# Patient Record
Sex: FEMALE | Race: WHITE | NOT HISPANIC OR LATINO | Employment: OTHER | URBAN - METROPOLITAN AREA
[De-identification: names, ages, dates, MRNs, and addresses within clinical notes are randomized per-mention and may not be internally consistent; named-entity substitution may affect disease eponyms.]

---

## 2017-01-06 ENCOUNTER — HOSPITAL ENCOUNTER (OUTPATIENT)
Dept: ONCOLOGY | Facility: CLINIC | Age: 72
Discharge: HOME | End: 2017-01-06
Attending: FAMILY MEDICINE | Admitting: FAMILY MEDICINE

## 2017-01-06 LAB — GLUCOSE BLD-MCNC: 99 MG/DL (ref 70–105)

## 2017-04-20 ENCOUNTER — HOSPITAL ENCOUNTER (OUTPATIENT)
Dept: CT IMAGING | Facility: HOSPITAL | Age: 72
Discharge: HOME | End: 2017-04-20
Attending: NURSE PRACTITIONER | Admitting: NURSE PRACTITIONER

## 2017-08-10 ENCOUNTER — HOSPITAL ENCOUNTER (OUTPATIENT)
Dept: MRI IMAGING | Facility: HOSPITAL | Age: 72
Discharge: HOME | End: 2017-08-10
Attending: FAMILY MEDICINE | Admitting: FAMILY MEDICINE

## 2017-08-24 ENCOUNTER — HOSPITAL ENCOUNTER (OUTPATIENT)
Dept: OTHER | Facility: HOSPITAL | Age: 72
Discharge: HOME | End: 2017-08-24
Attending: NURSE PRACTITIONER | Admitting: NURSE PRACTITIONER

## 2017-09-13 ENCOUNTER — HOSPITAL ENCOUNTER (OUTPATIENT)
Dept: CARDIOLOGY | Facility: HOSPITAL | Age: 72
Discharge: HOME | End: 2017-09-13
Attending: INTERNAL MEDICINE | Admitting: INTERNAL MEDICINE

## 2017-10-20 ENCOUNTER — HOSPITAL ENCOUNTER (OUTPATIENT)
Dept: CT IMAGING | Facility: HOSPITAL | Age: 72
Discharge: HOME | End: 2017-10-20
Attending: FAMILY MEDICINE | Admitting: FAMILY MEDICINE

## 2017-12-14 ENCOUNTER — HOSPITAL ENCOUNTER (OUTPATIENT)
Dept: CARDIOLOGY | Facility: HOSPITAL | Age: 72
Discharge: HOME | End: 2017-12-14
Attending: INTERNAL MEDICINE | Admitting: INTERNAL MEDICINE

## 2018-01-05 ENCOUNTER — HOSPITAL ENCOUNTER (OUTPATIENT)
Dept: OTHER | Facility: HOSPITAL | Age: 73
Discharge: HOME | End: 2018-01-05
Attending: SURGERY | Admitting: SURGERY

## 2018-01-05 LAB — CREAT BLDA-MCNC: 1 MG/DL (ref 0.6–1.3)

## 2018-03-16 ENCOUNTER — HOSPITAL ENCOUNTER (OUTPATIENT)
Dept: LAB | Facility: HOSPITAL | Age: 73
Discharge: HOME | End: 2018-03-16
Attending: FAMILY MEDICINE | Admitting: FAMILY MEDICINE

## 2018-06-28 ENCOUNTER — HOSPITAL ENCOUNTER (OUTPATIENT)
Dept: GENERAL RADIOLOGY | Facility: HOSPITAL | Age: 73
Discharge: HOME | End: 2018-06-28
Attending: NURSE PRACTITIONER | Admitting: NURSE PRACTITIONER

## 2019-08-01 ENCOUNTER — OFFICE VISIT (OUTPATIENT)
Dept: CARDIOLOGY | Facility: CLINIC | Age: 74
End: 2019-08-01

## 2019-08-01 VITALS
HEART RATE: 88 BPM | OXYGEN SATURATION: 91 % | WEIGHT: 146.4 LBS | SYSTOLIC BLOOD PRESSURE: 127 MMHG | DIASTOLIC BLOOD PRESSURE: 70 MMHG | RESPIRATION RATE: 18 BRPM

## 2019-08-01 DIAGNOSIS — E78.5 DYSLIPIDEMIA: ICD-10-CM

## 2019-08-01 DIAGNOSIS — I50.22 CHRONIC SYSTOLIC CONGESTIVE HEART FAILURE (HCC): ICD-10-CM

## 2019-08-01 DIAGNOSIS — J44.9 CHRONIC OBSTRUCTIVE PULMONARY DISEASE, UNSPECIFIED COPD TYPE (HCC): ICD-10-CM

## 2019-08-01 DIAGNOSIS — I25.10 CORONARY ARTERY DISEASE INVOLVING NATIVE CORONARY ARTERY OF NATIVE HEART WITHOUT ANGINA PECTORIS: Primary | ICD-10-CM

## 2019-08-01 DIAGNOSIS — I71.40 ABDOMINAL AORTIC ANEURYSM (AAA) WITHOUT RUPTURE (HCC): ICD-10-CM

## 2019-08-01 DIAGNOSIS — I10 ESSENTIAL HYPERTENSION: ICD-10-CM

## 2019-08-01 PROCEDURE — 99214 OFFICE O/P EST MOD 30 MIN: CPT | Performed by: INTERNAL MEDICINE

## 2019-08-01 PROCEDURE — 93000 ELECTROCARDIOGRAM COMPLETE: CPT | Performed by: INTERNAL MEDICINE

## 2019-08-01 RX ORDER — CLOPIDOGREL BISULFATE 75 MG/1
75 TABLET ORAL DAILY
COMMUNITY
Start: 2017-05-04

## 2019-08-01 RX ORDER — NITROGLYCERIN 0.4 MG/1
0.4 TABLET SUBLINGUAL
COMMUNITY
Start: 2017-12-12 | End: 2021-07-21 | Stop reason: SDUPTHER

## 2019-08-01 RX ORDER — ESTRADIOL 1 MG/1
3 TABLET ORAL NIGHTLY
COMMUNITY
Start: 2017-06-28

## 2019-08-01 RX ORDER — METHOCARBAMOL 500 MG/1
500 TABLET, FILM COATED ORAL 3 TIMES DAILY PRN
COMMUNITY

## 2019-08-01 RX ORDER — LORAZEPAM 1 MG/1
1 TABLET ORAL 4 TIMES DAILY PRN
COMMUNITY
Start: 2017-05-04

## 2019-08-01 RX ORDER — LEVOTHYROXINE SODIUM 0.05 MG/1
50 TABLET ORAL DAILY
COMMUNITY
End: 2019-11-18

## 2019-08-01 RX ORDER — BUDESONIDE AND FORMOTEROL FUMARATE DIHYDRATE 160; 4.5 UG/1; UG/1
2 AEROSOL RESPIRATORY (INHALATION) 2 TIMES DAILY
COMMUNITY
Start: 2018-07-30 | End: 2019-11-18

## 2019-08-01 RX ORDER — ISOSORBIDE MONONITRATE 30 MG/1
30 TABLET, EXTENDED RELEASE ORAL EVERY 24 HOURS
COMMUNITY
Start: 2018-06-27

## 2019-08-01 RX ORDER — FERROUS SULFATE TAB EC 324 MG (65 MG FE EQUIVALENT) 324 (65 FE) MG
324 TABLET DELAYED RESPONSE ORAL
COMMUNITY

## 2019-08-01 RX ORDER — HYDROCODONE BITARTRATE AND ACETAMINOPHEN 10; 325 MG/1; MG/1
1 TABLET ORAL EVERY 6 HOURS PRN
COMMUNITY
Start: 2017-05-04

## 2019-08-01 RX ORDER — DIPHENHYDRAMINE HYDROCHLORIDE 25 MG/1
1 TABLET ORAL DAILY
COMMUNITY
End: 2022-03-03

## 2019-08-01 RX ORDER — ROSUVASTATIN CALCIUM 10 MG/1
10 TABLET, COATED ORAL DAILY
COMMUNITY
Start: 2017-05-04 | End: 2022-03-18 | Stop reason: HOSPADM

## 2019-08-01 RX ORDER — GABAPENTIN 400 MG/1
400 CAPSULE ORAL 3 TIMES DAILY
COMMUNITY
Start: 2017-05-04

## 2019-08-01 RX ORDER — SERTRALINE HYDROCHLORIDE 100 MG/1
100 TABLET, FILM COATED ORAL DAILY
COMMUNITY
Start: 2017-05-04

## 2019-08-01 RX ORDER — RANOLAZINE 1000 MG/1
1000 TABLET, EXTENDED RELEASE ORAL EVERY 12 HOURS SCHEDULED
COMMUNITY
Start: 2018-07-30 | End: 2020-02-18

## 2019-08-01 NOTE — PROGRESS NOTES
"Cardiology Office Visit      Encounter Date:  08/01/2019    Patient ID:   Gayathri Reddy is a 74 y.o. female.    Reason For Followup:  CAD  Tako-Tsubo cardiomyopathy  HTN with hypertensive cardiovascular disease     Brief Clinical History:  Dear Dr. Hector, Deonte Patel MD    I had the pleasure of seeing Gayathri Reddy today. As you are well aware, this is a 74 y.o. female with an established history of CAD.  She has undergone 4-V CABG in 1995.  She has additional history of hypertension with hypertensive cardiovascular disease, HLD, COPD, and cardiomyopathy.  She presents today to follow-up on the above conditions.    Interval History:  She denies any chest pain, pressure, heaviness or tightness. She reports shortness of breath however it is not out of character.  She denies any PND orthopnea.  She denies any syncope or near-syncope. She has some palpitations from time to time as well.    She has experienced a couple of falls. The most recent was when she tripped over her oxygen tubing. She had another slip and fall in the shower. All of her falls have been mechanical. She also reports feeling shaky from time to time and these \"spells\" resolve when she lays down.    She underwent Cardiac catheterization in October 2018 with FFR and no significant treatable lesion was noted.  She did report a 75% improvement with Ranexa.  She is unable to afford this medication.  We will continue to work with patient assistance. She is getting samples however this may be difficult going forward due to the medication now being generic.      Assessment & Plan    Impressions:  CAD s/p CABG with attrition of 3/4 grafts. 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     Last echo with LVEF 45%  Oxygen dependant COPD  Chest pain with typical and atypical features.  HTN with HTCVD  AAA followed by Dr Lang  Angina pectoris significantly improved with Ranexa  Skin cancer on " "nose s/p surgery    Recommendations:  Consider melatonin to help with sleep  Try to check BP during \"spells\" and see if this is playing a role  Follow-up in 6 months, sooner should there be difficulties.    Objective:    Vitals:  Vitals:    08/01/19 1130   BP: 127/70   Pulse: 88   Resp: 18   SpO2: 91%   Weight: 66.4 kg (146 lb 6.4 oz)       Physical Exam:    General: Alert, cooperative, no distress, appears stated age  Head:  Normocephalic, atraumatic, mucous membranes moist  Eyes:  Conjunctiva/corneas clear, EOM's intact     Neck:  Supple,  no adenopathy;      Lungs: Clear to auscultation bilaterally, no wheezes rhonchi rales are noted  Chest wall: No tenderness  Heart::  Regular rate and rhythm, S1 and S2 normal, no murmur, rub or gallop  Abdomen: Soft, non-tender, nondistended bowel sounds active  Extremities: No cyanosis, clubbing, or edema  Pulses: 2+ and symmetric all extremities  Skin:  No rashes or lesions  Neuro/psych: A&O x3. CN II through XII are grossly intact with appropriate affect      Allergies:  Allergies   Allergen Reactions   • Naprosyn  [Naproxen] Rash   • Bactrim [Sulfamethoxazole-Trimethoprim] Rash       Medication Review:     Current Outpatient Medications:   •  Biotin (BIOTIN 5000) 5 MG capsule, Take 1 capsule by mouth Daily., Disp: , Rfl:   •  budesonide-formoterol (SYMBICORT) 160-4.5 MCG/ACT inhaler, Inhale 2 puffs 2 (Two) Times a Day., Disp: , Rfl:   •  clopidogrel (PLAVIX) 75 MG tablet, Take 1 tablet by mouth Daily., Disp: , Rfl:   •  estradiol (ESTRACE) 1 MG tablet, Take 1 tablet by mouth 3 (Three) Times a Day., Disp: , Rfl:   •  ferrous sulfate 324 (65 Fe) MG tablet delayed-release EC tablet, Take 324 mg by mouth Daily With Breakfast., Disp: , Rfl:   •  gabapentin (NEURONTIN) 400 MG capsule, Take 1 capsule by mouth 3 (Three) Times a Day., Disp: , Rfl:   •  HYDROcodone-acetaminophen (NORCO)  MG per tablet, Take 1 tablet by mouth Every 6 (Six) Hours As Needed., Disp: , Rfl:   •  " isosorbide mononitrate (IMDUR) 30 MG 24 hr tablet, Take 1 tablet by mouth Daily., Disp: , Rfl:   •  levothyroxine (SYNTHROID, LEVOTHROID) 50 MCG tablet, Take 50 mcg by mouth Daily., Disp: , Rfl:   •  LORazepam (ATIVAN) 1 MG tablet, Take 1 tablet by mouth 4 (Four) Times a Day As Needed., Disp: , Rfl:   •  methocarbamol (ROBAXIN) 500 MG tablet, Take 500 mg by mouth 3 (Three) Times a Day., Disp: , Rfl:   •  nitroglycerin (NITROSTAT) 0.4 MG SL tablet, Place 1 tablet under the tongue As Needed., Disp: , Rfl:   •  ranolazine (RANEXA) 1000 MG 12 hr tablet, Take 1 tablet by mouth Daily., Disp: , Rfl:   •  rosuvastatin (CRESTOR) 10 MG tablet, Take 1 tablet by mouth Daily., Disp: , Rfl:   •  sertraline (ZOLOFT) 100 MG tablet, Take 1 tablet by mouth Daily., Disp: , Rfl:   •  Tiotropium Bromide Monohydrate (SPIRIVA RESPIMAT) 1.25 MCG/ACT aerosol solution inhaler, Inhale 2 capsules 2 (Two) Times a Day., Disp: , Rfl:   •  Unable to find, Take 1 each by mouth 1 (One) Time. [employer] Sleep Aid Max strength, Disp: , Rfl:     Family History:  Family History   Problem Relation Age of Onset   • Heart disease Mother    • Aneurysm Mother    • Heart disease Father        Past Medical History:  Past Medical History:   Diagnosis Date   • Aneurysm (CMS/HCC)     AAA   • Arthritis    • COPD (chronic obstructive pulmonary disease) (CMS/HCC)    • Dyslipidemia    • GERD (gastroesophageal reflux disease)    • History of right heart catheterization     7/30/2017; 10/2018   • Hypertension    • Myocardial infarction (CMS/HCC)     x 3       Past surgical History:  Past Surgical History:   Procedure Laterality Date   • ABDOMINAL AORTIC ANEURYSM REPAIR N/A 2001   • CARDIAC CATHETERIZATION     • CATARACT EXTRACTION Bilateral    • CORONARY ANGIOPLASTY WITH STENT PLACEMENT N/A     x 5   • CORONARY ARTERY BYPASS GRAFT      x 4 1995   • HYSTERECTOMY N/A        Social History:  Social History     Socioeconomic History   • Marital status:      Spouse name:  Not on file   • Number of children: Not on file   • Years of education: Not on file   • Highest education level: Not on file   Tobacco Use   • Smoking status: Former Smoker   • Smokeless tobacco: Never Used   Substance and Sexual Activity   • Alcohol use: No     Frequency: Never   • Drug use: No       Review of Systems:  The following systems were reviewed as they relate to the cardiovascular system: Constitutional, Eyes, ENT, Cardiovascular, Respiratory, Gastrointestinal, Integumentary, Neurological, Psychiatric, Hematologic, Endocrine, Musculoskeletal, and Genitourinary. The pertinent cardiovascular findings are reported above with all other cardiovascular points within those systems being negative.    Diagnostic Study Review:     Current Electrocardiogram:    ECG 12 Lead  Date/Time: 8/1/2019 1:52 PM  Performed by: Roc Butler DO  Authorized by: Roc Butler DO   Comments: Sinus rhythm with a ventricular rate of 77 beats per minute. Incomplete LBBB. 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 Normal QT and QTc intervals. Left axis deviation.            Most recent Cardiac Catheterization:  Date:  Findings:    Most Recent Echocardiogram:  Date:  Findings:    Most Recent Stress Test:  Date:  Findings:    Most Recent Ambulatory ECG Monitor:  Date:  Findings:    NOTE: The following portions of the patient's history were reviewed and updated this visit as appropriate: allergies, current medications, past family history, past medical history, past social history, past surgical history and problem list.

## 2019-08-01 NOTE — PATIENT INSTRUCTIONS
"Consider Melatonin for slep aid  Check blood pressure and heart rate with \"spells\" and notify us of values  Follow-up in 6 months, sooner if there ar issues.  "

## 2019-08-09 PROBLEM — E78.5 DYSLIPIDEMIA: Status: ACTIVE | Noted: 2017-08-21

## 2019-08-09 PROBLEM — I50.9 CONGESTIVE HEART FAILURE (HCC): Status: ACTIVE | Noted: 2017-08-21

## 2019-08-09 PROBLEM — I25.10 CORONARY ARTERY DISEASE: Status: ACTIVE | Noted: 2017-08-21

## 2019-08-09 PROBLEM — I10 HYPERTENSION: Status: ACTIVE | Noted: 2017-08-21

## 2019-08-09 PROBLEM — J44.9 CHRONIC OBSTRUCTIVE PULMONARY DISEASE (HCC): Status: ACTIVE | Noted: 2017-08-21

## 2019-08-09 PROBLEM — I71.40 ABDOMINAL AORTIC ANEURYSM (AAA) (HCC): Status: ACTIVE | Noted: 2017-06-28

## 2019-10-31 ENCOUNTER — TRANSCRIBE ORDERS (OUTPATIENT)
Dept: ADMINISTRATIVE | Facility: HOSPITAL | Age: 74
End: 2019-10-31

## 2019-10-31 DIAGNOSIS — J44.9 CHRONIC OBSTRUCTIVE PULMONARY DISEASE, UNSPECIFIED COPD TYPE (HCC): Primary | ICD-10-CM

## 2019-11-14 ENCOUNTER — HOSPITAL ENCOUNTER (EMERGENCY)
Facility: HOSPITAL | Age: 74
Discharge: HOME | End: 2019-11-14
Admitting: EMERGENCY MEDICINE

## 2019-11-14 ENCOUNTER — APPOINTMENT (OUTPATIENT)
Dept: CT IMAGING | Facility: HOSPITAL | Age: 74
End: 2019-11-14

## 2019-11-14 ENCOUNTER — HOSPITAL ENCOUNTER (OUTPATIENT)
Dept: RESPIRATORY THERAPY | Facility: HOSPITAL | Age: 74
Discharge: HOME | End: 2019-11-14
Admitting: INTERNAL MEDICINE

## 2019-11-14 VITALS
RESPIRATION RATE: 16 BRPM | SYSTOLIC BLOOD PRESSURE: 171 MMHG | HEART RATE: 78 BPM | HEIGHT: 64 IN | TEMPERATURE: 98 F | BODY MASS INDEX: 24.92 KG/M2 | OXYGEN SATURATION: 96 % | WEIGHT: 146 LBS | DIASTOLIC BLOOD PRESSURE: 78 MMHG

## 2019-11-14 DIAGNOSIS — R51.9 NONINTRACTABLE HEADACHE, UNSPECIFIED CHRONICITY PATTERN, UNSPECIFIED HEADACHE TYPE: ICD-10-CM

## 2019-11-14 DIAGNOSIS — S16.1XXA STRAIN OF NECK MUSCLE, INITIAL ENCOUNTER: ICD-10-CM

## 2019-11-14 DIAGNOSIS — J44.9 CHRONIC OBSTRUCTIVE PULMONARY DISEASE, UNSPECIFIED COPD TYPE (HCC): ICD-10-CM
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
## 2019-11-14 DIAGNOSIS — S00.03XA CONTUSION OF SCALP, INITIAL ENCOUNTER: ICD-10-CM

## 2019-11-14 DIAGNOSIS — J06.9 UPPER RESPIRATORY TRACT INFECTION, UNSPECIFIED TYPE: ICD-10-CM

## 2019-11-14 PROCEDURE — 72125 CT NECK SPINE W/O DYE: CPT

## 2019-11-14 PROCEDURE — 99284 EMERGENCY DEPT VISIT MOD MDM: CPT

## 2019-11-14 PROCEDURE — 94727 GAS DIL/WSHOT DETER LNG VOL: CPT

## 2019-11-14 PROCEDURE — 94060 EVALUATION OF WHEEZING: CPT

## 2019-11-14 PROCEDURE — 94729 DIFFUSING CAPACITY: CPT

## 2019-11-14 PROCEDURE — 70450 CT HEAD/BRAIN W/O DYE: CPT

## 2019-11-14 RX ORDER — ALBUTEROL SULFATE 2.5 MG/3ML
2.5 SOLUTION RESPIRATORY (INHALATION) ONCE
Status: COMPLETED | OUTPATIENT
Start: 2019-11-14 | End: 2019-11-14

## 2019-11-14 RX ORDER — BENZONATATE 100 MG/1
100 CAPSULE ORAL 3 TIMES DAILY PRN
Qty: 21 CAPSULE | Refills: 0 | Status: SHIPPED | OUTPATIENT
Start: 2019-11-14 | End: 2022-02-18

## 2019-11-14 RX ORDER — FLUTICASONE PROPIONATE 50 MCG
2 SPRAY, SUSPENSION (ML) NASAL DAILY
Qty: 9.9 ML | Refills: 0 | Status: SHIPPED | OUTPATIENT
Start: 2019-11-14 | End: 2022-02-18 | Stop reason: ALTCHOICE

## 2019-11-14 RX ADMIN — ALBUTEROL SULFATE 2.5 MG: 2.5 SOLUTION RESPIRATORY (INHALATION) at 15:53

## 2019-11-15 NOTE — ED NOTES
Pt fell reports lost balance, c/o HA, neck pain and back pain      Delphine Samaniego RN  11/14/19 9120

## 2019-11-15 NOTE — ED PROVIDER NOTES
Subjective   Chief complaint: Fall      Context: Patient is a 74-year-old female who comes in amatory by private vehicle with complaints of headache and neck pain after she tripped and fell backwards.  She denies any loss of consciousness unilateral focal deficits weakness confusion ataxia lethargy visual changes saddle anesthesia bowel or bladder incontinence retention or weakness.    Duration:1930    Timing: waxes and wanes    Severity: moderate    Associated symptoms: worse with rom          PCP: chasidy              Review of Systems   Constitutional: Negative.    HENT: Positive for congestion.    Eyes: Negative.    Respiratory: Positive for cough.    Cardiovascular: Negative.    Gastrointestinal: Negative.    Genitourinary: Negative.    Musculoskeletal: Positive for neck pain.   Skin: Negative.    Neurological: Positive for headaches.   Hematological: Bruises/bleeds easily.       Past Medical History:   Diagnosis Date   • Aneurysm (CMS/[facility])     AAA   • Arthritis    • COPD (chronic obstructive pulmonary disease) (CMS/[facility])    • Dyslipidemia    • GERD (gastroesophageal reflux disease)    • History of right heart catheterization     7/30/2017; 10/2018   • Hypertension    • Myocardial infarction (CMS/[facility])     x 3       Allergies   Allergen Reactions   • Naprosyn  [Naproxen] Rash   • Bactrim [Sulfamethoxazole-Trimethoprim] Rash       Past Surgical History:   Procedure Laterality Date   • ABDOMINAL AORTIC ANEURYSM REPAIR N/A 2001   • CARDIAC CATHETERIZATION     • CATARACT EXTRACTION Bilateral    • CORONARY ANGIOPLASTY WITH STENT PLACEMENT N/A     x 5   • CORONARY ARTERY BYPASS GRAFT      x 4 1995   • HYSTERECTOMY N/A        Family History   Problem Relation Age of Onset   • Heart disease Mother    • Aneurysm Mother    • Heart disease Father        Social History     Socioeconomic History   • Marital status:      Spouse name: Not on file   • Number of children: Not on file   • Years of education: Not on file   •  Highest education level: Not on file   Tobacco Use   • Smoking status: Former Smoker   • Smokeless tobacco: Never Used   Substance and Sexual Activity   • Alcohol use: No     Frequency: Never   • Drug use: No           Objective   Physical Exam     Vital signs and traige nurse note reviewed.  Constitutional:  Awake, alert; well developed and well nourished.  No acute distress, the patient is examined in hospital gown.  HEENT:  Normocephalic, atraumatic; pupils are PERRL with intact EOM; oropharynx is pink and moist without edema or erythema.  TMs intact bilaterally without erythema bulging bleeding or discharge.  Minor amount of clear rhinorrhea.  There is some postnasal drip noted in the posterior oropharynx  Neck: Supple,   Cardiovascular: Regular rate and rhythm, normal S1-S2. . Murmur  Pulmonary: Respiratory effort regular, nonlabored; breath sounds CTA without wheezing or rhonchi.  Abdomen: Soft, nontender, nondistended with normoactive bowel sounds; no rebound or guarding.    Musculoskeletal: Independent range of motion of all extremities, no palpable tenderness or edema.   Back:  Spine is midline and without midline tenderness on palpation of , thoracic, and lumbar spine; some midline tenderness over the cervical spine without any bony step-off or crepitus; paraspinal musculature is tender bilateral trapezius muscles and without soft tissue swelling, overlying ecchymosis or erythema. ROM is without pain,  Distal muscle strength is 5/5.  Neuro: Alert oriented x3, speech is clear and appropriate. strong and equal dorsiflexion of bilateral great toes L4, L5, S1 motor sensory intact.    Patient oxygen saturation 94% on her baseline 2 L nasal cannula.  Triage saturation of 81% erroneous as triage nurse states he put the heart rate in the SPO2 box.    Procedures           ED Course        Labs Reviewed - No data to display  Medications - No data to display  Ct Head Without Contrast    Result Date: 11/14/2019  No  evidence of hemorrhage, mass effect or midline shift. No acute process identified.  Electronically Signed ByUsha Granger On:11/14/2019 9:42 PM This report was finalized on [id number] by  Andres Landry    Ct Cervical Spine Without Contrast    Result Date: 11/14/2019  No acute osseous abnormality.  Electronically Signed ByUsha Granger On:11/14/2019 9:44 PM This report was finalized on [id number] by  Andres Landry              MDM  Number of Diagnoses or Management Options  Contusion of scalp, initial encounter:   Fall, initial encounter:   Nonintractable headache, unspecified chronicity pattern, unspecified headache type:   Strain of neck muscle, initial encounter:   Upper respiratory tract infection, unspecified type:   Diagnosis management comments: Medical decision  Comorbidities:  has a past medical history of Aneurysm (CMS/[facility]), Arthritis, COPD (chronic obstructive pulmonary disease) (CMS/[facility]), Dyslipidemia, GERD (gastroesophageal reflux disease), History of right heart catheterization, Hypertension, and Myocardial infarction (CMS/[facility]).  Differentials: ICH fracture concussion  Radiology interpretation:  X-rays reviewed by me and interpreted by radiologist,   Ct Head Without Contrast    Result Date: 11/14/2019  No evidence of hemorrhage, mass effect or midline shift. No acute process identified.  Electronically Signed ByUsha Granger On:11/14/2019 9:42 PM This report was finalized on [id number] by  Daniella Granger, Andres    Ct Cervical Spine Without Contrast    Result Date: 11/14/2019  No acute osseous abnormality.  Electronically Signed ByUsha Granger On:11/14/2019 9:44 PM This report was finalized on [id number] by  Andres Landry    Lab interpretation: Agrees not warranted    On reexam patient is awake alert and oriented neurologically intact  We discussed signs and symptoms of when to return emergently to the ER and warning precautions for ICH    Charge patient states she has had some nasal congestion  and postnasal drip.  Physical exam and ROS updated a states she has had congestion for approximately 2 days.  S above.  Patient will also be discharged home with Tessalon and Flonase-we discussed antibiotic's were not warranted at this time.    i discussed findings with patient who voices understanding of discharge instructions, signs and symptoms requiring return to ED; discharged improved and in stable condition with follow up for re-evaluation.      Patient Progress  Patient progress: stable      Final diagnoses:   Fall, initial encounter   Nonintractable headache, unspecified chronicity pattern, unspecified headache type   Strain of neck muscle, initial encounter   Contusion of scalp, initial encounter   Upper respiratory tract infection, unspecified type              Susanne Aguila, APRN  11/14/19 2216       Susanne Aguila, APRN  11/14/19 6515

## 2019-11-18 ENCOUNTER — HOSPITAL ENCOUNTER (INPATIENT)
Facility: HOSPITAL | Age: 74
LOS: 6 days | Discharge: HOME | End: 2019-11-24
Attending: INTERNAL MEDICINE | Admitting: INTERNAL MEDICINE

## 2019-11-18 ENCOUNTER — APPOINTMENT (OUTPATIENT)
Dept: GENERAL RADIOLOGY | Facility: HOSPITAL | Age: 74
End: 2019-11-18

## 2019-11-18 ENCOUNTER — HOSPITAL ENCOUNTER (INPATIENT)
Dept: CARDIOLOGY | Facility: HOSPITAL | Age: 74
Discharge: HOME | End: 2019-11-18

## 2019-11-18 DIAGNOSIS — R05.9 COUGH: ICD-10-CM

## 2019-11-18 DIAGNOSIS — I50.9 ACUTE HEART FAILURE, UNSPECIFIED HEART FAILURE TYPE (HCC): ICD-10-CM

## 2019-11-18 DIAGNOSIS — R50.9 FEVER, UNSPECIFIED FEVER CAUSE: ICD-10-CM

## 2019-11-18 DIAGNOSIS — J18.9 COMMUNITY ACQUIRED PNEUMONIA, UNSPECIFIED LATERALITY: Primary | ICD-10-CM

## 2019-11-18 LAB
ABO GROUP BLD: NORMAL
ALBUMIN SERPL-MCNC: 3.2 G/DL (ref 3.5–5.2)
ALBUMIN SERPL-MCNC: 3.6 G/DL (ref 3.5–5.2)
ALBUMIN/GLOB SERPL: 1.1 G/DL
ALBUMIN/GLOB SERPL: 1.2 G/DL
ALP SERPL-CCNC: 67 U/L (ref 39–117)
ALP SERPL-CCNC: 72 U/L (ref 39–117)
ALT SERPL W P-5'-P-CCNC: 6 U/L (ref 1–33)
ALT SERPL W P-5'-P-CCNC: 6 U/L (ref 1–33)
ANION GAP SERPL CALCULATED.3IONS-SCNC: 10 MMOL/L (ref 5–15)
ANION GAP SERPL CALCULATED.3IONS-SCNC: 12 MMOL/L (ref 5–15)
ANION GAP SERPL CALCULATED.3IONS-SCNC: 9 MMOL/L (ref 5–15)
AST SERPL-CCNC: 11 U/L (ref 1–32)
AST SERPL-CCNC: 11 U/L (ref 1–32)
B PERT DNA SPEC QL NAA+PROBE: NOT DETECTED
BASOPHILS # BLD AUTO: 0 10*3/MM3 (ref 0–0.2)
BASOPHILS # BLD AUTO: 0.1 10*3/MM3 (ref 0–0.2)
BASOPHILS NFR BLD AUTO: 0.5 % (ref 0–1.5)
BASOPHILS NFR BLD AUTO: 0.6 % (ref 0–1.5)
BILIRUB SERPL-MCNC: 0.3 MG/DL (ref 0.2–1.2)
BILIRUB SERPL-MCNC: 0.6 MG/DL (ref 0.2–1.2)
BILIRUB UR QL STRIP: NEGATIVE
BLD GP AB SCN SERPL QL: NEGATIVE
BUN BLD-MCNC: 13 MG/DL (ref 8–23)
BUN BLD-MCNC: 16 MG/DL (ref 8–23)
BUN BLD-MCNC: 18 MG/DL (ref 8–23)
BUN/CREAT SERPL: 12.9 (ref 7–25)
BUN/CREAT SERPL: 13.2 (ref 7–25)
BUN/CREAT SERPL: 14.4 (ref 7–25)
C PNEUM DNA NPH QL NAA+NON-PROBE: NOT DETECTED
CALCIUM SPEC-SCNC: 8.2 MG/DL (ref 8.6–10.5)
CALCIUM SPEC-SCNC: 8.7 MG/DL (ref 8.6–10.5)
CALCIUM SPEC-SCNC: 8.9 MG/DL (ref 8.6–10.5)
CHLORIDE SERPL-SCNC: 98 MMOL/L (ref 98–107)
CHLORIDE SERPL-SCNC: 99 MMOL/L (ref 98–107)
CHLORIDE SERPL-SCNC: 99 MMOL/L (ref 98–107)
CLARITY UR: CLEAR
CO2 SERPL-SCNC: 28 MMOL/L (ref 22–29)
CO2 SERPL-SCNC: 29 MMOL/L (ref 22–29)
CO2 SERPL-SCNC: 30 MMOL/L (ref 22–29)
COLOR UR: YELLOW
CREAT BLD-MCNC: 1.01 MG/DL (ref 0.57–1)
CREAT BLD-MCNC: 1.11 MG/DL (ref 0.57–1)
CREAT BLD-MCNC: 1.36 MG/DL (ref 0.57–1)
D-LACTATE SERPL-SCNC: 0.9 MMOL/L (ref 0.5–2)
DEPRECATED RDW RBC AUTO: 47.3 FL (ref 37–54)
DEPRECATED RDW RBC AUTO: 47.7 FL (ref 37–54)
EOSINOPHIL # BLD AUTO: 0 10*3/MM3 (ref 0–0.4)
EOSINOPHIL # BLD AUTO: 0 10*3/MM3 (ref 0–0.4)
EOSINOPHIL NFR BLD AUTO: 0.2 % (ref 0.3–6.2)
EOSINOPHIL NFR BLD AUTO: 0.5 % (ref 0.3–6.2)
ERYTHROCYTE [DISTWIDTH] IN BLOOD BY AUTOMATED COUNT: 13.4 % (ref 12.3–15.4)
ERYTHROCYTE [DISTWIDTH] IN BLOOD BY AUTOMATED COUNT: 13.5 % (ref 12.3–15.4)
FLUAV H1 2009 PAND RNA NPH QL NAA+PROBE: NOT DETECTED
FLUAV H1 HA GENE NPH QL NAA+PROBE: NOT DETECTED
FLUAV H3 RNA NPH QL NAA+PROBE: NOT DETECTED
FLUAV SUBTYP SPEC NAA+PROBE: NOT DETECTED
FLUBV RNA ISLT QL NAA+PROBE: NOT DETECTED
GFR SERPL CREATININE-BSD FRML MDRD: 38 ML/MIN/1.73
GFR SERPL CREATININE-BSD FRML MDRD: 48 ML/MIN/1.73
GFR SERPL CREATININE-BSD FRML MDRD: 54 ML/MIN/1.73
GLOBULIN UR ELPH-MCNC: 3 GM/DL
GLOBULIN UR ELPH-MCNC: 3.1 GM/DL
GLUCOSE BLD-MCNC: 126 MG/DL (ref 65–99)
GLUCOSE BLD-MCNC: 127 MG/DL (ref 65–99)
GLUCOSE BLD-MCNC: 149 MG/DL (ref 65–99)
GLUCOSE UR STRIP-MCNC: NEGATIVE MG/DL
HADV DNA SPEC NAA+PROBE: NOT DETECTED
HCOV 229E RNA SPEC QL NAA+PROBE: NOT DETECTED
HCOV HKU1 RNA SPEC QL NAA+PROBE: NOT DETECTED
HCOV NL63 RNA SPEC QL NAA+PROBE: NOT DETECTED
HCOV OC43 RNA SPEC QL NAA+PROBE: NOT DETECTED
HCT VFR BLD AUTO: 22.4 % (ref 34–46.6)
HCT VFR BLD AUTO: 23.3 % (ref 34–46.6)
HCT VFR BLD AUTO: 25.8 % (ref 34–46.6)
HCT VFR BLD AUTO: 26.2 % (ref 34–46.6)
HGB BLD-MCNC: 7.7 G/DL (ref 12–15.9)
HGB BLD-MCNC: 7.9 G/DL (ref 12–15.9)
HGB BLD-MCNC: 8.5 G/DL (ref 12–15.9)
HGB BLD-MCNC: 9.1 G/DL (ref 12–15.9)
HGB UR QL STRIP.AUTO: NEGATIVE
HMPV RNA NPH QL NAA+NON-PROBE: NOT DETECTED
HOLD SPECIMEN: NORMAL
HOLD SPECIMEN: NORMAL
HPIV1 RNA SPEC QL NAA+PROBE: NOT DETECTED
HPIV2 RNA SPEC QL NAA+PROBE: NOT DETECTED
HPIV3 RNA NPH QL NAA+PROBE: NOT DETECTED
HPIV4 P GENE NPH QL NAA+PROBE: NOT DETECTED
IRON 24H UR-MRATE: 10 MCG/DL (ref 37–145)
IRON 24H UR-MRATE: 10 MCG/DL (ref 37–145)
IRON SATN MFR SERPL: 4 % (ref 20–50)
KETONES UR QL STRIP: NEGATIVE
L PNEUMO1 AG UR QL IA: NEGATIVE
LDH SERPL-CCNC: 134 U/L (ref 135–214)
LEUKOCYTE ESTERASE UR QL STRIP.AUTO: NEGATIVE
LYMPHOCYTES # BLD AUTO: 1.6 10*3/MM3 (ref 0.7–3.1)
LYMPHOCYTES # BLD AUTO: 1.9 10*3/MM3 (ref 0.7–3.1)
LYMPHOCYTES NFR BLD AUTO: 16.2 % (ref 19.6–45.3)
LYMPHOCYTES NFR BLD AUTO: 21.7 % (ref 19.6–45.3)
M PNEUMO IGG SER IA-ACNC: NOT DETECTED
MAGNESIUM SERPL-MCNC: 1.6 MG/DL (ref 1.6–2.4)
MCH RBC QN AUTO: 33.8 PG (ref 26.6–33)
MCH RBC QN AUTO: 34.8 PG (ref 26.6–33)
MCHC RBC AUTO-ENTMCNC: 33.7 G/DL (ref 31.5–35.7)
MCHC RBC AUTO-ENTMCNC: 34.8 G/DL (ref 31.5–35.7)
MCV RBC AUTO: 100 FL (ref 79–97)
MCV RBC AUTO: 100.1 FL (ref 79–97)
MONOCYTES # BLD AUTO: 1.1 10*3/MM3 (ref 0.1–0.9)
MONOCYTES # BLD AUTO: 1.1 10*3/MM3 (ref 0.1–0.9)
MONOCYTES NFR BLD AUTO: 10.8 % (ref 5–12)
MONOCYTES NFR BLD AUTO: 13.1 % (ref 5–12)
NEUTROPHILS # BLD AUTO: 5.7 10*3/MM3 (ref 1.7–7)
NEUTROPHILS # BLD AUTO: 7 10*3/MM3 (ref 1.7–7)
NEUTROPHILS NFR BLD AUTO: 64.4 % (ref 42.7–76)
NEUTROPHILS NFR BLD AUTO: 72 % (ref 42.7–76)
NITRITE UR QL STRIP: NEGATIVE
NRBC BLD AUTO-RTO: 0 /100 WBC (ref 0–0.2)
NRBC BLD AUTO-RTO: 0.1 /100 WBC (ref 0–0.2)
NT-PROBNP SERPL-MCNC: 4031 PG/ML (ref 5–900)
PH UR STRIP.AUTO: <=5 [PH] (ref 5–8)
PLAT MORPH BLD: NORMAL
PLATELET # BLD AUTO: 145 10*3/MM3 (ref 140–450)
PLATELET # BLD AUTO: 177 10*3/MM3 (ref 140–450)
PMV BLD AUTO: 7.8 FL (ref 6–12)
PMV BLD AUTO: 7.9 FL (ref 6–12)
POTASSIUM BLD-SCNC: 3.7 MMOL/L (ref 3.5–5.2)
POTASSIUM BLD-SCNC: 4.1 MMOL/L (ref 3.5–5.2)
POTASSIUM BLD-SCNC: 4.2 MMOL/L (ref 3.5–5.2)
PROT SERPL-MCNC: 6.2 G/DL (ref 6–8.5)
PROT SERPL-MCNC: 6.7 G/DL (ref 6–8.5)
PROT UR QL STRIP: NEGATIVE
RBC # BLD AUTO: 2.33 10*6/MM3 (ref 3.77–5.28)
RBC # BLD AUTO: 2.62 10*6/MM3 (ref 3.77–5.28)
RBC MORPH BLD: NORMAL
RH BLD: NEGATIVE
RHINOVIRUS RNA SPEC NAA+PROBE: NOT DETECTED
RSV RNA NPH QL NAA+NON-PROBE: NOT DETECTED
SODIUM BLD-SCNC: 137 MMOL/L (ref 136–145)
SODIUM BLD-SCNC: 137 MMOL/L (ref 136–145)
SODIUM BLD-SCNC: 140 MMOL/L (ref 136–145)
SP GR UR STRIP: 1.01 (ref 1–1.03)
T&S EXPIRATION DATE: NORMAL
TIBC SERPL-MCNC: 232 MCG/DL (ref 298–536)
TRANSFERRIN SERPL-MCNC: 156 MG/DL (ref 200–360)
TROPONIN T SERPL-MCNC: <0.01 NG/ML (ref 0–0.03)
UROBILINOGEN UR QL STRIP: NORMAL
WBC MORPH BLD: NORMAL
WBC NRBC COR # BLD: 8.8 10*3/MM3 (ref 3.4–10.8)
WBC NRBC COR # BLD: 9.8 10*3/MM3 (ref 3.4–10.8)
WHOLE BLOOD HOLD SPECIMEN: NORMAL
WHOLE BLOOD HOLD SPECIMEN: NORMAL

## 2019-11-18 PROCEDURE — 86850 RBC ANTIBODY SCREEN: CPT | Performed by: INTERNAL MEDICINE

## 2019-11-18 PROCEDURE — 83880 ASSAY OF NATRIURETIC PEPTIDE: CPT | Performed by: INTERNAL MEDICINE

## 2019-11-18 PROCEDURE — 83605 ASSAY OF LACTIC ACID: CPT

## 2019-11-18 PROCEDURE — 25010000002 CEFTRIAXONE PER 250 MG: Performed by: NURSE PRACTITIONER

## 2019-11-18 PROCEDURE — 99285 EMERGENCY DEPT VISIT HI MDM: CPT

## 2019-11-18 PROCEDURE — 85025 COMPLETE CBC W/AUTO DIFF WBC: CPT | Performed by: INTERNAL MEDICINE

## 2019-11-18 PROCEDURE — 71046 X-RAY EXAM CHEST 2 VIEWS: CPT

## 2019-11-18 PROCEDURE — 86901 BLOOD TYPING SEROLOGIC RH(D): CPT | Performed by: INTERNAL MEDICINE

## 2019-11-18 PROCEDURE — 87899 AGENT NOS ASSAY W/OPTIC: CPT | Performed by: INTERNAL MEDICINE

## 2019-11-18 PROCEDURE — 94799 UNLISTED PULMONARY SVC/PX: CPT

## 2019-11-18 PROCEDURE — 87040 BLOOD CULTURE FOR BACTERIA: CPT | Performed by: NURSE PRACTITIONER

## 2019-11-18 PROCEDURE — 82607 VITAMIN B-12: CPT | Performed by: INTERNAL MEDICINE

## 2019-11-18 PROCEDURE — 85025 COMPLETE CBC W/AUTO DIFF WBC: CPT | Performed by: EMERGENCY MEDICINE

## 2019-11-18 PROCEDURE — 25010000002 FUROSEMIDE PER 20 MG: Performed by: INTERNAL MEDICINE

## 2019-11-18 PROCEDURE — 86901 BLOOD TYPING SEROLOGIC RH(D): CPT

## 2019-11-18 PROCEDURE — 85014 HEMATOCRIT: CPT | Performed by: INTERNAL MEDICINE

## 2019-11-18 PROCEDURE — 83735 ASSAY OF MAGNESIUM: CPT | Performed by: INTERNAL MEDICINE

## 2019-11-18 PROCEDURE — 85046 RETICYTE/HGB CONCENTRATE: CPT | Performed by: INTERNAL MEDICINE

## 2019-11-18 PROCEDURE — 86900 BLOOD TYPING SEROLOGIC ABO: CPT

## 2019-11-18 PROCEDURE — 0099U HC BIOFIRE FILMARRAY RESP PANEL 1: CPT | Performed by: NURSE PRACTITIONER

## 2019-11-18 PROCEDURE — 93005 ELECTROCARDIOGRAM TRACING: CPT | Performed by: INTERNAL MEDICINE

## 2019-11-18 PROCEDURE — 85018 HEMOGLOBIN: CPT | Performed by: INTERNAL MEDICINE

## 2019-11-18 PROCEDURE — 94640 AIRWAY INHALATION TREATMENT: CPT

## 2019-11-18 PROCEDURE — 85007 BL SMEAR W/DIFF WBC COUNT: CPT | Performed by: EMERGENCY MEDICINE

## 2019-11-18 PROCEDURE — 25010000002 FUROSEMIDE PER 20 MG: Performed by: NURSE PRACTITIONER

## 2019-11-18 PROCEDURE — 83880 ASSAY OF NATRIURETIC PEPTIDE: CPT | Performed by: EMERGENCY MEDICINE

## 2019-11-18 PROCEDURE — 81003 URINALYSIS AUTO W/O SCOPE: CPT | Performed by: INTERNAL MEDICINE

## 2019-11-18 PROCEDURE — 83540 ASSAY OF IRON: CPT | Performed by: INTERNAL MEDICINE

## 2019-11-18 PROCEDURE — 84466 ASSAY OF TRANSFERRIN: CPT | Performed by: INTERNAL MEDICINE

## 2019-11-18 PROCEDURE — 99223 1ST HOSP IP/OBS HIGH 75: CPT | Performed by: INTERNAL MEDICINE

## 2019-11-18 PROCEDURE — 93005 ELECTROCARDIOGRAM TRACING: CPT | Performed by: EMERGENCY MEDICINE

## 2019-11-18 PROCEDURE — 83615 LACTATE (LD) (LDH) ENZYME: CPT | Performed by: INTERNAL MEDICINE

## 2019-11-18 PROCEDURE — 82746 ASSAY OF FOLIC ACID SERUM: CPT | Performed by: INTERNAL MEDICINE

## 2019-11-18 PROCEDURE — 84484 ASSAY OF TROPONIN QUANT: CPT | Performed by: INTERNAL MEDICINE

## 2019-11-18 PROCEDURE — 36415 COLL VENOUS BLD VENIPUNCTURE: CPT

## 2019-11-18 PROCEDURE — 80053 COMPREHEN METABOLIC PANEL: CPT | Performed by: EMERGENCY MEDICINE

## 2019-11-18 PROCEDURE — 84484 ASSAY OF TROPONIN QUANT: CPT | Performed by: EMERGENCY MEDICINE

## 2019-11-18 PROCEDURE — 86900 BLOOD TYPING SEROLOGIC ABO: CPT | Performed by: INTERNAL MEDICINE

## 2019-11-18 PROCEDURE — 93306 TTE W/DOPPLER COMPLETE: CPT

## 2019-11-18 PROCEDURE — 80053 COMPREHEN METABOLIC PANEL: CPT | Performed by: INTERNAL MEDICINE

## 2019-11-18 RX ORDER — CHOLECALCIFEROL (VITAMIN D3) 125 MCG
5 CAPSULE ORAL NIGHTLY PRN
Status: DISCONTINUED | OUTPATIENT
Start: 2019-11-18 | End: 2019-11-24 | Stop reason: HOSPADM

## 2019-11-18 RX ORDER — ONDANSETRON 4 MG/1
4 TABLET, FILM COATED ORAL EVERY 6 HOURS PRN
Status: DISCONTINUED | OUTPATIENT
Start: 2019-11-18 | End: 2019-11-24 | Stop reason: HOSPADM

## 2019-11-18 RX ORDER — HYDROCODONE BITARTRATE AND ACETAMINOPHEN 10; 325 MG/1; MG/1
1 TABLET ORAL EVERY 4 HOURS PRN
Status: DISCONTINUED | OUTPATIENT
Start: 2019-11-18 | End: 2019-11-24 | Stop reason: HOSPADM

## 2019-11-18 RX ORDER — METHOCARBAMOL 500 MG/1
500 TABLET, FILM COATED ORAL 3 TIMES DAILY PRN
Status: DISCONTINUED | OUTPATIENT
Start: 2019-11-18 | End: 2019-11-24 | Stop reason: HOSPADM

## 2019-11-18 RX ORDER — IPRATROPIUM BROMIDE AND ALBUTEROL SULFATE 2.5; .5 MG/3ML; MG/3ML
3 SOLUTION RESPIRATORY (INHALATION)
Status: DISCONTINUED | OUTPATIENT
Start: 2019-11-18 | End: 2019-11-24 | Stop reason: HOSPADM

## 2019-11-18 RX ORDER — LEVOTHYROXINE AND LIOTHYRONINE 38; 9 UG/1; UG/1
60 TABLET ORAL DAILY
COMMUNITY
End: 2022-03-03 | Stop reason: DRUGHIGH

## 2019-11-18 RX ORDER — FUROSEMIDE 10 MG/ML
40 INJECTION INTRAMUSCULAR; INTRAVENOUS DAILY
Status: DISCONTINUED | OUTPATIENT
Start: 2019-11-19 | End: 2019-11-24 | Stop reason: HOSPADM

## 2019-11-18 RX ORDER — ESTRADIOL 1 MG/1
1 TABLET ORAL 3 TIMES DAILY
Status: DISCONTINUED | OUTPATIENT
Start: 2019-11-18 | End: 2019-11-24 | Stop reason: HOSPADM

## 2019-11-18 RX ORDER — LEVOTHYROXINE AND LIOTHYRONINE 19; 4.5 UG/1; UG/1
60 TABLET ORAL DAILY
Status: DISCONTINUED | OUTPATIENT
Start: 2019-11-19 | End: 2019-11-24 | Stop reason: HOSPADM

## 2019-11-18 RX ORDER — ACETAMINOPHEN 160 MG/5ML
650 SOLUTION ORAL EVERY 4 HOURS PRN
Status: DISCONTINUED | OUTPATIENT
Start: 2019-11-18 | End: 2019-11-24 | Stop reason: HOSPADM

## 2019-11-18 RX ORDER — DOCUSATE SODIUM 100 MG/1
100 CAPSULE, LIQUID FILLED ORAL DAILY
Status: DISCONTINUED | OUTPATIENT
Start: 2019-11-19 | End: 2019-11-24 | Stop reason: HOSPADM

## 2019-11-18 RX ORDER — GUAIFENESIN 600 MG/1
1200 TABLET, EXTENDED RELEASE ORAL EVERY 12 HOURS SCHEDULED
Status: DISCONTINUED | OUTPATIENT
Start: 2019-11-18 | End: 2019-11-24 | Stop reason: HOSPADM

## 2019-11-18 RX ORDER — ASPIRIN 81 MG/1
81 TABLET ORAL DAILY
Status: DISCONTINUED | OUTPATIENT
Start: 2019-11-19 | End: 2019-11-24 | Stop reason: HOSPADM

## 2019-11-18 RX ORDER — SODIUM CHLORIDE 0.9 % (FLUSH) 0.9 %
10 SYRINGE (ML) INJECTION AS NEEDED
Status: DISCONTINUED | OUTPATIENT
Start: 2019-11-18 | End: 2019-11-24 | Stop reason: HOSPADM

## 2019-11-18 RX ORDER — NITROGLYCERIN 0.4 MG/1
0.4 TABLET SUBLINGUAL
Status: DISCONTINUED | OUTPATIENT
Start: 2019-11-18 | End: 2019-11-24 | Stop reason: HOSPADM

## 2019-11-18 RX ORDER — RANOLAZINE 500 MG/1
1000 TABLET, EXTENDED RELEASE ORAL EVERY 12 HOURS SCHEDULED
Status: DISCONTINUED | OUTPATIENT
Start: 2019-11-18 | End: 2019-11-24 | Stop reason: HOSPADM

## 2019-11-18 RX ORDER — DOCUSATE SODIUM 100 MG/1
100 CAPSULE, LIQUID FILLED ORAL DAILY
Status: DISCONTINUED | OUTPATIENT
Start: 2019-11-18 | End: 2019-11-18

## 2019-11-18 RX ORDER — FLUTICASONE PROPIONATE 50 MCG
2 SPRAY, SUSPENSION (ML) NASAL DAILY
Status: DISCONTINUED | OUTPATIENT
Start: 2019-11-19 | End: 2019-11-24 | Stop reason: HOSPADM

## 2019-11-18 RX ORDER — ROSUVASTATIN CALCIUM 10 MG/1
10 TABLET, COATED ORAL DAILY
Status: DISCONTINUED | OUTPATIENT
Start: 2019-11-19 | End: 2019-11-24 | Stop reason: HOSPADM

## 2019-11-18 RX ORDER — CLOPIDOGREL BISULFATE 75 MG/1
75 TABLET ORAL DAILY
Status: DISCONTINUED | OUTPATIENT
Start: 2019-11-19 | End: 2019-11-24 | Stop reason: HOSPADM

## 2019-11-18 RX ORDER — ISOSORBIDE MONONITRATE 30 MG/1
30 TABLET, EXTENDED RELEASE ORAL EVERY 24 HOURS
Status: DISCONTINUED | OUTPATIENT
Start: 2019-11-19 | End: 2019-11-24 | Stop reason: HOSPADM

## 2019-11-18 RX ORDER — GABAPENTIN 400 MG/1
400 CAPSULE ORAL 3 TIMES DAILY
Status: DISCONTINUED | OUTPATIENT
Start: 2019-11-18 | End: 2019-11-24 | Stop reason: HOSPADM

## 2019-11-18 RX ORDER — LORAZEPAM 1 MG/1
1 TABLET ORAL 4 TIMES DAILY PRN
Status: DISCONTINUED | OUTPATIENT
Start: 2019-11-18 | End: 2019-11-24 | Stop reason: HOSPADM

## 2019-11-18 RX ORDER — FUROSEMIDE 10 MG/ML
20 INJECTION INTRAMUSCULAR; INTRAVENOUS ONCE
Status: COMPLETED | OUTPATIENT
Start: 2019-11-18 | End: 2019-11-18

## 2019-11-18 RX ORDER — ACETAMINOPHEN 650 MG/1
650 SUPPOSITORY RECTAL EVERY 4 HOURS PRN
Status: DISCONTINUED | OUTPATIENT
Start: 2019-11-18 | End: 2019-11-24 | Stop reason: HOSPADM

## 2019-11-18 RX ORDER — SODIUM CHLORIDE 0.9 % (FLUSH) 0.9 %
10 SYRINGE (ML) INJECTION EVERY 12 HOURS SCHEDULED
Status: DISCONTINUED | OUTPATIENT
Start: 2019-11-18 | End: 2019-11-24 | Stop reason: HOSPADM

## 2019-11-18 RX ORDER — HYDROCODONE BITARTRATE AND ACETAMINOPHEN 10; 325 MG/1; MG/1
1 TABLET ORAL EVERY 6 HOURS PRN
Status: DISCONTINUED | OUTPATIENT
Start: 2019-11-18 | End: 2019-11-24 | Stop reason: HOSPADM

## 2019-11-18 RX ORDER — FERROUS SULFATE TAB EC 324 MG (65 MG FE EQUIVALENT) 324 (65 FE) MG
324 TABLET DELAYED RESPONSE ORAL
Status: DISCONTINUED | OUTPATIENT
Start: 2019-11-19 | End: 2019-11-24 | Stop reason: HOSPADM

## 2019-11-18 RX ORDER — FUROSEMIDE 10 MG/ML
40 INJECTION INTRAMUSCULAR; INTRAVENOUS ONCE
Status: COMPLETED | OUTPATIENT
Start: 2019-11-18 | End: 2019-11-18

## 2019-11-18 RX ORDER — BENZONATATE 100 MG/1
100 CAPSULE ORAL 3 TIMES DAILY
Status: DISCONTINUED | OUTPATIENT
Start: 2019-11-18 | End: 2019-11-19

## 2019-11-18 RX ORDER — SERTRALINE HYDROCHLORIDE 100 MG/1
100 TABLET, FILM COATED ORAL DAILY
Status: DISCONTINUED | OUTPATIENT
Start: 2019-11-19 | End: 2019-11-24 | Stop reason: HOSPADM

## 2019-11-18 RX ORDER — ACETAMINOPHEN 325 MG/1
650 TABLET ORAL EVERY 4 HOURS PRN
Status: DISCONTINUED | OUTPATIENT
Start: 2019-11-18 | End: 2019-11-24 | Stop reason: HOSPADM

## 2019-11-18 RX ORDER — BENZONATATE 100 MG/1
100 CAPSULE ORAL 3 TIMES DAILY PRN
Status: DISCONTINUED | OUTPATIENT
Start: 2019-11-18 | End: 2019-11-20

## 2019-11-18 RX ORDER — FAMOTIDINE 20 MG/1
40 TABLET, FILM COATED ORAL DAILY
Status: DISCONTINUED | OUTPATIENT
Start: 2019-11-19 | End: 2019-11-18

## 2019-11-18 RX ORDER — ONDANSETRON 2 MG/ML
4 INJECTION INTRAMUSCULAR; INTRAVENOUS EVERY 6 HOURS PRN
Status: DISCONTINUED | OUTPATIENT
Start: 2019-11-18 | End: 2019-11-24 | Stop reason: HOSPADM

## 2019-11-18 RX ORDER — ACETAMINOPHEN 500 MG
1000 TABLET ORAL ONCE
Status: COMPLETED | OUTPATIENT
Start: 2019-11-18 | End: 2019-11-18

## 2019-11-18 RX ORDER — ASPIRIN 81 MG/1
81 TABLET ORAL DAILY
COMMUNITY

## 2019-11-18 RX ORDER — TRAMADOL HYDROCHLORIDE 50 MG/1
50 TABLET ORAL EVERY 6 HOURS PRN
Status: DISCONTINUED | OUTPATIENT
Start: 2019-11-18 | End: 2019-11-24 | Stop reason: HOSPADM

## 2019-11-18 RX ORDER — PANTOPRAZOLE SODIUM 40 MG/10ML
40 INJECTION, POWDER, LYOPHILIZED, FOR SOLUTION INTRAVENOUS EVERY 12 HOURS SCHEDULED
Status: DISCONTINUED | OUTPATIENT
Start: 2019-11-18 | End: 2019-11-24 | Stop reason: HOSPADM

## 2019-11-18 RX ADMIN — GABAPENTIN 400 MG: 400 CAPSULE ORAL at 21:13

## 2019-11-18 RX ADMIN — Medication 10 ML: at 11:43

## 2019-11-18 RX ADMIN — LORAZEPAM 1 MG: 1 TABLET ORAL at 21:14

## 2019-11-18 RX ADMIN — DOXYCYCLINE 100 MG: 100 INJECTION, POWDER, LYOPHILIZED, FOR SOLUTION INTRAVENOUS at 22:00

## 2019-11-18 RX ADMIN — FUROSEMIDE 20 MG: 10 INJECTION, SOLUTION INTRAMUSCULAR; INTRAVENOUS at 08:27

## 2019-11-18 RX ADMIN — IPRATROPIUM BROMIDE AND ALBUTEROL SULFATE 3 ML: .5; 3 SOLUTION RESPIRATORY (INHALATION) at 18:47

## 2019-11-18 RX ADMIN — PANTOPRAZOLE SODIUM 40 MG: 40 INJECTION, POWDER, FOR SOLUTION INTRAVENOUS at 21:14

## 2019-11-18 RX ADMIN — IPRATROPIUM BROMIDE AND ALBUTEROL SULFATE 3 ML: .5; 3 SOLUTION RESPIRATORY (INHALATION) at 12:02

## 2019-11-18 RX ADMIN — GABAPENTIN 400 MG: 400 CAPSULE ORAL at 16:25

## 2019-11-18 RX ADMIN — FUROSEMIDE 40 MG: 10 INJECTION, SOLUTION INTRAMUSCULAR; INTRAVENOUS at 21:14

## 2019-11-18 RX ADMIN — CEFTRIAXONE SODIUM 2 G: 10 INJECTION, POWDER, FOR SOLUTION INTRAVENOUS at 07:15

## 2019-11-18 RX ADMIN — BENZONATATE 100 MG: 100 CAPSULE ORAL at 21:13

## 2019-11-18 RX ADMIN — ESTRADIOL 1 MG: 1 TABLET ORAL at 21:13

## 2019-11-18 RX ADMIN — DOXYCYCLINE 100 MG: 100 INJECTION, POWDER, LYOPHILIZED, FOR SOLUTION INTRAVENOUS at 07:15

## 2019-11-18 RX ADMIN — RANOLAZINE 1000 MG: 500 TABLET, FILM COATED, EXTENDED RELEASE ORAL at 21:14

## 2019-11-18 RX ADMIN — Medication 10 ML: at 21:13

## 2019-11-18 RX ADMIN — ACETAMINOPHEN 1000 MG: 500 TABLET, FILM COATED ORAL at 08:27

## 2019-11-18 RX ADMIN — GUAIFENESIN 1200 MG: 600 TABLET, EXTENDED RELEASE ORAL at 21:14

## 2019-11-18 NOTE — ED NOTES
Pt was moved rooms due to delay on getting a IP room and the need for privacy. Request was placed for hospital bed     Lina Leach RN  11/18/19 2200     1 pair

## 2019-11-18 NOTE — H&P
Hospitalist Team      Patient Care Team:  Deonte Hector MD as PCP - General        Chief Complaint: Shortness of breath    Subjective  The patient is a 74-year-old female with a long-standing history of coronary artery disease who presents with a chief complaint of increasing shortness of breath.  The patient apparently follows with Dr. Booker look for her cardiac problems and is status post 5 stents and coronary artery bypass grafting.  The bypass was done in 1995, the stents apparently have been done more recently.    The patient reports that she began having increasing problems with cough and shortness of breath and decreasing ability to even get around her home environment.  The patient had fever up to 100.6, but other than that she has been unable to lie down at all due to her shortness of breath.  The patient is chronically on home oxygen as well as breathing treatments which she has continued to utilize but has not had any overall improvement.  The patient reports that her cough is nonproductive and is continuing throughout the night.  The patient denies any chest pain, but she feels rough all over.  Interval History and ROS:     Review of Systems   Constitutional: Negative for activity change, appetite change, fatigue and fever.   HENT: Negative for congestion, nosebleeds, postnasal drip and voice change.    Eyes: Negative for photophobia and discharge.   Respiratory: Positive for cough and shortness of breath. Negative for choking, chest tightness and wheezing.    Cardiovascular: Positive for leg swelling. Negative for chest pain.   Gastrointestinal: Negative for abdominal distention, abdominal pain, constipation, diarrhea and nausea.   Genitourinary: Negative for decreased urine volume, difficulty urinating, dysuria, frequency and urgency.   Musculoskeletal: Negative for arthralgias and myalgias.   Skin: Negative for rash.   Neurological: Negative for dizziness, syncope, facial asymmetry,  "light-headedness and headaches.   Psychiatric/Behavioral: Negative for agitation, behavioral problems, confusion and decreased concentration. The patient is not nervous/anxious.    All other systems reviewed and are negative.      Objective    Vital Signs  Temp:  [98.2 °F (36.8 °C)-100.2 °F (37.9 °C)] 98.3 °F (36.8 °C)  Heart Rate:  [] 72  Resp:  [14-16] 14  BP: ()/(31-96) 147/69  Oxygen Therapy  SpO2: 100 %  Device (Oxygen Therapy): nasal cannula  Flow (L/min): 4  Flowsheet Rows      First Filed Value   Admission Height  160 cm (63\") Documented at 11/18/2019 0629   Admission Weight  66.2 kg (145 lb 15.1 oz) Documented at 11/18/2019 0629        Intake & Output (last 3 days)       11/15 0701 - 11/16 0700 11/16 0701 - 11/17 0700 11/17 0701 - 11/18 0700 11/18 0701 - 11/19 0700    IV Piggyback    100    Total Intake(mL/kg)    100 (1.5)    Net    +100                Lines, Drains & Airways    Active LDAs     Name:   Placement date:   Placement time:   Site:   Days:    Peripheral IV 11/18/19 1057 Left Arm   11/18/19    1057    Arm   less than 1                Physical Exam:    General Appearance:    Alert, cooperative, in no acute distress.  She is on her oxygen at the time of my examination.   Head:    Normocephalic, without obvious abnormality, atraumatic   Eyes:            Lids and lashes normal, conjunctivae and sclerae normal, no   icterus, no pallor, corneas clear, PERRLA   Ears:    Ears appear intact with no abnormalities noted   Throat:   No oral lesions, no thrush, oral mucosa moist   Neck:   No adenopathy, supple, trachea midline, no thyromegaly, no     carotid bruit, no JVD   Back:     No kyphosis present, no scoliosis present, no skin lesions,       erythema or scars, no tenderness to percussion or                   palpation,   range of motion normal   Lungs:    Rales are present bilaterally in the bases on auscultation,respirations regular, even and                   unlabored    Heart:    " Regular rhythm and normal rate, normal S1 and S2, no            murmur, there is a gallop present, no rub, no click   Breast Exam:    Deferred   Abdomen:     Normal bowel sounds, no masses, no organomegaly, soft        non-tender, non-distended, no guarding, no rebound                 tenderness   Genitalia:    Deferred   Extremities:   Moves all extremities well, 2-3+ edema, no cyanosis, no              redness   Pulses:   Pulses palpable and equal bilaterally   Skin:   No bleeding, bruising or rash   Lymph nodes:   No palpable adenopathy   Neurologic:   Cranial nerves 2 - 12 grossly intact, sensation intact, DTR        present and equal bilaterally       Results Review:     I reviewed the patient's new clinical results.    Lab Results (last 24 hours)     Procedure Component Value Units Date/Time    Urinalysis With Microscopic If Indicated (No Culture) - Urine, Clean Catch [[id number]]  (Normal) Collected:  11/18/19 1623    Specimen:  Urine, Clean Catch Updated:  11/18/19 1705     Color, UA Yellow     Appearance, UA Clear     pH, UA <=5.0     Specific Gravity, UA 1.013     Glucose, UA Negative     Ketones, UA Negative     Bilirubin, UA Negative     Blood, UA Negative     Protein, UA Negative     Leuk Esterase, UA Negative     Nitrite, UA Negative     Urobilinogen, UA 0.2 E.U./dL    Narrative:       Urine microscopic not indicated.    Retic With IRF & RET-He [[id number]] Collected:  11/18/19 1628    Specimen:  Blood Updated:  11/18/19 1653    Troponin [[id number]] Collected:  11/18/19 1627    Specimen:  Blood Updated:  11/18/19 1653    Comprehensive Metabolic Panel [[id number]] Collected:  11/18/19 1627    Specimen:  Blood Updated:  11/18/19 1653    Vitamin B12 [[id number]] Collected:  11/18/19 1627    Specimen:  Blood Updated:  11/18/19 1653    Folate [[id number]] Collected:  11/18/19 1627    Specimen:  Blood Updated:  11/18/19 1653    Legionella Antigen, Urine - Urine, Urine, Clean Catch [[id number]] Collected:  11/18/19  1623    Specimen:  Urine, Clean Catch Updated:  11/18/19 1652    Iron [[id number]]  (Abnormal) Collected:  11/18/19 1203    Specimen:  Blood Updated:  11/18/19 1620     Iron 10 mcg/dL     Iron Profile [[id number]]  (Abnormal) Collected:  11/18/19 1203    Specimen:  Blood Updated:  11/18/19 1620     Iron 10 mcg/dL      Iron Saturation 4 %      Transferrin 156 mg/dL      TIBC 232 mcg/dL     Lactate Dehydrogenase [[id number]]  (Abnormal) Collected:  11/18/19 1203    Specimen:  Blood Updated:  11/18/19 1620      U/L     Hemoglobin & Hematocrit, Blood [[id number]]  (Abnormal) Collected:  11/18/19 1505    Specimen:  Blood Updated:  11/18/19 1528     Hemoglobin 7.7 g/dL      Hematocrit 22.4 %     Basic Metabolic Panel [[id number]]  (Abnormal) Collected:  11/18/19 1203    Specimen:  Blood Updated:  11/18/19 1228     Glucose 126 mg/dL      BUN 16 mg/dL      Creatinine 1.11 mg/dL      Sodium 137 mmol/L      Potassium 4.1 mmol/L      Chloride 99 mmol/L      CO2 28.0 mmol/L      Calcium 8.2 mg/dL      eGFR Non African Amer 48 mL/min/1.73      BUN/Creatinine Ratio 14.4     Anion Gap 10.0 mmol/L     Narrative:       GFR Normal >60  Chronic Kidney Disease <60  Kidney Failure <15    Magnesium [[id number]]  (Normal) Collected:  11/18/19 1203    Specimen:  Blood Updated:  11/18/19 1228     Magnesium 1.6 mg/dL     Troponin [[id number]]  (Normal) Collected:  11/18/19 1203    Specimen:  Blood Updated:  11/18/19 1228     Troponin T <0.010 ng/mL     Narrative:       Troponin T Reference Range:  <= 0.03 ng/mL-   Negative for AMI  >0.03 ng/mL-     Abnormal for myocardial necrosis.  Clinicians would have to utilize clinical acumen, EKG, Troponin and serial changes to determine if it is an Acute Myocardial Infarction or myocardial injury due to an underlying chronic condition.     CBC & Differential [[id number]] Collected:  11/18/19 1203    Specimen:  Blood Updated:  11/18/19 1211    Narrative:       The following orders were created for panel  order CBC & Differential.  Procedure                               Abnormality         Status                     ---------                               -----------         ------                     CBC Auto Differential[[id number]]        Abnormal            Final result                 Please view results for these tests on the individual orders.    CBC Auto Differential [[id number]]  (Abnormal) Collected:  11/18/19 1203    Specimen:  Blood Updated:  11/18/19 1211     WBC 8.80 10*3/mm3      RBC 2.33 10*6/mm3      Hemoglobin 7.9 g/dL      Hematocrit 23.3 %      .1 fL      MCH 33.8 pg      MCHC 33.7 g/dL      RDW 13.4 %      RDW-SD 47.7 fl      MPV 7.8 fL      Platelets 145 10*3/mm3      Neutrophil % 64.4 %      Lymphocyte % 21.7 %      Monocyte % 13.1 %      Eosinophil % 0.2 %      Basophil % 0.6 %      Neutrophils, Absolute 5.70 10*3/mm3      Lymphocytes, Absolute 1.90 10*3/mm3      Monocytes, Absolute 1.10 10*3/mm3      Eosinophils, Absolute 0.00 10*3/mm3      Basophils, Absolute 0.10 10*3/mm3      nRBC 0.0 /100 WBC     Respiratory Panel, PCR - Swab, Nasopharynx [[id number]]  (Normal) Collected:  11/18/19 0704    Specimen:  Swab from Nasopharynx Updated:  11/18/19 0838     ADENOVIRUS, PCR Not Detected     Coronavirus 229E Not Detected     Coronavirus HKU1 Not Detected     Coronavirus NL63 Not Detected     Coronavirus OC43 Not Detected     Human Metapneumovirus Not Detected     Human Rhinovirus/Enterovirus Not Detected     Influenza B PCR Not Detected     Parainfluenza Virus 1 Not Detected     Parainfluenza Virus 2 Not Detected     Parainfluenza Virus 3 Not Detected     Parainfluenza Virus 4 Not Detected     Bordetella pertussis pcr Not Detected     Influenza A H1 2009 PCR Not Detected     Chlamydophila pneumoniae PCR Not Detected     Mycoplasma pneumo by PCR Not Detected     Influenza A PCR Not Detected     Influenza A H3 Not Detected     Influenza A H1 Not Detected     RSV, PCR Not Detected    CBC &  Differential [[id number]] Collected:  11/18/19 0649    Specimen:  Blood Updated:  11/18/19 0804    Narrative:       The following orders were created for panel order CBC & Differential.  Procedure                               Abnormality         Status                     ---------                               -----------         ------                     CBC Auto Differential[[id number]]        Abnormal            Final result                 Please view results for these tests on the individual orders.    CBC Auto Differential [[id number]]  (Abnormal) Collected:  11/18/19 0649    Specimen:  Blood Updated:  11/18/19 0804     WBC 9.80 10*3/mm3      RBC 2.62 10*6/mm3      Hemoglobin 9.1 g/dL      Hematocrit 26.2 %      .0 fL      MCH 34.8 pg      MCHC 34.8 g/dL      RDW 13.5 %      RDW-SD 47.3 fl      MPV 7.9 fL      Platelets 177 10*3/mm3      Neutrophil % 72.0 %      Lymphocyte % 16.2 %      Monocyte % 10.8 %      Eosinophil % 0.5 %      Basophil % 0.5 %      Neutrophils, Absolute 7.00 10*3/mm3      Lymphocytes, Absolute 1.60 10*3/mm3      Monocytes, Absolute 1.10 10*3/mm3      Eosinophils, Absolute 0.00 10*3/mm3      Basophils, Absolute 0.00 10*3/mm3      nRBC 0.1 /100 WBC     Narrative:       Appended report. These results have been appended to a previously verified report.    Scan Slide [[id number]]  (Normal) Collected:  11/18/19 0649    Specimen:  Blood Updated:  11/18/19 0804     RBC Morphology Normal     WBC Morphology Normal     Platelet Morphology Normal    Narrative:       Slide Reviewed    [place] Draw [[id number]] Collected:  11/18/19 0649    Specimen:  Blood Updated:  11/18/19 0801    Narrative:       The following orders were created for panel order [place] Draw.  Procedure                               Abnormality         Status                     ---------                               -----------         ------                     Light Blue Top[[id number]]                                    Final result               Green Top (Gel)[[id number]]                                  Final result               Lavender Top[[id number]]                                     Final result               Gold Top - SST[[id number]]                                   Final result                 Please view results for these tests on the individual orders.    Gold Top - SST [[id number]] Collected:  11/18/19 0649    Specimen:  Blood Updated:  11/18/19 0801     Extra Tube Hold for add-ons.     Comment: Auto resulted.       Light Blue Top [[id number]] Collected:  11/18/19 0649    Specimen:  Blood Updated:  11/18/19 0801     Extra Tube hold for add-on     Comment: Auto resulted       Green Top (Gel) [[id number]] Collected:  11/18/19 0649    Specimen:  Blood Updated:  11/18/19 0801     Extra Tube Hold for add-ons.     Comment: Auto resulted.       Lavender Top [[id number]] Collected:  11/18/19 0649    Specimen:  Blood Updated:  11/18/19 0801     Extra Tube hold for add-on     Comment: Auto resulted       Blood Culture - Blood, Wrist, Right [[id number]] Collected:  11/18/19 0714    Specimen:  Blood from Wrist, Right Updated:  11/18/19 0759    Comprehensive Metabolic Panel [[id number]]  (Abnormal) Collected:  11/18/19 0649    Specimen:  Blood Updated:  11/18/19 0729     Glucose 127 mg/dL      BUN 13 mg/dL      Creatinine 1.01 mg/dL      Sodium 137 mmol/L      Potassium 4.2 mmol/L      Chloride 99 mmol/L      CO2 29.0 mmol/L      Calcium 8.9 mg/dL      Total Protein 6.7 g/dL      Albumin 3.60 g/dL      ALT (SGPT) 6 U/L      AST (SGOT) 11 U/L      Alkaline Phosphatase 72 U/L      Total Bilirubin 0.6 mg/dL      eGFR Non African Amer 54 mL/min/1.73      Globulin 3.1 gm/dL      A/G Ratio 1.2 g/dL      BUN/Creatinine Ratio 12.9     Anion Gap 9.0 mmol/L     Narrative:       GFR Normal >60  Chronic Kidney Disease <60  Kidney Failure <15    Troponin [[id number]]  (Normal) Collected:  11/18/19 0649    Specimen:  Blood Updated:  11/18/19 0729      Troponin T <0.010 ng/mL     Narrative:       Troponin T Reference Range:  <= 0.03 ng/mL-   Negative for AMI  >0.03 ng/mL-     Abnormal for myocardial necrosis.  Clinicians would have to utilize clinical acumen, EKG, Troponin and serial changes to determine if it is an Acute Myocardial Infarction or myocardial injury due to an underlying chronic condition.     BNP [[id number]]  (Abnormal) Collected:  11/18/19 0649    Specimen:  Blood Updated:  11/18/19 0727     proBNP 4,031.0 pg/mL     Narrative:       Among patients with dyspnea, NT-proBNP is highly sensitive for the detection of acute congestive heart failure. In addition NT-proBNP of <300 pg/ml effectively rules out acute congestive heart failure with 99% negative predictive value.    Blood Culture - Blood, Arm, Left [[id number]] Collected:  11/18/19 0649    Specimen:  Blood from Arm, Left Updated:  11/18/19 0705    POC Lactate [[id number]]  (Normal) Collected:  11/18/19 0701    Specimen:  Blood Updated:  11/18/19 0702     Lactate 0.9 mmol/L      Comment: Serial Number: [id number]:  [id number]             Imaging Results (Last 24 Hours)     Procedure Component Value Units Date/Time    XR Chest 2 View [[id number]] Collected:  11/18/19 0648     Updated:  11/18/19 0652    Narrative:       DATE OF EXAM:  11/18/2019 6:42 AM     PROCEDURE:  XR CHEST 2 VW-     INDICATIONS:  CHF/COPD Protocol     COMPARISON:  10/28/2018.     TECHNIQUE:   Two radiologic views of the chest , PA and lateral were obtained.     FINDINGS:  Bibasilar pulmonary opacities are seen, which may represent atelectasis,  infiltrate, and/or fibrosis. A similar appearance was seen previously.  Pneumonia cannot be excluded. No cardiac enlargement is seen. The  thoracic aorta is atherosclerotic. There is chronic calcified  granulomatous disease of the chest. Biapical pleural-parenchymal  scarring is noted. No pneumothorax is seen. The patient has undergone  median sternotomy and suspected CABG surgery. No  pleural effusion is  suggested.        Impression:       Bibasilar pulmonary opacities are seen, which are similar or increased  in degree since the prior study. The findings may represent progression  of fibrosis. Atelectasis and/or pneumonia cannot be excluded.     Electronically Signed By-Dr. Quoc Parker MD On:11/18/2019 6:50 AM  This report was finalized on [id number] by Dr. Quoc Parker MD.          Xrays, labs reviewed personally by physician.    ECG/EMG Results (most recent)     Procedure Component Value Units Date/Time    ECG 12 Lead [[id number]] Collected:  11/18/19 0642     Updated:  11/18/19 0644    Narrative:       HEART RATE= 99  bpm  RR Interval= 608  ms  AZ Interval= 205  ms  P Horizontal Axis= 22  deg  P Front Axis= 35  deg  QRSD Interval= 85  ms  QT Interval= 348  ms  QRS Axis= -19  deg  T Wave Axis=   deg  - ABNORMAL ECG -  Sinus rhythm  Atrial premature complex  Anteroseptal infarct, old  Electronically Signed By:   Date and Time of Study: 2019-11-18 06:42:25          Medication Review:   I have reviewed the patient's current medication list    Current Facility-Administered Medications:   •  acetaminophen (TYLENOL) tablet 650 mg, 650 mg, Oral, Q4H PRN **OR** acetaminophen (TYLENOL) 160 MG/5ML solution 650 mg, 650 mg, Oral, Q4H PRN **OR** acetaminophen (TYLENOL) suppository 650 mg, 650 mg, Rectal, Q4H PRN, Ashtyn Salter MD  •  [START ON 11/19/2019] aspirin EC tablet 81 mg, 81 mg, Oral, Daily, Ashtyn Salter MD  •  benzonatate (TESSALON) capsule 100 mg, 100 mg, Oral, TID PRN, Ashtyn Salter MD  •  [START ON 11/19/2019] cefTRIAXone (ROCEPHIN) 1 g in sodium chloride 0.9 % 100 mL IVPB, 1 g, Intravenous, Q24H, Ashtyn Salter MD  •  [START ON 11/19/2019] clopidogrel (PLAVIX) tablet 75 mg, 75 mg, Oral, Daily, Ashtyn Salter MD  •  [START ON 11/19/2019] docusate sodium (COLACE) capsule 100 mg, 100 mg, Oral, Daily, Ashtyn Salter MD  •  doxycycline (VIBRAMYCIN) 100 mg  in sodium chloride 0.9 % 100 mL IVPB, 100 mg, Intravenous, Q12H, Ashtyn Salter MD  •  enoxaparin (LOVENOX) syringe 40 mg, 40 mg, Subcutaneous, Q24H, Ashtyn Salter MD  •  estradiol (ESTRACE) tablet 1 mg, 1 mg, Oral, TID, Ashtyn Salter MD  •  [START ON 11/19/2019] famotidine (PEPCID) tablet 40 mg, 40 mg, Oral, Daily, Ashtyn Salter MD  •  [START ON 11/19/2019] ferrous sulfate EC tablet 324 mg, 324 mg, Oral, Daily With Breakfast, Ashtyn Salter MD  •  [START ON 11/19/2019] fluticasone (FLONASE) 50 MCG/ACT nasal spray 2 spray, 2 spray, Nasal, Daily, Ashtyn Salter MD  •  furosemide (LASIX) injection 40 mg, 40 mg, Intravenous, Once, Ashtyn Salter MD  •  furosemide (LASIX) injection 40 mg, 40 mg, Intravenous, Daily, Ashtyn Salter MD  •  gabapentin (NEURONTIN) capsule 400 mg, 400 mg, Oral, TID, Ashtyn Salter MD, 400 mg at 11/18/19 1625  •  HYDROcodone-acetaminophen (NORCO)  MG per tablet 1 tablet, 1 tablet, Oral, Q6H PRN, Ashtyn Salter MD  •  HYDROcodone-acetaminophen (NORCO)  MG per tablet 1 tablet, 1 tablet, Oral, Q4H PRN, Ashtyn Salter MD  •  ipratropium-albuterol (DUO-NEB) nebulizer solution 3 mL, 3 mL, Nebulization, 4x Daily - RT, Ashtyn Salter MD, 3 mL at 11/18/19 1202  •  [START ON 11/19/2019] isosorbide mononitrate (IMDUR) 24 hr tablet 30 mg, 30 mg, Oral, Q24H, Ashtyn Salter MD  •  LORazepam (ATIVAN) tablet 1 mg, 1 mg, Oral, 4x Daily PRN, Ashtyn Salter MD  •  melatonin tablet 5 mg, 5 mg, Oral, Nightly PRN, Ashtyn Salter MD  •  methocarbamol (ROBAXIN) tablet 500 mg, 500 mg, Oral, TID PRN, Ashtyn Salter MD  •  nitroglycerin (NITROSTAT) SL tablet 0.4 mg, 0.4 mg, Sublingual, Q5 Min PRN, Ashtyn Salter MD  •  ondansetron (ZOFRAN) tablet 4 mg, 4 mg, Oral, Q6H PRN **OR** ondansetron (ZOFRAN) injection 4 mg, 4 mg, Intravenous, Q6H PRN, Ashtyn Salter MD  •  ranolazine (RANEXA) 12 hr tablet 1,000 mg, 1,000 mg,  Oral, Q12H, Ashtyn Salter MD  •  [START ON 11/19/2019] rosuvastatin (CRESTOR) tablet 10 mg, 10 mg, Oral, Daily, Ashtyn Salter MD  •  [START ON 11/19/2019] sertraline (ZOLOFT) tablet 100 mg, 100 mg, Oral, Daily, Ashtyn Salter MD  •  sodium chloride 0.9 % flush 10 mL, 10 mL, Intravenous, PRN, Ashtyn Salter MD  •  sodium chloride 0.9 % flush 10 mL, 10 mL, Intravenous, Q12H, Ashtyn Salter MD, 10 mL at 11/18/19 1143  •  sodium chloride 0.9 % flush 10 mL, 10 mL, Intravenous, PRN, Ashtyn Salter MD  •  [START ON 11/19/2019] Thyroid (ARMOUR) tablet 60 mg, 60 mg, Oral, Daily, Ashtyn Salter MD  •  traMADol (ULTRAM) tablet 50 mg, 50 mg, Oral, Q6H PRN, Ashtyn Salter MD      Assessment/Plan   1.  Dyspnea-the differential diagnosis would include coronary artery disease with congestive heart failure especially given the patient's BNP of greater than 4000.  The patient does have some renal insufficiency as well.  The patient denies chest pain.  Also in the differential would be COPD exacerbation as well as pneumonia.  While the patient does have some rales in her bases which would tend to support congestive heart failure, this remains in the differential.  The patient does not have a white count and is afebrile at this time.  This will tend to make this less likely.  We will continue with the Rocephin and doxycycline started in the emergency department and will let the cultures and the clinical course dictate whether or not these need to be continued beyond the next day or so.  We will also continue with DuoNeb breathing treatments with albuterol every 4 as needed.  We will also ask Dr. Blair who knows the patient well to follow her with us.  An echocardiogram and serial troponins have been ordered as well.  We will also give the patient 40 mg of IV Lasix now and monitor her output.  2.  Coronary artery disease-we will continue the patient's home medications.  As outlined above we  will also initiate diuresis and will follow her BNP as well as her serial troponins and electrolytes.  We will replace them as values dictate.  3.  Renal insufficiency-I cannot find any old records on the patient in the system despite the fact that she has been here for quite some time.  The patient apparently has records that have not yet been merged into the new epic system.  Will monitor her daily and will adjust as values dictate.  4.  Anemia-some of this may be delusional due to congestive heart failure.  The patient does report that she has been anemic for quite some time, however she denies any hemoptysis, hematemesis or hematochezia.  She does have hemorrhoids which she says do occasionally bleed.  She does have some bruising as well.  She has macrocytic indices therefore she has had a B12 level folate for to count drawn.  Would plan to transfusion the patient should she drop her hematocrit below 25% especially given her underlying coronary artery disease.  Check her hemoglobin and hematocrit every 6 hours for the next 24 hours.  Patient has also been typed and screened as well as having a route to count and iron studies drawn as well.  She also has stool for occult blood ordered.  She does have a history of colonic polyps and her last colonoscopy was approximately 6 years ago by her report.  She is also already on PPI support and we will continue this for now.  5.  Cough-we will start her on some Tessalon Perles so that her sensorium is not called and hopefully this will decrease the coughing and she can get some rest.    Plan for disposition: Be determined    Ashtyn Salter MD  11/18/19  5:11 PM

## 2019-11-18 NOTE — ED NOTES
Pt is resting in bed vitals are stable denies pain, family is aware of admission. Awaiting clean IP     Lina Leach RN  11/18/19 1118

## 2019-11-18 NOTE — ED NOTES
Pt was placed on bedpan, without complication. Denies pain no signs of distress     Lina Leach RN  11/18/19 6987

## 2019-11-18 NOTE — ED NOTES
Care transferred from David GUNN to Lina Leach RN.   Vitals are stable  2nd set of cultures were collected, and pt was started on antibiotics.  Pt is maintaining oxygen saturation well on 4L of oxygen with 98% on pleth         Lina Leach RN  11/18/19 0754

## 2019-11-18 NOTE — ED PROVIDER NOTES
Subjective   74-year-old female presents with complaint of dyspnea since this weekend.  She reports that she has had a productive cough this started on Thursday with a fever T-max of 100.5.  Patient denies recent antibiotics nor steroid use.  Reports that her sister did have a cold but lives with her.  She was seen here status post fall on 11/14/2019.  Patient denies smoking.    1. Location: Chest  2. Quality: Tightness  3. Severity: Moderate  4. Worsening factors: Cough, dyspnea  5. Alleviating factors: Denies  6. Onset: Cough-Thursday, dyspnea-Saturday  7. Radiation: Denies  8. Frequency: Constant periods of intensity  9. Co-morbidities: Aneurysm, arthritis, COPD, hyperlipidemia, GERD, hypertension, MI x3  10. Source: Patient and family at bedside              Review of Systems   Constitutional: Negative for chills, diaphoresis and fever.   HENT: Negative for ear discharge, postnasal drip, rhinorrhea and sore throat.    Respiratory: Positive for cough and chest tightness. Negative for apnea, choking, shortness of breath, wheezing and stridor.    Cardiovascular: Negative for chest pain, palpitations and leg swelling.   Gastrointestinal: Negative for abdominal pain, diarrhea, nausea and vomiting.   Genitourinary: Negative for decreased urine volume, difficulty urinating, flank pain and frequency.   Skin: Negative for color change, pallor and rash.   Neurological: Negative for headaches.   Psychiatric/Behavioral: Negative for confusion.   All other systems reviewed and are negative.      Past Medical History:   Diagnosis Date   • Aneurysm (CMS/[facility])     AAA   • Arthritis    • COPD (chronic obstructive pulmonary disease) (CMS/[facility])    • Dyslipidemia    • GERD (gastroesophageal reflux disease)    • History of right heart catheterization     7/30/2017; 10/2018   • Hypertension    • Myocardial infarction (CMS/HCC)     x 3       Allergies   Allergen Reactions   • Naprosyn  [Naproxen] Rash   • Bactrim  General Sunscreen Counseling: I recommended a broad spectrum sunscreen with at least SPF of 30, but higher is better.  I explained that SPF 30 sunscreens block approximately 97 percent of the sun's harmful rays in vitro, but in practice a higher SPF offers more protection from sun exposure as most people don't use the density of application to get the number on the body.  Sunscreens should be applied at least 15 minutes prior to expected sun exposure and then every 2 hours after that as long as sun exposure continues. If swimming or exercising sunscreen should be reapplied more frequently.  One ounce, or 30 fluid ounces (the equivalent of a shot glass full of sunscreen), is adequate to protect the skin not covered by a bathing suit. I also recommended a lip balm with a sunscreen as well. Sun protective clothing (UPF50+) can be used in lieu of sunscreen but must be worn the entire time you are exposed to the sun's rays. [Sulfamethoxazole-Trimethoprim] Rash       Past Surgical History:   Procedure Laterality Date   • ABDOMINAL AORTIC ANEURYSM REPAIR N/A 2001   • CARDIAC CATHETERIZATION     • CATARACT EXTRACTION Bilateral    • CORONARY ANGIOPLASTY WITH STENT PLACEMENT N/A     x 5   • CORONARY ARTERY BYPASS GRAFT      x 4 1995   • HYSTERECTOMY N/A        Family History   Problem Relation Age of Onset   • Heart disease Mother    • Aneurysm Mother    • Heart disease Father        Social History     Socioeconomic History   • Marital status:      Spouse name: Not on file   • Number of children: Not on file   • Years of education: Not on file   • Highest education level: Not on file   Tobacco Use   • Smoking status: Former Smoker   • Smokeless tobacco: Never Used   Substance and Sexual Activity   • Alcohol use: No     Frequency: Never   • Drug use: No           Objective   Physical Exam   Constitutional: She is oriented to person, place, and time. She appears well-developed and well-nourished. She does not appear ill. No distress.   HENT:   Head: Normocephalic and atraumatic.   Eyes: Conjunctivae and EOM are normal. Pupils are equal, round, and reactive to light.   Neck: Normal range of motion. Neck supple.   Cardiovascular: Normal rate, regular rhythm, S1 normal, S2 normal, normal heart sounds and intact distal pulses. Exam reveals no gallop and no friction rub.   No murmur heard.  Pulmonary/Chest: Effort normal. No respiratory distress. She has decreased breath sounds in the right lower field and the left lower field. She has no wheezes. She has rales in the right upper field, the right middle field and the left upper field. She exhibits no tenderness.   Abdominal: Soft. Bowel sounds are normal. She exhibits no distension and no mass. There is no tenderness. There is no rebound and no guarding. No hernia.   Neurological: She is alert and oriented to person, place, and time.   Skin: Skin is warm and dry. Capillary refill takes less  than 2 seconds. No rash noted. No erythema. No pallor.   Psychiatric: She has a normal mood and affect. Her behavior is normal. Judgment and thought content normal.   Nursing note and vitals reviewed.      Procedures           ED Course  ED Course as of Nov 18 0823   Mon Nov 18, 2019   0642 Sinus rhythm with no ectopy.  Rate of 99.  Interpreted by Dr. Kurtz and reviewed by me.  Similar to previous EKG 10/28/2018 ECG 12 Lead [AL]   0755 Noted to be 9.31-year ago. Hemoglobin: (!) 9.1 [AL]   0814 Patient's lactate was 0.8 and WBC noted to be within normal limits.  Patient was given Lasix.  At this time, sepsis has been ruled out.  No saline bolus given. Discussed this with Dr. Kurtz.  [AL]      ED Course User Index  [AL] Melissa Hernandes, NP      Xr Chest 2 View    Result Date: 11/18/2019  Bibasilar pulmonary opacities are seen, which are similar or increased in degree since the prior study. The findings may represent progression of fibrosis. Atelectasis and/or pneumonia cannot be excluded.  Electronically Signed By-Dr. Quoc Parker MD On:11/18/2019 6:50 AM This report was finalized on [id number] by Dr. Quoc Parker MD.    Medications   sodium chloride 0.9 % flush 10 mL (not administered)   furosemide (LASIX) injection 20 mg (not administered)   cefTRIAXone (ROCEPHIN) in SWFI 2 GRAMS/20ml IV PUSH syringe (2 g Intravenous Given 11/18/19 0715)   doxycycline (VIBRAMYCIN) 100 mg in sodium chloride 0.9 % 100 mL IVPB (0 mg Intravenous Stopped 11/18/19 0815)     Labs Reviewed   COMPREHENSIVE METABOLIC PANEL - Abnormal; Notable for the following components:       Result Value    Glucose 127 (*)     Creatinine 1.01 (*)     eGFR Non  Amer 54 (*)     All other components within normal limits    Narrative:     GFR Normal >60  Chronic Kidney Disease <60  Kidney Failure <15   BNP (IN-HOUSE) - Abnormal; Notable for the following components:    proBNP 4,031.0 (*)     All other components within normal limits     Detail Level: Generalized Narrative:     Among patients with dyspnea, NT-proBNP is highly sensitive for the detection of acute congestive heart failure. In addition NT-proBNP of <300 pg/ml effectively rules out acute congestive heart failure with 99% negative predictive value.   CBC WITH AUTO DIFFERENTIAL - Abnormal; Notable for the following components:    RBC 2.62 (*)     Hemoglobin 9.1 (*)     Hematocrit 26.2 (*)     .0 (*)     MCH 34.8 (*)     Lymphocyte % 16.2 (*)     Monocytes, Absolute 1.10 (*)     All other components within normal limits    Narrative:     Appended report. These results have been appended to a previously verified report.   TROPONIN (IN-HOUSE) - Normal    Narrative:     Troponin T Reference Range:  <= 0.03 ng/mL-   Negative for AMI  >0.03 ng/mL-     Abnormal for myocardial necrosis.  Clinicians would have to utilize clinical acumen, EKG, Troponin and serial changes to determine if it is an Acute Myocardial Infarction or myocardial injury due to an underlying chronic condition.    SCAN SLIDE - Normal    Narrative:     Slide Reviewed   POC LACTATE - Normal   BLOOD CULTURE   BLOOD CULTURE   RESPIRATORY PANEL, PCR   RAINBOW DRAW    Narrative:     The following orders were created for panel order [facility] Draw.  Procedure                               Abnormality         Status                     ---------                               -----------         ------                     Light Blue Top[[id number]]                                   Final result               Green Top (Gel)[[id number]]                                  Final result               Lavender Top[[id number]]                                     Final result               Gold Top - SST[[id number]]                                   Final result                 Please view results for these tests on the individual orders.   POC LACTATE   CBC AND DIFFERENTIAL    Narrative:     The following orders were created for panel order CBC &  Differential.  Procedure                               Abnormality         Status                     ---------                               -----------         ------                     CBC Auto Differential[[id number]]        Abnormal            Final result                 Please view results for these tests on the individual orders.   LIGHT BLUE TOP   GREEN TOP   LAVENDER TOP   GOLD TOP - SST                 MDM  Number of Diagnoses or Management Options  Acute heart failure, unspecified heart failure type (CMS/HCC):   Community acquired pneumonia, unspecified laterality:   Cough:   Fever, unspecified fever cause:   Diagnosis management comments: Chart Review: 11/14/2019 patient was seen at this facility status post fall and discharged home.  Comorbidity: Aneurysm, arthritis, COPD, hyperlipidemia, GERD, hypertension, MI x3  Imaging: Was interpreted by physician and reviewed by myself:  Disposition/Treatment: Discussed results with patient, verbalized understanding.  Agreeable with plan of care.    Patient undressed and placed in gown for exam. 74-year-old female presents with complaint of dyspnea since this weekend.  She reports that she has had a productive cough this started on Thursday with a fever T-max of 100.5.  Patient denies recent antibiotics nor steroid use.  Reports that her sister did have a cold but lives with her.  She was seen here status post fall on 11/14/2019.  Patient denies smoking. SOA protocol initiated by nursing staff.  Patient was noted to have bilateral rales in the upper lobes and diminished bibasilarly.  Simple sepsis initiated by provider. Patient's lactate was 0.8 and WBC noted to be within normal limits.  Patient was given Lasix.  At this time, sepsis has been ruled out.  No saline bolus given. Discussed this with Dr. Kurtz. Hospitalist paged for admission.  Spoke with Dr. Salter who accepted admission.           Amount and/or Complexity of Data Reviewed  Clinical lab tests:  Products Recommended: Neutrogena SPF stick, aquaphor SPF 30 lip reviewed  Tests in the radiology section of CPT®: reviewed  Tests in the medicine section of CPT®: reviewed        Final diagnoses:   Community acquired pneumonia, unspecified laterality   Acute heart failure, unspecified heart failure type (CMS/HCC)   Fever, unspecified fever cause   Cough              Melissa Hernandes, NP  11/18/19 [id number]

## 2019-11-18 NOTE — ED NOTES
Pt is resting in bed vitals are stable denies pain aware of delay on IP room      Lina Leach RN  11/18/19 5478

## 2019-11-19 LAB
ANION GAP SERPL CALCULATED.3IONS-SCNC: 11 MMOL/L (ref 5–15)
BASOPHILS # BLD AUTO: 0 10*3/MM3 (ref 0–0.2)
BASOPHILS NFR BLD AUTO: 0.4 % (ref 0–1.5)
BH CV ECHO MEAS - ACS: 1.6 CM
BH CV ECHO MEAS - AO MAX PG (FULL): 9.6 MMHG
BH CV ECHO MEAS - AO MAX PG: 15.1 MMHG
BH CV ECHO MEAS - AO MEAN PG (FULL): 4.7 MMHG
BH CV ECHO MEAS - AO MEAN PG: 7.6 MMHG
BH CV ECHO MEAS - AO ROOT AREA (BSA CORRECTED): 1.9
BH CV ECHO MEAS - AO ROOT AREA: 8 CM^2
BH CV ECHO MEAS - AO ROOT DIAM: 3.2 CM
BH CV ECHO MEAS - AO V2 MAX: 193.3 CM/SEC
BH CV ECHO MEAS - AO V2 MEAN: 126.2 CM/SEC
BH CV ECHO MEAS - AO V2 VTI: 37.9 CM
BH CV ECHO MEAS - AORTIC HR: 74.9 BPM
BH CV ECHO MEAS - AORTIC R-R: 0.8 SEC
BH CV ECHO MEAS - ASC AORTA: 2.8 CM
BH CV ECHO MEAS - AVA(I,A): 2.1 CM^2
BH CV ECHO MEAS - AVA(I,D): 2.1 CM^2
BH CV ECHO MEAS - AVA(V,A): 1.9 CM^2
BH CV ECHO MEAS - AVA(V,D): 1.9 CM^2
BH CV ECHO MEAS - BSA(HAYCOCK): 1.7 M^2
BH CV ECHO MEAS - BSA: 1.7 M^2
BH CV ECHO MEAS - BZI_BMI: 25.1 KILOGRAMS/M^2
BH CV ECHO MEAS - BZI_METRIC_HEIGHT: 162.6 CM
BH CV ECHO MEAS - BZI_METRIC_WEIGHT: 66.2 KG
BH CV ECHO MEAS - CI(AO): 13.2 L/MIN/M^2
BH CV ECHO MEAS - CI(LVOT): 3.6 L/MIN/M^2
BH CV ECHO MEAS - CO(AO): 22.6 L/MIN
BH CV ECHO MEAS - CO(LVOT): 6.1 L/MIN
BH CV ECHO MEAS - EDV(CUBED): 86 ML
BH CV ECHO MEAS - EDV(MOD-SP4): 76.2 ML
BH CV ECHO MEAS - EDV(TEICH): 88.3 ML
BH CV ECHO MEAS - EF(CUBED): 74.2 %
BH CV ECHO MEAS - EF(MOD-BP): 70 %
BH CV ECHO MEAS - EF(MOD-SP4): 69.8 %
BH CV ECHO MEAS - EF(TEICH): 66.3 %
BH CV ECHO MEAS - ESV(CUBED): 22.1 ML
BH CV ECHO MEAS - ESV(MOD-SP4): 23 ML
BH CV ECHO MEAS - ESV(TEICH): 29.8 ML
BH CV ECHO MEAS - FS: 36.4 %
BH CV ECHO MEAS - IVS/LVPW: 0.94
BH CV ECHO MEAS - IVSD: 1.2 CM
BH CV ECHO MEAS - LA DIMENSION(2D): 4.3 CM
BH CV ECHO MEAS - LV DIASTOLIC VOL/BSA (35-75): 44.5 ML/M^2
BH CV ECHO MEAS - LV MASS(C)D: 193.4 GRAMS
BH CV ECHO MEAS - LV MASS(C)DI: 113 GRAMS/M^2
BH CV ECHO MEAS - LV MAX PG: 5.5 MMHG
BH CV ECHO MEAS - LV MEAN PG: 2.9 MMHG
BH CV ECHO MEAS - LV SYSTOLIC VOL/BSA (12-30): 13.4 ML/M^2
BH CV ECHO MEAS - LV V1 MAX: 117.1 CM/SEC
BH CV ECHO MEAS - LV V1 MEAN: 76.4 CM/SEC
BH CV ECHO MEAS - LV V1 VTI: 26.4 CM
BH CV ECHO MEAS - LVIDD: 4.4 CM
BH CV ECHO MEAS - LVIDS: 2.8 CM
BH CV ECHO MEAS - LVOT AREA: 3.1 CM^2
BH CV ECHO MEAS - LVOT DIAM: 2 CM
BH CV ECHO MEAS - LVPWD: 1.2 CM
BH CV ECHO MEAS - MR MAX PG: 96.2 MMHG
BH CV ECHO MEAS - MR MAX VEL: 490.5 CM/SEC
BH CV ECHO MEAS - MV A MAX VEL: 103.4 CM/SEC
BH CV ECHO MEAS - MV DEC SLOPE: 914.1 CM/SEC^2
BH CV ECHO MEAS - MV DEC TIME: 0.15 SEC
BH CV ECHO MEAS - MV E MAX VEL: 136.6 CM/SEC
BH CV ECHO MEAS - MV E/A: 1.3
BH CV ECHO MEAS - MV MAX PG: 7.4 MMHG
BH CV ECHO MEAS - MV MEAN PG: 3.3 MMHG
BH CV ECHO MEAS - MV V2 MAX: 136 CM/SEC
BH CV ECHO MEAS - MV V2 MEAN: 86.4 CM/SEC
BH CV ECHO MEAS - MV V2 VTI: 32.6 CM
BH CV ECHO MEAS - MVA(VTI): 2.5 CM^2
BH CV ECHO MEAS - PA ACC TIME: 0.13 SEC
BH CV ECHO MEAS - PA MAX PG (FULL): 1.3 MMHG
BH CV ECHO MEAS - PA MAX PG: 2.9 MMHG
BH CV ECHO MEAS - PA MEAN PG (FULL): 0.73 MMHG
BH CV ECHO MEAS - PA MEAN PG: 1.5 MMHG
BH CV ECHO MEAS - PA PR(ACCEL): 20.8 MMHG
BH CV ECHO MEAS - PA V2 MAX: 85.8 CM/SEC
BH CV ECHO MEAS - PA V2 MEAN: 57.3 CM/SEC
BH CV ECHO MEAS - PA V2 VTI: 15.5 CM
BH CV ECHO MEAS - PI END-D VEL: 125.6 CM/SEC
BH CV ECHO MEAS - PI MAX PG: 20.2 MMHG
BH CV ECHO MEAS - PI MAX VEL: 225 CM/SEC
BH CV ECHO MEAS - PULM A REVS DUR: 0.13 SEC
BH CV ECHO MEAS - PULM A REVS VEL: 31.8 CM/SEC
BH CV ECHO MEAS - PULM DIAS VEL: 102.6 CM/SEC
BH CV ECHO MEAS - PULM S/D: 0.71
BH CV ECHO MEAS - PULM SYS VEL: 72.8 CM/SEC
BH CV ECHO MEAS - PVA(I,A): 6.6 CM^2
BH CV ECHO MEAS - PVA(I,D): 6.6 CM^2
BH CV ECHO MEAS - PVA(V,A): 5.6 CM^2
BH CV ECHO MEAS - PVA(V,D): 5.6 CM^2
BH CV ECHO MEAS - QP/QS: 1.3
BH CV ECHO MEAS - RAP SYSTOLE: 3 MMHG
BH CV ECHO MEAS - RV MAX PG: 1.6 MMHG
BH CV ECHO MEAS - RV MEAN PG: 0.79 MMHG
BH CV ECHO MEAS - RV V1 MAX: 64.1 CM/SEC
BH CV ECHO MEAS - RV V1 MEAN: 41 CM/SEC
BH CV ECHO MEAS - RV V1 VTI: 13.6 CM
BH CV ECHO MEAS - RVDD: 2.3 CM
BH CV ECHO MEAS - RVOT AREA: 7.5 CM^2
BH CV ECHO MEAS - RVOT DIAM: 3.1 CM
BH CV ECHO MEAS - RVSP: 24.4 MMHG
BH CV ECHO MEAS - SI(AO): 176.1 ML/M^2
BH CV ECHO MEAS - SI(CUBED): 37.3 ML/M^2
BH CV ECHO MEAS - SI(LVOT): 47.5 ML/M^2
BH CV ECHO MEAS - SI(MOD-SP4): 31.1 ML/M^2
BH CV ECHO MEAS - SI(TEICH): 34.2 ML/M^2
BH CV ECHO MEAS - SV(AO): 301.3 ML
BH CV ECHO MEAS - SV(CUBED): 63.8 ML
BH CV ECHO MEAS - SV(LVOT): 81.3 ML
BH CV ECHO MEAS - SV(MOD-SP4): 53.2 ML
BH CV ECHO MEAS - SV(RVOT): 101.6 ML
BH CV ECHO MEAS - SV(TEICH): 58.6 ML
BH CV ECHO MEAS - TR MAX VEL: 231.4 CM/SEC
BUN BLD-MCNC: 20 MG/DL (ref 8–23)
BUN/CREAT SERPL: 16.1 (ref 7–25)
CALCIUM SPEC-SCNC: 8.9 MG/DL (ref 8.6–10.5)
CHLORIDE SERPL-SCNC: 97 MMOL/L (ref 98–107)
CO2 SERPL-SCNC: 31 MMOL/L (ref 22–29)
CREAT BLD-MCNC: 1.24 MG/DL (ref 0.57–1)
DEPRECATED RDW RBC AUTO: 46.4 FL (ref 37–54)
EOSINOPHIL # BLD AUTO: 0.1 10*3/MM3 (ref 0–0.4)
EOSINOPHIL NFR BLD AUTO: 1.2 % (ref 0.3–6.2)
ERYTHROCYTE [DISTWIDTH] IN BLOOD BY AUTOMATED COUNT: 13.1 % (ref 12.3–15.4)
FOLATE SERPL-MCNC: 8.07 NG/ML (ref 4.78–24.2)
GFR SERPL CREATININE-BSD FRML MDRD: 42 ML/MIN/1.73
GLUCOSE BLD-MCNC: 104 MG/DL (ref 65–99)
HCT VFR BLD AUTO: 25.1 % (ref 34–46.6)
HGB BLD-MCNC: 8.3 G/DL (ref 12–15.9)
HGB RETIC QN AUTO: 26.4 PG (ref 29.8–36.1)
IMM RETICS NFR: 13.6 % (ref 3–15.8)
LYMPHOCYTES # BLD AUTO: 2.1 10*3/MM3 (ref 0.7–3.1)
LYMPHOCYTES NFR BLD AUTO: 25.5 % (ref 19.6–45.3)
MAGNESIUM SERPL-MCNC: 1.5 MG/DL (ref 1.6–2.4)
MCH RBC QN AUTO: 33.5 PG (ref 26.6–33)
MCHC RBC AUTO-ENTMCNC: 33.3 G/DL (ref 31.5–35.7)
MCV RBC AUTO: 100.7 FL (ref 79–97)
MONOCYTES # BLD AUTO: 0.9 10*3/MM3 (ref 0.1–0.9)
MONOCYTES NFR BLD AUTO: 10.7 % (ref 5–12)
NEUTROPHILS # BLD AUTO: 5.1 10*3/MM3 (ref 1.7–7)
NEUTROPHILS NFR BLD AUTO: 62.2 % (ref 42.7–76)
NRBC BLD AUTO-RTO: 0.1 /100 WBC (ref 0–0.2)
NT-PROBNP SERPL-MCNC: 2661 PG/ML (ref 5–900)
PLATELET # BLD AUTO: 168 10*3/MM3 (ref 140–450)
PMV BLD AUTO: 8 FL (ref 6–12)
POTASSIUM BLD-SCNC: 3.5 MMOL/L (ref 3.5–5.2)
RBC # BLD AUTO: 2.49 10*6/MM3 (ref 3.77–5.28)
RETICS/RBC NFR AUTO: 1.91 % (ref 0.7–1.9)
SODIUM BLD-SCNC: 139 MMOL/L (ref 136–145)
VIT B12 BLD-MCNC: 465 PG/ML (ref 211–946)
WBC NRBC COR # BLD: 8.2 10*3/MM3 (ref 3.4–10.8)

## 2019-11-19 PROCEDURE — 99233 SBSQ HOSP IP/OBS HIGH 50: CPT | Performed by: INTERNAL MEDICINE

## 2019-11-19 PROCEDURE — 85025 COMPLETE CBC W/AUTO DIFF WBC: CPT | Performed by: INTERNAL MEDICINE

## 2019-11-19 PROCEDURE — 80048 BASIC METABOLIC PNL TOTAL CA: CPT | Performed by: INTERNAL MEDICINE

## 2019-11-19 PROCEDURE — 94799 UNLISTED PULMONARY SVC/PX: CPT

## 2019-11-19 PROCEDURE — 25010000002 CEFTRIAXONE PER 250 MG: Performed by: INTERNAL MEDICINE

## 2019-11-19 PROCEDURE — 83735 ASSAY OF MAGNESIUM: CPT | Performed by: INTERNAL MEDICINE

## 2019-11-19 PROCEDURE — 99222 1ST HOSP IP/OBS MODERATE 55: CPT | Performed by: INTERNAL MEDICINE

## 2019-11-19 PROCEDURE — 25010000002 FUROSEMIDE PER 20 MG: Performed by: INTERNAL MEDICINE

## 2019-11-19 PROCEDURE — 93306 TTE W/DOPPLER COMPLETE: CPT | Performed by: INTERNAL MEDICINE

## 2019-11-19 PROCEDURE — 25010000002 ENOXAPARIN PER 10 MG: Performed by: INTERNAL MEDICINE

## 2019-11-19 RX ORDER — BENZONATATE 100 MG/1
200 CAPSULE ORAL 3 TIMES DAILY
Status: DISCONTINUED | OUTPATIENT
Start: 2019-11-19 | End: 2019-11-24 | Stop reason: HOSPADM

## 2019-11-19 RX ADMIN — ESTRADIOL 1 MG: 1 TABLET ORAL at 15:57

## 2019-11-19 RX ADMIN — PANTOPRAZOLE SODIUM 40 MG: 40 INJECTION, POWDER, FOR SOLUTION INTRAVENOUS at 20:38

## 2019-11-19 RX ADMIN — IPRATROPIUM BROMIDE AND ALBUTEROL SULFATE 3 ML: .5; 3 SOLUTION RESPIRATORY (INHALATION) at 15:39

## 2019-11-19 RX ADMIN — FERROUS SULFATE TAB EC 324 MG (65 MG FE EQUIVALENT) 324 MG: 324 (65 FE) TABLET DELAYED RESPONSE at 08:00

## 2019-11-19 RX ADMIN — HYDROCODONE BITARTRATE AND ACETAMINOPHEN 1 TABLET: 10; 325 TABLET ORAL at 08:56

## 2019-11-19 RX ADMIN — ACETAMINOPHEN 650 MG: 325 TABLET ORAL at 20:38

## 2019-11-19 RX ADMIN — ESTRADIOL 1 MG: 1 TABLET ORAL at 20:38

## 2019-11-19 RX ADMIN — LORAZEPAM 1 MG: 1 TABLET ORAL at 15:58

## 2019-11-19 RX ADMIN — THYROID, PORCINE 60 MG: 30 TABLET ORAL at 08:00

## 2019-11-19 RX ADMIN — BENZONATATE 200 MG: 100 CAPSULE ORAL at 20:38

## 2019-11-19 RX ADMIN — FUROSEMIDE 40 MG: 10 INJECTION, SOLUTION INTRAMUSCULAR; INTRAVENOUS at 08:01

## 2019-11-19 RX ADMIN — FLUTICASONE PROPIONATE 2 SPRAY: 50 SPRAY, METERED NASAL at 09:00

## 2019-11-19 RX ADMIN — CEFTRIAXONE SODIUM 1 G: 1 INJECTION, POWDER, FOR SOLUTION INTRAMUSCULAR; INTRAVENOUS at 08:01

## 2019-11-19 RX ADMIN — Medication 10 ML: at 20:39

## 2019-11-19 RX ADMIN — ASPIRIN 81 MG: 81 TABLET, COATED ORAL at 08:01

## 2019-11-19 RX ADMIN — ROSUVASTATIN CALCIUM 10 MG: 10 TABLET, FILM COATED ORAL at 08:01

## 2019-11-19 RX ADMIN — RANOLAZINE 1000 MG: 500 TABLET, FILM COATED, EXTENDED RELEASE ORAL at 20:38

## 2019-11-19 RX ADMIN — GABAPENTIN 400 MG: 400 CAPSULE ORAL at 20:38

## 2019-11-19 RX ADMIN — LORAZEPAM 1 MG: 1 TABLET ORAL at 08:10

## 2019-11-19 RX ADMIN — IPRATROPIUM BROMIDE AND ALBUTEROL SULFATE 3 ML: .5; 3 SOLUTION RESPIRATORY (INHALATION) at 19:43

## 2019-11-19 RX ADMIN — PANTOPRAZOLE SODIUM 40 MG: 40 INJECTION, POWDER, FOR SOLUTION INTRAVENOUS at 08:01

## 2019-11-19 RX ADMIN — CLOPIDOGREL BISULFATE 75 MG: 75 TABLET ORAL at 08:01

## 2019-11-19 RX ADMIN — DOXYCYCLINE 100 MG: 100 INJECTION, POWDER, LYOPHILIZED, FOR SOLUTION INTRAVENOUS at 20:39

## 2019-11-19 RX ADMIN — IPRATROPIUM BROMIDE AND ALBUTEROL SULFATE 3 ML: .5; 3 SOLUTION RESPIRATORY (INHALATION) at 11:58

## 2019-11-19 RX ADMIN — ISOSORBIDE MONONITRATE 30 MG: 30 TABLET, EXTENDED RELEASE ORAL at 08:02

## 2019-11-19 RX ADMIN — BENZONATATE 100 MG: 100 CAPSULE ORAL at 08:00

## 2019-11-19 RX ADMIN — IPRATROPIUM BROMIDE AND ALBUTEROL SULFATE 3 ML: .5; 3 SOLUTION RESPIRATORY (INHALATION) at 07:46

## 2019-11-19 RX ADMIN — RANOLAZINE 1000 MG: 500 TABLET, FILM COATED, EXTENDED RELEASE ORAL at 08:00

## 2019-11-19 RX ADMIN — GUAIFENESIN 1200 MG: 600 TABLET, EXTENDED RELEASE ORAL at 20:38

## 2019-11-19 RX ADMIN — BENZONATATE 200 MG: 100 CAPSULE ORAL at 15:58

## 2019-11-19 RX ADMIN — Medication 10 ML: at 08:01

## 2019-11-19 RX ADMIN — GUAIFENESIN 1200 MG: 600 TABLET, EXTENDED RELEASE ORAL at 08:00

## 2019-11-19 RX ADMIN — SERTRALINE 100 MG: 100 TABLET, FILM COATED ORAL at 08:01

## 2019-11-19 RX ADMIN — GABAPENTIN 400 MG: 400 CAPSULE ORAL at 15:58

## 2019-11-19 RX ADMIN — ESTRADIOL 1 MG: 1 TABLET ORAL at 08:00

## 2019-11-19 RX ADMIN — DOCUSATE SODIUM 100 MG: 100 CAPSULE, LIQUID FILLED ORAL at 08:00

## 2019-11-19 RX ADMIN — GABAPENTIN 400 MG: 400 CAPSULE ORAL at 08:01

## 2019-11-19 RX ADMIN — ENOXAPARIN SODIUM 40 MG: 40 INJECTION SUBCUTANEOUS at 15:57

## 2019-11-19 RX ADMIN — DOXYCYCLINE 100 MG: 100 INJECTION, POWDER, LYOPHILIZED, FOR SOLUTION INTRAVENOUS at 09:27

## 2019-11-19 NOTE — CONSULTS
Cardiology Consult Note      Requesting Physician    Ashtyn Salter MD    PATIENT IDENTIFICATION    Name: Gayathri Reddy Age: 74 y.o. Sex: female :  1945 MRN: [id number]    REASON FOR CONSULTATION:  74 y.o. female with an established history of CAD.  She has undergone 4-V CABG in  with attrition 3/4 grafts.  SVG-diagonal remains patent with negative FFR 10/2018. She has additional history of hypertension with hypertensive cardiovascular disease, HLD, COPD-oxygen dependent, chronic chest pain with typical and atypical features, AAA followed by vascular, angina pectoris significantly improved with Ranexa and Takotsubo cardiomyopathy.     2D echo 10/29/2018:   Ejection Fraction is 45-50%, apical hypokinesis is noted in the distal segment, apical akinesis, impaired LV relaxation.     Nuclear stress test 10/29/2018:  Moderate size severe intensity perfusion defect involving the inferolateral wall suggestive of prior myocardial infarction with small to moderate size lane-infarct ischemia.  Overall left ventricular systolic function was hypokinesis of the inferolateral wall with a calculated ejection fraction of 62%.    CC:  Shortness of breath    HISTORY OF PRESENT ILLNESS:   Patient presented to the emergency department with report of onset 5 days ago of progressively worsening shortness of breath.  She reports associated cough and exercise intolerance.  The patient had fever up to 100.6, positive orthopnea. The patient is chronically on home oxygen as well as breathing treatments which she has continued with no improvement in her symptoms.  The patient denies any chest pain, pressure or tightness, but she feels bad all over.  Pertinent labs include proBNP 2661, troponin negative x2, creatinine 1.24 (1.36), magnesium 1.5, hemoglobin 8.3 (8.5).  EKG shows sinus rhythm with PAC, old anterior/septal wall MI.  She denies any lower extremity edema, dizziness, lightheadedness,  "nausea.      REVIEW OF SYSTEMS:  Pertinent items are noted in HPI, all other systems reviewed and negative    OBJECTIVE   Patient sitting up in bed in mild respiratory distress on nasal O2    Admission problems    Chronic obstructive pulmonary disease (CMS/HCC)    Congestive heart failure (CMS/HCC)    Coronary artery disease    Dyslipidemia    Hypertension    Community acquired pneumonia            Vital Signs  Visit Vitals  /61   Pulse 85   Temp 98.2 °F (36.8 °C) (Oral)   Resp 18   Ht 162.6 cm (64\")   Wt 66 kg (145 lb 8.1 oz)   SpO2 98%   BMI 24.98 kg/m²     Oxygen Therapy  SpO2: 98 %  Pulse Oximetry Type: Intermittent  Device (Oxygen Therapy): nasal cannula  Flow (L/min): 4  Flowsheet Rows      First Filed Value   Admission Height  160 cm (63\") Documented at 11/18/2019 0629   Admission Weight  66.2 kg (145 lb 15.1 oz) Documented at 11/18/2019 0629        Intake & Output (last 3 days)       11/16 0701 - 11/17 0700 11/17 0701 - 11/18 0700 11/18 0701 - 11/19 0700 11/19 0701 - 11/20 0700    IV Piggyback   100     Total Intake(mL/kg)   100 (1.5)     Net   +100                 Lines, Drains & Airways    Active LDAs     Name:   Placement date:   Placement time:   Site:   Days:    Peripheral IV 11/18/19 1057 Left Arm   11/18/19    1057    Arm   less than 1                Medical History    Past Medical History:   Diagnosis Date   • Aneurysm (CMS/HCC)     AAA   • Arthritis    • CHF (congestive heart failure) (CMS/HCC)    • COPD (chronic obstructive pulmonary disease) (CMS/HCC)    • Dyslipidemia    • GERD (gastroesophageal reflux disease)    • History of right heart catheterization     7/30/2017; 10/2018   • Hypertension    • Myocardial infarct (CMS/HCC)    • Myocardial infarction (CMS/HCC)     x 3        Surgical History    Past Surgical History:   Procedure Laterality Date   • ABDOMINAL AORTIC ANEURYSM REPAIR N/A 2001   • CARDIAC CATHETERIZATION     • CATARACT EXTRACTION Bilateral    • CORONARY ANGIOPLASTY WITH " "STENT PLACEMENT N/A     x 5   • CORONARY ARTERY BYPASS GRAFT      x 4 1995   • HYSTERECTOMY N/A         Family History    Family History   Problem Relation Age of Onset   • Heart disease Mother    • Aneurysm Mother    • Heart disease Father        Social History    Social History     Tobacco Use   • Smoking status: Former Smoker   • Smokeless tobacco: Never Used   Substance Use Topics   • Alcohol use: No     Frequency: Never        Allergies    Allergies   Allergen Reactions   • Naprosyn  [Naproxen] Rash   • Bactrim [Sulfamethoxazole-Trimethoprim] Rash              /61   Pulse 85   Temp 98.2 °F (36.8 °C) (Oral)   Resp 18   Ht 162.6 cm (64\")   Wt 66 kg (145 lb 8.1 oz)   SpO2 98%   BMI 24.98 kg/m²   Intake/Output last 3 shifts:  I/O last 3 completed shifts:  In: 100 [IV Piggyback:100]  Out: -   Intake/Output this shift:  No intake/output data recorded.    PHYSICAL EXAM:        Scheduled Meds:        aspirin 81 mg Oral Daily   benzonatate 100 mg Oral TID   cefTRIAXone 1 g Intravenous Q24H   clopidogrel 75 mg Oral Daily   docusate sodium 100 mg Oral Daily   doxycycline 100 mg Intravenous Q12H   enoxaparin 40 mg Subcutaneous Q24H   estradiol 1 mg Oral TID   ferrous sulfate 324 mg Oral Daily With Breakfast   fluticasone 2 spray Nasal Daily   furosemide 40 mg Intravenous Daily   gabapentin 400 mg Oral TID   guaiFENesin 1,200 mg Oral Q12H   ipratropium-albuterol 3 mL Nebulization 4x Daily - RT   isosorbide mononitrate 30 mg Oral Q24H   pantoprazole 40 mg Intravenous Q12H   ranolazine 1,000 mg Oral Q12H   rosuvastatin 10 mg Oral Daily   sertraline 100 mg Oral Daily   sodium chloride 10 mL Intravenous Q12H   Thyroid 60 mg Oral Daily       Continuous Infusions:         PRN Meds:    •  acetaminophen **OR** acetaminophen **OR** acetaminophen  •  benzocaine-menthol  •  benzonatate  •  HYDROcodone-acetaminophen  •  HYDROcodone-acetaminophen  •  LORazepam  •  melatonin  •  methocarbamol  •  nitroglycerin  •  " ondansetron **OR** ondansetron  •  sodium chloride  •  sodium chloride  •  traMADol        Results Review:     I reviewed the patient's new clinical results.    CBC    Results from last 7 days   Lab Units 11/19/19  0429 11/18/19  2236 11/18/19  1505 11/18/19  1203 11/18/19  0649   WBC 10*3/mm3 8.20  --   --  8.80 9.80   HEMOGLOBIN g/dL 8.3* 8.5* 7.7* 7.9* 9.1*   PLATELETS 10*3/mm3 168  --   --  145 177     Cr Clearance Estimated Creatinine Clearance: 37.2 mL/min (A) (by C-G formula based on SCr of 1.24 mg/dL (H)).  Coag     HbA1C No results found for: HGBA1C  Blood Glucose No results found for: POCGLU  Infection   Results from last 7 days   Lab Units 11/18/19  0714 11/18/19  0649   BLOODCX  No growth at 24 hours No growth at 24 hours     CMP   Results from last 7 days   Lab Units 11/19/19  0429 11/18/19  1627 11/18/19  1203 11/18/19  0649   SODIUM mmol/L 139 140 137 137   POTASSIUM mmol/L 3.5 3.7 4.1 4.2   CHLORIDE mmol/L 97* 98 99 99   CO2 mmol/L 31.0* 30.0* 28.0 29.0   BUN mg/dL 20 18 16 13   CREATININE mg/dL 1.24* 1.36* 1.11* 1.01*   GLUCOSE mg/dL 104* 149* 126* 127*   ALBUMIN g/dL  --  3.20*  --  3.60   BILIRUBIN mg/dL  --  0.3  --  0.6   ALK PHOS U/L  --  67  --  72   AST (SGOT) U/L  --  11  --  11   ALT (SGPT) U/L  --  6  --  6     ABG      UA    Results from last 7 days   Lab Units 11/18/19  1623   NITRITE UA  Negative     ALFONZO  No results found for: POCMETH, POCAMPHET, POCBARBITUR, POCBENZO, POCCOCAINE, POCOPIATES, POCOXYCODO, POCPHENCYC, POCPROPOXY, POCTHC, POCTRICYC  Lysis Labs   Results from last 7 days   Lab Units 11/19/19  0429 11/18/19  2236 11/18/19  1627 11/18/19  1505 11/18/19  1203 11/18/19  0649   HEMOGLOBIN g/dL 8.3* 8.5*  --  7.7* 7.9* 9.1*   PLATELETS 10*3/mm3 168  --   --   --  145 177   CREATININE mg/dL 1.24*  --  1.36*  --  1.11* 1.01*     Radiology(recent) Xr Chest 2 View    Result Date: 11/18/2019  Bibasilar pulmonary opacities are seen, which are similar or increased in degree since the  prior study. The findings may represent progression of fibrosis. Atelectasis and/or pneumonia cannot be excluded.  Electronically Signed By-Dr. Quoc Parker MD On:11/18/2019 6:50 AM This report was finalized on [id number] by Dr. Quoc Parker MD.        Results from last 7 days   Lab Units 11/18/19  2236   TROPONIN T ng/mL <0.010       Xrays, labs reviewed personally by physician.    ECG/EMG Results (most recent)     Procedure Component Value Units Date/Time    Adult Transthoracic Echo Complete W/ Cont if Necessary Per Protocol [[id number]] Collected:  11/18/19 1738     Updated:  11/18/19 1806     BSA 1.7 m^2      BH CV ECHO DANNY - RVDD 2.3 cm      IVSd 1.2 cm      LVIDd 4.4 cm      LVIDs 2.8 cm      LVPWd 1.2 cm      IVS/LVPW 0.94     FS 36.4 %      EDV(Teich) 88.3 ml      ESV(Teich) 29.8 ml      EF(Teich) 66.3 %      EDV(cubed) 86.0 ml      ESV(cubed) 22.1 ml      EF(cubed) 74.2 %      LV mass(C)d 193.4 grams      LV mass(C)dI 113.0 grams/m^2      SV(Teich) 58.6 ml      SI(Teich) 34.2 ml/m^2      SV(cubed) 63.8 ml      SI(cubed) 37.3 ml/m^2      Ao root diam 3.2 cm      Ao root area 8.0 cm^2      ACS 1.6 cm      asc Aorta Diam 2.8 cm      LVOT diam 2.0 cm      LVOT area 3.1 cm^2      RVOT diam 3.1 cm      RVOT area 7.5 cm^2      EDV(MOD-sp4) 76.2 ml      ESV(MOD-sp4) 23.0 ml      EF(MOD-sp4) 69.8 %      SV(MOD-sp4) 53.2 ml      SI(MOD-sp4) 31.1 ml/m^2      Ao root area (BSA corrected) 1.9     LV Warren Vol (BSA corrected) 44.5 ml/m^2      LV Sys Vol (BSA corrected) 13.4 ml/m^2      Aortic R-R 0.8 sec      Aortic HR 74.9 BPM      MV E max truman 136.6 cm/sec      MV A max truman 103.4 cm/sec      MV E/A 1.3     MV V2 max 136.0 cm/sec      MV max PG 7.4 mmHg      MV V2 mean 86.4 cm/sec      MV mean PG 3.3 mmHg      MV V2 VTI 32.6 cm      MVA(VTI) 2.5 cm^2      MV dec slope 914.1 cm/sec^2      MV dec time 0.15 sec      Ao pk truman 193.3 cm/sec      Ao max PG 15.1 mmHg      Ao max PG (full) 9.6 mmHg      Ao V2 mean 126.2  cm/sec      Ao mean PG 7.6 mmHg      Ao mean PG (full) 4.7 mmHg      Ao V2 VTI 37.9 cm      OREN(I,A) 2.1 cm^2      OREN(I,D) 2.1 cm^2      OREN(V,A) 1.9 cm^2      OREN(V,D) 1.9 cm^2      LV V1 max PG 5.5 mmHg      LV V1 mean PG 2.9 mmHg      LV V1 max 117.1 cm/sec      LV V1 mean 76.4 cm/sec      LV V1 VTI 26.4 cm      MR max elmer 490.5 cm/sec      MR max PG 96.2 mmHg      CO(Ao) 22.6 l/min      CI(Ao) 13.2 l/min/m^2      SV(Ao) 301.3 ml      SI(Ao) 176.1 ml/m^2      CO(LVOT) 6.1 l/min      CI(LVOT) 3.6 l/min/m^2      SV(LVOT) 81.3 ml      SV(RVOT) 101.6 ml      SI(LVOT) 47.5 ml/m^2      PA V2 max 85.8 cm/sec      PA max PG 2.9 mmHg      PA max PG (full) 1.3 mmHg      PA V2 mean 57.3 cm/sec      PA mean PG 1.5 mmHg      PA mean PG (full) 0.73 mmHg      PA V2 VTI 15.5 cm      PVA(I,A) 6.6 cm^2      BH CV ECHO DANNY - PVA(I,D) 6.6 cm^2      BH CV ECHO DANNY - PVA(V,A) 5.6 cm^2      BH CV ECHO DANNY - PVA(V,D) 5.6 cm^2      PA acc time 0.13 sec      PI end-d elmer 125.6 cm/sec      PI max elmer 225.0 cm/sec      PI max PG 20.2 mmHg      RV V1 max PG 1.6 mmHg      RV V1 mean PG 0.79 mmHg      RV V1 max 64.1 cm/sec      RV V1 mean 41.0 cm/sec      RV V1 VTI 13.6 cm      TR max elmer 231.4 cm/sec      RVSP(TR) 24.4 mmHg      RAP systole 3.0 mmHg      PA pr(Accel) 20.8 mmHg      Pulm Sys Elmer 72.8 cm/sec      Pulm Warren Elmer 102.6 cm/sec      Pulm S/D 0.71     Qp/Qs 1.3     Pulm A Revs Dur 0.13 sec      Pulm A Revs Elmer 31.8 cm/sec      BH CV ECHO DANNY - BZI_BMI 25.1 kilograms/m^2      BH CV ECHO DANNY - BSA(HAYCOCK) 1.7 m^2       CV ECHO DANNY - BZI_METRIC_WEIGHT 66.2 kg      BH CV ECHO DANNY - BZI_METRIC_HEIGHT 162.6 cm      EF(MOD-bp) 70.0 %      LA dimension(2D) 4.3 cm     ECG 12 Lead [[id number]] Collected:  11/18/19 0642     Updated:  11/19/19 0658    Narrative:       HEART RATE= 99  bpm  RR Interval= 608  ms  WY Interval= 205  ms  P Horizontal Axis= 22  deg  P Front Axis= 35  deg  QRSD Interval= 85  ms  QT Interval= 348  ms  QRS Axis=  -19  deg  T Wave Axis=   deg  - ABNORMAL ECG -  Sinus rhythm  Atrial premature complex  Anteroseptal infarct, old  When compared with ECG of 28-Oct-2018 21:02:36,  Significant rate increase  Significant axis, voltage or hypertrophy change  Electronically Signed By: Av Kurtz (Ac) 19-Nov-2019 06:58:11  Date and Time of Study: 2019-11-18 06:42:25            Medication Review:   I have reviewed the patient's current medication list  Scheduled Meds:    aspirin 81 mg Oral Daily   benzonatate 100 mg Oral TID   cefTRIAXone 1 g Intravenous Q24H   clopidogrel 75 mg Oral Daily   docusate sodium 100 mg Oral Daily   doxycycline 100 mg Intravenous Q12H   enoxaparin 40 mg Subcutaneous Q24H   estradiol 1 mg Oral TID   ferrous sulfate 324 mg Oral Daily With Breakfast   fluticasone 2 spray Nasal Daily   furosemide 40 mg Intravenous Daily   gabapentin 400 mg Oral TID   guaiFENesin 1,200 mg Oral Q12H   ipratropium-albuterol 3 mL Nebulization 4x Daily - RT   isosorbide mononitrate 30 mg Oral Q24H   pantoprazole 40 mg Intravenous Q12H   ranolazine 1,000 mg Oral Q12H   rosuvastatin 10 mg Oral Daily   sertraline 100 mg Oral Daily   sodium chloride 10 mL Intravenous Q12H   Thyroid 60 mg Oral Daily     Continuous Infusions:   PRN Meds:.•  acetaminophen **OR** acetaminophen **OR** acetaminophen  •  benzocaine-menthol  •  benzonatate  •  HYDROcodone-acetaminophen  •  HYDROcodone-acetaminophen  •  LORazepam  •  melatonin  •  methocarbamol  •  nitroglycerin  •  ondansetron **OR** ondansetron  •  sodium chloride  •  sodium chloride  •  traMADol    Imaging:  Imaging Results (Last 72 Hours)     Procedure Component Value Units Date/Time    XR Chest 2 View [[id number]] Collected:  11/18/19 0648     Updated:  11/18/19 0652    Narrative:       DATE OF EXAM:  11/18/2019 6:42 AM     PROCEDURE:  XR CHEST 2 VW-     INDICATIONS:  CHF/COPD Protocol     COMPARISON:  10/28/2018.     TECHNIQUE:   Two radiologic views of the chest , PA and lateral were  obtained.     FINDINGS:  Bibasilar pulmonary opacities are seen, which may represent atelectasis,  infiltrate, and/or fibrosis. A similar appearance was seen previously.  Pneumonia cannot be excluded. No cardiac enlargement is seen. The  thoracic aorta is atherosclerotic. There is chronic calcified  granulomatous disease of the chest. Biapical pleural-parenchymal  scarring is noted. No pneumothorax is seen. The patient has undergone  median sternotomy and suspected CABG surgery. No pleural effusion is  suggested.        Impression:       Bibasilar pulmonary opacities are seen, which are similar or increased  in degree since the prior study. The findings may represent progression  of fibrosis. Atelectasis and/or pneumonia cannot be excluded.     Electronically Signed By-Dr. Quoc Parker MD On:11/18/2019 6:50 AM  This report was finalized on [id number] by Dr. Quoc Parker MD.            Alix Bernardo, RADHA  11/19/19  8:56 AM

## 2019-11-19 NOTE — PROGRESS NOTES
Discharge Planning Assessment   [facility]     Patient Name: aGyathri Reddy  MRN: [id number]  Today's Date: 11/19/2019    Admit Date: 11/18/2019    Discharge Needs Assessment     Row Name 11/19/19 1133       Living Environment    Lives With  sibling(s) Lives with sister    Current Living Arrangements  home/apartment/condo    Primary Care Provided by  self    Provides Primary Care For  no one Her sister Jackie has caregiver    Able to Return to Prior Arrangements  yes       Transition Planning    Patient/Family Anticipates Transition to  home with family       Discharge Needs Assessment    Readmission Within the Last 30 Days  no previous admission in last 30 days    Concerns to be Addressed  no discharge needs identified    Equipment Currently Used at Home  oxygen 02@ 3L with [employer], portable/concentrator    Equipment Needed After Discharge  none    Provided post acute provider list?  -- deneis needs        Discharge Plan     Row Name 11/19/19 1131       Plan    Plan  Return home with sister Jackie    Plan Comments  Plan to return home, Currently has home 02 via [employer], portable and concentrator  Discharge Barrier: IV Lasix,     Demographic Summary     Row Name 11/19/19 1131       General Information    Admission Type  inpatient    Arrived From  emergency department    Required Notices Provided  Important Message from Medicare    Referral Source  admission list;emergency department    Reason for Consult  discharge planning    Preferred Language  English        Functional Status     Row Name 11/19/19 1132       Functional Status    Usual Activity Tolerance  good    Current Activity Tolerance  good       Functional Status, IADL    Medications  independent    Meal Preparation  independent    Housekeeping  independent       Mental Status    General Appearance WDL  WDL     Alnea Villanueva RN   [phone]

## 2019-11-19 NOTE — PROGRESS NOTES
"Hospitalist Team      Patient Care Team:  Deonte Hector MD as PCP - General        Chief Complaint: Shortness of breath    Subjective  Today the patient reports that her shortness of breath is improved over yesterday but has not returned to baseline.  The patient overall says she is feeling somewhat better.  She has had her echocardiogram but the report remains pending.  She has no new complaints.  Interval History and ROS:     Review of Systems   Constitutional: Negative for activity change, appetite change, fatigue and fever.   HENT: Negative for congestion, nosebleeds, postnasal drip and voice change.    Eyes: Negative for photophobia and discharge.   Respiratory: Positive for cough and shortness of breath. Negative for choking, chest tightness and wheezing.    Cardiovascular: Negative for chest pain and leg swelling.   Gastrointestinal: Negative for abdominal distention, abdominal pain, constipation, diarrhea and nausea.   Genitourinary: Negative for decreased urine volume, difficulty urinating, dysuria, frequency and urgency.   Musculoskeletal: Negative for arthralgias and myalgias.   Skin: Negative for rash.   Neurological: Negative for dizziness, syncope, facial asymmetry, light-headedness and headaches.   Psychiatric/Behavioral: Negative for agitation, behavioral problems, confusion and decreased concentration. The patient is not nervous/anxious.    All other systems reviewed and are negative.      Objective    Vital Signs  Temp:  [98.1 °F (36.7 °C)-98.5 °F (36.9 °C)] 98.1 °F (36.7 °C)  Heart Rate:  [61-88] 88  Resp:  [15-19] 18  BP: (133-161)/(61-75) 153/74  Oxygen Therapy  SpO2: 99 %  Pulse Oximetry Type: Intermittent  Device (Oxygen Therapy): nasal cannula  Flow (L/min): 4  Oxygen Concentration (%): 4  Flowsheet Rows      First Filed Value   Admission Height  160 cm (63\") Documented at 11/18/2019 0629   Admission Weight  66.2 kg (145 lb 15.1 oz) Documented at 11/18/2019 0629        Intake & " Output (last 3 days)       11/16 0701 - 11/17 0700 11/17 0701 - 11/18 0700 11/18 0701 - 11/19 0700 11/19 0701 - 11/20 0700    P.O.    520    IV Piggyback   100     Total Intake(mL/kg)   100 (1.5) 520 (7.9)    Net   +100 +520                Lines, Drains & Airways    Active LDAs     Name:   Placement date:   Placement time:   Site:   Days:    Peripheral IV (Ped/Christopher) 11/19/19 Anterior;Right Forearm   11/19/19    0852     less than 1                Physical Exam:    General Appearance:    Alert, cooperative, in no acute distress   Head:    Normocephalic, without obvious abnormality, atraumatic   Eyes:            Lids and lashes normal, conjunctivae and sclerae normal, no   icterus, no pallor, corneas clear, PERRLA   Ears:    Ears appear intact with no abnormalities noted   Throat:   No oral lesions, no thrush, oral mucosa moist   Neck:   No adenopathy, supple, trachea midline, no thyromegaly, no     carotid bruit, no JVD   Back:     No kyphosis present, no scoliosis present, no skin lesions,       erythema or scars, no tenderness to percussion or                   palpation,   range of motion normal   Lungs:    She has a few rales in the bases to auscultation,respirations regular, even and unlabored    Heart:    Regular rhythm and normal rate, normal S1 and S2, no            murmur, no gallop, no rub, no click   Breast Exam:    Deferred   Abdomen:     Normal bowel sounds, no masses, no organomegaly, soft        non-tender, non-distended, no guarding, no rebound                 tenderness   Genitalia:    Deferred   Extremities:   Moves all extremities well, no edema, no cyanosis, no              redness   Pulses:   Pulses palpable and equal bilaterally   Skin:   No bleeding, bruising or rash   Lymph nodes:   No palpable adenopathy   Neurologic:   Cranial nerves 2 - 12 grossly intact, sensation intact, DTR        present and equal bilaterally       Results Review:     I reviewed the patient's new clinical results.    Lab  Results (last 24 hours)     Procedure Component Value Units Date/Time    Blood Culture - Blood, Wrist, Right [[id number]] Collected:  11/18/19 0714    Specimen:  Blood from Wrist, Right Updated:  11/19/19 0800     Blood Culture No growth at 24 hours    Blood Culture - Blood, Arm, Left [[id number]] Collected:  11/18/19 0649    Specimen:  Blood from Arm, Left Updated:  11/19/19 0715     Blood Culture No growth at 24 hours    Basic Metabolic Panel [[id number]]  (Abnormal) Collected:  11/19/19 0429    Specimen:  Blood Updated:  11/19/19 0525     Glucose 104 mg/dL      BUN 20 mg/dL      Creatinine 1.24 mg/dL      Sodium 139 mmol/L      Potassium 3.5 mmol/L      Chloride 97 mmol/L      CO2 31.0 mmol/L      Calcium 8.9 mg/dL      eGFR Non African Amer 42 mL/min/1.73      BUN/Creatinine Ratio 16.1     Anion Gap 11.0 mmol/L     Narrative:       GFR Normal >60  Chronic Kidney Disease <60  Kidney Failure <15    Magnesium [[id number]]  (Abnormal) Collected:  11/19/19 0429    Specimen:  Blood Updated:  11/19/19 0525     Magnesium 1.5 mg/dL     CBC & Differential [[id number]] Collected:  11/19/19 0429    Specimen:  Blood Updated:  11/19/19 0455    Narrative:       The following orders were created for panel order CBC & Differential.  Procedure                               Abnormality         Status                     ---------                               -----------         ------                     CBC Auto Differential[[id number]]        Abnormal            Final result                 Please view results for these tests on the individual orders.    CBC Auto Differential [[id number]]  (Abnormal) Collected:  11/19/19 0429    Specimen:  Blood Updated:  11/19/19 0455     WBC 8.20 10*3/mm3      RBC 2.49 10*6/mm3      Hemoglobin 8.3 g/dL      Hematocrit 25.1 %      .7 fL      MCH 33.5 pg      MCHC 33.3 g/dL      RDW 13.1 %      RDW-SD 46.4 fl      MPV 8.0 fL      Platelets 168 10*3/mm3      Neutrophil % 62.2 %      Lymphocyte %  25.5 %      Monocyte % 10.7 %      Eosinophil % 1.2 %      Basophil % 0.4 %      Neutrophils, Absolute 5.10 10*3/mm3      Lymphocytes, Absolute 2.10 10*3/mm3      Monocytes, Absolute 0.90 10*3/mm3      Eosinophils, Absolute 0.10 10*3/mm3      Basophils, Absolute 0.00 10*3/mm3      nRBC 0.1 /100 WBC     Vitamin B12 [[id number]]  (Normal) Collected:  11/18/19 1627    Specimen:  Blood Updated:  11/19/19 0039     Vitamin B-12 465 pg/mL     Folate [[id number]]  (Normal) Collected:  11/18/19 1627    Specimen:  Blood Updated:  11/19/19 0039     Folate 8.07 ng/mL     BNP [[id number]]  (Abnormal) Collected:  11/18/19 2236    Specimen:  Blood Updated:  11/19/19 0021     proBNP 2,661.0 pg/mL     Narrative:       Among patients with dyspnea, NT-proBNP is highly sensitive for the detection of acute congestive heart failure. In addition NT-proBNP of <300 pg/ml effectively rules out acute congestive heart failure with 99% negative predictive value.    Retic With IRF & RET-He [[id number]]  (Abnormal) Collected:  11/18/19 1628    Specimen:  Blood Updated:  11/19/19 0011     Immature Reticulocyte Fraction 13.6 %      Reticulocyte % 1.91 %      Reticulocyte Hgb 26.4 pg     Troponin [[id number]]  (Normal) Collected:  11/18/19 2236    Specimen:  Blood Updated:  11/18/19 2312     Troponin T <0.010 ng/mL     Narrative:       Troponin T Reference Range:  <= 0.03 ng/mL-   Negative for AMI  >0.03 ng/mL-     Abnormal for myocardial necrosis.  Clinicians would have to utilize clinical acumen, EKG, Troponin and serial changes to determine if it is an Acute Myocardial Infarction or myocardial injury due to an underlying chronic condition.     Hemoglobin & Hematocrit, Blood [[id number]]  (Abnormal) Collected:  11/18/19 2236    Specimen:  Blood Updated:  11/18/19 2248     Hemoglobin 8.5 g/dL      Hematocrit 25.8 %     Comprehensive Metabolic Panel [[id number]]  (Abnormal) Collected:  11/18/19 1627    Specimen:  Blood Updated:  11/18/19 1726     Glucose  149 mg/dL      BUN 18 mg/dL      Creatinine 1.36 mg/dL      Sodium 140 mmol/L      Potassium 3.7 mmol/L      Chloride 98 mmol/L      CO2 30.0 mmol/L      Calcium 8.7 mg/dL      Total Protein 6.2 g/dL      Albumin 3.20 g/dL      ALT (SGPT) 6 U/L      AST (SGOT) 11 U/L      Alkaline Phosphatase 67 U/L      Total Bilirubin 0.3 mg/dL      eGFR Non African Amer 38 mL/min/1.73      Globulin 3.0 gm/dL      A/G Ratio 1.1 g/dL      BUN/Creatinine Ratio 13.2     Anion Gap 12.0 mmol/L     Narrative:       GFR Normal >60  Chronic Kidney Disease <60  Kidney Failure <15    Troponin [[id number]]  (Normal) Collected:  11/18/19 1627    Specimen:  Blood Updated:  11/18/19 1715     Troponin T <0.010 ng/mL     Narrative:       Troponin T Reference Range:  <= 0.03 ng/mL-   Negative for AMI  >0.03 ng/mL-     Abnormal for myocardial necrosis.  Clinicians would have to utilize clinical acumen, EKG, Troponin and serial changes to determine if it is an Acute Myocardial Infarction or myocardial injury due to an underlying chronic condition.     Legionella Antigen, Urine - Urine, Urine, Clean Catch [[id number]]  (Normal) Collected:  11/18/19 1623    Specimen:  Urine, Clean Catch Updated:  11/18/19 1715     LEGIONELLA ANTIGEN, URINE Negative    Urinalysis With Microscopic If Indicated (No Culture) - Urine, Clean Catch [[id number]]  (Normal) Collected:  11/18/19 1623    Specimen:  Urine, Clean Catch Updated:  11/18/19 1705     Color, UA Yellow     Appearance, UA Clear     pH, UA <=5.0     Specific Gravity, UA 1.013     Glucose, UA Negative     Ketones, UA Negative     Bilirubin, UA Negative     Blood, UA Negative     Protein, UA Negative     Leuk Esterase, UA Negative     Nitrite, UA Negative     Urobilinogen, UA 0.2 E.U./dL    Narrative:       Urine microscopic not indicated.    Iron [[id number]]  (Abnormal) Collected:  11/18/19 1203    Specimen:  Blood Updated:  11/18/19 1620     Iron 10 mcg/dL     Iron Profile [[id number]]  (Abnormal) Collected:   11/18/19 1203    Specimen:  Blood Updated:  11/18/19 1620     Iron 10 mcg/dL      Iron Saturation 4 %      Transferrin 156 mg/dL      TIBC 232 mcg/dL     Lactate Dehydrogenase [[id number]]  (Abnormal) Collected:  11/18/19 1203    Specimen:  Blood Updated:  11/18/19 1620      U/L     Hemoglobin & Hematocrit, Blood [[id number]]  (Abnormal) Collected:  11/18/19 1505    Specimen:  Blood Updated:  11/18/19 1528     Hemoglobin 7.7 g/dL      Hematocrit 22.4 %           Imaging Results (Last 24 Hours)     ** No results found for the last 24 hours. **          Xrays, labs reviewed personally by physician.    ECG/EMG Results (most recent)     Procedure Component Value Units Date/Time    Adult Transthoracic Echo Complete W/ Cont if Necessary Per Protocol [[id number]] Collected:  11/18/19 1738     Updated:  11/18/19 1806     BSA 1.7 m^2      BH CV ECHO DANNY - RVDD 2.3 cm      IVSd 1.2 cm      LVIDd 4.4 cm      LVIDs 2.8 cm      LVPWd 1.2 cm      IVS/LVPW 0.94     FS 36.4 %      EDV(Teich) 88.3 ml      ESV(Teich) 29.8 ml      EF(Teich) 66.3 %      EDV(cubed) 86.0 ml      ESV(cubed) 22.1 ml      EF(cubed) 74.2 %      LV mass(C)d 193.4 grams      LV mass(C)dI 113.0 grams/m^2      SV(Teich) 58.6 ml      SI(Teich) 34.2 ml/m^2      SV(cubed) 63.8 ml      SI(cubed) 37.3 ml/m^2      Ao root diam 3.2 cm      Ao root area 8.0 cm^2      ACS 1.6 cm      asc Aorta Diam 2.8 cm      LVOT diam 2.0 cm      LVOT area 3.1 cm^2      RVOT diam 3.1 cm      RVOT area 7.5 cm^2      EDV(MOD-sp4) 76.2 ml      ESV(MOD-sp4) 23.0 ml      EF(MOD-sp4) 69.8 %      SV(MOD-sp4) 53.2 ml      SI(MOD-sp4) 31.1 ml/m^2      Ao root area (BSA corrected) 1.9     LV Warren Vol (BSA corrected) 44.5 ml/m^2      LV Sys Vol (BSA corrected) 13.4 ml/m^2      Aortic R-R 0.8 sec      Aortic HR 74.9 BPM      MV E max truman 136.6 cm/sec      MV A max truman 103.4 cm/sec      MV E/A 1.3     MV V2 max 136.0 cm/sec      MV max PG 7.4 mmHg      MV V2 mean 86.4 cm/sec      MV mean PG  3.3 mmHg      MV V2 VTI 32.6 cm      MVA(VTI) 2.5 cm^2      MV dec slope 914.1 cm/sec^2      MV dec time 0.15 sec      Ao pk elmer 193.3 cm/sec      Ao max PG 15.1 mmHg      Ao max PG (full) 9.6 mmHg      Ao V2 mean 126.2 cm/sec      Ao mean PG 7.6 mmHg      Ao mean PG (full) 4.7 mmHg      Ao V2 VTI 37.9 cm      OREN(I,A) 2.1 cm^2      OREN(I,D) 2.1 cm^2      OREN(V,A) 1.9 cm^2      OREN(V,D) 1.9 cm^2      LV V1 max PG 5.5 mmHg      LV V1 mean PG 2.9 mmHg      LV V1 max 117.1 cm/sec      LV V1 mean 76.4 cm/sec      LV V1 VTI 26.4 cm      MR max elmer 490.5 cm/sec      MR max PG 96.2 mmHg      CO(Ao) 22.6 l/min      CI(Ao) 13.2 l/min/m^2      SV(Ao) 301.3 ml      SI(Ao) 176.1 ml/m^2      CO(LVOT) 6.1 l/min      CI(LVOT) 3.6 l/min/m^2      SV(LVOT) 81.3 ml      SV(RVOT) 101.6 ml      SI(LVOT) 47.5 ml/m^2      PA V2 max 85.8 cm/sec      PA max PG 2.9 mmHg      PA max PG (full) 1.3 mmHg      PA V2 mean 57.3 cm/sec      PA mean PG 1.5 mmHg      PA mean PG (full) 0.73 mmHg      PA V2 VTI 15.5 cm      PVA(I,A) 6.6 cm^2      BH CV ECHO DANNY - PVA(I,D) 6.6 cm^2      BH CV ECHO DANNY - PVA(V,A) 5.6 cm^2      BH CV ECHO DANNY - PVA(V,D) 5.6 cm^2      PA acc time 0.13 sec      PI end-d elmer 125.6 cm/sec      PI max elmer 225.0 cm/sec      PI max PG 20.2 mmHg      RV V1 max PG 1.6 mmHg      RV V1 mean PG 0.79 mmHg      RV V1 max 64.1 cm/sec      RV V1 mean 41.0 cm/sec      RV V1 VTI 13.6 cm      TR max elmer 231.4 cm/sec      RVSP(TR) 24.4 mmHg      RAP systole 3.0 mmHg      PA pr(Accel) 20.8 mmHg      Pulm Sys Elmer 72.8 cm/sec      Pulm Warren Elmer 102.6 cm/sec      Pulm S/D 0.71     Qp/Qs 1.3     Pulm A Revs Dur 0.13 sec      Pulm A Revs Elmer 31.8 cm/sec       CV ECHO DANNY - BZI_BMI 25.1 kilograms/m^2       CV ECHO DANNY - BSA(HAYCOCK) 1.7 m^2       CV ECHO DANNY - BZI_METRIC_WEIGHT 66.2 kg       CV ECHO DANNY - BZI_METRIC_HEIGHT 162.6 cm      EF(MOD-bp) 70.0 %      LA dimension(2D) 4.3 cm     ECG 12 Lead [[id number]] Collected:  11/18/19  0642     Updated:  11/19/19 0658    Narrative:       HEART RATE= 99  bpm  RR Interval= 608  ms  OH Interval= 205  ms  P Horizontal Axis= 22  deg  P Front Axis= 35  deg  QRSD Interval= 85  ms  QT Interval= 348  ms  QRS Axis= -19  deg  T Wave Axis=   deg  - ABNORMAL ECG -  Sinus rhythm  Atrial premature complex  Anteroseptal infarct, old  When compared with ECG of 28-Oct-2018 21:02:36,  Significant rate increase  Significant axis, voltage or hypertrophy change  Electronically Signed By: Av Kurtz (Ac) 19-Nov-2019 06:58:11  Date and Time of Study: 2019-11-18 06:42:25          Medication Review:   I have reviewed the patient's current medication list    Current Facility-Administered Medications:   •  acetaminophen (TYLENOL) tablet 650 mg, 650 mg, Oral, Q4H PRN **OR** acetaminophen (TYLENOL) 160 MG/5ML solution 650 mg, 650 mg, Oral, Q4H PRN **OR** acetaminophen (TYLENOL) suppository 650 mg, 650 mg, Rectal, Q4H PRN, Ashtyn Salter MD  •  aspirin EC tablet 81 mg, 81 mg, Oral, Daily, Ashtyn Salter MD, 81 mg at 11/19/19 0801  •  benzocaine-menthol (CEPACOL) lozenge 1 lozenge, 1 lozenge, Mouth/Throat, Q2H PRN, Elisha Sherwood APRN  •  benzonatate (TESSALON) capsule 100 mg, 100 mg, Oral, TID PRN, Ashtyn Salter MD  •  benzonatate (TESSALON) capsule 100 mg, 100 mg, Oral, TID, Ashtyn Salter MD, 100 mg at 11/19/19 0800  •  cefTRIAXone (ROCEPHIN) 1 g in sodium chloride 0.9 % 100 mL IVPB, 1 g, Intravenous, Q24H, Ashtyn Salter MD, Last Rate: 200 mL/hr at 11/19/19 0801, 1 g at 11/19/19 0801  •  clopidogrel (PLAVIX) tablet 75 mg, 75 mg, Oral, Daily, Ashtyn Saltre MD, 75 mg at 11/19/19 0801  •  docusate sodium (COLACE) capsule 100 mg, 100 mg, Oral, Daily, Ashtyn Salter MD, 100 mg at 11/19/19 0800  •  doxycycline (VIBRAMYCIN) 100 mg in sodium chloride 0.9 % 100 mL IVPB, 100 mg, Intravenous, Q12H, Ashtyn Salter MD, 100 mg at 11/19/19 0927  •  enoxaparin (LOVENOX) syringe 40 mg, 40  mg, Subcutaneous, Q24H, Ahstyn Salter MD  •  estradiol (ESTRACE) tablet 1 mg, 1 mg, Oral, TID, Ashtyn Salter MD, 1 mg at 11/19/19 0800  •  ferrous sulfate EC tablet 324 mg, 324 mg, Oral, Daily With Breakfast, Ashtyn Salter MD, 324 mg at 11/19/19 0800  •  fluticasone (FLONASE) 50 MCG/ACT nasal spray 2 spray, 2 spray, Nasal, Daily, Ashtyn Salter MD, 2 spray at 11/19/19 0900  •  furosemide (LASIX) injection 40 mg, 40 mg, Intravenous, Daily, Ashtyn Salter MD, 40 mg at 11/19/19 0801  •  gabapentin (NEURONTIN) capsule 400 mg, 400 mg, Oral, TID, Ashtyn Salter MD, 400 mg at 11/19/19 0801  •  guaiFENesin (MUCINEX) 12 hr tablet 1,200 mg, 1,200 mg, Oral, Q12H, Elisha Sherwood APRN, 1,200 mg at 11/19/19 0800  •  HYDROcodone-acetaminophen (NORCO)  MG per tablet 1 tablet, 1 tablet, Oral, Q6H PRN, Ashtyn Salter MD, 1 tablet at 11/19/19 0856  •  HYDROcodone-acetaminophen (NORCO)  MG per tablet 1 tablet, 1 tablet, Oral, Q4H PRN, Ashtyn Salter MD  •  ipratropium-albuterol (DUO-NEB) nebulizer solution 3 mL, 3 mL, Nebulization, 4x Daily - RT, Ashtyn Salter MD, 3 mL at 11/19/19 1158  •  isosorbide mononitrate (IMDUR) 24 hr tablet 30 mg, 30 mg, Oral, Q24H, Ashtyn Salter MD, 30 mg at 11/19/19 0802  •  LORazepam (ATIVAN) tablet 1 mg, 1 mg, Oral, 4x Daily PRN, Ashtyn Salter MD, 1 mg at 11/19/19 0810  •  melatonin tablet 5 mg, 5 mg, Oral, Nightly PRN, Ashtyn Salter MD  •  methocarbamol (ROBAXIN) tablet 500 mg, 500 mg, Oral, TID PRN, Ashtyn Salter MD  •  nitroglycerin (NITROSTAT) SL tablet 0.4 mg, 0.4 mg, Sublingual, Q5 Min PRN, Ashtyn Salter MD  •  ondansetron (ZOFRAN) tablet 4 mg, 4 mg, Oral, Q6H PRN **OR** ondansetron (ZOFRAN) injection 4 mg, 4 mg, Intravenous, Q6H PRN, Ashtyn Salter MD  •  pantoprazole (PROTONIX) injection 40 mg, 40 mg, Intravenous, Q12H, Ashtyn Salter MD, 40 mg at 11/19/19 0801  •  ranolazine (RANEXA) 12 hr  tablet 1,000 mg, 1,000 mg, Oral, Q12H, Ashtyn Salter MD, 1,000 mg at 11/19/19 0800  •  rosuvastatin (CRESTOR) tablet 10 mg, 10 mg, Oral, Daily, Ashtyn Salter MD, 10 mg at 11/19/19 0801  •  sertraline (ZOLOFT) tablet 100 mg, 100 mg, Oral, Daily, Ashtyn Salter MD, 100 mg at 11/19/19 0801  •  sodium chloride 0.9 % flush 10 mL, 10 mL, Intravenous, PRN, Ashtyn Salter MD  •  sodium chloride 0.9 % flush 10 mL, 10 mL, Intravenous, Q12H, Ashtyn Salter MD, 10 mL at 11/19/19 0801  •  sodium chloride 0.9 % flush 10 mL, 10 mL, Intravenous, PRN, Ashtyn Salter MD  •  Thyroid (ARMOUR) tablet 60 mg, 60 mg, Oral, Daily, Ashtyn Salter MD, 60 mg at 11/19/19 0800  •  traMADol (ULTRAM) tablet 50 mg, 50 mg, Oral, Q6H PRN, Ashtyn Salter MD      Assessment/Plan   1.  Dyspnea-this is likely related to cardiac disease.  The patient is much improved with diuretic therapy.  Her cough is somewhat improved as well.  Continue the Lasix and the empiric Rocephin.  We will repeat a chest x-ray in the a.m.  2.  Coronary artery disease-appreciate cardiology's input.  Will await the report of the recent echocardiogram.  Appreciate their note which included information that previously had not been available.  3.  Renal insufficiency-the patient's creatinine is actually improved today over yesterday.  We will continue to follow daily.  4.  Anemia-the patient appears to have the anemia of chronic disease.  Clearly she has multiple medical issues which could account for that.  We will continue supportive care.  We will also guaiac her stools in order to ensure that she does not have a GI issue that needs to be addressed.  5.  Cough-the patient is improved on the Tessalon Perles but remains with cough.  We will increase them to 200 mg 3 times daily routinely.    Plan for disposition: Home possibly with home health    Ashtyn Salter MD  11/19/19  2:40 PM

## 2019-11-19 NOTE — PLAN OF CARE
Problem: Patient Care Overview  Goal: Plan of Care Review  Outcome: Ongoing (interventions implemented as appropriate)   11/19/19 0305   OTHER   Outcome Summary pt has bad cough and complained that she just wanted to get some rest. Pt was finally able to go to sleep once cough medication took effect. no other complaints through the night, will continue to monitor pt      Goal: Individualization and Mutuality  Outcome: Ongoing (interventions implemented as appropriate)    Goal: Discharge Needs Assessment  Outcome: Ongoing (interventions implemented as appropriate)    Goal: Interprofessional Rounds/Family Conf  Outcome: Ongoing (interventions implemented as appropriate)      Problem: Fall Risk (Adult)  Goal: Identify Related Risk Factors and Signs and Symptoms  Outcome: Outcome(s) achieved Date Met: 11/19/19 11/19/19 0305   Fall Risk (Adult)   Related Risk Factors (Fall Risk) age-related changes;gait/mobility problems;history of falls;polypharmacy;environment unfamiliar   Signs and Symptoms (Fall Risk) presence of risk factors     Goal: Absence of Fall  Outcome: Ongoing (interventions implemented as appropriate)   11/19/19 0305   Fall Risk (Adult)   Absence of Fall making progress toward outcome       Problem: Pneumonia (Adult)  Goal: Signs and Symptoms of Listed Potential Problems Will be Absent, Minimized or Managed (Pneumonia)  Outcome: Ongoing (interventions implemented as appropriate)   11/19/19 0305   Goal/Outcome Evaluation   Problems Assessed (Pneumonia) all   Problems Present (Pneumonia) infection progression;respiratory compromise

## 2019-11-19 NOTE — CONSULTS
CARDIOLOGY CONSULT      Requesting Provider    Ashtyn Salter MD      PATIENT IDENTIFICATION    Name: Gayathri Reddy Age: 74 y.o. Sex: female :  1945 MRN: [id number]    REASON FOR CONSULTATION:  74 y.o. female with an established history of CAD.  She has undergone 4-V CABG in  with attrition 3/4 grafts.  SVG-diagonal remains patent with negative FFR 10/2018. She has additional history of hypertension with hypertensive cardiovascular disease, HLD, COPD-oxygen dependent, chronic chest pain with typical and atypical features, AAA followed by vascular, angina pectoris significantly improved with Ranexa and Takotsubo cardiomyopathy.      Her most recent noninvasive findings are as follows:    2D echo 10/29/2018:   Ejection Fraction is 45-50%, apical hypokinesis is noted in the distal segment, apical akinesis, impaired LV relaxation.      Nuclear stress test 10/29/2018:  Moderate size severe intensity perfusion defect involving the inferolateral wall suggestive of prior myocardial infarction with small to moderate size lane-infarct ischemia.  Overall left ventricular systolic function was hypokinesis of the inferolateral wall with a calculated ejection fraction of 62%.    CHIEF COMPLAINT:  Dyspnea    HISTORY OF PRESENT ILLNESS:   This is a 74-year-old female who presented to the emergency department with a complaint of shortness of breath that began approximately 5 days prior to presentation.  She reports progressive shortness of breath.  She had associated cough and exercise intolerance.  She reports a fever of 100.6 and orthopnea.  She has chronic respiratory failure and is on home oxygen.  She receives breathing treatments periodically as well.  The breathing treatments however did not provide her any relief from her symptoms.  She denies any chest pain pressure heaviness or tightness.  She reports feeling just overall bad.    Serial cardiac biomarkers are negative for acute injury however her  proBNP is elevated at 2661.    IMPRESSIONS  Acute respiratory failure  Pneumonia  Elevated pro BNP of uncertain clinical significance  COPD oxygen dependent  History of coronary artery disease status post remote four-vessel CABG     Most recent catheterization demonstrating attrition of 3 of 4 grafts     Negative FFR of remaining vein graft in October 2018  Elevated proBNP  Hypertension  Hypertensive cardiovascular disease  Hyperlipidemia  Chronic stable angina    RECOMMENDATIONS:  Antimicrobials as per admitting service  Pulmonary toilet  2D echocardiogram reviewed and demonstrates normal LV systolic function.  Monitor volume status  Further recommendations as patient's course progresses.    Medical History    Past Medical History:   Diagnosis Date   • Aneurysm (CMS/[facility])     AAA   • Arthritis    • CHF (congestive heart failure) (CMS/[facility])    • COPD (chronic obstructive pulmonary disease) (CMS/[facility])    • Dyslipidemia    • GERD (gastroesophageal reflux disease)    • History of right heart catheterization     7/30/2017; 10/2018   • Hypertension    • Myocardial infarct (CMS/[facility])    • Myocardial infarction (CMS/[facility])     x 3        Surgical History    Past Surgical History:   Procedure Laterality Date   • ABDOMINAL AORTIC ANEURYSM REPAIR N/A 2001   • CARDIAC CATHETERIZATION     • CATARACT EXTRACTION Bilateral    • CORONARY ANGIOPLASTY WITH STENT PLACEMENT N/A     x 5   • CORONARY ARTERY BYPASS GRAFT      x 4 1995   • HYSTERECTOMY N/A         Family History    Family History   Problem Relation Age of Onset   • Heart disease Mother    • Aneurysm Mother    • Heart disease Father        Social History    Social History     Tobacco Use   • Smoking status: Former Smoker   • Smokeless tobacco: Never Used   Substance Use Topics   • Alcohol use: No     Frequency: Never        Allergies    Allergies   Allergen Reactions   • Naprosyn  [Naproxen] Rash   • Bactrim [Sulfamethoxazole-Trimethoprim] Rash       REVIEW OF SYSTEMS:  Pertinent  "items are noted in HPI, all other systems reviewed and negative    OBJECTIVE     VITAL SIGNS:  Visit Vitals  /74   Pulse 84   Temp 98.1 °F (36.7 °C) (Oral)   Resp 18   Ht 162.6 cm (64\")   Wt 66 kg (145 lb 8.1 oz)   SpO2 98%   BMI 24.98 kg/m²     Oxygen Therapy  SpO2: 98 %  Pulse Oximetry Type: Intermittent  Device (Oxygen Therapy): nasal cannula  Flow (L/min): 3(decreased to 3 liters from 4, home 02 @ 3 liters.)  Flowsheet Rows      First Filed Value   Admission Height  160 cm (63\") Documented at 11/18/2019 0629   Admission Weight  66.2 kg (145 lb 15.1 oz) Documented at 11/18/2019 0629        Intake & Output (last 3 days)       11/16 0701 - 11/17 0700 11/17 0701 - 11/18 0700 11/18 0701 - 11/19 0700 11/19 0701 - 11/20 0700    P.O.    520    IV Piggyback   100     Total Intake(mL/kg)   100 (1.5) 520 (7.9)    Net   +100 +520                Lines, Drains & Airways    Active LDAs     Name:   Placement date:   Placement time:   Site:   Days:    Peripheral IV (Ped/Christopher) 11/19/19 Anterior;Right Forearm   11/19/19    0852     less than 1              /74   Pulse 84   Temp 98.1 °F (36.7 °C) (Oral)   Resp 18   Ht 162.6 cm (64\")   Wt 66 kg (145 lb 8.1 oz)   SpO2 98%   BMI 24.98 kg/m²     INTAKE/OUTPUT:    Intake/Output this shift:  I/O this shift:  In: 520 [P.O.:520]  Out: -   Intake/Output last 3 shifts:  I/O last 3 completed shifts:  In: 100 [IV Piggyback:100]  Out: -       PHYSICAL EXAM:    General: Alert, cooperative, no distress, appears stated age  Head:  Normocephalic, atraumatic, mucous membranes moist  Eyes:  Conjunctiva/corneas clear, EOM's intact     Neck:  Supple,  no adenopathy;      Lungs: Coarse with occasional crackles  Chest wall: No tenderness  Heart::  Regular rate and rhythm, S1 and S2 normal, no murmur, rub or gallop  Abdomen: Soft, non-tender, nondistended bowel sounds active  Extremities: No cyanosis, clubbing, or edema  Pulses: 2+ and symmetric all extremities  Skin:  No rashes or " lesions  Neuro/psych: A&O x3. CN II through XII are grossly intact with appropriate affect    Scheduled Meds:        aspirin 81 mg Oral Daily   benzonatate 200 mg Oral TID   cefTRIAXone 1 g Intravenous Q24H   clopidogrel 75 mg Oral Daily   docusate sodium 100 mg Oral Daily   doxycycline 100 mg Intravenous Q12H   enoxaparin 40 mg Subcutaneous Q24H   estradiol 1 mg Oral TID   ferrous sulfate 324 mg Oral Daily With Breakfast   fluticasone 2 spray Nasal Daily   furosemide 40 mg Intravenous Daily   gabapentin 400 mg Oral TID   guaiFENesin 1,200 mg Oral Q12H   ipratropium-albuterol 3 mL Nebulization 4x Daily - RT   isosorbide mononitrate 30 mg Oral Q24H   pantoprazole 40 mg Intravenous Q12H   ranolazine 1,000 mg Oral Q12H   rosuvastatin 10 mg Oral Daily   sertraline 100 mg Oral Daily   sodium chloride 10 mL Intravenous Q12H   Thyroid 60 mg Oral Daily       Continuous Infusions:         PRN Meds:    •  acetaminophen **OR** acetaminophen **OR** acetaminophen  •  benzocaine-menthol  •  benzonatate  •  HYDROcodone-acetaminophen  •  HYDROcodone-acetaminophen  •  LORazepam  •  melatonin  •  methocarbamol  •  nitroglycerin  •  ondansetron **OR** ondansetron  •  sodium chloride  •  sodium chloride  •  traMADol     Data Review:     Xrays, labs reviewed personally by provider.    CBC    Results from last 7 days   Lab Units 11/19/19  0429 11/18/19  2236 11/18/19  1505 11/18/19  1203 11/18/19  0649   WBC 10*3/mm3 8.20  --   --  8.80 9.80   HEMOGLOBIN g/dL 8.3* 8.5* 7.7* 7.9* 9.1*   PLATELETS 10*3/mm3 168  --   --  145 177     Cr Clearance Estimated Creatinine Clearance: 37.2 mL/min (A) (by C-G formula based on SCr of 1.24 mg/dL (H)).  Coag     HbA1C No results found for: HGBA1C  Blood Glucose No results found for: POCGLU  Infection Results from last 7 days   Lab Units 11/18/19  0714 11/18/19  0649   BLOODCX  No growth at 24 hours No growth at 24 hours     CMP Results from last 7 days   Lab Units 11/19/19  0429 11/18/19  6729  11/18/19  1203 11/18/19  0649   SODIUM mmol/L 139 140 137 137   POTASSIUM mmol/L 3.5 3.7 4.1 4.2   CHLORIDE mmol/L 97* 98 99 99   CO2 mmol/L 31.0* 30.0* 28.0 29.0   BUN mg/dL 20 18 16 13   CREATININE mg/dL 1.24* 1.36* 1.11* 1.01*   GLUCOSE mg/dL 104* 149* 126* 127*   ALBUMIN g/dL  --  3.20*  --  3.60   BILIRUBIN mg/dL  --  0.3  --  0.6   ALK PHOS U/L  --  67  --  72   AST (SGOT) U/L  --  11  --  11   ALT (SGPT) U/L  --  6  --  6     ABG      UA  Results from last 7 days   Lab Units 11/18/19  1623   NITRITE UA  Negative     ALFONZO  No results found for: POCMETH, POCAMPHET, POCBARBITUR, POCBENZO, POCCOCAINE, POCOPIATES, POCOXYCODO, POCPHENCYC, POCPROPOXY, POCTHC, POCTRICYC  Lysis Labs Results from last 7 days   Lab Units 11/19/19  0429 11/18/19  2236 11/18/19  1627 11/18/19  1505 11/18/19  1203 11/18/19  0649   HEMOGLOBIN g/dL 8.3* 8.5*  --  7.7* 7.9* 9.1*   PLATELETS 10*3/mm3 168  --   --   --  145 177   CREATININE mg/dL 1.24*  --  1.36*  --  1.11* 1.01*     Radiology(recent) Xr Chest 2 View    Result Date: 11/18/2019  Bibasilar pulmonary opacities are seen, which are similar or increased in degree since the prior study. The findings may represent progression of fibrosis. Atelectasis and/or pneumonia cannot be excluded.  Electronically Signed By-Dr. Quoc Parker MD On:11/18/2019 6:50 AM This report was finalized on [id number] by Dr. Quoc Parker MD.        Results from last 7 days   Lab Units 11/18/19  2236   TROPONIN T ng/mL <0.010       ECG/EMG Results (most recent)     Procedure Component Value Units Date/Time    ECG 12 Lead [[id number]] Collected:  11/18/19 0642     Updated:  11/19/19 0658    Narrative:       HEART RATE= 99  bpm  RR Interval= 608  ms  CA Interval= 205  ms  P Horizontal Axis= 22  deg  P Front Axis= 35  deg  QRSD Interval= 85  ms  QT Interval= 348  ms  QRS Axis= -19  deg  T Wave Axis=   deg  - ABNORMAL ECG -  Sinus rhythm  Atrial premature complex  Anteroseptal infarct, old  When compared with  ECG of 28-Oct-2018 21:02:36,  Significant rate increase  Significant axis, voltage or hypertrophy change  Electronically Signed By: Av Kurtz (Ac) 19-Nov-2019 06:58:11  Date and Time of Study: 2019-11-18 06:42:25    Adult Transthoracic Echo Complete W/ Cont if Necessary Per Protocol [[id number]] Collected:  11/18/19 1738     Updated:  11/19/19 1624     BSA 1.7 m^2       CV ECHO DANNY - RVDD 2.3 cm      IVSd 1.2 cm      LVIDd 4.4 cm      LVIDs 2.8 cm      LVPWd 1.2 cm      IVS/LVPW 0.94     FS 36.4 %      EDV(Teich) 88.3 ml      ESV(Teich) 29.8 ml      EF(Teich) 66.3 %      EDV(cubed) 86.0 ml      ESV(cubed) 22.1 ml      EF(cubed) 74.2 %      LV mass(C)d 193.4 grams      LV mass(C)dI 113.0 grams/m^2      SV(Teich) 58.6 ml      SI(Teich) 34.2 ml/m^2      SV(cubed) 63.8 ml      SI(cubed) 37.3 ml/m^2      Ao root diam 3.2 cm      Ao root area 8.0 cm^2      ACS 1.6 cm      asc Aorta Diam 2.8 cm      LVOT diam 2.0 cm      LVOT area 3.1 cm^2      RVOT diam 3.1 cm      RVOT area 7.5 cm^2      EDV(MOD-sp4) 76.2 ml      ESV(MOD-sp4) 23.0 ml      EF(MOD-sp4) 69.8 %      SV(MOD-sp4) 53.2 ml      SI(MOD-sp4) 31.1 ml/m^2      Ao root area (BSA corrected) 1.9     LV Warren Vol (BSA corrected) 44.5 ml/m^2      LV Sys Vol (BSA corrected) 13.4 ml/m^2      Aortic R-R 0.8 sec      Aortic HR 74.9 BPM      MV E max truman 136.6 cm/sec      MV A max truman 103.4 cm/sec      MV E/A 1.3     MV V2 max 136.0 cm/sec      MV max PG 7.4 mmHg      MV V2 mean 86.4 cm/sec      MV mean PG 3.3 mmHg      MV V2 VTI 32.6 cm      MVA(VTI) 2.5 cm^2      MV dec slope 914.1 cm/sec^2      MV dec time 0.15 sec      Ao pk truman 193.3 cm/sec      Ao max PG 15.1 mmHg      Ao max PG (full) 9.6 mmHg      Ao V2 mean 126.2 cm/sec      Ao mean PG 7.6 mmHg      Ao mean PG (full) 4.7 mmHg      Ao V2 VTI 37.9 cm      OREN(I,A) 2.1 cm^2      OREN(I,D) 2.1 cm^2      OREN(V,A) 1.9 cm^2      OREN(V,D) 1.9 cm^2      LV V1 max PG 5.5 mmHg      LV V1 mean PG 2.9 mmHg      LV V1 max  117.1 cm/sec      LV V1 mean 76.4 cm/sec      LV V1 VTI 26.4 cm      MR max elmer 490.5 cm/sec      MR max PG 96.2 mmHg      CO(Ao) 22.6 l/min      CI(Ao) 13.2 l/min/m^2      SV(Ao) 301.3 ml      SI(Ao) 176.1 ml/m^2      CO(LVOT) 6.1 l/min      CI(LVOT) 3.6 l/min/m^2      SV(LVOT) 81.3 ml      SV(RVOT) 101.6 ml      SI(LVOT) 47.5 ml/m^2      PA V2 max 85.8 cm/sec      PA max PG 2.9 mmHg      PA max PG (full) 1.3 mmHg      PA V2 mean 57.3 cm/sec      PA mean PG 1.5 mmHg      PA mean PG (full) 0.73 mmHg      PA V2 VTI 15.5 cm      PVA(I,A) 6.6 cm^2       CV ECHO DANNY - PVA(I,D) 6.6 cm^2       CV ECHO DANNY - PVA(V,A) 5.6 cm^2       CV ECHO DANNY - PVA(V,D) 5.6 cm^2      PA acc time 0.13 sec      PI end-d elmer 125.6 cm/sec      PI max elmer 225.0 cm/sec      PI max PG 20.2 mmHg      RV V1 max PG 1.6 mmHg      RV V1 mean PG 0.79 mmHg      RV V1 max 64.1 cm/sec      RV V1 mean 41.0 cm/sec      RV V1 VTI 13.6 cm      TR max elmer 231.4 cm/sec      RVSP(TR) 24.4 mmHg      RAP systole 3.0 mmHg      PA pr(Accel) 20.8 mmHg      Pulm Sys Elmer 72.8 cm/sec      Pulm Warren Elmer 102.6 cm/sec      Pulm S/D 0.71     Qp/Qs 1.3     Pulm A Revs Dur 0.13 sec      Pulm A Revs Elmer 31.8 cm/sec       CV ECHO DANNY - BZI_BMI 25.1 kilograms/m^2       CV ECHO DANNY - BSA(HAYCOCK) 1.7 m^2       CV ECHO DANNY - BZI_METRIC_WEIGHT 66.2 kg       CV ECHO DANNY - BZI_METRIC_HEIGHT 162.6 cm      EF(MOD-bp) 70.0 %      LA dimension(2D) 4.3 cm     Narrative:       · Left ventricular systolic function is normal.  · Estimated EF was in agreement with the calculated EF. Estimated EF   appears to be in the range of 66 - 70%.  · Mild-to-moderate mitral valve regurgitation is present  · Left ventricular wall thickness is consistent with borderline concentric   hypertrophy.  · Mild-to-moderate pulmonic valve regurgitation is present.  · Left atrial cavity size is mild-to-moderately dilated.             Imaging:  Imaging Results (Last 72 Hours)     Procedure  Component Value Units Date/Time    XR Chest 2 View [[id number]] Collected:  11/18/19 0648     Updated:  11/18/19 0652    Narrative:       DATE OF EXAM:  11/18/2019 6:42 AM     PROCEDURE:  XR CHEST 2 VW-     INDICATIONS:  CHF/COPD Protocol     COMPARISON:  10/28/2018.     TECHNIQUE:   Two radiologic views of the chest , PA and lateral were obtained.     FINDINGS:  Bibasilar pulmonary opacities are seen, which may represent atelectasis,  infiltrate, and/or fibrosis. A similar appearance was seen previously.  Pneumonia cannot be excluded. No cardiac enlargement is seen. The  thoracic aorta is atherosclerotic. There is chronic calcified  granulomatous disease of the chest. Biapical pleural-parenchymal  scarring is noted. No pneumothorax is seen. The patient has undergone  median sternotomy and suspected CABG surgery. No pleural effusion is  suggested.        Impression:       Bibasilar pulmonary opacities are seen, which are similar or increased  in degree since the prior study. The findings may represent progression  of fibrosis. Atelectasis and/or pneumonia cannot be excluded.     Electronically Signed By-Dr. Quoc Parker MD On:11/18/2019 6:50 AM  This report was finalized on [id number] by Dr. Quoc Parker MD.            Roc Butler DO  11/19/19  5:56 PM

## 2019-11-20 ENCOUNTER — APPOINTMENT (OUTPATIENT)
Dept: GENERAL RADIOLOGY | Facility: HOSPITAL | Age: 74
End: 2019-11-20

## 2019-11-20 LAB
ANION GAP SERPL CALCULATED.3IONS-SCNC: 9 MMOL/L (ref 5–15)
BASOPHILS # BLD AUTO: 0 10*3/MM3 (ref 0–0.2)
BASOPHILS NFR BLD AUTO: 0.3 % (ref 0–1.5)
BUN BLD-MCNC: 15 MG/DL (ref 8–23)
BUN/CREAT SERPL: 17.9 (ref 7–25)
CALCIUM SPEC-SCNC: 9.4 MG/DL (ref 8.6–10.5)
CHLORIDE SERPL-SCNC: 99 MMOL/L (ref 98–107)
CO2 SERPL-SCNC: 34 MMOL/L (ref 22–29)
CREAT BLD-MCNC: 0.84 MG/DL (ref 0.57–1)
DEPRECATED RDW RBC AUTO: 45.5 FL (ref 37–54)
EOSINOPHIL # BLD AUTO: 0.1 10*3/MM3 (ref 0–0.4)
EOSINOPHIL NFR BLD AUTO: 1.8 % (ref 0.3–6.2)
ERYTHROCYTE [DISTWIDTH] IN BLOOD BY AUTOMATED COUNT: 13.2 % (ref 12.3–15.4)
GFR SERPL CREATININE-BSD FRML MDRD: 66 ML/MIN/1.73
GLUCOSE BLD-MCNC: 125 MG/DL (ref 65–99)
HCT VFR BLD AUTO: 27 % (ref 34–46.6)
HGB BLD-MCNC: 9.4 G/DL (ref 12–15.9)
LYMPHOCYTES # BLD AUTO: 2.4 10*3/MM3 (ref 0.7–3.1)
LYMPHOCYTES NFR BLD AUTO: 28.6 % (ref 19.6–45.3)
MAGNESIUM SERPL-MCNC: 1.5 MG/DL (ref 1.6–2.4)
MCH RBC QN AUTO: 34.2 PG (ref 26.6–33)
MCHC RBC AUTO-ENTMCNC: 34.7 G/DL (ref 31.5–35.7)
MCV RBC AUTO: 98.6 FL (ref 79–97)
MONOCYTES # BLD AUTO: 0.7 10*3/MM3 (ref 0.1–0.9)
MONOCYTES NFR BLD AUTO: 8.8 % (ref 5–12)
NEUTROPHILS # BLD AUTO: 5 10*3/MM3 (ref 1.7–7)
NEUTROPHILS NFR BLD AUTO: 60.5 % (ref 42.7–76)
NRBC BLD AUTO-RTO: 0 /100 WBC (ref 0–0.2)
NT-PROBNP SERPL-MCNC: 1067 PG/ML (ref 5–900)
PLATELET # BLD AUTO: 204 10*3/MM3 (ref 140–450)
PMV BLD AUTO: 7.7 FL (ref 6–12)
POTASSIUM BLD-SCNC: 3.4 MMOL/L (ref 3.5–5.2)
POTASSIUM BLD-SCNC: 3.9 MMOL/L (ref 3.5–5.2)
RBC # BLD AUTO: 2.74 10*6/MM3 (ref 3.77–5.28)
SODIUM BLD-SCNC: 142 MMOL/L (ref 136–145)
WBC NRBC COR # BLD: 8.2 10*3/MM3 (ref 3.4–10.8)

## 2019-11-20 PROCEDURE — 85025 COMPLETE CBC W/AUTO DIFF WBC: CPT | Performed by: INTERNAL MEDICINE

## 2019-11-20 PROCEDURE — 80048 BASIC METABOLIC PNL TOTAL CA: CPT | Performed by: INTERNAL MEDICINE

## 2019-11-20 PROCEDURE — 83735 ASSAY OF MAGNESIUM: CPT | Performed by: INTERNAL MEDICINE

## 2019-11-20 PROCEDURE — 25010000002 MAGNESIUM SULFATE 2 GM/50ML SOLUTION: Performed by: INTERNAL MEDICINE

## 2019-11-20 PROCEDURE — 71045 X-RAY EXAM CHEST 1 VIEW: CPT

## 2019-11-20 PROCEDURE — 25010000002 ONDANSETRON PER 1 MG: Performed by: INTERNAL MEDICINE

## 2019-11-20 PROCEDURE — 99233 SBSQ HOSP IP/OBS HIGH 50: CPT | Performed by: INTERNAL MEDICINE

## 2019-11-20 PROCEDURE — 83880 ASSAY OF NATRIURETIC PEPTIDE: CPT | Performed by: INTERNAL MEDICINE

## 2019-11-20 PROCEDURE — 99232 SBSQ HOSP IP/OBS MODERATE 35: CPT | Performed by: INTERNAL MEDICINE

## 2019-11-20 PROCEDURE — 94799 UNLISTED PULMONARY SVC/PX: CPT

## 2019-11-20 PROCEDURE — 25010000002 CEFTRIAXONE PER 250 MG: Performed by: INTERNAL MEDICINE

## 2019-11-20 PROCEDURE — 25010000002 ENOXAPARIN PER 10 MG: Performed by: INTERNAL MEDICINE

## 2019-11-20 PROCEDURE — 25010000002 FUROSEMIDE PER 20 MG: Performed by: INTERNAL MEDICINE

## 2019-11-20 PROCEDURE — 84132 ASSAY OF SERUM POTASSIUM: CPT | Performed by: INTERNAL MEDICINE

## 2019-11-20 RX ORDER — POTASSIUM CHLORIDE 1.5 G/1.77G
40 POWDER, FOR SOLUTION ORAL AS NEEDED
Status: DISCONTINUED | OUTPATIENT
Start: 2019-11-20 | End: 2019-11-24 | Stop reason: HOSPADM

## 2019-11-20 RX ORDER — LOSARTAN POTASSIUM 25 MG/1
25 TABLET ORAL
Status: DISCONTINUED | OUTPATIENT
Start: 2019-11-20 | End: 2019-11-24 | Stop reason: HOSPADM

## 2019-11-20 RX ORDER — POTASSIUM CHLORIDE 20 MEQ/1
40 TABLET, EXTENDED RELEASE ORAL AS NEEDED
Status: DISCONTINUED | OUTPATIENT
Start: 2019-11-20 | End: 2019-11-24 | Stop reason: HOSPADM

## 2019-11-20 RX ORDER — MAGNESIUM SULFATE HEPTAHYDRATE 40 MG/ML
2 INJECTION, SOLUTION INTRAVENOUS AS NEEDED
Status: DISCONTINUED | OUTPATIENT
Start: 2019-11-20 | End: 2019-11-24 | Stop reason: HOSPADM

## 2019-11-20 RX ORDER — MAGNESIUM SULFATE HEPTAHYDRATE 40 MG/ML
4 INJECTION, SOLUTION INTRAVENOUS AS NEEDED
Status: DISCONTINUED | OUTPATIENT
Start: 2019-11-20 | End: 2019-11-24 | Stop reason: HOSPADM

## 2019-11-20 RX ORDER — BENZONATATE 100 MG/1
200 CAPSULE ORAL 3 TIMES DAILY PRN
Status: DISCONTINUED | OUTPATIENT
Start: 2019-11-20 | End: 2019-11-24 | Stop reason: HOSPADM

## 2019-11-20 RX ADMIN — ONDANSETRON 4 MG: 2 INJECTION INTRAMUSCULAR; INTRAVENOUS at 15:31

## 2019-11-20 RX ADMIN — IPRATROPIUM BROMIDE AND ALBUTEROL SULFATE 3 ML: .5; 3 SOLUTION RESPIRATORY (INHALATION) at 07:35

## 2019-11-20 RX ADMIN — DOCUSATE SODIUM 100 MG: 100 CAPSULE, LIQUID FILLED ORAL at 08:33

## 2019-11-20 RX ADMIN — IPRATROPIUM BROMIDE AND ALBUTEROL SULFATE 3 ML: .5; 3 SOLUTION RESPIRATORY (INHALATION) at 19:55

## 2019-11-20 RX ADMIN — FLUTICASONE PROPIONATE 2 SPRAY: 50 SPRAY, METERED NASAL at 08:37

## 2019-11-20 RX ADMIN — BENZONATATE 200 MG: 100 CAPSULE ORAL at 20:48

## 2019-11-20 RX ADMIN — ESTRADIOL 1 MG: 1 TABLET ORAL at 08:32

## 2019-11-20 RX ADMIN — GABAPENTIN 400 MG: 400 CAPSULE ORAL at 20:47

## 2019-11-20 RX ADMIN — RANOLAZINE 1000 MG: 500 TABLET, FILM COATED, EXTENDED RELEASE ORAL at 20:48

## 2019-11-20 RX ADMIN — POTASSIUM CHLORIDE 40 MEQ: 1500 TABLET, EXTENDED RELEASE ORAL at 10:52

## 2019-11-20 RX ADMIN — HYDROCODONE BITARTRATE AND ACETAMINOPHEN 1 TABLET: 10; 325 TABLET ORAL at 08:36

## 2019-11-20 RX ADMIN — PANTOPRAZOLE SODIUM 40 MG: 40 INJECTION, POWDER, FOR SOLUTION INTRAVENOUS at 20:47

## 2019-11-20 RX ADMIN — ACETAMINOPHEN 650 MG: 325 TABLET ORAL at 15:30

## 2019-11-20 RX ADMIN — ISOSORBIDE MONONITRATE 30 MG: 30 TABLET, EXTENDED RELEASE ORAL at 08:32

## 2019-11-20 RX ADMIN — GUAIFENESIN 1200 MG: 600 TABLET, EXTENDED RELEASE ORAL at 08:31

## 2019-11-20 RX ADMIN — THYROID, PORCINE 60 MG: 30 TABLET ORAL at 09:51

## 2019-11-20 RX ADMIN — IPRATROPIUM BROMIDE AND ALBUTEROL SULFATE 3 ML: .5; 3 SOLUTION RESPIRATORY (INHALATION) at 15:56

## 2019-11-20 RX ADMIN — HYDROCODONE BITARTRATE AND HOMATROPINE METHYLBROMIDE 5 ML: 5; 1.5 SOLUTION ORAL at 16:31

## 2019-11-20 RX ADMIN — LORAZEPAM 1 MG: 1 TABLET ORAL at 15:31

## 2019-11-20 RX ADMIN — MAGNESIUM SULFATE HEPTAHYDRATE 2 G: 40 INJECTION, SOLUTION INTRAVENOUS at 15:44

## 2019-11-20 RX ADMIN — GABAPENTIN 400 MG: 400 CAPSULE ORAL at 08:32

## 2019-11-20 RX ADMIN — ASPIRIN 81 MG: 81 TABLET, COATED ORAL at 08:32

## 2019-11-20 RX ADMIN — LOSARTAN POTASSIUM 25 MG: 25 TABLET, FILM COATED ORAL at 18:03

## 2019-11-20 RX ADMIN — LORAZEPAM 1 MG: 1 TABLET ORAL at 05:25

## 2019-11-20 RX ADMIN — GUAIFENESIN 1200 MG: 600 TABLET, EXTENDED RELEASE ORAL at 20:48

## 2019-11-20 RX ADMIN — CLOPIDOGREL BISULFATE 75 MG: 75 TABLET ORAL at 08:33

## 2019-11-20 RX ADMIN — Medication 10 ML: at 08:36

## 2019-11-20 RX ADMIN — ESTRADIOL 1 MG: 1 TABLET ORAL at 20:48

## 2019-11-20 RX ADMIN — CEFTRIAXONE SODIUM 1 G: 1 INJECTION, POWDER, FOR SOLUTION INTRAMUSCULAR; INTRAVENOUS at 08:34

## 2019-11-20 RX ADMIN — DOXYCYCLINE 100 MG: 100 INJECTION, POWDER, LYOPHILIZED, FOR SOLUTION INTRAVENOUS at 09:26

## 2019-11-20 RX ADMIN — BENZONATATE 200 MG: 100 CAPSULE ORAL at 15:30

## 2019-11-20 RX ADMIN — IPRATROPIUM BROMIDE AND ALBUTEROL SULFATE 3 ML: .5; 3 SOLUTION RESPIRATORY (INHALATION) at 11:20

## 2019-11-20 RX ADMIN — MAGNESIUM SULFATE HEPTAHYDRATE 2 G: 40 INJECTION, SOLUTION INTRAVENOUS at 10:53

## 2019-11-20 RX ADMIN — BENZONATATE 200 MG: 100 CAPSULE ORAL at 08:31

## 2019-11-20 RX ADMIN — LORAZEPAM 1 MG: 1 TABLET ORAL at 20:59

## 2019-11-20 RX ADMIN — MAGNESIUM SULFATE HEPTAHYDRATE 2 G: 40 INJECTION, SOLUTION INTRAVENOUS at 13:03

## 2019-11-20 RX ADMIN — FUROSEMIDE 40 MG: 10 INJECTION, SOLUTION INTRAMUSCULAR; INTRAVENOUS at 08:33

## 2019-11-20 RX ADMIN — GABAPENTIN 400 MG: 400 CAPSULE ORAL at 15:31

## 2019-11-20 RX ADMIN — Medication 10 ML: at 20:48

## 2019-11-20 RX ADMIN — ESTRADIOL 1 MG: 1 TABLET ORAL at 15:31

## 2019-11-20 RX ADMIN — POTASSIUM CHLORIDE 40 MEQ: 1500 TABLET, EXTENDED RELEASE ORAL at 16:12

## 2019-11-20 RX ADMIN — RANOLAZINE 1000 MG: 500 TABLET, FILM COATED, EXTENDED RELEASE ORAL at 08:31

## 2019-11-20 RX ADMIN — SERTRALINE 100 MG: 100 TABLET, FILM COATED ORAL at 08:31

## 2019-11-20 RX ADMIN — ROSUVASTATIN CALCIUM 10 MG: 10 TABLET, FILM COATED ORAL at 08:31

## 2019-11-20 RX ADMIN — PANTOPRAZOLE SODIUM 40 MG: 40 INJECTION, POWDER, FOR SOLUTION INTRAVENOUS at 08:33

## 2019-11-20 RX ADMIN — DOXYCYCLINE 100 MG: 100 INJECTION, POWDER, LYOPHILIZED, FOR SOLUTION INTRAVENOUS at 20:47

## 2019-11-20 RX ADMIN — FERROUS SULFATE TAB EC 324 MG (65 MG FE EQUIVALENT) 324 MG: 324 (65 FE) TABLET DELAYED RESPONSE at 08:32

## 2019-11-20 RX ADMIN — ENOXAPARIN SODIUM 40 MG: 40 INJECTION SUBCUTANEOUS at 15:30

## 2019-11-20 RX ADMIN — METHOCARBAMOL TABLETS 500 MG: 500 TABLET, COATED ORAL at 05:25

## 2019-11-20 RX ADMIN — ACETAMINOPHEN 650 MG: 325 TABLET ORAL at 05:25

## 2019-11-20 NOTE — PROGRESS NOTES
"Hospitalist Team      Patient Care Team:  Deonte Hector MD as PCP - General        Chief Complaint: Patient is complaining to nursing of a cough which has been recalcitrant to Tessalon Perles.    Subjective  Today the patient reports that her shortness of breath is improved over mission.  Nursing reports that the patient is complaining of a lot of coughing.  Her cough is nonproductive.  She is still waiting seeing the cardiologist.  Interval History and ROS:     Review of Systems   Constitutional: Negative for activity change, appetite change, fatigue and fever.   HENT: Negative for congestion, nosebleeds, postnasal drip and voice change.    Eyes: Negative for photophobia and discharge.   Respiratory: Positive for cough and shortness of breath. Negative for choking, chest tightness and wheezing.    Cardiovascular: Negative for chest pain and leg swelling.   Gastrointestinal: Negative for abdominal distention, abdominal pain, constipation, diarrhea and nausea.   Genitourinary: Negative for decreased urine volume, difficulty urinating, dysuria, frequency and urgency.   Musculoskeletal: Negative for arthralgias and myalgias.   Skin: Negative for rash.   Neurological: Negative for dizziness, syncope, facial asymmetry, light-headedness and headaches.   Psychiatric/Behavioral: Negative for agitation, behavioral problems, confusion and decreased concentration. The patient is not nervous/anxious.    All other systems reviewed and are negative.      Objective    Vital Signs  Temp:  [98.7 °F (37.1 °C)-99.9 °F (37.7 °C)] 98.7 °F (37.1 °C)  Heart Rate:  [75-98] 88  Resp:  [16-19] 19  BP: (156-185)/(75-79) 185/77  Oxygen Therapy  SpO2: 98 %  Pulse Oximetry Type: Intermittent  Device (Oxygen Therapy): nasal cannula  Flow (L/min): 3  Flowsheet Rows      First Filed Value   Admission Height  160 cm (63\") Documented at 11/18/2019 0629   Admission Weight  66.2 kg (145 lb 15.1 oz) Documented at 11/18/2019 0629        Intake " & Output (last 3 days)       11/17 0701 - 11/18 0700 11/18 0701 - 11/19 0700 11/19 0701 - 11/20 0700 11/20 0701 - 11/21 0700    P.O.   520     IV Piggyback  100      Total Intake(mL/kg)  100 (1.5) 520 (7.9)     Net  +100 +520                 Lines, Drains & Airways    Active LDAs     Name:   Placement date:   Placement time:   Site:   Days:    Peripheral IV (Ped/Christopher) 11/19/19 Anterior;Right Forearm   11/19/19    0852     less than 1                Physical Exam:    General Appearance:    Alert, cooperative, in no acute distress   Head:    Normocephalic, without obvious abnormality, atraumatic   Eyes:            Lids and lashes normal, conjunctivae and sclerae normal, no   icterus, no pallor, corneas clear, PERRLA   Ears:    Ears appear intact with no abnormalities noted   Throat:   No oral lesions, no thrush, oral mucosa moist   Neck:   No adenopathy, supple, trachea midline, no thyromegaly, no     carotid bruit, no JVD   Back:     No kyphosis present, no scoliosis present, no skin lesions,       erythema or scars, no tenderness to percussion or                   palpation,   range of motion normal   Lungs:    She has a few rales in the bases to auscultation,respirations regular, even and unlabored.  Equal breath sounds bilaterally.    Heart:    Regular rhythm and normal rate, normal S1 and S2, no            murmur, no gallop, no rub, no click   Breast Exam:    Deferred   Abdomen:     Normal bowel sounds, no masses, no organomegaly, soft        non-tender, non-distended, no guarding, no rebound                 tenderness   Genitalia:    Deferred   Extremities:   Moves all extremities well, no edema, no cyanosis, no              redness   Pulses:   Pulses palpable and equal bilaterally   Skin:   No bleeding, bruising or rash   Lymph nodes:   No palpable adenopathy   Neurologic:   Cranial nerves 2 - 12 grossly intact, sensation intact, DTR        present and equal bilaterally       Results Review:     I reviewed the  patient's new clinical results.    Lab Results (last 24 hours)     Procedure Component Value Units Date/Time    Blood Culture - Blood, Wrist, Right [[id number]] Collected:  11/18/19 0714    Specimen:  Blood from Wrist, Right Updated:  11/20/19 0800     Blood Culture No growth at 2 days    Blood Culture - Blood, Arm, Left [[id number]] Collected:  11/18/19 0649    Specimen:  Blood from Arm, Left Updated:  11/20/19 0715     Blood Culture No growth at 2 days    Basic Metabolic Panel [[id number]]  (Abnormal) Collected:  11/20/19 0449    Specimen:  Blood Updated:  11/20/19 0541     Glucose 125 mg/dL      BUN 15 mg/dL      Creatinine 0.84 mg/dL      Sodium 142 mmol/L      Potassium 3.4 mmol/L      Chloride 99 mmol/L      CO2 34.0 mmol/L      Calcium 9.4 mg/dL      eGFR Non African Amer 66 mL/min/1.73      BUN/Creatinine Ratio 17.9     Anion Gap 9.0 mmol/L     Narrative:       GFR Normal >60  Chronic Kidney Disease <60  Kidney Failure <15    Magnesium [[id number]]  (Abnormal) Collected:  11/20/19 0449    Specimen:  Blood Updated:  11/20/19 0541     Magnesium 1.5 mg/dL     BNP [[id number]]  (Abnormal) Collected:  11/20/19 0449    Specimen:  Blood Updated:  11/20/19 0533     proBNP 1,067.0 pg/mL     Narrative:       Among patients with dyspnea, NT-proBNP is highly sensitive for the detection of acute congestive heart failure. In addition NT-proBNP of <300 pg/ml effectively rules out acute congestive heart failure with 99% negative predictive value.    CBC & Differential [[id number]] Collected:  11/20/19 0449    Specimen:  Blood Updated:  11/20/19 0529    Narrative:       The following orders were created for panel order CBC & Differential.  Procedure                               Abnormality         Status                     ---------                               -----------         ------                     CBC Auto Differential[[id number]]        Abnormal            Final result                 Please view results for these  tests on the individual orders.    CBC Auto Differential [[id number]]  (Abnormal) Collected:  11/20/19 0449    Specimen:  Blood Updated:  11/20/19 0529     WBC 8.20 10*3/mm3      RBC 2.74 10*6/mm3      Hemoglobin 9.4 g/dL      Hematocrit 27.0 %      MCV 98.6 fL      MCH 34.2 pg      MCHC 34.7 g/dL      RDW 13.2 %      RDW-SD 45.5 fl      MPV 7.7 fL      Platelets 204 10*3/mm3      Neutrophil % 60.5 %      Lymphocyte % 28.6 %      Monocyte % 8.8 %      Eosinophil % 1.8 %      Basophil % 0.3 %      Neutrophils, Absolute 5.00 10*3/mm3      Lymphocytes, Absolute 2.40 10*3/mm3      Monocytes, Absolute 0.70 10*3/mm3      Eosinophils, Absolute 0.10 10*3/mm3      Basophils, Absolute 0.00 10*3/mm3      nRBC 0.0 /100 WBC           Imaging Results (Last 24 Hours)     Procedure Component Value Units Date/Time    XR Chest 1 View [[id number]] Collected:  11/20/19 0727     Updated:  11/20/19 0731    Narrative:          DATE OF EXAM:   11/20/2019 6:35 AM     PROCEDURE:   XR CHEST 1 VW-     INDICATIONS:   f/u pulmonary edema; J18.9-Pneumonia, unspecified organism; I50.9-Heart  failure, unspecified; R50.9-Fever, unspecified; R05-Cough     COMPARISON:  11/18/2019     TECHNIQUE:   [Portable chest radiograph]     FINDINGS:    Postoperative changes of sternotomy and CABG. Heart size normal. Mild  bibasilar airspace disease unchanged. No pneumothorax. No large  effusion. Stable appearance of biapical scarring. Granuloma the right  upper lung.       Impression:       No change in bibasilar airspace disease which again could relate to  pneumonia, atelectasis, or progression of chronic fibrosis.     Electronically Signed By-Nahid Cooley On:11/20/2019 7:29 AM  This report was finalized on [id number] by  Nahid Cooley, .          Xrays, labs reviewed personally by physician.    ECG/EMG Results (most recent)     Procedure Component Value Units Date/Time    ECG 12 Lead [[id number]] Collected:  11/18/19 0642     Updated:  11/19/19 0658     Narrative:       HEART RATE= 99  bpm  RR Interval= 608  ms  NH Interval= 205  ms  P Horizontal Axis= 22  deg  P Front Axis= 35  deg  QRSD Interval= 85  ms  QT Interval= 348  ms  QRS Axis= -19  deg  T Wave Axis=   deg  - ABNORMAL ECG -  Sinus rhythm  Atrial premature complex  Anteroseptal infarct, old  When compared with ECG of 28-Oct-2018 21:02:36,  Significant rate increase  Significant axis, voltage or hypertrophy change  Electronically Signed By: Av Kurtz (Ac) 19-Nov-2019 06:58:11  Date and Time of Study: 2019-11-18 06:42:25    Adult Transthoracic Echo Complete W/ Cont if Necessary Per Protocol [[id number]] Collected:  11/18/19 1738     Updated:  11/19/19 1624     BSA 1.7 m^2       CV ECHO DANNY - RVDD 2.3 cm      IVSd 1.2 cm      LVIDd 4.4 cm      LVIDs 2.8 cm      LVPWd 1.2 cm      IVS/LVPW 0.94     FS 36.4 %      EDV(Teich) 88.3 ml      ESV(Teich) 29.8 ml      EF(Teich) 66.3 %      EDV(cubed) 86.0 ml      ESV(cubed) 22.1 ml      EF(cubed) 74.2 %      LV mass(C)d 193.4 grams      LV mass(C)dI 113.0 grams/m^2      SV(Teich) 58.6 ml      SI(Teich) 34.2 ml/m^2      SV(cubed) 63.8 ml      SI(cubed) 37.3 ml/m^2      Ao root diam 3.2 cm      Ao root area 8.0 cm^2      ACS 1.6 cm      asc Aorta Diam 2.8 cm      LVOT diam 2.0 cm      LVOT area 3.1 cm^2      RVOT diam 3.1 cm      RVOT area 7.5 cm^2      EDV(MOD-sp4) 76.2 ml      ESV(MOD-sp4) 23.0 ml      EF(MOD-sp4) 69.8 %      SV(MOD-sp4) 53.2 ml      SI(MOD-sp4) 31.1 ml/m^2      Ao root area (BSA corrected) 1.9     LV Warren Vol (BSA corrected) 44.5 ml/m^2      LV Sys Vol (BSA corrected) 13.4 ml/m^2      Aortic R-R 0.8 sec      Aortic HR 74.9 BPM      MV E max truman 136.6 cm/sec      MV A max truman 103.4 cm/sec      MV E/A 1.3     MV V2 max 136.0 cm/sec      MV max PG 7.4 mmHg      MV V2 mean 86.4 cm/sec      MV mean PG 3.3 mmHg      MV V2 VTI 32.6 cm      MVA(VTI) 2.5 cm^2      MV dec slope 914.1 cm/sec^2      MV dec time 0.15 sec      Ao pk truman 193.3 cm/sec      Ao  max PG 15.1 mmHg      Ao max PG (full) 9.6 mmHg      Ao V2 mean 126.2 cm/sec      Ao mean PG 7.6 mmHg      Ao mean PG (full) 4.7 mmHg      Ao V2 VTI 37.9 cm      OREN(I,A) 2.1 cm^2      OREN(I,D) 2.1 cm^2      OREN(V,A) 1.9 cm^2      OREN(V,D) 1.9 cm^2      LV V1 max PG 5.5 mmHg      LV V1 mean PG 2.9 mmHg      LV V1 max 117.1 cm/sec      LV V1 mean 76.4 cm/sec      LV V1 VTI 26.4 cm      MR max elmer 490.5 cm/sec      MR max PG 96.2 mmHg      CO(Ao) 22.6 l/min      CI(Ao) 13.2 l/min/m^2      SV(Ao) 301.3 ml      SI(Ao) 176.1 ml/m^2      CO(LVOT) 6.1 l/min      CI(LVOT) 3.6 l/min/m^2      SV(LVOT) 81.3 ml      SV(RVOT) 101.6 ml      SI(LVOT) 47.5 ml/m^2      PA V2 max 85.8 cm/sec      PA max PG 2.9 mmHg      PA max PG (full) 1.3 mmHg      PA V2 mean 57.3 cm/sec      PA mean PG 1.5 mmHg      PA mean PG (full) 0.73 mmHg      PA V2 VTI 15.5 cm      PVA(I,A) 6.6 cm^2      BH CV ECHO DANNY - PVA(I,D) 6.6 cm^2      BH CV ECHO DANNY - PVA(V,A) 5.6 cm^2      BH CV ECHO DANNY - PVA(V,D) 5.6 cm^2      PA acc time 0.13 sec      PI end-d elmer 125.6 cm/sec      PI max elmer 225.0 cm/sec      PI max PG 20.2 mmHg      RV V1 max PG 1.6 mmHg      RV V1 mean PG 0.79 mmHg      RV V1 max 64.1 cm/sec      RV V1 mean 41.0 cm/sec      RV V1 VTI 13.6 cm      TR max elmer 231.4 cm/sec      RVSP(TR) 24.4 mmHg      RAP systole 3.0 mmHg      PA pr(Accel) 20.8 mmHg      Pulm Sys Elmer 72.8 cm/sec      Pulm Warren Elmer 102.6 cm/sec      Pulm S/D 0.71     Qp/Qs 1.3     Pulm A Revs Dur 0.13 sec      Pulm A Revs Elmer 31.8 cm/sec      BH CV ECHO DANNY - BZI_BMI 25.1 kilograms/m^2      BH CV ECHO DANNY - BSA(HAYCOCK) 1.7 m^2      BH CV ECHO DANNY - BZI_METRIC_WEIGHT 66.2 kg      BH CV ECHO DANNY - BZI_METRIC_HEIGHT 162.6 cm      EF(MOD-bp) 70.0 %      LA dimension(2D) 4.3 cm     Narrative:       · Left ventricular systolic function is normal.  · Estimated EF was in agreement with the calculated EF. Estimated EF   appears to be in the range of 66 - 70%.  · Mild-to-moderate  mitral valve regurgitation is present  · Left ventricular wall thickness is consistent with borderline concentric   hypertrophy.  · Mild-to-moderate pulmonic valve regurgitation is present.  · Left atrial cavity size is mild-to-moderately dilated.             Medication Review:   I have reviewed the patient's current medication list    Current Facility-Administered Medications:   •  acetaminophen (TYLENOL) tablet 650 mg, 650 mg, Oral, Q4H PRN, 650 mg at 11/20/19 1530 **OR** acetaminophen (TYLENOL) 160 MG/5ML solution 650 mg, 650 mg, Oral, Q4H PRN **OR** acetaminophen (TYLENOL) suppository 650 mg, 650 mg, Rectal, Q4H PRN, Ashtyn Salter MD  •  aspirin EC tablet 81 mg, 81 mg, Oral, Daily, Ashtyn Salter MD, 81 mg at 11/20/19 0832  •  benzocaine-menthol (CEPACOL) lozenge 1 lozenge, 1 lozenge, Mouth/Throat, Q2H PRN, Elisha Sherwood APRN  •  benzonatate (TESSALON) capsule 200 mg, 200 mg, Oral, TID, Ashtyn Salter MD, 200 mg at 11/20/19 1530  •  benzonatate (TESSALON) capsule 200 mg, 200 mg, Oral, TID PRN, Ashtyn Salter MD  •  cefTRIAXone (ROCEPHIN) 1 g in sodium chloride 0.9 % 100 mL IVPB, 1 g, Intravenous, Q24H, Ashtyn Salter MD, Last Rate: 200 mL/hr at 11/20/19 0834, 1 g at 11/20/19 0834  •  clopidogrel (PLAVIX) tablet 75 mg, 75 mg, Oral, Daily, Ashtyn Salter MD, 75 mg at 11/20/19 0833  •  docusate sodium (COLACE) capsule 100 mg, 100 mg, Oral, Daily, Ashtyn Salter MD, 100 mg at 11/20/19 0833  •  doxycycline (VIBRAMYCIN) 100 mg in sodium chloride 0.9 % 100 mL IVPB, 100 mg, Intravenous, Q12H, Ashtyn Salter MD, 100 mg at 11/20/19 0926  •  enoxaparin (LOVENOX) syringe 40 mg, 40 mg, Subcutaneous, Q24H, Ashtyn Salter MD, 40 mg at 11/20/19 1530  •  estradiol (ESTRACE) tablet 1 mg, 1 mg, Oral, TID, Ashtyn Salter MD, 1 mg at 11/20/19 1531  •  ferrous sulfate EC tablet 324 mg, 324 mg, Oral, Daily With Breakfast, Ashtyn Salter MD, 324 mg at 11/20/19 0832  •   fluticasone (FLONASE) 50 MCG/ACT nasal spray 2 spray, 2 spray, Nasal, Daily, Ashtyn Salter MD, 2 spray at 11/20/19 0837  •  furosemide (LASIX) injection 40 mg, 40 mg, Intravenous, Daily, Ashtyn Salter MD, 40 mg at 11/20/19 0833  •  gabapentin (NEURONTIN) capsule 400 mg, 400 mg, Oral, TID, Ashtyn Salter MD, 400 mg at 11/20/19 1531  •  guaiFENesin (MUCINEX) 12 hr tablet 1,200 mg, 1,200 mg, Oral, Q12H, Elisha Sherwood APRN, 1,200 mg at 11/20/19 0831  •  HYDROcodone-acetaminophen (NORCO)  MG per tablet 1 tablet, 1 tablet, Oral, Q6H PRN, Ashtyn Salter MD, 1 tablet at 11/19/19 0856  •  HYDROcodone-acetaminophen (NORCO)  MG per tablet 1 tablet, 1 tablet, Oral, Q4H PRN, Ashtyn Salter MD, 1 tablet at 11/20/19 0836  •  HYDROcodone-homatropine (HYCODAN) 5-1.5 MG/5ML syrup 5 mL, 5 mL, Oral, Q6H PRN, Ashtyn Salter MD  •  ipratropium-albuterol (DUO-NEB) nebulizer solution 3 mL, 3 mL, Nebulization, 4x Daily - RT, Ashtyn Salter MD, 3 mL at 11/20/19 1556  •  isosorbide mononitrate (IMDUR) 24 hr tablet 30 mg, 30 mg, Oral, Q24H, Ashtyn Salter MD, 30 mg at 11/20/19 0832  •  LORazepam (ATIVAN) tablet 1 mg, 1 mg, Oral, 4x Daily PRN, Ashtyn Salter MD, 1 mg at 11/20/19 1531  •  Magnesium Sulfate 2 gram Bolus, followed by 8 gram infusion (total Mg dose 10 grams)- Mg less than or equal to 1mg/dL, 2 g, Intravenous, PRN **OR** Magnesium Sulfate 2 gram / 50mL Infusion (GIVE X 3 BAGS TO EQUAL 6GM TOTAL DOSE) - Mg 1.1 - 1.5 mg/dl, 2 g, Intravenous, PRN, Last Rate: 25 mL/hr at 11/20/19 1544, 2 g at 11/20/19 1544 **OR** Magnesium Sulfate 4 gram infusion- Mg 1.6-1.9 mg/dL, 4 g, Intravenous, PRN, Ashtyn Salter MD  •  melatonin tablet 5 mg, 5 mg, Oral, Nightly PRN, Ashtyn Salter MD  •  methocarbamol (ROBAXIN) tablet 500 mg, 500 mg, Oral, TID PRN, Ashtyn Salter MD, 500 mg at 11/20/19 0525  •  nitroglycerin (NITROSTAT) SL tablet 0.4 mg, 0.4 mg, Sublingual, Q5 Min  PRN, Ashtyn Salter MD  •  ondansetron (ZOFRAN) tablet 4 mg, 4 mg, Oral, Q6H PRN **OR** ondansetron (ZOFRAN) injection 4 mg, 4 mg, Intravenous, Q6H PRN, Ashtyn Salter MD, 4 mg at 11/20/19 1531  •  pantoprazole (PROTONIX) injection 40 mg, 40 mg, Intravenous, Q12H, Ashtyn Salter MD, 40 mg at 11/20/19 0833  •  potassium chloride (K-DUR,KLOR-CON) CR tablet 40 mEq, 40 mEq, Oral, PRN, Ashtyn Salter MD, 40 mEq at 11/20/19 1612  •  potassium chloride (KLOR-CON) packet 40 mEq, 40 mEq, Oral, PRN, Ashtyn Salter MD  •  ranolazine (RANEXA) 12 hr tablet 1,000 mg, 1,000 mg, Oral, Q12H, Ashtyn Salter MD, 1,000 mg at 11/20/19 0831  •  rosuvastatin (CRESTOR) tablet 10 mg, 10 mg, Oral, Daily, Ashtyn Salter MD, 10 mg at 11/20/19 0831  •  sertraline (ZOLOFT) tablet 100 mg, 100 mg, Oral, Daily, Ashtyn Salter MD, 100 mg at 11/20/19 0831  •  sodium chloride 0.9 % flush 10 mL, 10 mL, Intravenous, PRN, Ashtyn Salter MD  •  sodium chloride 0.9 % flush 10 mL, 10 mL, Intravenous, Q12H, Ashtyn Salter MD, 10 mL at 11/20/19 0836  •  sodium chloride 0.9 % flush 10 mL, 10 mL, Intravenous, PRN, Ashtyn Salter MD  •  Thyroid (ARMOUR) tablet 60 mg, 60 mg, Oral, Daily, Ashtyn Salter MD, 60 mg at 11/20/19 0951  •  traMADol (ULTRAM) tablet 50 mg, 50 mg, Oral, Q6H PRN, Ashtyn Salter MD      Assessment/Plan   1.  Dyspnea-this is likely related to cardiac disease.  The patient is much improved with diuretic therapy.  Her cough is somewhat improved as well.  Continue the Lasix and the empiric Rocephin.  Today's chest x-ray shows essentially no change.  We will continue the Rocephin for now and follow closely.  2.  Coronary artery disease-appreciate cardiology's input.  Will await the report of the recent echocardiogram.  Appreciate their note which included information that previously had not been available.  3.  Renal insufficiency-the patient's renal function appears to be improving  on the Lasix.  Her BNP was down to thousand 67.  We will continue the Lasix twice daily for now but consider decreasing it to daily tomorrow.  4.  Anemia-the patient appears to have the anemia of chronic disease.  Clearly she has multiple medical issues which could account for that.  We will continue supportive care.  We will also guaiac her stools in order to ensure that she does not have a GI issue that needs to be addressed.  Patient reports that she is given them at least one sample, however I cannot find the results of the stool for occult blood.  We will continue as 3 have been ordered.  5.  Cough-the patient is improved on the Tessalon Perles but remains with cough.  We will increase them to 200 mg 3 times daily routinely.  We will also give her Hycodan for breakthrough cough.  6.  Hypokalemia-we will replace per electrolyte protocol  7.  Hypomagnesemia-replace per protocol and recheck.  Plan for disposition: Home possibly with home health    Ashtyn Salter MD  11/20/19  4:15 PM

## 2019-11-20 NOTE — PLAN OF CARE
Problem: Patient Care Overview  Goal: Plan of Care Review  Outcome: Ongoing (interventions implemented as appropriate)   11/20/19 0135   OTHER   Outcome Summary pt has had no complaints through the night, she just wanted to get some rest, will continue to montior pt     Goal: Individualization and Mutuality  Outcome: Ongoing (interventions implemented as appropriate)    Goal: Discharge Needs Assessment  Outcome: Ongoing (interventions implemented as appropriate)    Goal: Interprofessional Rounds/Family Conf  Outcome: Ongoing (interventions implemented as appropriate)      Problem: Pneumonia (Adult)  Goal: Signs and Symptoms of Listed Potential Problems Will be Absent, Minimized or Managed (Pneumonia)  Outcome: Ongoing (interventions implemented as appropriate)   11/20/19 0135   Goal/Outcome Evaluation   Problems Assessed (Pneumonia) all   Problems Present (Pneumonia) infection progression;respiratory compromise

## 2019-11-20 NOTE — PROGRESS NOTES
Patient reports that she was told by an EMT that she has CHF but that her cardiologist has never told her that but that she had an ECHO yesterday and is awaiting to hear the results from the cardiologist.   Will follow up again tomorrow

## 2019-11-20 NOTE — PLAN OF CARE
Problem: Patient Care Overview  Goal: Plan of Care Review   11/20/19 1638   OTHER   Outcome Summary Frequent cough today. Mag runs x3 today with Potassium replacement. Expiratory wheezes noted with auscultation.

## 2019-11-20 NOTE — PROGRESS NOTES
Cardiology Progress Note      Admiting Physician:  Ashtyn Salter MD   LOS: 2 days       Reason For Followup:  Respiratory failure  Coronary artery disease  Elevated proBNP      Subjective:    Interval History:  Seen and examined.  Chart and labs reviewed.  Patient reports breathing better today.  Denies chest pain pressure heaviness or tightness.  Coughing up sputum.  No other new issues identified.  Discussed echocardiogram findings with patient.  Blood pressure suboptimal today.  We will add losartan    Review of Systems:  A complete review of systems was undertaken with the pertinent cardiovascular findings listed in history of present illness and all other systems being negative.     Assessment & Plan    Impressions:  Acute respiratory failure  Pneumonia  Elevated pro BNP of uncertain clinical significance  COPD oxygen dependent  History of coronary artery disease status post remote four-vessel CABG     Most recent catheterization demonstrating attrition of 3 of 4 grafts     Negative FFR of remaining vein graft in October 2018  Elevated proBNP  Hypertension  Hypertensive cardiovascular disease  Hyperlipidemia  Chronic stable angina    Recommendations:  Antimicrobials as per admitting service  Pulmonary toilet  2D echocardiogram reviewed and demonstrates normal LV systolic function.  Monitor volume status  Add losartan  No further cardiac work-up planned at the present time.        Objective:    Medication Review:   Scheduled Meds:  aspirin 81 mg Oral Daily   benzonatate 200 mg Oral TID   cefTRIAXone 1 g Intravenous Q24H   clopidogrel 75 mg Oral Daily   docusate sodium 100 mg Oral Daily   doxycycline 100 mg Intravenous Q12H   enoxaparin 40 mg Subcutaneous Q24H   estradiol 1 mg Oral TID   ferrous sulfate 324 mg Oral Daily With Breakfast   fluticasone 2 spray Nasal Daily   furosemide 40 mg Intravenous Daily   gabapentin 400 mg Oral TID   guaiFENesin 1,200 mg Oral Q12H   ipratropium-albuterol 3 mL Nebulization  4x Daily - RT   isosorbide mononitrate 30 mg Oral Q24H   pantoprazole 40 mg Intravenous Q12H   ranolazine 1,000 mg Oral Q12H   rosuvastatin 10 mg Oral Daily   sertraline 100 mg Oral Daily   sodium chloride 10 mL Intravenous Q12H   Thyroid 60 mg Oral Daily     Continuous Infusions:   PRN Meds:.•  acetaminophen **OR** acetaminophen **OR** acetaminophen  •  benzocaine-menthol  •  benzonatate  •  HYDROcodone-acetaminophen  •  HYDROcodone-acetaminophen  •  HYDROcodone-homatropine  •  LORazepam  •  magnesium sulfate **OR** magnesium sulfate **OR** magnesium sulfate  •  melatonin  •  methocarbamol  •  nitroglycerin  •  ondansetron **OR** ondansetron  •  potassium chloride  •  potassium chloride  •  sodium chloride  •  sodium chloride  •  traMADol    Patient Active Problem List   Diagnosis   • Abdominal aortic aneurysm (AAA) (CMS/HCC)   • Chronic obstructive pulmonary disease (CMS/HCC)   • Congestive heart failure (CMS/HCC)   • Coronary artery disease   • Dyslipidemia   • Hypertension   • Community acquired pneumonia         Physical Exam:    General: Alert, cooperative, no distress, appears stated age  Head:  Normocephalic, atraumatic, mucous membranes moist  Eyes:  Conjunctiva/corneas clear, EOM's intact     Neck:  Supple,  no adenopathy;      Lungs: Coarse with occasional crackles  Chest wall: No tenderness  Heart::  Regular rate and rhythm, S1 and S2 normal, no murmur, rub or gallop  Abdomen: Soft, non-tender, nondistended bowel sounds active  Extremities: No cyanosis, clubbing, or edema  Pulses: 2+ and symmetric all extremities  Skin:  No rashes or lesions  Neuro/psych: A&O x3. CN II through XII are grossly intact with appropriate affect    Vital Signs:  Vitals:    11/20/19 1123 11/20/19 1556 11/20/19 1602 11/20/19 1614   BP:    (!) 185/77   BP Location:       Patient Position:       Pulse: 86 78 78 88   Resp: 18 16 18 19   Temp:    98.7 °F (37.1 °C)   TempSrc:    Oral   SpO2:  98%  98%   Weight:       Height:          Wt Readings from Last 1 Encounters:   11/20/19 66.1 kg (145 lb 11.6 oz)     No intake or output data in the 24 hours ending 11/20/19 1718      Results Review:     CBC    Results from last 7 days   Lab Units 11/20/19  0449 11/19/19  0429 11/18/19  2236 11/18/19  1505 11/18/19  1203 11/18/19  0649   WBC 10*3/mm3 8.20 8.20  --   --  8.80 9.80   HEMOGLOBIN g/dL 9.4* 8.3* 8.5* 7.7* 7.9* 9.1*   PLATELETS 10*3/mm3 204 168  --   --  145 177     Cr Clearance Estimated Creatinine Clearance: 55 mL/min (by C-G formula based on SCr of 0.84 mg/dL).  Coag     HbA1C No results found for: HGBA1C  Blood Glucose No results found for: POCGLU  Infection Results from last 7 days   Lab Units 11/18/19  0714 11/18/19  0649   BLOODCX  No growth at 2 days No growth at 2 days     CMP Results from last 7 days   Lab Units 11/20/19  0449 11/19/19  0429 11/18/19  1627 11/18/19  1203 11/18/19  0649   SODIUM mmol/L 142 139 140 137 137   POTASSIUM mmol/L 3.4* 3.5 3.7 4.1 4.2   CHLORIDE mmol/L 99 97* 98 99 99   CO2 mmol/L 34.0* 31.0* 30.0* 28.0 29.0   BUN mg/dL 15 20 18 16 13   CREATININE mg/dL 0.84 1.24* 1.36* 1.11* 1.01*   GLUCOSE mg/dL 125* 104* 149* 126* 127*   ALBUMIN g/dL  --   --  3.20*  --  3.60   BILIRUBIN mg/dL  --   --  0.3  --  0.6   ALK PHOS U/L  --   --  67  --  72   AST (SGOT) U/L  --   --  11  --  11   ALT (SGPT) U/L  --   --  6  --  6     ABG      UA  Results from last 7 days   Lab Units 11/18/19  1623   NITRITE UA  Negative     ALFONZO  No results found for: POCMETH, POCAMPHET, POCBARBITUR, POCBENZO, POCCOCAINE, POCOPIATES, POCOXYCODO, POCPHENCYC, POCPROPOXY, POCTHC, POCTRICYC  Lysis Labs Results from last 7 days   Lab Units 11/20/19  0449 11/19/19  0429 11/18/19  2236 11/18/19  1627 11/18/19  1505 11/18/19  1203 11/18/19  0649   HEMOGLOBIN g/dL 9.4* 8.3* 8.5*  --  7.7* 7.9* 9.1*   PLATELETS 10*3/mm3 204 168  --   --   --  145 177   CREATININE mg/dL 0.84 1.24*  --  1.36*  --  1.11* 1.01*     Radiology(recent) Xr Chest 1  View    Result Date: 11/20/2019  No change in bibasilar airspace disease which again could relate to pneumonia, atelectasis, or progression of chronic fibrosis.  Electronically Signed By-Nahid Cooley On:11/20/2019 7:29 AM This report was finalized on [id number] by  Nahid Cooley, .        Results from last 7 days   Lab Units 11/18/19  2236   TROPONIN T ng/mL <0.010       Imaging Results (Last 24 Hours)     Procedure Component Value Units Date/Time    XR Chest 1 View [[id number]] Collected:  11/20/19 0727     Updated:  11/20/19 0731    Narrative:          DATE OF EXAM:   11/20/2019 6:35 AM     PROCEDURE:   XR CHEST 1 VW-     INDICATIONS:   f/u pulmonary edema; J18.9-Pneumonia, unspecified organism; I50.9-Heart  failure, unspecified; R50.9-Fever, unspecified; R05-Cough     COMPARISON:  11/18/2019     TECHNIQUE:   [Portable chest radiograph]     FINDINGS:    Postoperative changes of sternotomy and CABG. Heart size normal. Mild  bibasilar airspace disease unchanged. No pneumothorax. No large  effusion. Stable appearance of biapical scarring. Granuloma the right  upper lung.       Impression:       No change in bibasilar airspace disease which again could relate to  pneumonia, atelectasis, or progression of chronic fibrosis.     Electronically Signed By-Nahid Cooley On:11/20/2019 7:29 AM  This report was finalized on [id number] by  Nahid Cooley, .          Cardiac Studies:  Echo-   Results for orders placed during the hospital encounter of 11/18/19   Adult Transthoracic Echo Complete W/ Cont if Necessary Per Protocol    Narrative · Left ventricular systolic function is normal.  · Estimated EF was in agreement with the calculated EF. Estimated EF   appears to be in the range of 66 - 70%.  · Mild-to-moderate mitral valve regurgitation is present  · Left ventricular wall thickness is consistent with borderline concentric   hypertrophy.  · Mild-to-moderate pulmonic valve regurgitation is present.  · Left atrial cavity size  is mild-to-moderately dilated.        Stress Myoview-  Cath-        Roc Butler DO  11/20/19  5:18 PM

## 2019-11-21 LAB
ANION GAP SERPL CALCULATED.3IONS-SCNC: 13 MMOL/L (ref 5–15)
BASOPHILS # BLD AUTO: 0 10*3/MM3 (ref 0–0.2)
BASOPHILS NFR BLD AUTO: 0.4 % (ref 0–1.5)
BUN BLD-MCNC: 13 MG/DL (ref 8–23)
BUN/CREAT SERPL: 14.6 (ref 7–25)
CALCIUM SPEC-SCNC: 8.8 MG/DL (ref 8.6–10.5)
CHLORIDE SERPL-SCNC: 99 MMOL/L (ref 98–107)
CO2 SERPL-SCNC: 27 MMOL/L (ref 22–29)
CREAT BLD-MCNC: 0.89 MG/DL (ref 0.57–1)
DEPRECATED RDW RBC AUTO: 45.9 FL (ref 37–54)
EOSINOPHIL # BLD AUTO: 0.2 10*3/MM3 (ref 0–0.4)
EOSINOPHIL NFR BLD AUTO: 2.3 % (ref 0.3–6.2)
ERYTHROCYTE [DISTWIDTH] IN BLOOD BY AUTOMATED COUNT: 13.3 % (ref 12.3–15.4)
GFR SERPL CREATININE-BSD FRML MDRD: 62 ML/MIN/1.73
GLUCOSE BLD-MCNC: 145 MG/DL (ref 65–99)
HCT VFR BLD AUTO: 25.6 % (ref 34–46.6)
HEMOCCULT STL QL IA: NEGATIVE
HEMOCCULT STL QL IA: NEGATIVE
HGB BLD-MCNC: 8.8 G/DL (ref 12–15.9)
LYMPHOCYTES # BLD AUTO: 1.6 10*3/MM3 (ref 0.7–3.1)
LYMPHOCYTES NFR BLD AUTO: 18.9 % (ref 19.6–45.3)
MAGNESIUM SERPL-MCNC: 2.5 MG/DL (ref 1.6–2.4)
MCH RBC QN AUTO: 33.9 PG (ref 26.6–33)
MCHC RBC AUTO-ENTMCNC: 34.5 G/DL (ref 31.5–35.7)
MCV RBC AUTO: 98.2 FL (ref 79–97)
MONOCYTES # BLD AUTO: 0.7 10*3/MM3 (ref 0.1–0.9)
MONOCYTES NFR BLD AUTO: 8.5 % (ref 5–12)
NEUTROPHILS # BLD AUTO: 5.9 10*3/MM3 (ref 1.7–7)
NEUTROPHILS NFR BLD AUTO: 69.9 % (ref 42.7–76)
NRBC BLD AUTO-RTO: 0.1 /100 WBC (ref 0–0.2)
NT-PROBNP SERPL-MCNC: 1067 PG/ML (ref 5–900)
PLATELET # BLD AUTO: 219 10*3/MM3 (ref 140–450)
PMV BLD AUTO: 7.5 FL (ref 6–12)
POTASSIUM BLD-SCNC: 3.9 MMOL/L (ref 3.5–5.2)
RBC # BLD AUTO: 2.6 10*6/MM3 (ref 3.77–5.28)
SODIUM BLD-SCNC: 139 MMOL/L (ref 136–145)
WBC NRBC COR # BLD: 8.4 10*3/MM3 (ref 3.4–10.8)

## 2019-11-21 PROCEDURE — 99232 SBSQ HOSP IP/OBS MODERATE 35: CPT | Performed by: NURSE PRACTITIONER

## 2019-11-21 PROCEDURE — 25010000002 ENOXAPARIN PER 10 MG: Performed by: INTERNAL MEDICINE

## 2019-11-21 PROCEDURE — 25010000002 ONDANSETRON PER 1 MG: Performed by: INTERNAL MEDICINE

## 2019-11-21 PROCEDURE — 83880 ASSAY OF NATRIURETIC PEPTIDE: CPT | Performed by: INTERNAL MEDICINE

## 2019-11-21 PROCEDURE — 80048 BASIC METABOLIC PNL TOTAL CA: CPT | Performed by: INTERNAL MEDICINE

## 2019-11-21 PROCEDURE — 99232 SBSQ HOSP IP/OBS MODERATE 35: CPT | Performed by: INTERNAL MEDICINE

## 2019-11-21 PROCEDURE — 25010000002 FUROSEMIDE PER 20 MG: Performed by: INTERNAL MEDICINE

## 2019-11-21 PROCEDURE — 25010000002 CEFTRIAXONE PER 250 MG: Performed by: INTERNAL MEDICINE

## 2019-11-21 PROCEDURE — 94799 UNLISTED PULMONARY SVC/PX: CPT

## 2019-11-21 PROCEDURE — 82274 ASSAY TEST FOR BLOOD FECAL: CPT | Performed by: INTERNAL MEDICINE

## 2019-11-21 PROCEDURE — 85025 COMPLETE CBC W/AUTO DIFF WBC: CPT | Performed by: INTERNAL MEDICINE

## 2019-11-21 PROCEDURE — 83735 ASSAY OF MAGNESIUM: CPT | Performed by: INTERNAL MEDICINE

## 2019-11-21 RX ADMIN — PANTOPRAZOLE SODIUM 40 MG: 40 INJECTION, POWDER, FOR SOLUTION INTRAVENOUS at 20:00

## 2019-11-21 RX ADMIN — FLUTICASONE PROPIONATE 2 SPRAY: 50 SPRAY, METERED NASAL at 09:23

## 2019-11-21 RX ADMIN — ACETAMINOPHEN 650 MG: 325 TABLET ORAL at 01:40

## 2019-11-21 RX ADMIN — LORAZEPAM 1 MG: 1 TABLET ORAL at 09:26

## 2019-11-21 RX ADMIN — THYROID, PORCINE 60 MG: 30 TABLET ORAL at 09:18

## 2019-11-21 RX ADMIN — METHOCARBAMOL TABLETS 500 MG: 500 TABLET, COATED ORAL at 20:00

## 2019-11-21 RX ADMIN — HYDROCODONE BITARTRATE AND ACETAMINOPHEN 1 TABLET: 10; 325 TABLET ORAL at 09:21

## 2019-11-21 RX ADMIN — GABAPENTIN 400 MG: 400 CAPSULE ORAL at 20:00

## 2019-11-21 RX ADMIN — DOXYCYCLINE 100 MG: 100 INJECTION, POWDER, LYOPHILIZED, FOR SOLUTION INTRAVENOUS at 10:37

## 2019-11-21 RX ADMIN — METHOCARBAMOL TABLETS 500 MG: 500 TABLET, COATED ORAL at 01:42

## 2019-11-21 RX ADMIN — FERROUS SULFATE TAB EC 324 MG (65 MG FE EQUIVALENT) 324 MG: 324 (65 FE) TABLET DELAYED RESPONSE at 09:19

## 2019-11-21 RX ADMIN — BENZONATATE 200 MG: 100 CAPSULE ORAL at 20:00

## 2019-11-21 RX ADMIN — BENZONATATE 200 MG: 100 CAPSULE ORAL at 15:45

## 2019-11-21 RX ADMIN — PANTOPRAZOLE SODIUM 40 MG: 40 INJECTION, POWDER, FOR SOLUTION INTRAVENOUS at 09:21

## 2019-11-21 RX ADMIN — METHOCARBAMOL TABLETS 500 MG: 500 TABLET, COATED ORAL at 09:26

## 2019-11-21 RX ADMIN — BENZONATATE 200 MG: 100 CAPSULE ORAL at 09:20

## 2019-11-21 RX ADMIN — GUAIFENESIN 1200 MG: 600 TABLET, EXTENDED RELEASE ORAL at 20:00

## 2019-11-21 RX ADMIN — IPRATROPIUM BROMIDE AND ALBUTEROL SULFATE 3 ML: .5; 3 SOLUTION RESPIRATORY (INHALATION) at 15:48

## 2019-11-21 RX ADMIN — GUAIFENESIN 1200 MG: 600 TABLET, EXTENDED RELEASE ORAL at 09:19

## 2019-11-21 RX ADMIN — IPRATROPIUM BROMIDE AND ALBUTEROL SULFATE 3 ML: .5; 3 SOLUTION RESPIRATORY (INHALATION) at 20:11

## 2019-11-21 RX ADMIN — ONDANSETRON 4 MG: 2 INJECTION INTRAMUSCULAR; INTRAVENOUS at 18:17

## 2019-11-21 RX ADMIN — ROSUVASTATIN CALCIUM 10 MG: 10 TABLET, FILM COATED ORAL at 09:19

## 2019-11-21 RX ADMIN — ESTRADIOL 1 MG: 1 TABLET ORAL at 15:45

## 2019-11-21 RX ADMIN — ESTRADIOL 1 MG: 1 TABLET ORAL at 20:00

## 2019-11-21 RX ADMIN — HYDROCODONE BITARTRATE AND HOMATROPINE METHYLBROMIDE 5 ML: 5; 1.5 SOLUTION ORAL at 09:25

## 2019-11-21 RX ADMIN — RANOLAZINE 1000 MG: 500 TABLET, FILM COATED, EXTENDED RELEASE ORAL at 09:20

## 2019-11-21 RX ADMIN — LORAZEPAM 1 MG: 1 TABLET ORAL at 03:21

## 2019-11-21 RX ADMIN — Medication 10 ML: at 10:36

## 2019-11-21 RX ADMIN — GABAPENTIN 400 MG: 400 CAPSULE ORAL at 15:45

## 2019-11-21 RX ADMIN — CEFTRIAXONE SODIUM 1 G: 1 INJECTION, POWDER, FOR SOLUTION INTRAMUSCULAR; INTRAVENOUS at 09:17

## 2019-11-21 RX ADMIN — LORAZEPAM 1 MG: 1 TABLET ORAL at 20:00

## 2019-11-21 RX ADMIN — ESTRADIOL 1 MG: 1 TABLET ORAL at 09:18

## 2019-11-21 RX ADMIN — SERTRALINE 100 MG: 100 TABLET, FILM COATED ORAL at 09:20

## 2019-11-21 RX ADMIN — RANOLAZINE 1000 MG: 500 TABLET, FILM COATED, EXTENDED RELEASE ORAL at 20:00

## 2019-11-21 RX ADMIN — CLOPIDOGREL BISULFATE 75 MG: 75 TABLET ORAL at 09:20

## 2019-11-21 RX ADMIN — ONDANSETRON 4 MG: 2 INJECTION INTRAMUSCULAR; INTRAVENOUS at 10:07

## 2019-11-21 RX ADMIN — LOSARTAN POTASSIUM 25 MG: 25 TABLET, FILM COATED ORAL at 09:18

## 2019-11-21 RX ADMIN — HYDROCODONE BITARTRATE AND HOMATROPINE METHYLBROMIDE 5 ML: 5; 1.5 SOLUTION ORAL at 20:00

## 2019-11-21 RX ADMIN — HYDROCODONE BITARTRATE AND HOMATROPINE METHYLBROMIDE 5 ML: 5; 1.5 SOLUTION ORAL at 03:24

## 2019-11-21 RX ADMIN — IPRATROPIUM BROMIDE AND ALBUTEROL SULFATE 3 ML: .5; 3 SOLUTION RESPIRATORY (INHALATION) at 11:14

## 2019-11-21 RX ADMIN — FUROSEMIDE 40 MG: 10 INJECTION, SOLUTION INTRAMUSCULAR; INTRAVENOUS at 09:20

## 2019-11-21 RX ADMIN — ENOXAPARIN SODIUM 40 MG: 40 INJECTION SUBCUTANEOUS at 15:45

## 2019-11-21 RX ADMIN — GABAPENTIN 400 MG: 400 CAPSULE ORAL at 09:20

## 2019-11-21 RX ADMIN — DOXYCYCLINE 100 MG: 100 INJECTION, POWDER, LYOPHILIZED, FOR SOLUTION INTRAVENOUS at 20:01

## 2019-11-21 RX ADMIN — Medication 10 ML: at 20:00

## 2019-11-21 RX ADMIN — ASPIRIN 81 MG: 81 TABLET, COATED ORAL at 09:21

## 2019-11-21 RX ADMIN — ISOSORBIDE MONONITRATE 30 MG: 30 TABLET, EXTENDED RELEASE ORAL at 09:21

## 2019-11-21 RX ADMIN — IPRATROPIUM BROMIDE AND ALBUTEROL SULFATE 3 ML: .5; 3 SOLUTION RESPIRATORY (INHALATION) at 07:22

## 2019-11-21 RX ADMIN — DOCUSATE SODIUM 100 MG: 100 CAPSULE, LIQUID FILLED ORAL at 09:21

## 2019-11-21 NOTE — PROGRESS NOTES
Cardiology Progress Note      Admiting Physician:  Ashtyn Salter MD   LOS: 3 days       Reason For Followup:  Respiratory failure  Coronary artery disease  Elevated proBNP      Subjective:    Interval History:  Seen and examined.  Chart and labs reviewed.  Patient reports breathing is okay, but has had cough through the night and some nausea.  Denies chest pain, pressure, heaviness or tightness.  Coughing up sputum.  No other new issues identified.  Losartan added for better blood pressure control 11/20/2019 with some improvement.    Review of Systems:  A complete review of systems was undertaken with the pertinent cardiovascular findings listed in history of present illness and all other systems being negative.     Assessment & Plan    Impressions:  Acute respiratory failure  Pneumonia  Elevated pro BNP of uncertain clinical significance  COPD oxygen dependent  History of coronary artery disease status post remote four-vessel CABG     Most recent catheterization demonstrating attrition of 3 of 4 grafts     Negative FFR of remaining vein graft in October 2018  Elevated proBNP  Hypertension  Hypertensive cardiovascular disease  Hyperlipidemia  Chronic stable angina    Recommendations:  Antimicrobials as per admitting service  Pulmonary toilet  2D echocardiogram reviewed and demonstrates normal LV systolic function.  Monitor volume status  Losartan added with some improvement in blood pressure  No further cardiac work-up planned at the present time.        Objective:    Medication Review:   Scheduled Meds:    aspirin 81 mg Oral Daily   benzonatate 200 mg Oral TID   cefTRIAXone 1 g Intravenous Q24H   clopidogrel 75 mg Oral Daily   docusate sodium 100 mg Oral Daily   doxycycline 100 mg Intravenous Q12H   enoxaparin 40 mg Subcutaneous Q24H   estradiol 1 mg Oral TID   ferrous sulfate 324 mg Oral Daily With Breakfast   fluticasone 2 spray Nasal Daily   furosemide 40 mg Intravenous Daily   gabapentin 400 mg Oral TID    guaiFENesin 1,200 mg Oral Q12H   ipratropium-albuterol 3 mL Nebulization 4x Daily - RT   isosorbide mononitrate 30 mg Oral Q24H   losartan 25 mg Oral Q24H   pantoprazole 40 mg Intravenous Q12H   ranolazine 1,000 mg Oral Q12H   rosuvastatin 10 mg Oral Daily   sertraline 100 mg Oral Daily   sodium chloride 10 mL Intravenous Q12H   Thyroid 60 mg Oral Daily     Continuous Infusions:   PRN Meds:.•  acetaminophen **OR** acetaminophen **OR** acetaminophen  •  benzocaine-menthol  •  benzonatate  •  HYDROcodone-acetaminophen  •  HYDROcodone-acetaminophen  •  HYDROcodone-homatropine  •  LORazepam  •  magnesium sulfate **OR** magnesium sulfate **OR** magnesium sulfate  •  melatonin  •  methocarbamol  •  nitroglycerin  •  ondansetron **OR** ondansetron  •  potassium chloride  •  potassium chloride  •  sodium chloride  •  sodium chloride  •  traMADol    Patient Active Problem List   Diagnosis   • Abdominal aortic aneurysm (AAA) (CMS/HCC)   • Chronic obstructive pulmonary disease (CMS/HCC)   • Congestive heart failure (CMS/HCC)   • Coronary artery disease   • Dyslipidemia   • Hypertension   • Community acquired pneumonia         Physical Exam:    General: Well-developed, well-nourished 74-year-old  female who is alert, cooperative, appears stated age  Head:  Normocephalic, atraumatic, mucous membranes moist  Eyes:  Conjunctiva/corneas clear, EOM's intact     Neck:  Supple,  no adenopathy;      Lungs: Diminished and coarse with occasional crackles, no wheezes  Chest wall: No tenderness  Heart::  Regular rate and rhythm, S1 and S2 normal, no murmur, rub or gallop  Abdomen: Soft, non-tender, nondistended bowel sounds active  Extremities: No cyanosis, clubbing, or edema  Pulses: 2+ and symmetric all extremities  Skin:  No rashes or lesions  Neuro/psych: A&O x3. CN II through XII are grossly intact with appropriate affect    Vital Signs:  Vitals:    11/21/19 0315 11/21/19 0715 11/21/19 0722 11/21/19 0729   BP: 166/72 159/69      BP Location:  Right arm     Patient Position:  Lying     Pulse: 80 85 83 84   Resp: 20 20 20 20   Temp: 99.1 °F (37.3 °C)      TempSrc:       SpO2: 98% 97% 97% 98%   Weight: 63.3 kg (139 lb 8.8 oz)      Height:         Wt Readings from Last 1 Encounters:   11/21/19 63.3 kg (139 lb 8.8 oz)       Intake/Output Summary (Last 24 hours) at 11/21/2019 0809  Last data filed at 11/21/2019 0315  Gross per 24 hour   Intake --   Output 100 ml   Net -100 ml         Results Review:     CBC    Results from last 7 days   Lab Units 11/21/19  0402 11/20/19  0449 11/19/19  0429 11/18/19  2236 11/18/19  1505 11/18/19  1203 11/18/19  0649   WBC 10*3/mm3 8.40 8.20 8.20  --   --  8.80 9.80   HEMOGLOBIN g/dL 8.8* 9.4* 8.3* 8.5* 7.7* 7.9* 9.1*   PLATELETS 10*3/mm3 219 204 168  --   --  145 177     Cr Clearance Estimated Creatinine Clearance: 55.4 mL/min (by C-G formula based on SCr of 0.89 mg/dL).  Coag     HbA1C No results found for: HGBA1C  Blood Glucose No results found for: POCGLU  Infection   Results from last 7 days   Lab Units 11/18/19  0714 11/18/19  0649   BLOODCX  No growth at 3 days No growth at 3 days     CMP   Results from last 7 days   Lab Units 11/21/19  0403 11/20/19 2006 11/20/19 0449 11/19/19  0429 11/18/19  1627 11/18/19  1203 11/18/19  0649   SODIUM mmol/L 139  --  142 139 140 137 137   POTASSIUM mmol/L 3.9 3.9 3.4* 3.5 3.7 4.1 4.2   CHLORIDE mmol/L 99  --  99 97* 98 99 99   CO2 mmol/L 27.0  --  34.0* 31.0* 30.0* 28.0 29.0   BUN mg/dL 13  --  15 20 18 16 13   CREATININE mg/dL 0.89  --  0.84 1.24* 1.36* 1.11* 1.01*   GLUCOSE mg/dL 145*  --  125* 104* 149* 126* 127*   ALBUMIN g/dL  --   --   --   --  3.20*  --  3.60   BILIRUBIN mg/dL  --   --   --   --  0.3  --  0.6   ALK PHOS U/L  --   --   --   --  67  --  72   AST (SGOT) U/L  --   --   --   --  11  --  11   ALT (SGPT) U/L  --   --   --   --  6  --  6     ABG      UA    Results from last 7 days   Lab Units 11/18/19  1623   NITRITE UA  Negative     ALFONZO  No results  found for: POCMETH, POCAMPHET, POCBARBITUR, POCBENZO, POCCOCAINE, POCOPIATES, POCOXYCODO, POCPHENCYC, POCPROPOXY, POCTHC, POCTRICYC  Lysis Labs   Results from last 7 days   Lab Units 11/21/19  0403 11/21/19  0402 11/20/19  0449 11/19/19  0429 11/18/19  2236 11/18/19  1627 11/18/19  1505 11/18/19  1203 11/18/19  0649   HEMOGLOBIN g/dL  --  8.8* 9.4* 8.3* 8.5*  --  7.7* 7.9* 9.1*   PLATELETS 10*3/mm3  --  219 204 168  --   --   --  145 177   CREATININE mg/dL 0.89  --  0.84 1.24*  --  1.36*  --  1.11* 1.01*     Radiology(recent) Xr Chest 1 View    Result Date: 11/20/2019  No change in bibasilar airspace disease which again could relate to pneumonia, atelectasis, or progression of chronic fibrosis.  Electronically Signed By-Nahid Cooley On:11/20/2019 7:29 AM This report was finalized on [id number] by  Nahid Cooley, .        Results from last 7 days   Lab Units 11/18/19 2236   TROPONIN T ng/mL <0.010       Imaging Results (Last 24 Hours)     ** No results found for the last 24 hours. **          Cardiac Studies:  Echo-   Results for orders placed during the hospital encounter of 11/18/19   Adult Transthoracic Echo Complete W/ Cont if Necessary Per Protocol    Narrative · Left ventricular systolic function is normal.  · Estimated EF was in agreement with the calculated EF. Estimated EF   appears to be in the range of 66 - 70%.  · Mild-to-moderate mitral valve regurgitation is present  · Left ventricular wall thickness is consistent with borderline concentric   hypertrophy.  · Mild-to-moderate pulmonic valve regurgitation is present.  · Left atrial cavity size is mild-to-moderately dilated.        Stress Myoview-  Cath-        RADHA Rudd  11/21/19  8:09 AM

## 2019-11-21 NOTE — PLAN OF CARE
Problem: Patient Care Overview  Goal: Plan of Care Review  Outcome: Ongoing (interventions implemented as appropriate)   11/21/19 6502   OTHER   Outcome Summary Pt continues with frequent cough and pain. Meds given as ordered. Will continue to monitor.   Coping/Psychosocial   Plan of Care Reviewed With patient     Goal: Interprofessional Rounds/Family Conf  Outcome: Ongoing (interventions implemented as appropriate)

## 2019-11-21 NOTE — PROGRESS NOTES
Hospitalist Team      Patient Care Team:  Deonte Hector MD as PCP - General        Chief Complaint: Patient is complaining to nursing of a cough which has been recalcitrant to Tessalon Perles.    Subjective  The patient today is complaining of nausea and has not been eating well.  The patient also continues to complain of cough which she says may be better than on admission but is not resolved.  Also tells me today that she had been told that she had a recurrence of her abdominal aortic aneurysm 5 to 7 years ago but she has neglected to have this followed up.  Is not having any pain in her abdomen at this time.  She is having some nausea which is chronic in nature from her description today.  Interval History and ROS:     Review of Systems   Constitutional: Negative for activity change, appetite change, fatigue and fever.   HENT: Negative for congestion, nosebleeds, postnasal drip and voice change.    Eyes: Negative for photophobia and discharge.   Respiratory: Positive for cough and shortness of breath. Negative for choking, chest tightness and wheezing.    Cardiovascular: Negative for chest pain and leg swelling.   Gastrointestinal: Negative for abdominal distention, abdominal pain, constipation, diarrhea and nausea.   Genitourinary: Negative for decreased urine volume, difficulty urinating, dysuria, frequency and urgency.   Musculoskeletal: Negative for arthralgias and myalgias.   Skin: Negative for rash.   Neurological: Negative for dizziness, syncope, facial asymmetry, light-headedness and headaches.   Psychiatric/Behavioral: Negative for agitation, behavioral problems, confusion and decreased concentration. The patient is not nervous/anxious.    All other systems reviewed and are negative.      Objective    Vital Signs  Temp:  [98.4 °F (36.9 °C)-99.1 °F (37.3 °C)] 98.7 °F (37.1 °C)  Heart Rate:  [75-95] 82  Resp:  [18-22] 18  BP: (112-166)/(61-72) 112/61  Oxygen Therapy  SpO2: 98 %  Pulse Oximetry  "Type: Intermittent  Device (Oxygen Therapy): nasal cannula  Flow (L/min): 3  Flowsheet Rows      First Filed Value   Admission Height  160 cm (63\") Documented at 11/18/2019 0629   Admission Weight  66.2 kg (145 lb 15.1 oz) Documented at 11/18/2019 0629        Intake & Output (last 3 days)       11/18 0701 - 11/19 0700 11/19 0701 - 11/20 0700 11/20 0701 - 11/21 0700 11/21 0701 - 11/22 0700    P.O.  520      IV Piggyback 100       Total Intake(mL/kg) 100 (1.5) 520 (7.9)      Urine (mL/kg/hr)   100 (0.1)     Stool   0     Total Output   100     Net +100 +520 -100             Stool Unmeasured Occurrence   0 x         Lines, Drains & Airways    Active LDAs     Name:   Placement date:   Placement time:   Site:   Days:    Peripheral IV (Ped/Christopher) 11/19/19 Anterior;Right Forearm   11/19/19    0852     less than 1                Physical Exam:    General Appearance:    Alert, cooperative, in no acute distress   Head:    Normocephalic, without obvious abnormality, atraumatic   Eyes:            Lids and lashes normal, conjunctivae and sclerae normal, no   icterus, no pallor, corneas clear, PERRLA   Ears:    Ears appear intact with no abnormalities noted   Throat:   No oral lesions, no thrush, oral mucosa moist   Neck:   No adenopathy, supple, trachea midline, no thyromegaly, no     carotid bruit, no JVD   Back:     No kyphosis present, no scoliosis present, no skin lesions,       erythema or scars, no tenderness to percussion or                   palpation,   range of motion normal   Lungs:    She has a few rales in the bases to auscultation,respirations regular, even and unlabored.  Equal breath sounds bilaterally.    Heart:    Regular rhythm and normal rate, normal S1 and S2, no            murmur, no gallop, no rub, no click   Breast Exam:    Deferred   Abdomen:     Normal bowel sounds, no masses, no organomegaly, soft        non-tender, non-distended, no guarding, no rebound                 tenderness   Genitalia:    " Deferred   Extremities:   Moves all extremities well, no edema, no cyanosis, no              redness   Pulses:   Pulses palpable and equal bilaterally   Skin:   No bleeding, bruising or rash   Lymph nodes:   No palpable adenopathy   Neurologic:   Cranial nerves 2 - 12 grossly intact, sensation intact, DTR        present and equal bilaterally       Results Review:     I reviewed the patient's new clinical results.    Lab Results (last 24 hours)     Procedure Component Value Units Date/Time    Occult Blood, Fecal By Immunoassay - Stool, Per Rectum [[id number]]  (Normal) Collected:  11/21/19 1454    Specimen:  Stool from Per Rectum Updated:  11/21/19 1600     Occult Blood, Fecal by Immunoassay Negative    Occult Blood, Fecal By Immunoassay - Stool, Per Rectum [[id number]]  (Normal) Collected:  11/21/19 0740    Specimen:  Stool from Per Rectum Updated:  11/21/19 1058     Occult Blood, Fecal by Immunoassay Negative    Blood Culture - Blood, Wrist, Right [[id number]] Collected:  11/18/19 0714    Specimen:  Blood from Wrist, Right Updated:  11/21/19 0801     Blood Culture No growth at 3 days    Blood Culture - Blood, Arm, Left [[id number]] Collected:  11/18/19 0649    Specimen:  Blood from Arm, Left Updated:  11/21/19 0716     Blood Culture No growth at 3 days    Magnesium [[id number]]  (Abnormal) Collected:  11/21/19 0403    Specimen:  Blood Updated:  11/21/19 0646     Magnesium 2.5 mg/dL     Basic Metabolic Panel [[id number]]  (Abnormal) Collected:  11/21/19 0403    Specimen:  Blood Updated:  11/21/19 0646     Glucose 145 mg/dL      BUN 13 mg/dL      Creatinine 0.89 mg/dL      Sodium 139 mmol/L      Potassium 3.9 mmol/L      Chloride 99 mmol/L      CO2 27.0 mmol/L      Calcium 8.8 mg/dL      eGFR Non African Amer 62 mL/min/1.73      BUN/Creatinine Ratio 14.6     Anion Gap 13.0 mmol/L     Narrative:       GFR Normal >60  Chronic Kidney Disease <60  Kidney Failure <15    BNP [[id number]]  (Abnormal) Collected:  11/21/19 0403     Specimen:  Blood Updated:  11/21/19 0629     proBNP 1,067.0 pg/mL     Narrative:       Among patients with dyspnea, NT-proBNP is highly sensitive for the detection of acute congestive heart failure. In addition NT-proBNP of <300 pg/ml effectively rules out acute congestive heart failure with 99% negative predictive value.    CBC & Differential [[id number]] Collected:  11/21/19 0402    Specimen:  Blood Updated:  11/21/19 0435    Narrative:       The following orders were created for panel order CBC & Differential.  Procedure                               Abnormality         Status                     ---------                               -----------         ------                     CBC Auto Differential[[id number]]        Abnormal            Final result                 Please view results for these tests on the individual orders.    CBC Auto Differential [[id number]]  (Abnormal) Collected:  11/21/19 0402    Specimen:  Blood Updated:  11/21/19 0435     WBC 8.40 10*3/mm3      RBC 2.60 10*6/mm3      Hemoglobin 8.8 g/dL      Hematocrit 25.6 %      MCV 98.2 fL      MCH 33.9 pg      MCHC 34.5 g/dL      RDW 13.3 %      RDW-SD 45.9 fl      MPV 7.5 fL      Platelets 219 10*3/mm3      Neutrophil % 69.9 %      Lymphocyte % 18.9 %      Monocyte % 8.5 %      Eosinophil % 2.3 %      Basophil % 0.4 %      Neutrophils, Absolute 5.90 10*3/mm3      Lymphocytes, Absolute 1.60 10*3/mm3      Monocytes, Absolute 0.70 10*3/mm3      Eosinophils, Absolute 0.20 10*3/mm3      Basophils, Absolute 0.00 10*3/mm3      nRBC 0.1 /100 WBC     Potassium [[id number]]  (Normal) Collected:  11/20/19 2006    Specimen:  Blood Updated:  11/20/19 2043     Potassium 3.9 mmol/L           Imaging Results (Last 24 Hours)     ** No results found for the last 24 hours. **          Xrays, labs reviewed personally by physician.    ECG/EMG Results (most recent)     Procedure Component Value Units Date/Time    ECG 12 Lead [[id number]] Collected:  11/18/19 0642      Updated:  11/19/19 0658    Narrative:       HEART RATE= 99  bpm  RR Interval= 608  ms  WI Interval= 205  ms  P Horizontal Axis= 22  deg  P Front Axis= 35  deg  QRSD Interval= 85  ms  QT Interval= 348  ms  QRS Axis= -19  deg  T Wave Axis=   deg  - ABNORMAL ECG -  Sinus rhythm  Atrial premature complex  Anteroseptal infarct, old  When compared with ECG of 28-Oct-2018 21:02:36,  Significant rate increase  Significant axis, voltage or hypertrophy change  Electronically Signed By: Av Kurtz (Ac) 19-Nov-2019 06:58:11  Date and Time of Study: 2019-11-18 06:42:25    Adult Transthoracic Echo Complete W/ Cont if Necessary Per Protocol [[id number]] Collected:  11/18/19 1738     Updated:  11/19/19 1624     BSA 1.7 m^2       CV ECHO DANNY - RVDD 2.3 cm      IVSd 1.2 cm      LVIDd 4.4 cm      LVIDs 2.8 cm      LVPWd 1.2 cm      IVS/LVPW 0.94     FS 36.4 %      EDV(Teich) 88.3 ml      ESV(Teich) 29.8 ml      EF(Teich) 66.3 %      EDV(cubed) 86.0 ml      ESV(cubed) 22.1 ml      EF(cubed) 74.2 %      LV mass(C)d 193.4 grams      LV mass(C)dI 113.0 grams/m^2      SV(Teich) 58.6 ml      SI(Teich) 34.2 ml/m^2      SV(cubed) 63.8 ml      SI(cubed) 37.3 ml/m^2      Ao root diam 3.2 cm      Ao root area 8.0 cm^2      ACS 1.6 cm      asc Aorta Diam 2.8 cm      LVOT diam 2.0 cm      LVOT area 3.1 cm^2      RVOT diam 3.1 cm      RVOT area 7.5 cm^2      EDV(MOD-sp4) 76.2 ml      ESV(MOD-sp4) 23.0 ml      EF(MOD-sp4) 69.8 %      SV(MOD-sp4) 53.2 ml      SI(MOD-sp4) 31.1 ml/m^2      Ao root area (BSA corrected) 1.9     LV Warren Vol (BSA corrected) 44.5 ml/m^2      LV Sys Vol (BSA corrected) 13.4 ml/m^2      Aortic R-R 0.8 sec      Aortic HR 74.9 BPM      MV E max truman 136.6 cm/sec      MV A max truman 103.4 cm/sec      MV E/A 1.3     MV V2 max 136.0 cm/sec      MV max PG 7.4 mmHg      MV V2 mean 86.4 cm/sec      MV mean PG 3.3 mmHg      MV V2 VTI 32.6 cm      MVA(VTI) 2.5 cm^2      MV dec slope 914.1 cm/sec^2      MV dec time 0.15 sec      Ao pk  elmer 193.3 cm/sec      Ao max PG 15.1 mmHg      Ao max PG (full) 9.6 mmHg      Ao V2 mean 126.2 cm/sec      Ao mean PG 7.6 mmHg      Ao mean PG (full) 4.7 mmHg      Ao V2 VTI 37.9 cm      OREN(I,A) 2.1 cm^2      OREN(I,D) 2.1 cm^2      OREN(V,A) 1.9 cm^2      OREN(V,D) 1.9 cm^2      LV V1 max PG 5.5 mmHg      LV V1 mean PG 2.9 mmHg      LV V1 max 117.1 cm/sec      LV V1 mean 76.4 cm/sec      LV V1 VTI 26.4 cm      MR max elmer 490.5 cm/sec      MR max PG 96.2 mmHg      CO(Ao) 22.6 l/min      CI(Ao) 13.2 l/min/m^2      SV(Ao) 301.3 ml      SI(Ao) 176.1 ml/m^2      CO(LVOT) 6.1 l/min      CI(LVOT) 3.6 l/min/m^2      SV(LVOT) 81.3 ml      SV(RVOT) 101.6 ml      SI(LVOT) 47.5 ml/m^2      PA V2 max 85.8 cm/sec      PA max PG 2.9 mmHg      PA max PG (full) 1.3 mmHg      PA V2 mean 57.3 cm/sec      PA mean PG 1.5 mmHg      PA mean PG (full) 0.73 mmHg      PA V2 VTI 15.5 cm      PVA(I,A) 6.6 cm^2      BH CV ECHO DANNY - PVA(I,D) 6.6 cm^2      BH CV ECHO DANNY - PVA(V,A) 5.6 cm^2      BH CV ECHO DANNY - PVA(V,D) 5.6 cm^2      PA acc time 0.13 sec      PI end-d elmer 125.6 cm/sec      PI max elmer 225.0 cm/sec      PI max PG 20.2 mmHg      RV V1 max PG 1.6 mmHg      RV V1 mean PG 0.79 mmHg      RV V1 max 64.1 cm/sec      RV V1 mean 41.0 cm/sec      RV V1 VTI 13.6 cm      TR max elmer 231.4 cm/sec      RVSP(TR) 24.4 mmHg      RAP systole 3.0 mmHg      PA pr(Accel) 20.8 mmHg      Pulm Sys Elmer 72.8 cm/sec      Pulm Warren Elmer 102.6 cm/sec      Pulm S/D 0.71     Qp/Qs 1.3     Pulm A Revs Dur 0.13 sec      Pulm A Revs Elmer 31.8 cm/sec      BH CV ECHO DANNY - BZI_BMI 25.1 kilograms/m^2      BH CV ECHO DANNY - BSA(HAYCOCK) 1.7 m^2      BH CV ECHO DANNY - BZI_METRIC_WEIGHT 66.2 kg      BH CV ECHO DANNY - BZI_METRIC_HEIGHT 162.6 cm      EF(MOD-bp) 70.0 %      LA dimension(2D) 4.3 cm     Narrative:       · Left ventricular systolic function is normal.  · Estimated EF was in agreement with the calculated EF. Estimated EF   appears to be in the range of 66 -  70%.  · Mild-to-moderate mitral valve regurgitation is present  · Left ventricular wall thickness is consistent with borderline concentric   hypertrophy.  · Mild-to-moderate pulmonic valve regurgitation is present.  · Left atrial cavity size is mild-to-moderately dilated.             Medication Review:   I have reviewed the patient's current medication list    Current Facility-Administered Medications:   •  acetaminophen (TYLENOL) tablet 650 mg, 650 mg, Oral, Q4H PRN, 650 mg at 11/21/19 0140 **OR** acetaminophen (TYLENOL) 160 MG/5ML solution 650 mg, 650 mg, Oral, Q4H PRN **OR** acetaminophen (TYLENOL) suppository 650 mg, 650 mg, Rectal, Q4H PRN, Ashtyn Salter MD  •  aspirin EC tablet 81 mg, 81 mg, Oral, Daily, Ashtyn Salter MD, 81 mg at 11/21/19 0921  •  benzocaine-menthol (CEPACOL) lozenge 1 lozenge, 1 lozenge, Mouth/Throat, Q2H PRN, Elisha Sherwood APRN  •  benzonatate (TESSALON) capsule 200 mg, 200 mg, Oral, TID, Ashtyn Salter MD, 200 mg at 11/21/19 1545  •  benzonatate (TESSALON) capsule 200 mg, 200 mg, Oral, TID PRN, Ashtyn Salter MD  •  cefTRIAXone (ROCEPHIN) 1 g in sodium chloride 0.9 % 100 mL IVPB, 1 g, Intravenous, Q24H, Ashtyn Salter MD, Last Rate: 200 mL/hr at 11/21/19 0917, 1 g at 11/21/19 0917  •  clopidogrel (PLAVIX) tablet 75 mg, 75 mg, Oral, Daily, Ashtyn Salter MD, 75 mg at 11/21/19 0920  •  docusate sodium (COLACE) capsule 100 mg, 100 mg, Oral, Daily, Ashtyn Salter MD, 100 mg at 11/21/19 0921  •  doxycycline (VIBRAMYCIN) 100 mg in sodium chloride 0.9 % 100 mL IVPB, 100 mg, Intravenous, Q12H, Ashtyn Salter MD, 100 mg at 11/21/19 1037  •  enoxaparin (LOVENOX) syringe 40 mg, 40 mg, Subcutaneous, Q24H, Ashtyn Salter MD, 40 mg at 11/21/19 1545  •  estradiol (ESTRACE) tablet 1 mg, 1 mg, Oral, TID, Ashtyn Salter MD, 1 mg at 11/21/19 1545  •  ferrous sulfate EC tablet 324 mg, 324 mg, Oral, Daily With Breakfast, Ashtyn Salter MD, 324  mg at 11/21/19 0919  •  fluticasone (FLONASE) 50 MCG/ACT nasal spray 2 spray, 2 spray, Nasal, Daily, Ashtyn Salter MD, 2 spray at 11/21/19 0923  •  furosemide (LASIX) injection 40 mg, 40 mg, Intravenous, Daily, Ashtyn Salter MD, 40 mg at 11/21/19 0920  •  gabapentin (NEURONTIN) capsule 400 mg, 400 mg, Oral, TID, Ashtyn Salter MD, 400 mg at 11/21/19 1545  •  guaiFENesin (MUCINEX) 12 hr tablet 1,200 mg, 1,200 mg, Oral, Q12H, Elisha Sherwood APRN, 1,200 mg at 11/21/19 0919  •  HYDROcodone-acetaminophen (NORCO)  MG per tablet 1 tablet, 1 tablet, Oral, Q6H PRN, Ashtyn Salter MD, 1 tablet at 11/21/19 0921  •  HYDROcodone-acetaminophen (NORCO)  MG per tablet 1 tablet, 1 tablet, Oral, Q4H PRN, Ashtyn Salter MD, 1 tablet at 11/20/19 0836  •  HYDROcodone-homatropine (HYCODAN) 5-1.5 MG/5ML syrup 5 mL, 5 mL, Oral, Q6H PRN, Ashtyn Salter MD, 5 mL at 11/21/19 0925  •  ipratropium-albuterol (DUO-NEB) nebulizer solution 3 mL, 3 mL, Nebulization, 4x Daily - RT, Ashtyn Salter MD, 3 mL at 11/21/19 1548  •  isosorbide mononitrate (IMDUR) 24 hr tablet 30 mg, 30 mg, Oral, Q24H, Ashtyn Salter MD, 30 mg at 11/21/19 0921  •  LORazepam (ATIVAN) tablet 1 mg, 1 mg, Oral, 4x Daily PRN, Ashtyn Salter MD, 1 mg at 11/21/19 0926  •  losartan (COZAAR) tablet 25 mg, 25 mg, Oral, Q24H, Roc Butlerwn, , 25 mg at 11/21/19 0918  •  Magnesium Sulfate 2 gram Bolus, followed by 8 gram infusion (total Mg dose 10 grams)- Mg less than or equal to 1mg/dL, 2 g, Intravenous, PRN **OR** Magnesium Sulfate 2 gram / 50mL Infusion (GIVE X 3 BAGS TO EQUAL 6GM TOTAL DOSE) - Mg 1.1 - 1.5 mg/dl, 2 g, Intravenous, PRN, Last Rate: 25 mL/hr at 11/20/19 1544, 2 g at 11/20/19 1544 **OR** Magnesium Sulfate 4 gram infusion- Mg 1.6-1.9 mg/dL, 4 g, Intravenous, PRN, Ashtyn Salter MD  •  melatonin tablet 5 mg, 5 mg, Oral, Nightly PRN, Ashtyn Salter MD  •  methocarbamol (ROBAXIN)  tablet 500 mg, 500 mg, Oral, TID PRN, Ashtyn Salter MD, 500 mg at 11/21/19 0926  •  nitroglycerin (NITROSTAT) SL tablet 0.4 mg, 0.4 mg, Sublingual, Q5 Min PRN, Ashtyn Salter MD  •  ondansetron (ZOFRAN) tablet 4 mg, 4 mg, Oral, Q6H PRN **OR** ondansetron (ZOFRAN) injection 4 mg, 4 mg, Intravenous, Q6H PRN, Ashtyn Salter MD, 4 mg at 11/21/19 1817  •  pantoprazole (PROTONIX) injection 40 mg, 40 mg, Intravenous, Q12H, Ashtyn Salter MD, 40 mg at 11/21/19 0921  •  potassium chloride (K-DUR,KLOR-CON) CR tablet 40 mEq, 40 mEq, Oral, PRN, Ashtyn Salter MD, 40 mEq at 11/20/19 1612  •  potassium chloride (KLOR-CON) packet 40 mEq, 40 mEq, Oral, PRN, Ashtyn Salter MD  •  ranolazine (RANEXA) 12 hr tablet 1,000 mg, 1,000 mg, Oral, Q12H, Ashtyn Salter MD, 1,000 mg at 11/21/19 0920  •  rosuvastatin (CRESTOR) tablet 10 mg, 10 mg, Oral, Daily, Ashtyn Salter MD, 10 mg at 11/21/19 0919  •  sertraline (ZOLOFT) tablet 100 mg, 100 mg, Oral, Daily, Ashtyn Salter MD, 100 mg at 11/21/19 0920  •  sodium chloride 0.9 % flush 10 mL, 10 mL, Intravenous, PRN, Ashtyn Salter MD  •  sodium chloride 0.9 % flush 10 mL, 10 mL, Intravenous, Q12H, Ashtyn Salter MD, 10 mL at 11/21/19 1036  •  sodium chloride 0.9 % flush 10 mL, 10 mL, Intravenous, PRN, Ashtyn Salter MD  •  Thyroid (ARMOUR) tablet 60 mg, 60 mg, Oral, Daily, Ashtyn Salter MD, 60 mg at 11/21/19 0918  •  traMADol (ULTRAM) tablet 50 mg, 50 mg, Oral, Q6H PRN, Ashtyn Salter MD      Assessment/Plan   1.  Dyspnea-this is likely related to cardiac disease.  The patient is much improved with diuretic therapy.  Her cough is somewhat improved as well.  Continue the Lasix and the empiric Rocephin.  Today's chest x-ray shows essentially no change.  We will continue the Rocephin for now and follow closely.  2.  Coronary artery disease-appreciate cardiology's input.  Will await the report of the recent echocardiogram.   Appreciate their note which included information that previously had not been available.  3.  Renal insufficiency-the patient's renal function appears to be improving on the Lasix.  Her BNP was down to thousand 67.  We will continue the Lasix twice daily for now but consider decreasing it to daily tomorrow.  4.  Anemia-the patient appears to have the anemia of chronic disease.  Clearly she has multiple medical issues which could account for that.  We will continue supportive care.  We will also guaiac her stools in order to ensure that she does not have a GI issue that needs to be addressed.  Patient reports that she is given them at least one sample, however I cannot find the results of the stool for occult blood.  We will continue as 3 have been ordered.  5.  Cough-the patient is improved on the Tessalon Perles but remains with cough.  We will increase them to 200 mg 3 times daily routinely.  We will also give her Hycodan for breakthrough cough.  Spite all of this the patient still continues to have significant cough.  Repeat chest x-ray in the a.m.  6.  Hypokalemia-we will replace per electrolyte protocol  7.  Hypomagnesemia-replace per protocol and recheck.  8.  History of abdominal aortic aneurysm-the patient now reports that she had a recurrence of the abdominal aortic aneurysm 5 to 7 years ago.  She was supposed to have this followed up, however she had neglected to do this.  We will order a nonemergent abdominal ultrasound to verify this and to measure it and see if intervention would be necessary.  Plan for disposition: Home possibly with home health    Ashtyn Salter MD  11/21/19  6:18 PM

## 2019-11-21 NOTE — PLAN OF CARE
Problem: Patient Care Overview  Goal: Plan of Care Review  Outcome: Ongoing (interventions implemented as appropriate)   11/21/19 0518   OTHER   Outcome Summary Pt has complained of pain, and prn meds given. Pt's cough has coughing frequently and prn cough med was given. Vitals have been stable. Will continue to monitor    Coping/Psychosocial   Plan of Care Reviewed With patient   Plan of Care Review   Progress no change       Problem: Pneumonia (Adult)  Goal: Signs and Symptoms of Listed Potential Problems Will be Absent, Minimized or Managed (Pneumonia)  Outcome: Ongoing (interventions implemented as appropriate)   11/21/19 0518   Goal/Outcome Evaluation   Problems Assessed (Pneumonia) all   Problems Present (Pneumonia) respiratory compromise

## 2019-11-22 ENCOUNTER — APPOINTMENT (OUTPATIENT)
Dept: ULTRASOUND IMAGING | Facility: HOSPITAL | Age: 74
End: 2019-11-22

## 2019-11-22 ENCOUNTER — APPOINTMENT (OUTPATIENT)
Dept: GENERAL RADIOLOGY | Facility: HOSPITAL | Age: 74
End: 2019-11-22

## 2019-11-22 PROBLEM — R19.7 DIARRHEA: Status: ACTIVE | Noted: 2019-11-22

## 2019-11-22 PROBLEM — D63.8 ANEMIA OF CHRONIC DISEASE: Status: ACTIVE | Noted: 2019-11-22

## 2019-11-22 LAB
ANION GAP SERPL CALCULATED.3IONS-SCNC: 9 MMOL/L (ref 5–15)
BASOPHILS # BLD AUTO: 0 10*3/MM3 (ref 0–0.2)
BASOPHILS NFR BLD AUTO: 0.2 % (ref 0–1.5)
BUN BLD-MCNC: 13 MG/DL (ref 8–23)
BUN/CREAT SERPL: 17.6 (ref 7–25)
CALCIUM SPEC-SCNC: 9.1 MG/DL (ref 8.6–10.5)
CHLORIDE SERPL-SCNC: 102 MMOL/L (ref 98–107)
CO2 SERPL-SCNC: 28 MMOL/L (ref 22–29)
CREAT BLD-MCNC: 0.74 MG/DL (ref 0.57–1)
DEPRECATED RDW RBC AUTO: 47.7 FL (ref 37–54)
EOSINOPHIL # BLD AUTO: 0.2 10*3/MM3 (ref 0–0.4)
EOSINOPHIL NFR BLD AUTO: 2.7 % (ref 0.3–6.2)
ERYTHROCYTE [DISTWIDTH] IN BLOOD BY AUTOMATED COUNT: 13.5 % (ref 12.3–15.4)
ERYTHROCYTE [SEDIMENTATION RATE] IN BLOOD: 72 MM/HR (ref 0–30)
GFR SERPL CREATININE-BSD FRML MDRD: 77 ML/MIN/1.73
GLUCOSE BLD-MCNC: 121 MG/DL (ref 65–99)
HAPTOGLOB SERPL-MCNC: 357 MG/DL (ref 30–200)
HCT VFR BLD AUTO: 25.9 % (ref 34–46.6)
HGB BLD-MCNC: 8.6 G/DL (ref 12–15.9)
LYMPHOCYTES # BLD AUTO: 2.2 10*3/MM3 (ref 0.7–3.1)
LYMPHOCYTES NFR BLD AUTO: 25.4 % (ref 19.6–45.3)
MAGNESIUM SERPL-MCNC: 1.9 MG/DL (ref 1.6–2.4)
MCH RBC QN AUTO: 32.8 PG (ref 26.6–33)
MCHC RBC AUTO-ENTMCNC: 33.2 G/DL (ref 31.5–35.7)
MCV RBC AUTO: 99 FL (ref 79–97)
MONOCYTES # BLD AUTO: 0.7 10*3/MM3 (ref 0.1–0.9)
MONOCYTES NFR BLD AUTO: 8.2 % (ref 5–12)
NEUTROPHILS # BLD AUTO: 5.5 10*3/MM3 (ref 1.7–7)
NEUTROPHILS NFR BLD AUTO: 63.5 % (ref 42.7–76)
NRBC BLD AUTO-RTO: 0 /100 WBC (ref 0–0.2)
NT-PROBNP SERPL-MCNC: 996.1 PG/ML (ref 5–900)
PLATELET # BLD AUTO: 234 10*3/MM3 (ref 140–450)
PMV BLD AUTO: 7.6 FL (ref 6–12)
POTASSIUM BLD-SCNC: 3.9 MMOL/L (ref 3.5–5.2)
RBC # BLD AUTO: 2.62 10*6/MM3 (ref 3.77–5.28)
SODIUM BLD-SCNC: 139 MMOL/L (ref 136–145)
WBC NRBC COR # BLD: 8.7 10*3/MM3 (ref 3.4–10.8)

## 2019-11-22 PROCEDURE — 25010000002 HYDRALAZINE PER 20 MG: Performed by: PHYSICIAN ASSISTANT

## 2019-11-22 PROCEDURE — 25010000002 CEFTRIAXONE PER 250 MG: Performed by: INTERNAL MEDICINE

## 2019-11-22 PROCEDURE — 25010000002 ONDANSETRON PER 1 MG: Performed by: INTERNAL MEDICINE

## 2019-11-22 PROCEDURE — 25010000002 FUROSEMIDE PER 20 MG: Performed by: INTERNAL MEDICINE

## 2019-11-22 PROCEDURE — 85025 COMPLETE CBC W/AUTO DIFF WBC: CPT | Performed by: INTERNAL MEDICINE

## 2019-11-22 PROCEDURE — 94760 N-INVAS EAR/PLS OXIMETRY 1: CPT

## 2019-11-22 PROCEDURE — 83735 ASSAY OF MAGNESIUM: CPT | Performed by: INTERNAL MEDICINE

## 2019-11-22 PROCEDURE — 94799 UNLISTED PULMONARY SVC/PX: CPT

## 2019-11-22 PROCEDURE — 84155 ASSAY OF PROTEIN SERUM: CPT | Performed by: NURSE PRACTITIONER

## 2019-11-22 PROCEDURE — 84165 PROTEIN E-PHORESIS SERUM: CPT | Performed by: NURSE PRACTITIONER

## 2019-11-22 PROCEDURE — 83880 ASSAY OF NATRIURETIC PEPTIDE: CPT | Performed by: INTERNAL MEDICINE

## 2019-11-22 PROCEDURE — 80048 BASIC METABOLIC PNL TOTAL CA: CPT | Performed by: INTERNAL MEDICINE

## 2019-11-22 PROCEDURE — 25010000002 IRON SUCROSE PER 1 MG: Performed by: NURSE PRACTITIONER

## 2019-11-22 PROCEDURE — 76775 US EXAM ABDO BACK WALL LIM: CPT

## 2019-11-22 PROCEDURE — 71045 X-RAY EXAM CHEST 1 VIEW: CPT

## 2019-11-22 PROCEDURE — 99232 SBSQ HOSP IP/OBS MODERATE 35: CPT | Performed by: NURSE PRACTITIONER

## 2019-11-22 PROCEDURE — 85652 RBC SED RATE AUTOMATED: CPT | Performed by: NURSE PRACTITIONER

## 2019-11-22 PROCEDURE — 83010 ASSAY OF HAPTOGLOBIN QUANT: CPT | Performed by: NURSE PRACTITIONER

## 2019-11-22 PROCEDURE — 99233 SBSQ HOSP IP/OBS HIGH 50: CPT | Performed by: INTERNAL MEDICINE

## 2019-11-22 PROCEDURE — 97162 PT EVAL MOD COMPLEX 30 MIN: CPT

## 2019-11-22 PROCEDURE — 99223 1ST HOSP IP/OBS HIGH 75: CPT | Performed by: INTERNAL MEDICINE

## 2019-11-22 RX ORDER — HYDRALAZINE HYDROCHLORIDE 20 MG/ML
10 INJECTION INTRAMUSCULAR; INTRAVENOUS EVERY 6 HOURS PRN
Status: DISCONTINUED | OUTPATIENT
Start: 2019-11-22 | End: 2019-11-24 | Stop reason: HOSPADM

## 2019-11-22 RX ADMIN — FUROSEMIDE 40 MG: 10 INJECTION, SOLUTION INTRAMUSCULAR; INTRAVENOUS at 08:44

## 2019-11-22 RX ADMIN — ESTRADIOL 1 MG: 1 TABLET ORAL at 21:07

## 2019-11-22 RX ADMIN — GABAPENTIN 400 MG: 400 CAPSULE ORAL at 16:30

## 2019-11-22 RX ADMIN — BENZONATATE 200 MG: 100 CAPSULE ORAL at 21:07

## 2019-11-22 RX ADMIN — GUAIFENESIN 1200 MG: 600 TABLET, EXTENDED RELEASE ORAL at 08:44

## 2019-11-22 RX ADMIN — THYROID, PORCINE 60 MG: 30 TABLET ORAL at 08:43

## 2019-11-22 RX ADMIN — LORAZEPAM 1 MG: 1 TABLET ORAL at 17:43

## 2019-11-22 RX ADMIN — HYDROCODONE BITARTRATE AND ACETAMINOPHEN 1 TABLET: 10; 325 TABLET ORAL at 19:13

## 2019-11-22 RX ADMIN — BENZONATATE 200 MG: 100 CAPSULE ORAL at 08:44

## 2019-11-22 RX ADMIN — ONDANSETRON 4 MG: 2 INJECTION INTRAMUSCULAR; INTRAVENOUS at 20:36

## 2019-11-22 RX ADMIN — RANOLAZINE 1000 MG: 500 TABLET, FILM COATED, EXTENDED RELEASE ORAL at 08:44

## 2019-11-22 RX ADMIN — RANOLAZINE 1000 MG: 500 TABLET, FILM COATED, EXTENDED RELEASE ORAL at 21:07

## 2019-11-22 RX ADMIN — PANTOPRAZOLE SODIUM 40 MG: 40 INJECTION, POWDER, FOR SOLUTION INTRAVENOUS at 08:44

## 2019-11-22 RX ADMIN — SERTRALINE 100 MG: 100 TABLET, FILM COATED ORAL at 08:44

## 2019-11-22 RX ADMIN — NYSTATIN 10 ML: 100000 SUSPENSION ORAL at 21:13

## 2019-11-22 RX ADMIN — PANTOPRAZOLE SODIUM 40 MG: 40 INJECTION, POWDER, FOR SOLUTION INTRAVENOUS at 21:10

## 2019-11-22 RX ADMIN — NYSTATIN 10 ML: 100000 SUSPENSION ORAL at 16:31

## 2019-11-22 RX ADMIN — CEFTRIAXONE SODIUM 1 G: 1 INJECTION, POWDER, FOR SOLUTION INTRAMUSCULAR; INTRAVENOUS at 08:45

## 2019-11-22 RX ADMIN — METHOCARBAMOL TABLETS 500 MG: 500 TABLET, COATED ORAL at 21:07

## 2019-11-22 RX ADMIN — LOSARTAN POTASSIUM 25 MG: 25 TABLET, FILM COATED ORAL at 08:43

## 2019-11-22 RX ADMIN — LORAZEPAM 1 MG: 1 TABLET ORAL at 10:29

## 2019-11-22 RX ADMIN — IPRATROPIUM BROMIDE AND ALBUTEROL SULFATE 3 ML: .5; 3 SOLUTION RESPIRATORY (INHALATION) at 19:17

## 2019-11-22 RX ADMIN — HYDRALAZINE HYDROCHLORIDE 10 MG: 20 INJECTION INTRAMUSCULAR; INTRAVENOUS at 06:17

## 2019-11-22 RX ADMIN — IPRATROPIUM BROMIDE AND ALBUTEROL SULFATE 3 ML: .5; 3 SOLUTION RESPIRATORY (INHALATION) at 08:57

## 2019-11-22 RX ADMIN — IRON SUCROSE 400 MG: 20 INJECTION, SOLUTION INTRAVENOUS at 16:30

## 2019-11-22 RX ADMIN — ESTRADIOL 1 MG: 1 TABLET ORAL at 16:30

## 2019-11-22 RX ADMIN — IPRATROPIUM BROMIDE AND ALBUTEROL SULFATE 3 ML: .5; 3 SOLUTION RESPIRATORY (INHALATION) at 16:39

## 2019-11-22 RX ADMIN — DOXYCYCLINE 100 MG: 100 INJECTION, POWDER, LYOPHILIZED, FOR SOLUTION INTRAVENOUS at 21:10

## 2019-11-22 RX ADMIN — ESTRADIOL 1 MG: 1 TABLET ORAL at 08:44

## 2019-11-22 RX ADMIN — CLOPIDOGREL BISULFATE 75 MG: 75 TABLET ORAL at 08:44

## 2019-11-22 RX ADMIN — ASPIRIN 81 MG: 81 TABLET, COATED ORAL at 08:44

## 2019-11-22 RX ADMIN — GABAPENTIN 400 MG: 400 CAPSULE ORAL at 08:44

## 2019-11-22 RX ADMIN — METHOCARBAMOL TABLETS 500 MG: 500 TABLET, COATED ORAL at 03:50

## 2019-11-22 RX ADMIN — GUAIFENESIN 1200 MG: 600 TABLET, EXTENDED RELEASE ORAL at 21:07

## 2019-11-22 RX ADMIN — ACETAMINOPHEN 650 MG: 325 TABLET ORAL at 03:40

## 2019-11-22 RX ADMIN — Medication 10 ML: at 08:45

## 2019-11-22 RX ADMIN — ONDANSETRON HYDROCHLORIDE 4 MG: 4 TABLET, FILM COATED ORAL at 03:40

## 2019-11-22 RX ADMIN — Medication 10 ML: at 21:19

## 2019-11-22 RX ADMIN — ROSUVASTATIN CALCIUM 10 MG: 10 TABLET, FILM COATED ORAL at 08:43

## 2019-11-22 RX ADMIN — DOXYCYCLINE 100 MG: 100 INJECTION, POWDER, LYOPHILIZED, FOR SOLUTION INTRAVENOUS at 10:25

## 2019-11-22 RX ADMIN — DOCUSATE SODIUM 100 MG: 100 CAPSULE, LIQUID FILLED ORAL at 08:44

## 2019-11-22 RX ADMIN — IPRATROPIUM BROMIDE AND ALBUTEROL SULFATE 3 ML: .5; 3 SOLUTION RESPIRATORY (INHALATION) at 11:00

## 2019-11-22 RX ADMIN — FERROUS SULFATE TAB EC 324 MG (65 MG FE EQUIVALENT) 324 MG: 324 (65 FE) TABLET DELAYED RESPONSE at 08:44

## 2019-11-22 RX ADMIN — BENZONATATE 200 MG: 100 CAPSULE ORAL at 16:30

## 2019-11-22 RX ADMIN — GABAPENTIN 400 MG: 400 CAPSULE ORAL at 21:07

## 2019-11-22 RX ADMIN — LORAZEPAM 1 MG: 1 TABLET ORAL at 03:40

## 2019-11-22 RX ADMIN — ISOSORBIDE MONONITRATE 30 MG: 30 TABLET, EXTENDED RELEASE ORAL at 08:44

## 2019-11-22 NOTE — CONSULTS
Hematology/Oncology Inpatient Consultation    Patient name: Gayathri Reddy  : 1945  MRN: [id number]  Referring Provider: Dr. Salter  Reason for Consultation: Anemia    Chief complaint: Shortness of breath and anemia    History of present illness:    74 y.o. female direct admitted 2019 secondary to increasing cough and shortness of breath with orthopnea.  She reported temperature up to 100.6 at home.  Admission labs showed WBC 9.8, hemoglobin 9.1, .0, and platelets 177,000.  Creatinine was 1.01.  There was no elevation of LFTs.  BNP was high (4031).  Troponin was normal.  Hemoglobin dropped to 7.7 post admission.  Anemia labs showed low iron, iron saturation (4%), and TIBC.  LDH was low.  Reticulocyte count was mildly elevated.  Vitamin B12 and folate were normal.  Hemoccult stool was negative. She reported chronic anemia on admission on oral iron therapy at home along with occasional bleeding from hemorrhoids.  Her last colonoscopy was over 10 years ago and done at [facility] where she had 4-6 polyps removed per her report.  She reported normal EGD at that time.  Chest x-ray showed stable bibasilar pulmonary opacities from prior exam done 10/28/2019 felt by the radiologist to possibly represent pneumonia, atelectasis, or progression of chronic fibrosis. Respiratory viral panel was negative.  An echocardiogram showed normal LVEF and mild to moderate mitral and pulmonic valve regurgitation.  There was borderline concentric hypertrophy and mild to moderate dilation of left atrial cavity. PMH significant for CAD status post multiple stent procedures and history of CABG in , and AAA.  Cardiology was consulted and she was started on Rocephin and diuresis.    19  Hematology/Oncology was consulted for anemia.    PCP: Deonte Hector MD    History:  Past Medical History:   Diagnosis Date   • Aneurysm (CMS/HCC)     AAA   • Arthritis    • CHF (congestive heart failure) (CMS/[facility])     • COPD (chronic obstructive pulmonary disease) (CMS/HCC)    • Dyslipidemia    • GERD (gastroesophageal reflux disease)    • History of right heart catheterization     2017; 10/2018   • Hypertension    • Hypothyroidism    • Myocardial infarct (CMS/HCC)    • Myocardial infarction (CMS/HCC)     x 3   ,   Past Surgical History:   Procedure Laterality Date   • ABDOMINAL AORTIC ANEURYSM REPAIR N/A    • CARDIAC CATHETERIZATION     • CATARACT EXTRACTION Bilateral    • CORONARY ANGIOPLASTY WITH STENT PLACEMENT N/A     x 5   • CORONARY ARTERY BYPASS GRAFT      x 4    • HYSTERECTOMY N/A    ,   Family History   Problem Relation Age of Onset   • Heart disease Mother    • Aneurysm Mother    • Heart disease Father    • Colon cancer Sister         Date of onset unknown to patient.  States her sister  from colon cancer at age 59.   • Cancer Paternal Grandfather         Type unknown to patient   ,   Social History     Tobacco Use   • Smoking status: Former Smoker     Last attempt to quit:      Years since quittin.9   • Smokeless tobacco: Never Used   Substance Use Topics   • Alcohol use: No     Frequency: Never   • Drug use: No   ,   Medications Prior to Admission   Medication Sig Dispense Refill Last Dose   • aspirin 81 MG EC tablet Take 81 mg by mouth Daily.      • benzonatate (TESSALON) 100 MG capsule Take 1 capsule by mouth 3 (Three) Times a Day As Needed for Cough. 21 capsule 0    • Biotin (BIOTIN 5000) 5 MG capsule Take 1 capsule by mouth Daily.   Taking   • clopidogrel (PLAVIX) 75 MG tablet Take 75 mg by mouth Daily.   Taking   • estradiol (ESTRACE) 1 MG tablet Take 1 mg by mouth 3 (Three) Times a Day.   Taking   • ferrous sulfate 324 (65 Fe) MG tablet delayed-release EC tablet Take 324 mg by mouth Daily With Breakfast.   Taking   • fluticasone (FLONASE) 50 MCG/ACT nasal spray 2 sprays into the nostril(s) as directed by provider Daily. 9.9 mL 0    • gabapentin (NEURONTIN) 400 MG capsule Take 400  mg by mouth 3 (Three) Times a Day.   Taking   • HYDROcodone-acetaminophen (NORCO)  MG per tablet Take 1 tablet by mouth Every 6 (Six) Hours As Needed.   Taking   • isosorbide mononitrate (IMDUR) 30 MG 24 hr tablet Take 30 mg by mouth Daily.   Taking   • LORazepam (ATIVAN) 1 MG tablet Take 1 mg by mouth 4 (Four) Times a Day As Needed.   Taking   • methocarbamol (ROBAXIN) 500 MG tablet Take 500 mg by mouth 3 (Three) Times a Day As Needed for Muscle Spasms.   Taking   • nitroglycerin (NITROSTAT) 0.4 MG SL tablet Place 0.4 mg under the tongue Every 5 (Five) Minutes As Needed for Chest Pain.   Taking   • ranolazine (RANEXA) 1000 MG 12 hr tablet Take 1,000 mg by mouth Every 12 (Twelve) Hours.   Taking   • rosuvastatin (CRESTOR) 10 MG tablet Take 10 mg by mouth Daily.   Taking   • sertraline (ZOLOFT) 100 MG tablet Take 100 mg by mouth Daily.   Taking   • Thyroid 60 MG PO tablet Take 60 mg by mouth Daily.      • Tiotropium Bromide Monohydrate (SPIRIVA RESPIMAT) 1.25 MCG/ACT aerosol solution inhaler Inhale 2 puffs 2 (Two) Times a Day.   Taking   • Unable to find Take 2 each by mouth every night at bedtime. [employer] Sleep Aid Max strength    Taking   , Scheduled Meds:      aspirin 81 mg Oral Daily   benzonatate 200 mg Oral TID   cefTRIAXone 1 g Intravenous Q24H   clopidogrel 75 mg Oral Daily   docusate sodium 100 mg Oral Daily   doxycycline 100 mg Intravenous Q12H   enoxaparin 40 mg Subcutaneous Q24H   estradiol 1 mg Oral TID   ferrous sulfate 324 mg Oral Daily With Breakfast   fluticasone 2 spray Nasal Daily   furosemide 40 mg Intravenous Daily   gabapentin 400 mg Oral TID   guaiFENesin 1,200 mg Oral Q12H   ipratropium-albuterol 3 mL Nebulization 4x Daily - RT   isosorbide mononitrate 30 mg Oral Q24H   losartan 25 mg Oral Q24H   pantoprazole 40 mg Intravenous Q12H   ranolazine 1,000 mg Oral Q12H   rosuvastatin 10 mg Oral Daily   sertraline 100 mg Oral Daily   sodium chloride 10 mL Intravenous Q12H   Thyroid 60 mg Oral  Daily   , Continuous Infusions:   , PRN Meds:  •  acetaminophen **OR** acetaminophen **OR** acetaminophen  •  benzocaine-menthol  •  benzonatate  •  hydrALAZINE  •  HYDROcodone-acetaminophen  •  HYDROcodone-acetaminophen  •  HYDROcodone-homatropine  •  LORazepam  •  magnesium sulfate **OR** magnesium sulfate **OR** magnesium sulfate  •  melatonin  •  methocarbamol  •  nitroglycerin  •  ondansetron **OR** ondansetron  •  potassium chloride  •  potassium chloride  •  sodium chloride  •  sodium chloride  •  traMADol     Allergies:  Naprosyn  [naproxen] and Bactrim [sulfamethoxazole-trimethoprim]    ROS:  Review of Systems   Constitutional: Positive for appetite change (poor since admit) and fatigue. Negative for activity change, chills and fever.   HENT: Negative for dental problem, ear pain, mouth sores ( upper gum is sore), nosebleeds and sore throat.    Eyes: Negative for photophobia and visual disturbance.   Respiratory: Positive for cough and shortness of breath. Negative for apnea, chest tightness, wheezing and stridor.    Cardiovascular: Negative for chest pain, palpitations and leg swelling.   Gastrointestinal: Negative for abdominal pain, blood in stool, constipation ( improved post admit with antibiotic without diarrhea), diarrhea, nausea and vomiting.   Endocrine: Negative for cold intolerance and heat intolerance.   Genitourinary: Negative for difficulty urinating, dysuria, frequency and hematuria.   Musculoskeletal: Negative for joint swelling and neck stiffness.   Skin: Negative for color change and rash.   Neurological: Positive for headaches. Negative for dizziness, seizures and syncope.   Hematological: Negative for adenopathy. Bruises/bleeds easily.        No obvious bleeding   Psychiatric/Behavioral: Negative for agitation and confusion. The patient is not nervous/anxious.    All other systems reviewed and are negative.       Objective     Vital Signs:   /66   Pulse 86   Temp 98.3 °F (36.8  "°C) (Oral)   Resp 15   Ht 162.6 cm (64\")   Wt 66.1 kg (145 lb 11.6 oz)   SpO2 98%   BMI 25.01 kg/m²     Physical Exam:  Physical Exam   Constitutional: She is oriented to person, place, and time. She appears well-developed and well-nourished. No distress.   HENT:   Head: Normocephalic and atraumatic.   Nose: Nose normal.   Mouth/Throat: Oropharynx is clear and moist. No oropharyngeal exudate.   dentures   Eyes: Conjunctivae and EOM are normal. Pupils are equal, round, and reactive to light. Right eye exhibits no discharge. Left eye exhibits no discharge. No scleral icterus.   Neck: Normal range of motion. Neck supple. No thyromegaly present.   Cardiovascular: Normal rate, regular rhythm, normal heart sounds and intact distal pulses. Exam reveals no gallop and no friction rub.   No murmur heard.  Monitor leads   Pulmonary/Chest: Effort normal. No stridor. No respiratory distress. She has no wheezes. She has rales ( few rhonchi in bases, right >left).   Abdominal: Soft. Bowel sounds are normal. She exhibits no mass. There is no tenderness. There is no rebound and no guarding.   Genitourinary:   Genitourinary Comments: deferred   Musculoskeletal: Normal range of motion. She exhibits no edema, tenderness or deformity.   Lymphadenopathy:     She has no cervical adenopathy.   Neurological: She is alert and oriented to person, place, and time. She exhibits normal muscle tone. Coordination normal.   Skin: Skin is warm and dry. Capillary refill takes less than 2 seconds. No rash noted. She is not diaphoretic. No erythema. No pallor.   Psychiatric: She has a normal mood and affect. Her behavior is normal.   Nursing note and vitals reviewed.     Results Review:  Lab Results (last 48 hours)     Procedure Component Value Units Date/Time    Blood Culture - Blood, Wrist, Right [[id number]] Collected:  11/18/19 0714    Specimen:  Blood from Wrist, Right Updated:  11/22/19 0801     Blood Culture No growth at 4 days    Blood " Culture - Blood, Arm, Left [[id number]] Collected:  11/18/19 0649    Specimen:  Blood from Arm, Left Updated:  11/22/19 0715     Blood Culture No growth at 4 days    Basic Metabolic Panel [[id number]]  (Abnormal) Collected:  11/22/19 0411    Specimen:  Blood Updated:  11/22/19 0522     Glucose 121 mg/dL      BUN 13 mg/dL      Creatinine 0.74 mg/dL      Sodium 139 mmol/L      Potassium 3.9 mmol/L      Chloride 102 mmol/L      CO2 28.0 mmol/L      Calcium 9.1 mg/dL      eGFR Non African Amer 77 mL/min/1.73      BUN/Creatinine Ratio 17.6     Anion Gap 9.0 mmol/L     Narrative:       GFR Normal >60  Chronic Kidney Disease <60  Kidney Failure <15    Magnesium [[id number]]  (Normal) Collected:  11/22/19 0411    Specimen:  Blood Updated:  11/22/19 0522     Magnesium 1.9 mg/dL     BNP [[id number]]  (Abnormal) Collected:  11/22/19 0411    Specimen:  Blood Updated:  11/22/19 0513     proBNP 996.1 pg/mL     Narrative:       Among patients with dyspnea, NT-proBNP is highly sensitive for the detection of acute congestive heart failure. In addition NT-proBNP of <300 pg/ml effectively rules out acute congestive heart failure with 99% negative predictive value.    CBC & Differential [[id number]] Collected:  11/22/19 0411    Specimen:  Blood Updated:  11/22/19 0453    Narrative:       The following orders were created for panel order CBC & Differential.  Procedure                               Abnormality         Status                     ---------                               -----------         ------                     CBC Auto Differential[[id number]]        Abnormal            Final result                 Please view results for these tests on the individual orders.    CBC Auto Differential [[id number]]  (Abnormal) Collected:  11/22/19 0411    Specimen:  Blood Updated:  11/22/19 0453     WBC 8.70 10*3/mm3      RBC 2.62 10*6/mm3      Hemoglobin 8.6 g/dL      Hematocrit 25.9 %      MCV 99.0 fL      MCH 32.8 pg      MCHC 33.2 g/dL       RDW 13.5 %      RDW-SD 47.7 fl      MPV 7.6 fL      Platelets 234 10*3/mm3      Neutrophil % 63.5 %      Lymphocyte % 25.4 %      Monocyte % 8.2 %      Eosinophil % 2.7 %      Basophil % 0.2 %      Neutrophils, Absolute 5.50 10*3/mm3      Lymphocytes, Absolute 2.20 10*3/mm3      Monocytes, Absolute 0.70 10*3/mm3      Eosinophils, Absolute 0.20 10*3/mm3      Basophils, Absolute 0.00 10*3/mm3      nRBC 0.0 /100 WBC     Occult Blood, Fecal By Immunoassay - Stool, Per Rectum [[id number]]  (Normal) Collected:  11/21/19 1454    Specimen:  Stool from Per Rectum Updated:  11/21/19 1600     Occult Blood, Fecal by Immunoassay Negative    Occult Blood, Fecal By Immunoassay - Stool, Per Rectum [[id number]]  (Normal) Collected:  11/21/19 0740    Specimen:  Stool from Per Rectum Updated:  11/21/19 1058     Occult Blood, Fecal by Immunoassay Negative    Magnesium [[id number]]  (Abnormal) Collected:  11/21/19 0403    Specimen:  Blood Updated:  11/21/19 0646     Magnesium 2.5 mg/dL     Basic Metabolic Panel [[id number]]  (Abnormal) Collected:  11/21/19 0403    Specimen:  Blood Updated:  11/21/19 0646     Glucose 145 mg/dL      BUN 13 mg/dL      Creatinine 0.89 mg/dL      Sodium 139 mmol/L      Potassium 3.9 mmol/L      Chloride 99 mmol/L      CO2 27.0 mmol/L      Calcium 8.8 mg/dL      eGFR Non African Amer 62 mL/min/1.73      BUN/Creatinine Ratio 14.6     Anion Gap 13.0 mmol/L     Narrative:       GFR Normal >60  Chronic Kidney Disease <60  Kidney Failure <15    BNP [[id number]]  (Abnormal) Collected:  11/21/19 0403    Specimen:  Blood Updated:  11/21/19 0629     proBNP 1,067.0 pg/mL     Narrative:       Among patients with dyspnea, NT-proBNP is highly sensitive for the detection of acute congestive heart failure. In addition NT-proBNP of <300 pg/ml effectively rules out acute congestive heart failure with 99% negative predictive value.    CBC & Differential [[id number]] Collected:  11/21/19 0402    Specimen:  Blood Updated:  11/21/19  0435    Narrative:       The following orders were created for panel order CBC & Differential.  Procedure                               Abnormality         Status                     ---------                               -----------         ------                     CBC Auto Differential[[id number]]        Abnormal            Final result                 Please view results for these tests on the individual orders.    CBC Auto Differential [[id number]]  (Abnormal) Collected:  11/21/19 0402    Specimen:  Blood Updated:  11/21/19 0435     WBC 8.40 10*3/mm3      RBC 2.60 10*6/mm3      Hemoglobin 8.8 g/dL      Hematocrit 25.6 %      MCV 98.2 fL      MCH 33.9 pg      MCHC 34.5 g/dL      RDW 13.3 %      RDW-SD 45.9 fl      MPV 7.5 fL      Platelets 219 10*3/mm3      Neutrophil % 69.9 %      Lymphocyte % 18.9 %      Monocyte % 8.5 %      Eosinophil % 2.3 %      Basophil % 0.4 %      Neutrophils, Absolute 5.90 10*3/mm3      Lymphocytes, Absolute 1.60 10*3/mm3      Monocytes, Absolute 0.70 10*3/mm3      Eosinophils, Absolute 0.20 10*3/mm3      Basophils, Absolute 0.00 10*3/mm3      nRBC 0.1 /100 WBC     Potassium [[id number]]  (Normal) Collected:  11/20/19 2006    Specimen:  Blood Updated:  11/20/19 2043     Potassium 3.9 mmol/L            Pending Results: SPEP, hapto, sed rate    Imaging Reviewed:   Xr Chest 2 View    Result Date: 11/18/2019  Bibasilar pulmonary opacities are seen, which are similar or increased in degree since the prior study. The findings may represent progression of fibrosis. Atelectasis and/or pneumonia cannot be excluded.  Electronically Signed By-Dr. Quoc Parker MD On:11/18/2019 6:50 AM This report was finalized on [id number] by Dr. Quoc Parker MD.    Ct Head Without Contrast    Result Date: 11/14/2019  No evidence of hemorrhage, mass effect or midline shift. No acute process identified.  Electronically Signed By-Daniella Granger On:11/14/2019 9:42 PM This report was finalized on [id number]  by  Daniella Granger, .    Ct Cervical Spine Without Contrast    Result Date: 11/14/2019  No acute osseous abnormality.  Electronically Signed By-Daniella Granger On:11/14/2019 9:44 PM This report was finalized on [id number] by  Daniella Granger, .    Xr Chest 1 View    Result Date: 11/22/2019   1. No significant interval change. 2. Status post CABG. 3. Basilar infiltrates, possibly representing fibrosis.  Electronically Signed By-Av Rosas On:11/22/2019 7:34 AM This report was finalized on [id number] by  Av Rosas, .    Xr Chest 1 View    Result Date: 11/20/2019  No change in bibasilar airspace disease which again could relate to pneumonia, atelectasis, or progression of chronic fibrosis.  Electronically Signed By-Nahid Cooley On:11/20/2019 7:29 AM This report was finalized on [id number] by  Nahid Cooley, .    Us Aorta Limited    Result Date: 11/22/2019  IMPRESSION : Probably little interval change is seen in the caliber of the fusiform infrarenal abdominal aortic aneurysm, measuring 3.5 cm in maximum diameter, since the prior abdominal/pelvic CT study from 1/5/2018.  Electronically Signed By-Dr. Quoc Parker MD On:11/22/2019 8:31 AM This report was finalized on [id number] by Dr. Quoc Parker MD.      I have reviewed the patient's labs, imaging, reports, and other clinician documentation.         Assessment/Plan     ASSESSMENT  1. Macrocytic anemia -  Iron studies with low iron, iron sat, and TIBC.  Chronic and home oral ferrous sulfate has been continued during admission.  Stool heme negative.  Last colonoscopy and EGD with polypectomy per patient report and normal EGD at least 10 years ago per patient. Not likely hemolysis with decreased LDH and mild elevation of retic however haptoglobin has not been checked.  No vitamin B12 or folate deficiencies.  Stable AAA. On PPI. Prior creatinine levels do not indicate CKD. Paraproteinemia or bone marrow etiologies possible.   2. Dyspnea/low grade fever - CXR findings  due to PNA vs pulmonary fibrosis or cardiac etiologies.  RVP neg and blood cx neg to date.  Improved post admit with diuretics and Rocephin.   3. CAD/h/o stents and CABG - per cardiology.  4. Infrarenal AAA - ultrasound showed 3.5 cm and felt stable since 1/2018.  5. Renal insufficiency - improved post hydration.  6. Hypothyroidism -  Per primary team.    PLAN  1. SPEP, haptoglobin and sed rate  2. Venofer 400mg IV x1  3. Daily CBC    Electronically signed by BOB Pool, 11/22/19, 3:17 PM.    I have personally performed a face-to-face diagnostic evaluation on this patient.  I have reviewed and agreed with the care plan.  Physical exam reveals decreased air entry in the lungs with some wheezing.  Hematology consulted for anemia.  Work-up shows anemia of chronic inflammation along with some evidence of iron deficiency.  No evidence of any occult GI bleeding.  Will order inflammatory markers to confirm if patient has inflammation.  Will give patient iron empirically to evaluate for response.  Patient does not have enough response to IV iron and to treatment of infection, I will consider doing bone marrow biopsy as outpatient.  Reviewed creatinine patient has a normal creatinine.    I discussed the patients findings and my recommendations with patient.    Thank you for this consult.  We will be happy to follow along in the care of this patient.     BOB Pool  11/22/19  11:58 AM

## 2019-11-22 NOTE — ASSESSMENT & PLAN NOTE
Today the patient reports that her cough is much improved.  She reports that she is overall feeling much better.  We will continue the Tessalon Perles and Hycodan as needed.

## 2019-11-22 NOTE — THERAPY EVALUATION
Patient Name: Gayathri Reddy  : 1945    MRN: [id number]                              Today's Date: 2019       Admit Date: 2019    Visit Dx:     ICD-10-CM ICD-9-CM   1. Community acquired pneumonia, unspecified laterality J18.9 486   2. Acute heart failure, unspecified heart failure type (CMS/HCC) I50.9 428.9   3. Fever, unspecified fever cause R50.9 780.60   4. Cough R05 786.2     Patient Active Problem List   Diagnosis   • Abdominal aortic aneurysm (AAA) (CMS/[facility])   • Chronic obstructive pulmonary disease (CMS/[facility])   • Congestive heart failure (CMS/[facility])   • Coronary artery disease   • Dyslipidemia   • Hypertension   • Community acquired pneumonia     Past Medical History:   Diagnosis Date   • Aneurysm (CMS/[facility])     AAA   • Arthritis    • CHF (congestive heart failure) (CMS/[facility])    • COPD (chronic obstructive pulmonary disease) (CMS/[facility])    • Dyslipidemia    • GERD (gastroesophageal reflux disease)    • History of right heart catheterization     2017; 10/2018   • Hypertension    • Hypothyroidism    • Myocardial infarct (CMS/[facility])    • Myocardial infarction (CMS/[facility])     x 3     Past Surgical History:   Procedure Laterality Date   • ABDOMINAL AORTIC ANEURYSM REPAIR N/A    • CARDIAC CATHETERIZATION     • CATARACT EXTRACTION Bilateral    • CORONARY ANGIOPLASTY WITH STENT PLACEMENT N/A     x 5   • CORONARY ARTERY BYPASS GRAFT      x 4    • HYSTERECTOMY N/A      General Information     Row Name 19 1457          PT Evaluation Time/Intention    Document Type  evaluation  -CR     Mode of Treatment  physical therapy  -CR     Row Name 19 3022          General Information    Patient Profile Reviewed?  yes  -CR     Prior Level of Function  independent:;all household mobility;ADL's  -CR     Existing Precautions/Restrictions  no known precautions/restrictions  -CR     Barriers to Rehab  none identified  -CR     Row Name 19 0665          Relationship/Environment    Lives With   sibling(s)  -CR     Row Name 11/22/19 1457          Home Main Entrance    Number of Stairs, Main Entrance  one  -CR     Row Name 11/22/19 1457          Stairs Within Home, Primary    Number of Stairs, Within Home, Primary  none  -CR     Row Name 11/22/19 1457          Cognitive Assessment/Intervention- PT/OT    Orientation Status (Cognition)  oriented x 4  -CR       User Key  (r) = Recorded By, (t) = Taken By, (c) = Cosigned By    Initials Name Provider Type    CR Reyes, Carmela, PT Physical Therapist        Mobility     Row Name 11/22/19 1459          Bed Mobility Assessment/Treatment    Bed Mobility Assessment/Treatment  bed mobility (all) activities  -CR     [place] Level (Bed Mobility)  independent  -CR     Row Name 11/22/19 1459          Sit-Stand Transfer    Sit-Stand [place] (Transfers)  independent  -CR     Row Name 11/22/19 1459          Gait/Stairs Assessment/Training    Gait/Stairs Assessment/Training  gait/ambulation independence  -CR     [place] Level (Gait)  independent  -CR     Pattern (Gait)  swing-through  -CR       User Key  (r) = Recorded By, (t) = Taken By, (c) = Cosigned By    Initials Name Provider Type    CR Reyes, Carmela, PT Physical Therapist        Obj/Interventions     Row Name 11/22/19 1459          General ROM    GENERAL ROM COMMENTS  active BUe/LE wfl  -CR     Row Name 11/22/19 1459          MMT (Manual Muscle Testing)    General MMT Comments  grossly wfl  -CR     Row Name 11/22/19 1459          Static Sitting Balance    Level of [place] (Unsupported Sitting, Static Balance)  independent  -CR     Sitting Position (Unsupported Sitting, Static Balance)  sitting on edge of bed  -CR     Time Able to Maintain Position (Unsupported Sitting, Static Balance)  2 to 3 minutes  -CR     Row Name 11/22/19 1459          Dynamic Sitting Balance    Level of [place], Reaches Outside Midline (Sitting, Dynamic Balance)  independent  -CR     Row Name 11/22/19 1459          Static  Standing Balance    Level of [place] (Supported Standing, Static Balance)  independent  -CR     Time Able to Maintain Position (Supported Standing, Static Balance)  2 to 3 minutes  -CR     Row Name 11/22/19 1459          Dynamic Standing Balance    Level of [place], Reaches Outside Midline (Standing, Dynamic Balance)  independent  -CR     Time Able to Maintain Position, Reaches Outside Midline (Standing, Dynamic Balance)  1 to 2 minutes  -CR       User Key  (r) = Recorded By, (t) = Taken By, (c) = Cosigned By    Initials Name Provider Type    CR Reyes, Carmela, PT Physical Therapist        Goals/Plan    No documentation.       Clinical Impression     Row Name 11/22/19 1500          Pain Assessment    Additional Documentation  Pain Scale: Numbers Pre/Post-Treatment (Group)  -CR     Row Name 11/22/19 1500          Pain Scale: Numbers Pre/Post-Treatment    Pain Scale: Numbers, Pretreatment  0/10 - no pain  -CR     Pain Scale: Numbers, Post-Treatment  0/10 - no pain  -CR     Row Name 11/22/19 1500          Plan of Care Review    Plan of Care Reviewed With  patient  -CR     Row Name 11/22/19 1500          Physical Therapy Clinical Impression    Patient/Family Goals Statement (PT Clinical Impression)  75 y/o female admitted on 11/18 due to worsening SOA, fever, weakness and found to have pna and anemia. Pt with COPD on home O2. At time of eval, pt is independent with all transfers and able to ambulate to/from bathroom without a.d. ; no inc work of breathing nor SOA noted . Pt has caregiver that assists with laundry, housecleaning and lives with sister. No further skilled rehab needs indicated; pt near her baseline. Educated on importance of mobility and pt verbalized understanding.   -CR     Criteria for Skilled Interventions Met (PT Clinical Impression)  no;treatment indicated  -CR       User Key  (r) = Recorded By, (t) = Taken By, (c) = Cosigned By    Initials Name Provider Type    CR Reyes, Carmela, PT  Physical Therapist        Outcome Measures     Row Name 11/22/19 1503          How much help from another person do you currently need...    Turning from your back to your side while in flat bed without using bedrails?  4  -CR     Moving from lying on back to sitting on the side of a flat bed without bedrails?  4  -CR     Moving to and from a bed to a chair (including a wheelchair)?  4  -CR     Standing up from a chair using your arms (e.g., wheelchair, bedside chair)?  4  -CR     Climbing 3-5 steps with a railing?  3  -CR     To walk in hospital room?  3  -CR     AM-PAC 6 Clicks Score (PT)  22  -CR     Row Name 11/22/19 1503          Functional Assessment    Outcome Measure Options  AM-PAC 6 Clicks Basic Mobility (PT)  -CR       User Key  (r) = Recorded By, (t) = Taken By, (c) = Cosigned By    Initials Name Provider Type    CR Reyes, Carmela, PT Physical Therapist        Physical Therapy Education     Title: PT OT SLP Therapies (Resolved)     Topic: Physical Therapy (Resolved)     Point: Mobility training (Resolved)     Learning Progress Summary           Patient Acceptance, E, VU by CR at 11/22/2019  3:04 PM                   Point: Home exercise program (Resolved)     Learning Progress Summary           Patient Acceptance, E, VU by CR at 11/22/2019  3:04 PM                   Point: Precautions (Resolved)     Learning Progress Summary           Patient Acceptance, E, VU by CR at 11/22/2019  3:04 PM                               User Key     Initials Effective Dates Name Provider Type Discipline    CR 03/01/19 -  Reyes, Carmela, PT Physical Therapist PT              PT Recommendation and Plan     Outcome Summary/Treatment Plan (PT)  Anticipated Discharge Disposition (PT): home  Plan of Care Reviewed With: patient     Time Calculation:   PT Charges     Row Name 11/22/19 1504             Time Calculation    Start Time  1345  -CR      Stop Time  1355  -CR      Time Calculation (min)  10 min  -CR      PT Received On   11/22/19  -CR         Time Calculation- PT    Total Timed Code Minutes- PT  0 minute(s)  -CR        User Key  (r) = Recorded By, (t) = Taken By, (c) = Cosigned By    Initials Name Provider Type    CR Reyes, Carmela, PT Physical Therapist        Therapy Charges for Today     Code Description Service Date Service Provider Modifiers Qty    39783409252 HC PT EVAL MOD COMPLEXITY 3 11/22/2019 Reyes, Carmela, NIKKIE GP 1          PT G-Codes  Outcome Measure Options: AM-PAC 6 Clicks Basic Mobility (PT)  AM-PAC 6 Clicks Score (PT): 22    Carmela Reyes, PT  11/22/2019

## 2019-11-22 NOTE — ASSESSMENT & PLAN NOTE
Currently the patient has no issues with chest pain.  We will continue her on her home medications.

## 2019-11-22 NOTE — PROGRESS NOTES
Cardiology Progress Note      Admiting Physician:  Ashtyn Salter MD   LOS: 4 days       Reason For Followup:  Respiratory failure  Coronary artery disease  Elevated proBNP      Subjective:    Interval History:  Seen and examined.  Chart and labs reviewed.  Patient reports breathing is improved.  Still with cough and clear phlegm production.  Nausea had subsided.  Denies chest pain, pressure, heaviness or tightness.  No other new issues identified.  Losartan added for better blood pressure control 11/20/2019 with good results.    Review of Systems:  A complete review of systems was undertaken with the pertinent cardiovascular findings listed in history of present illness and all other systems being negative.     Assessment & Plan    Impressions:  Acute respiratory failure  Pneumonia  Elevated pro BNP of uncertain clinical significance  COPD oxygen dependent  History of coronary artery disease status post remote four-vessel CABG     Most recent catheterization demonstrating attrition of 3 of 4 grafts     Negative FFR of remaining vein graft in October 2018  Elevated proBNP  Hypertension  Hypertensive cardiovascular disease  Hyperlipidemia  Chronic stable angina    Recommendations:  Antimicrobials as per admitting service  Pulmonary toilet  2D echocardiogram reviewed and demonstrates normal LV systolic function.  Monitor volume status  Losartan added with improvement in blood pressure  No further cardiac work-up planned at the present time.        Objective:    Medication Review:   Scheduled Meds:    aspirin 81 mg Oral Daily   benzonatate 200 mg Oral TID   cefTRIAXone 1 g Intravenous Q24H   clopidogrel 75 mg Oral Daily   docusate sodium 100 mg Oral Daily   doxycycline 100 mg Intravenous Q12H   enoxaparin 40 mg Subcutaneous Q24H   estradiol 1 mg Oral TID   ferrous sulfate 324 mg Oral Daily With Breakfast   fluticasone 2 spray Nasal Daily   furosemide 40 mg Intravenous Daily   gabapentin 400 mg Oral TID    guaiFENesin 1,200 mg Oral Q12H   ipratropium-albuterol 3 mL Nebulization 4x Daily - RT   isosorbide mononitrate 30 mg Oral Q24H   losartan 25 mg Oral Q24H   pantoprazole 40 mg Intravenous Q12H   ranolazine 1,000 mg Oral Q12H   rosuvastatin 10 mg Oral Daily   sertraline 100 mg Oral Daily   sodium chloride 10 mL Intravenous Q12H   Thyroid 60 mg Oral Daily     Continuous Infusions:   PRN Meds:.•  acetaminophen **OR** acetaminophen **OR** acetaminophen  •  benzocaine-menthol  •  benzonatate  •  hydrALAZINE  •  HYDROcodone-acetaminophen  •  HYDROcodone-acetaminophen  •  HYDROcodone-homatropine  •  LORazepam  •  magnesium sulfate **OR** magnesium sulfate **OR** magnesium sulfate  •  melatonin  •  methocarbamol  •  nitroglycerin  •  ondansetron **OR** ondansetron  •  potassium chloride  •  potassium chloride  •  sodium chloride  •  sodium chloride  •  traMADol    Patient Active Problem List   Diagnosis   • Abdominal aortic aneurysm (AAA) (CMS/HCC)   • Chronic obstructive pulmonary disease (CMS/HCC)   • Congestive heart failure (CMS/HCC)   • Coronary artery disease   • Dyslipidemia   • Hypertension   • Community acquired pneumonia         Physical Exam:    General: Well-developed, well-nourished 74-year-old  female who is alert, cooperative, appears stated age  Head:  Normocephalic, atraumatic, mucous membranes moist  Eyes:  Conjunctiva/corneas clear, EOM's intact     Neck:  Supple,  no adenopathy;      Lungs: Diminished but clear bilaterally, no wheezes  Chest wall: No tenderness  Heart::  Regular rate and rhythm, S1 and S2 normal, no murmur, rub or gallop  Abdomen: Soft, non-tender, nondistended bowel sounds active  Extremities: No cyanosis, clubbing, or edema  Pulses: 2+ and symmetric all extremities  Skin:  No rashes or lesions  Neuro/psych: A&O x3. CN II through XII are grossly intact with appropriate affect    Vital Signs:  Vitals:    11/21/19 2011 11/21/19 2014 11/21/19 2016 11/22/19 0306   BP:   170/74  171/74   BP Location:   Left arm Right arm   Patient Position:   Sitting Lying   Pulse: 73 88 78 84   Resp: 18 18 17 18   Temp:   98.6 °F (37 °C) 98.3 °F (36.8 °C)   TempSrc:   Axillary Oral   SpO2: 97% 97% 97% 100%   Weight:    66.1 kg (145 lb 11.6 oz)   Height:         Wt Readings from Last 1 Encounters:   11/22/19 66.1 kg (145 lb 11.6 oz)     No intake or output data in the 24 hours ending 11/22/19 0819      Results Review:     CBC    Results from last 7 days   Lab Units 11/22/19  0411 11/21/19  0402 11/20/19  0449 11/19/19  0429 11/18/19  2236 11/18/19  1505 11/18/19  1203 11/18/19  0649   WBC 10*3/mm3 8.70 8.40 8.20 8.20  --   --  8.80 9.80   HEMOGLOBIN g/dL 8.6* 8.8* 9.4* 8.3* 8.5* 7.7* 7.9* 9.1*   PLATELETS 10*3/mm3 234 219 204 168  --   --  145 177     Cr Clearance Estimated Creatinine Clearance: 57.8 mL/min (by C-G formula based on SCr of 0.74 mg/dL).  Coag     HbA1C No results found for: HGBA1C  Blood Glucose No results found for: POCGLU  Infection   Results from last 7 days   Lab Units 11/18/19  0714 11/18/19  0649   BLOODCX  No growth at 4 days No growth at 4 days     CMP   Results from last 7 days   Lab Units 11/22/19  0411 11/21/19  0403 11/20/19 2006 11/20/19  0449 11/19/19  0429 11/18/19  1627 11/18/19  1203 11/18/19  0649   SODIUM mmol/L 139 139  --  142 139 140 137 137   POTASSIUM mmol/L 3.9 3.9 3.9 3.4* 3.5 3.7 4.1 4.2   CHLORIDE mmol/L 102 99  --  99 97* 98 99 99   CO2 mmol/L 28.0 27.0  --  34.0* 31.0* 30.0* 28.0 29.0   BUN mg/dL 13 13  --  15 20 18 16 13   CREATININE mg/dL 0.74 0.89  --  0.84 1.24* 1.36* 1.11* 1.01*   GLUCOSE mg/dL 121* 145*  --  125* 104* 149* 126* 127*   ALBUMIN g/dL  --   --   --   --   --  3.20*  --  3.60   BILIRUBIN mg/dL  --   --   --   --   --  0.3  --  0.6   ALK PHOS U/L  --   --   --   --   --  67  --  72   AST (SGOT) U/L  --   --   --   --   --  11  --  11   ALT (SGPT) U/L  --   --   --   --   --  6  --  6     ABG      UA    Results from last 7 days   Lab Units  11/18/19  1623   NITRITE UA  Negative     ALFONZO  No results found for: POCMETH, POCAMPHET, POCBARBITUR, POCBENZO, POCCOCAINE, POCOPIATES, POCOXYCODO, POCPHENCYC, POCPROPOXY, POCTHC, POCTRICYC  Lysis Labs   Results from last 7 days   Lab Units 11/22/19  0411 11/21/19  0403 11/21/19  0402 11/20/19  0449 11/19/19  0429 11/18/19  2236 11/18/19  1627 11/18/19  1505 11/18/19  1203 11/18/19  0649   HEMOGLOBIN g/dL 8.6*  --  8.8* 9.4* 8.3* 8.5*  --  7.7* 7.9* 9.1*   PLATELETS 10*3/mm3 234  --  219 204 168  --   --   --  145 177   CREATININE mg/dL 0.74 0.89  --  0.84 1.24*  --  1.36*  --  1.11* 1.01*     Radiology(recent) Xr Chest 1 View    Result Date: 11/22/2019   1. No significant interval change. 2. Status post CABG. 3. Basilar infiltrates, possibly representing fibrosis.  Electronically Signed By-Av Rosas On:11/22/2019 7:34 AM This report was finalized on [id number] by  Av Rosas, .        Results from last 7 days   Lab Units 11/18/19  2236   TROPONIN T ng/mL <0.010       Imaging Results (Last 24 Hours)     Procedure Component Value Units Date/Time    US Aorta Limited [[id number]] Updated:  11/22/19 0805    XR Chest 1 View [[id number]] Collected:  11/22/19 0733     Updated:  11/22/19 0736    Narrative:       DATE OF EXAM:  11/22/2019 6:36 AM     PROCEDURE:  XR CHEST 1 VW-     INDICATIONS:  Follow-up pneumonia; J18.9-Pneumonia, unspecified organism; I50.9-Heart  failure, unspecified; R50.9-Fever, unspecified; R05-Cough     COMPARISON:  11/20/2019     TECHNIQUE:   Single radiographic AP view of the chest was obtained.     FINDINGS:  Cardiac size is stable. There are postoperative changes of prior CABG.  Predominantly interstitial infiltrates persist in the bases, unchanged.        Impression:          1. No significant interval change.  2. Status post CABG.  3. Basilar infiltrates, possibly representing fibrosis.     Electronically Signed By-Av Rosas On:11/22/2019 7:34 AM  This report was finalized on [id number]  by  Av Rosas, .          Cardiac Studies:  Echo-   Results for orders placed during the hospital encounter of 11/18/19   Adult Transthoracic Echo Complete W/ Cont if Necessary Per Protocol    Narrative · Left ventricular systolic function is normal.  · Estimated EF was in agreement with the calculated EF. Estimated EF   appears to be in the range of 66 - 70%.  · Mild-to-moderate mitral valve regurgitation is present  · Left ventricular wall thickness is consistent with borderline concentric   hypertrophy.  · Mild-to-moderate pulmonic valve regurgitation is present.  · Left atrial cavity size is mild-to-moderately dilated.        Stress Myoview-  Cath-        Alix Bernardo, APRN  11/22/19  8:19 AM

## 2019-11-22 NOTE — ASSESSMENT & PLAN NOTE
The patient's repeat chest x-ray essentially was unchanged.  This may be difficult to see much improvement given that the patient has underlying pulmonary disease.  We will continue to monitor.

## 2019-11-22 NOTE — ASSESSMENT & PLAN NOTE
The patient's echocardiogram showed good left ventricular function without evidence of congestive heart failure.  We will wean her off of the diuretics.  She seems to be much improved.

## 2019-11-22 NOTE — PLAN OF CARE
Problem: Patient Care Overview  Goal: Plan of Care Review  Outcome: Ongoing (interventions implemented as appropriate)   11/22/19 0442   OTHER   Outcome Summary pt has had an ongoing frequent nonproductive cough. Pt has requested prn meds for pain and anxiety as well. B/p was high, and medication ordered and given. Will continue to monitor    Coping/Psychosocial   Plan of Care Reviewed With patient   Plan of Care Review   Progress no change       Problem: Pneumonia (Adult)  Goal: Signs and Symptoms of Listed Potential Problems Will be Absent, Minimized or Managed (Pneumonia)  Outcome: Ongoing (interventions implemented as appropriate)   11/22/19 0442   Goal/Outcome Evaluation   Problems Assessed (Pneumonia) all   Problems Present (Pneumonia) none

## 2019-11-22 NOTE — PROGRESS NOTES
Hospitalist Team      Patient Care Team:  Deonte Hector MD as PCP - General        Chief Complaint: Patient is complaining to nursing of a cough which has been recalcitrant to Tessalon Perles.    Subjective  The patient is having problems with diarrhea today.  I saw her this morning and by noon she is Irving had 3 bowel movements.  She describes them as loose.  She is not complaining of any orthostasis nor she complaining of any nausea or vomiting.    The patient's breathing is somewhat improved.  She remains with a cough, while this is irritating this may not completely resolved.  The patient is already on Tessalon Perles 200 mg 3 times daily as well as Hycodan.  Her chest x-ray is essentially unchanged.  Lots of this may be related to her underlying COPD.  Interval History and ROS:     Review of Systems   Constitutional: Negative for activity change, appetite change, fatigue and fever.   HENT: Negative for congestion, nosebleeds, postnasal drip and voice change.    Eyes: Negative for photophobia and discharge.   Respiratory: Positive for cough and shortness of breath. Negative for choking, chest tightness and wheezing.    Cardiovascular: Negative for chest pain and leg swelling.   Gastrointestinal: Negative for abdominal distention, abdominal pain, constipation, diarrhea and nausea.   Genitourinary: Negative for decreased urine volume, difficulty urinating, dysuria, frequency and urgency.   Musculoskeletal: Negative for arthralgias and myalgias.   Skin: Negative for rash.   Neurological: Negative for dizziness, syncope, facial asymmetry, light-headedness and headaches.   Psychiatric/Behavioral: Negative for agitation, behavioral problems, confusion and decreased concentration. The patient is not nervous/anxious.    All other systems reviewed and are negative.      Objective    Vital Signs  Temp:  [98.3 °F (36.8 °C)-98.6 °F (37 °C)] 98.3 °F (36.8 °C)  Heart Rate:  [73-92] 86  Resp:  [15-18] 15  BP:  "(272-171)/(66-74) 132/66  Oxygen Therapy  SpO2: 98 %  Pulse Oximetry Type: Intermittent  Device (Oxygen Therapy): nasal cannula  Flow (L/min): 3  Flowsheet Rows      First Filed Value   Admission Height  160 cm (63\") Documented at 11/18/2019 0629   Admission Weight  66.2 kg (145 lb 15.1 oz) Documented at 11/18/2019 0629        Intake & Output (last 3 days)       11/19 0701 - 11/20 0700 11/20 0701 - 11/21 0700 11/21 0701 - 11/22 0700 11/22 0701 - 11/23 0700    P.O. 520   240    IV Piggyback    100    Total Intake(mL/kg) 520 (7.9)   340 (5.1)    Urine (mL/kg/hr)  100 (0.1)      Stool  0      Total Output  100      Net +520 -100  +340            Stool Unmeasured Occurrence  0 x          Lines, Drains & Airways    Active LDAs     Name:   Placement date:   Placement time:   Site:   Days:    Peripheral IV (Ped/Christopher) 11/19/19 Anterior;Right Forearm   11/19/19    0852     less than 1                Physical Exam:    General Appearance:    Alert, cooperative, in no acute distress   Head:    Normocephalic, without obvious abnormality, atraumatic   Eyes:            Lids and lashes normal, conjunctivae and sclerae normal, no   icterus, no pallor, corneas clear, PERRLA   Ears:    Ears appear intact with no abnormalities noted   Throat:   No oral lesions, no thrush, oral mucosa moist   Neck:   No adenopathy, supple, trachea midline, no thyromegaly, no     carotid bruit, no JVD   Back:     No kyphosis present, no scoliosis present, no skin lesions,       erythema or scars, no tenderness to percussion or                   palpation,   range of motion normal   Lungs:    She has a few rales in the bases to auscultation,respirations regular, even and unlabored.  Equal breath sounds bilaterally.    Heart:    Regular rhythm and normal rate, normal S1 and S2, no            murmur, no gallop, no rub, no click   Breast Exam:    Deferred   Abdomen:     Normal bowel sounds, no masses, no organomegaly, soft        non-tender, non-distended, no " guarding, no rebound                 tenderness   Genitalia:    Deferred   Extremities:   Moves all extremities well, no edema, no cyanosis, no              redness   Pulses:   Pulses palpable and equal bilaterally   Skin:   No bleeding, bruising or rash   Lymph nodes:   No palpable adenopathy   Neurologic:   Cranial nerves 2 - 12 grossly intact, sensation intact, DTR        present and equal bilaterally       Results Review:     I reviewed the patient's new clinical results.    Lab Results (last 24 hours)     Procedure Component Value Units Date/Time    Sedimentation Rate [[id number]] Collected:  11/22/19 0411    Specimen:  Blood Updated:  11/22/19 1536    Blood Culture - Blood, Wrist, Right [[id number]] Collected:  11/18/19 0714    Specimen:  Blood from Wrist, Right Updated:  11/22/19 0801     Blood Culture No growth at 4 days    Blood Culture - Blood, Arm, Left [[id number]] Collected:  11/18/19 0649    Specimen:  Blood from Arm, Left Updated:  11/22/19 0715     Blood Culture No growth at 4 days    Basic Metabolic Panel [[id number]]  (Abnormal) Collected:  11/22/19 0411    Specimen:  Blood Updated:  11/22/19 0522     Glucose 121 mg/dL      BUN 13 mg/dL      Creatinine 0.74 mg/dL      Sodium 139 mmol/L      Potassium 3.9 mmol/L      Chloride 102 mmol/L      CO2 28.0 mmol/L      Calcium 9.1 mg/dL      eGFR Non African Amer 77 mL/min/1.73      BUN/Creatinine Ratio 17.6     Anion Gap 9.0 mmol/L     Narrative:       GFR Normal >60  Chronic Kidney Disease <60  Kidney Failure <15    Magnesium [[id number]]  (Normal) Collected:  11/22/19 0411    Specimen:  Blood Updated:  11/22/19 0522     Magnesium 1.9 mg/dL     BNP [[id number]]  (Abnormal) Collected:  11/22/19 0411    Specimen:  Blood Updated:  11/22/19 0513     proBNP 996.1 pg/mL     Narrative:       Among patients with dyspnea, NT-proBNP is highly sensitive for the detection of acute congestive heart failure. In addition NT-proBNP of <300 pg/ml effectively rules out acute  congestive heart failure with 99% negative predictive value.    CBC & Differential [[id number]] Collected:  11/22/19 0411    Specimen:  Blood Updated:  11/22/19 0453    Narrative:       The following orders were created for panel order CBC & Differential.  Procedure                               Abnormality         Status                     ---------                               -----------         ------                     CBC Auto Differential[[id number]]        Abnormal            Final result                 Please view results for these tests on the individual orders.    CBC Auto Differential [[id number]]  (Abnormal) Collected:  11/22/19 0411    Specimen:  Blood Updated:  11/22/19 0453     WBC 8.70 10*3/mm3      RBC 2.62 10*6/mm3      Hemoglobin 8.6 g/dL      Hematocrit 25.9 %      MCV 99.0 fL      MCH 32.8 pg      MCHC 33.2 g/dL      RDW 13.5 %      RDW-SD 47.7 fl      MPV 7.6 fL      Platelets 234 10*3/mm3      Neutrophil % 63.5 %      Lymphocyte % 25.4 %      Monocyte % 8.2 %      Eosinophil % 2.7 %      Basophil % 0.2 %      Neutrophils, Absolute 5.50 10*3/mm3      Lymphocytes, Absolute 2.20 10*3/mm3      Monocytes, Absolute 0.70 10*3/mm3      Eosinophils, Absolute 0.20 10*3/mm3      Basophils, Absolute 0.00 10*3/mm3      nRBC 0.0 /100 WBC     Occult Blood, Fecal By Immunoassay - Stool, Per Rectum [[id number]]  (Normal) Collected:  11/21/19 1454    Specimen:  Stool from Per Rectum Updated:  11/21/19 1600     Occult Blood, Fecal by Immunoassay Negative          Imaging Results (Last 24 Hours)     Procedure Component Value Units Date/Time    US Aorta Limited [[id number]] Collected:  11/22/19 0826     Updated:  11/22/19 0833    Narrative:          DATE OF EXAM:   11/22/2019 7:52 AM     PROCEDURE:   US AORTA LIMITED-     INDICATIONS:   History of abdominal aortic aneurysm with reported recurrence, which is  not been followed up on in several years; J18.9-Pneumonia, unspecified  organism; I50.9-Heart failure,  unspecified; R50.9-Fever, unspecified;  R05-Cough.     COMPARISON:  CT examination of the abdomen and pelvis, 1/5/2018.     TECHNIQUE:   2-dimensional grayscale images as well as color and spectral Doppler  analysis are provided for review. The study was tailored in order to  evaluate the abdominal aorta and common iliac arteries.     FINDINGS:   There is a fusiform aneurysm of the infrarenal abdominal aorta, which  measures approximately 3.5 cm in greatest diameter by ultrasound  examination. It is probably similar in size to the prior CT study.  Atherosclerotic change is present in the imaged aortoiliac arterial  system. The aortic bifurcation is patent.  The maximum diameter of the  right common iliac artery is 0.9 cm.  The maximum diameter of the left  common iliac artery is 1.1 cm. Abdominal/pelvic CT examination follow-up  may be helpful in further imaging assessment if clinically warranted and  if not contraindicated.        Impression:       IMPRESSION :   Probably little interval change is seen in the caliber of the fusiform  infrarenal abdominal aortic aneurysm, measuring 3.5 cm in maximum  diameter, since the prior abdominal/pelvic CT study from 1/5/2018.     Electronically Signed By-Dr. Quoc Parker MD On:11/22/2019 8:31 AM  This report was finalized on [id number] by Dr. Quoc Parker MD.    XR Chest 1 View [[id number]] Collected:  11/22/19 0733     Updated:  11/22/19 0736    Narrative:       DATE OF EXAM:  11/22/2019 6:36 AM     PROCEDURE:  XR CHEST 1 VW-     INDICATIONS:  Follow-up pneumonia; J18.9-Pneumonia, unspecified organism; I50.9-Heart  failure, unspecified; R50.9-Fever, unspecified; R05-Cough     COMPARISON:  11/20/2019     TECHNIQUE:   Single radiographic AP view of the chest was obtained.     FINDINGS:  Cardiac size is stable. There are postoperative changes of prior CABG.  Predominantly interstitial infiltrates persist in the bases, unchanged.        Impression:          1. No  significant interval change.  2. Status post CABG.  3. Basilar infiltrates, possibly representing fibrosis.     Electronically Signed By-Av Rosas On:11/22/2019 7:34 AM  This report was finalized on [id number] by  Av Rosas, .          Xrays, labs reviewed personally by physician.    ECG/EMG Results (most recent)     Procedure Component Value Units Date/Time    ECG 12 Lead [[id number]] Collected:  11/18/19 0642     Updated:  11/19/19 0658    Narrative:       HEART RATE= 99  bpm  RR Interval= 608  ms  WA Interval= 205  ms  P Horizontal Axis= 22  deg  P Front Axis= 35  deg  QRSD Interval= 85  ms  QT Interval= 348  ms  QRS Axis= -19  deg  T Wave Axis=   deg  - ABNORMAL ECG -  Sinus rhythm  Atrial premature complex  Anteroseptal infarct, old  When compared with ECG of 28-Oct-2018 21:02:36,  Significant rate increase  Significant axis, voltage or hypertrophy change  Electronically Signed By: Av Kurtz (Ac) 19-Nov-2019 06:58:11  Date and Time of Study: 2019-11-18 06:42:25    Adult Transthoracic Echo Complete W/ Cont if Necessary Per Protocol [[id number]] Collected:  11/18/19 1738     Updated:  11/19/19 1624     BSA 1.7 m^2      BH CV ECHO DANNY - RVDD 2.3 cm      IVSd 1.2 cm      LVIDd 4.4 cm      LVIDs 2.8 cm      LVPWd 1.2 cm      IVS/LVPW 0.94     FS 36.4 %      EDV(Teich) 88.3 ml      ESV(Teich) 29.8 ml      EF(Teich) 66.3 %      EDV(cubed) 86.0 ml      ESV(cubed) 22.1 ml      EF(cubed) 74.2 %      LV mass(C)d 193.4 grams      LV mass(C)dI 113.0 grams/m^2      SV(Teich) 58.6 ml      SI(Teich) 34.2 ml/m^2      SV(cubed) 63.8 ml      SI(cubed) 37.3 ml/m^2      Ao root diam 3.2 cm      Ao root area 8.0 cm^2      ACS 1.6 cm      asc Aorta Diam 2.8 cm      LVOT diam 2.0 cm      LVOT area 3.1 cm^2      RVOT diam 3.1 cm      RVOT area 7.5 cm^2      EDV(MOD-sp4) 76.2 ml      ESV(MOD-sp4) 23.0 ml      EF(MOD-sp4) 69.8 %      SV(MOD-sp4) 53.2 ml      SI(MOD-sp4) 31.1 ml/m^2      Ao root area (BSA corrected) 1.9     LV  Warren Vol (BSA corrected) 44.5 ml/m^2      LV Sys Vol (BSA corrected) 13.4 ml/m^2      Aortic R-R 0.8 sec      Aortic HR 74.9 BPM      MV E max elmer 136.6 cm/sec      MV A max elmer 103.4 cm/sec      MV E/A 1.3     MV V2 max 136.0 cm/sec      MV max PG 7.4 mmHg      MV V2 mean 86.4 cm/sec      MV mean PG 3.3 mmHg      MV V2 VTI 32.6 cm      MVA(VTI) 2.5 cm^2      MV dec slope 914.1 cm/sec^2      MV dec time 0.15 sec      Ao pk elmer 193.3 cm/sec      Ao max PG 15.1 mmHg      Ao max PG (full) 9.6 mmHg      Ao V2 mean 126.2 cm/sec      Ao mean PG 7.6 mmHg      Ao mean PG (full) 4.7 mmHg      Ao V2 VTI 37.9 cm      OREN(I,A) 2.1 cm^2      OREN(I,D) 2.1 cm^2      OREN(V,A) 1.9 cm^2      OREN(V,D) 1.9 cm^2      LV V1 max PG 5.5 mmHg      LV V1 mean PG 2.9 mmHg      LV V1 max 117.1 cm/sec      LV V1 mean 76.4 cm/sec      LV V1 VTI 26.4 cm      MR max elmer 490.5 cm/sec      MR max PG 96.2 mmHg      CO(Ao) 22.6 l/min      CI(Ao) 13.2 l/min/m^2      SV(Ao) 301.3 ml      SI(Ao) 176.1 ml/m^2      CO(LVOT) 6.1 l/min      CI(LVOT) 3.6 l/min/m^2      SV(LVOT) 81.3 ml      SV(RVOT) 101.6 ml      SI(LVOT) 47.5 ml/m^2      PA V2 max 85.8 cm/sec      PA max PG 2.9 mmHg      PA max PG (full) 1.3 mmHg      PA V2 mean 57.3 cm/sec      PA mean PG 1.5 mmHg      PA mean PG (full) 0.73 mmHg      PA V2 VTI 15.5 cm      PVA(I,A) 6.6 cm^2      BH CV ECHO DANNY - PVA(I,D) 6.6 cm^2      BH CV ECHO DANNY - PVA(V,A) 5.6 cm^2      BH CV ECHO DANNY - PVA(V,D) 5.6 cm^2      PA acc time 0.13 sec      PI end-d elmer 125.6 cm/sec      PI max elmer 225.0 cm/sec      PI max PG 20.2 mmHg      RV V1 max PG 1.6 mmHg      RV V1 mean PG 0.79 mmHg      RV V1 max 64.1 cm/sec      RV V1 mean 41.0 cm/sec      RV V1 VTI 13.6 cm      TR max elmer 231.4 cm/sec      RVSP(TR) 24.4 mmHg      RAP systole 3.0 mmHg      PA pr(Accel) 20.8 mmHg      Pulm Sys Elmer 72.8 cm/sec      Pulm Warren Elmer 102.6 cm/sec      Pulm S/D 0.71     Qp/Qs 1.3     Pulm A Revs Dur 0.13 sec      Pulm A Revs Elmer 31.8  cm/sec       CV ECHO DANNY - BZI_BMI 25.1 kilograms/m^2       CV ECHO DANNY - BSA(HAYCOCK) 1.7 m^2       CV ECHO DANNY - BZI_METRIC_WEIGHT 66.2 kg       CV ECHO DANNY - BZI_METRIC_HEIGHT 162.6 cm      EF(MOD-bp) 70.0 %      LA dimension(2D) 4.3 cm     Narrative:       · Left ventricular systolic function is normal.  · Estimated EF was in agreement with the calculated EF. Estimated EF   appears to be in the range of 66 - 70%.  · Mild-to-moderate mitral valve regurgitation is present  · Left ventricular wall thickness is consistent with borderline concentric   hypertrophy.  · Mild-to-moderate pulmonic valve regurgitation is present.  · Left atrial cavity size is mild-to-moderately dilated.             Medication Review:   I have reviewed the patient's current medication list    Current Facility-Administered Medications:   •  acetaminophen (TYLENOL) tablet 650 mg, 650 mg, Oral, Q4H PRN, 650 mg at 11/22/19 0340 **OR** acetaminophen (TYLENOL) 160 MG/5ML solution 650 mg, 650 mg, Oral, Q4H PRN **OR** acetaminophen (TYLENOL) suppository 650 mg, 650 mg, Rectal, Q4H PRN, Ashtyn Salter MD  •  aspirin EC tablet 81 mg, 81 mg, Oral, Daily, Ashtyn Salter MD, 81 mg at 11/22/19 0844  •  benzocaine-menthol (CEPACOL) lozenge 1 lozenge, 1 lozenge, Mouth/Throat, Q2H PRN, Elisha Sherwood APRN  •  benzonatate (TESSALON) capsule 200 mg, 200 mg, Oral, TID, Ashtyn Salter MD, 200 mg at 11/22/19 0844  •  benzonatate (TESSALON) capsule 200 mg, 200 mg, Oral, TID PRN, Ashtyn Salter MD  •  cefTRIAXone (ROCEPHIN) 1 g in sodium chloride 0.9 % 100 mL IVPB, 1 g, Intravenous, Q24H, Ashtyn Salter MD, Last Rate: 200 mL/hr at 11/22/19 0845, 1 g at 11/22/19 0845  •  clopidogrel (PLAVIX) tablet 75 mg, 75 mg, Oral, Daily, Ashtyn Salter MD, 75 mg at 11/22/19 0844  •  docusate sodium (COLACE) capsule 100 mg, 100 mg, Oral, Daily, Ashtyn Salter MD, 100 mg at 11/22/19 0844  •  doxycycline (VIBRAMYCIN) 100 mg in  sodium chloride 0.9 % 100 mL IVPB, 100 mg, Intravenous, Q12H, Ashtyn Salter MD, 100 mg at 11/22/19 1025  •  enoxaparin (LOVENOX) syringe 40 mg, 40 mg, Subcutaneous, Q24H, Ashtyn Salter MD, 40 mg at 11/21/19 1545  •  estradiol (ESTRACE) tablet 1 mg, 1 mg, Oral, TID, Ashtyn Salter MD, 1 mg at 11/22/19 0844  •  ferrous sulfate EC tablet 324 mg, 324 mg, Oral, Daily With Breakfast, Ashtyn Salter MD, 324 mg at 11/22/19 0844  •  fluticasone (FLONASE) 50 MCG/ACT nasal spray 2 spray, 2 spray, Nasal, Daily, Ashtyn Salter MD, 2 spray at 11/21/19 0923  •  furosemide (LASIX) injection 40 mg, 40 mg, Intravenous, Daily, Ashtyn Salter MD, 40 mg at 11/22/19 0844  •  gabapentin (NEURONTIN) capsule 400 mg, 400 mg, Oral, TID, Ashtyn Salter MD, 400 mg at 11/22/19 0844  •  guaiFENesin (MUCINEX) 12 hr tablet 1,200 mg, 1,200 mg, Oral, Q12H, Elisha Sherwood APRN, 1,200 mg at 11/22/19 0844  •  hydrALAZINE (APRESOLINE) injection 10 mg, 10 mg, Intravenous, Q6H PRN, Yumiko Lucia PA-C, 10 mg at 11/22/19 0617  •  HYDROcodone-acetaminophen (NORCO)  MG per tablet 1 tablet, 1 tablet, Oral, Q6H PRN, Ashtyn Salter MD, 1 tablet at 11/21/19 0921  •  HYDROcodone-acetaminophen (NORCO)  MG per tablet 1 tablet, 1 tablet, Oral, Q4H PRN, Ashtyn Salter MD, 1 tablet at 11/20/19 0836  •  HYDROcodone-homatropine (HYCODAN) 5-1.5 MG/5ML syrup 5 mL, 5 mL, Oral, Q6H PRN, Ashtyn Salter MD, 5 mL at 11/21/19 2000  •  hydrocortisone-diphenhydramine-nystatin (MAGIC MOUTHWASH) suspension 10 mL, 10 mL, Swish & Spit, 4x Daily, Jay Srinivasan FNP  •  ipratropium-albuterol (DUO-NEB) nebulizer solution 3 mL, 3 mL, Nebulization, 4x Daily - RT, Ashtyn Salter MD, 3 mL at 11/22/19 1100  •  iron sucrose (VENOFER) 400 mg in sodium chloride 0.9 % 250 mL IVPB, 400 mg, Intravenous, Once, Jay Srinivasan FNP  •  isosorbide mononitrate (IMDUR) 24 hr tablet 30 mg, 30 mg, Oral, Q24H, Ashtyn Salter  MD Luiza, 30 mg at 11/22/19 0844  •  LORazepam (ATIVAN) tablet 1 mg, 1 mg, Oral, 4x Daily PRN, Ashtyn Salter MD, 1 mg at 11/22/19 1029  •  losartan (COZAAR) tablet 25 mg, 25 mg, Oral, Q24H, Roc Butler, , 25 mg at 11/22/19 0843  •  Magnesium Sulfate 2 gram Bolus, followed by 8 gram infusion (total Mg dose 10 grams)- Mg less than or equal to 1mg/dL, 2 g, Intravenous, PRN **OR** Magnesium Sulfate 2 gram / 50mL Infusion (GIVE X 3 BAGS TO EQUAL 6GM TOTAL DOSE) - Mg 1.1 - 1.5 mg/dl, 2 g, Intravenous, PRN, Last Rate: 25 mL/hr at 11/20/19 1544, 2 g at 11/20/19 1544 **OR** Magnesium Sulfate 4 gram infusion- Mg 1.6-1.9 mg/dL, 4 g, Intravenous, PRN, Ashtyn Salter MD  •  melatonin tablet 5 mg, 5 mg, Oral, Nightly PRN, Ashtyn Salter MD  •  methocarbamol (ROBAXIN) tablet 500 mg, 500 mg, Oral, TID PRN, Ashtyn Salter MD, 500 mg at 11/22/19 0350  •  nitroglycerin (NITROSTAT) SL tablet 0.4 mg, 0.4 mg, Sublingual, Q5 Min PRN, Ashtyn Salter MD  •  ondansetron (ZOFRAN) tablet 4 mg, 4 mg, Oral, Q6H PRN, 4 mg at 11/22/19 0340 **OR** ondansetron (ZOFRAN) injection 4 mg, 4 mg, Intravenous, Q6H PRN, Ashtyn Salter MD, 4 mg at 11/21/19 1817  •  pantoprazole (PROTONIX) injection 40 mg, 40 mg, Intravenous, Q12H, Ashtyn Salter MD, 40 mg at 11/22/19 0844  •  potassium chloride (K-DUR,KLOR-CON) CR tablet 40 mEq, 40 mEq, Oral, PRN, Ashtyn Salter MD, 40 mEq at 11/20/19 1612  •  potassium chloride (KLOR-CON) packet 40 mEq, 40 mEq, Oral, PRN, Ashtyn Salter MD  •  ranolazine (RANEXA) 12 hr tablet 1,000 mg, 1,000 mg, Oral, Q12H, Ashtyn Salter MD, 1,000 mg at 11/22/19 0844  •  rosuvastatin (CRESTOR) tablet 10 mg, 10 mg, Oral, Daily, Ashtyn Salter MD, 10 mg at 11/22/19 0843  •  sertraline (ZOLOFT) tablet 100 mg, 100 mg, Oral, Daily, Ashtyn Salter MD, 100 mg at 11/22/19 0844  •  sodium chloride 0.9 % flush 10 mL, 10 mL, Intravenous, PRN, Ashtyn Salter MD  •   sodium chloride 0.9 % flush 10 mL, 10 mL, Intravenous, Q12H, Ashtyn Salter MD, 10 mL at 11/22/19 0845  •  sodium chloride 0.9 % flush 10 mL, 10 mL, Intravenous, PRN, Ashtyn Salter MD  •  Thyroid (ARMOUR) tablet 60 mg, 60 mg, Oral, Daily, Ashtyn Salter MD, 60 mg at 11/22/19 0843  •  traMADol (ULTRAM) tablet 50 mg, 50 mg, Oral, Q6H PRN, Ashtyn Salter MD      Assessment/Plan   1.  Dyspnea-this is likely related to cardiac disease.  The patient is much improved with diuretic therapy.  Her cough is somewhat improved as well.  Continue the Lasix and the empiric Rocephin.  Today's chest x-ray shows essentially no change.  We will continue the Rocephin for now and follow closely.  2.  Coronary artery disease-appreciate cardiology's input.  Will await the report of the recent echocardiogram.  Appreciate their note which included information that previously had not been available.  3.  Renal insufficiency-the patient's renal function appears to be improving on the Lasix.  Her BNP was down to thousand 67.  We will continue the Lasix twice daily for now but consider decreasing it to daily tomorrow.  4.  Anemia-the patient appears to have the anemia of chronic disease.  Clearly she has multiple medical issues which could account for that.  We will continue supportive care.  We will also guaiac her stools in order to ensure that she does not have a GI issue that needs to be addressed.  Patient has had 3 guaiac negative stools.  Will ask hematology to see if any additional work-up is warranted at this time.  5.  Cough-the patient is improved on the Tessalon Perles but remains with cough.  We will increase them to 200 mg 3 times daily routinely.  We will also give her Hycodan for breakthrough cough.  Spite all of this the patient still continues to have significant cough.  Repeat chest x-ray in the a.m.  6.  Hypokalemia-we will replace per electrolyte protocol  7.  Hypomagnesemia-replace per protocol and  recheck.  8.  History of abdominal aortic aneurysm-the patient now reports that she had a recurrence of the abdominal aortic aneurysm 5 to 7 years ago.  She was supposed to have this followed up, however she had neglected to do this.  We will order a nonemergent abdominal ultrasound to verify this and to measure it and see if intervention would be necessary.  9.  Diarrhea-we will check the patient for C. difficile.  Will withhold Lomotil pending that result.  If the patient is negative Lomotil could be implemented.  Patient is not on any medications outside of the antibiotics that could be causing this i.e. no laxatives.  Plan for disposition: Home possibly with home health    Ashtyn Salter MD  11/22/19  3:56 PM

## 2019-11-23 LAB
ANION GAP SERPL CALCULATED.3IONS-SCNC: 8 MMOL/L (ref 5–15)
BACTERIA SPEC AEROBE CULT: NORMAL
BACTERIA SPEC AEROBE CULT: NORMAL
BASOPHILS # BLD AUTO: 0 10*3/MM3 (ref 0–0.2)
BASOPHILS NFR BLD AUTO: 0.2 % (ref 0–1.5)
BUN BLD-MCNC: 14 MG/DL (ref 8–23)
BUN/CREAT SERPL: 17.3 (ref 7–25)
CALCIUM SPEC-SCNC: 9 MG/DL (ref 8.6–10.5)
CHLORIDE SERPL-SCNC: 103 MMOL/L (ref 98–107)
CO2 SERPL-SCNC: 30 MMOL/L (ref 22–29)
CREAT BLD-MCNC: 0.81 MG/DL (ref 0.57–1)
DEPRECATED RDW RBC AUTO: 45.9 FL (ref 37–54)
EOSINOPHIL # BLD AUTO: 0.3 10*3/MM3 (ref 0–0.4)
EOSINOPHIL NFR BLD AUTO: 3.5 % (ref 0.3–6.2)
ERYTHROCYTE [DISTWIDTH] IN BLOOD BY AUTOMATED COUNT: 13.3 % (ref 12.3–15.4)
GFR SERPL CREATININE-BSD FRML MDRD: 69 ML/MIN/1.73
GLUCOSE BLD-MCNC: 116 MG/DL (ref 65–99)
HCT VFR BLD AUTO: 24.5 % (ref 34–46.6)
HGB BLD-MCNC: 8.6 G/DL (ref 12–15.9)
LYMPHOCYTES # BLD AUTO: 1.6 10*3/MM3 (ref 0.7–3.1)
LYMPHOCYTES NFR BLD AUTO: 22.1 % (ref 19.6–45.3)
MAGNESIUM SERPL-MCNC: 1.6 MG/DL (ref 1.6–2.4)
MCH RBC QN AUTO: 34.6 PG (ref 26.6–33)
MCHC RBC AUTO-ENTMCNC: 35.2 G/DL (ref 31.5–35.7)
MCV RBC AUTO: 98.2 FL (ref 79–97)
MONOCYTES # BLD AUTO: 0.6 10*3/MM3 (ref 0.1–0.9)
MONOCYTES NFR BLD AUTO: 7.7 % (ref 5–12)
NEUTROPHILS # BLD AUTO: 5 10*3/MM3 (ref 1.7–7)
NEUTROPHILS NFR BLD AUTO: 66.5 % (ref 42.7–76)
NRBC BLD AUTO-RTO: 0.1 /100 WBC (ref 0–0.2)
NT-PROBNP SERPL-MCNC: 1005 PG/ML (ref 5–900)
PLAT MORPH BLD: NORMAL
PLATELET # BLD AUTO: 222 10*3/MM3 (ref 140–450)
PMV BLD AUTO: 7.2 FL (ref 6–12)
POTASSIUM BLD-SCNC: 3.7 MMOL/L (ref 3.5–5.2)
RBC # BLD AUTO: 2.5 10*6/MM3 (ref 3.77–5.28)
RBC MORPH BLD: NORMAL
SODIUM BLD-SCNC: 141 MMOL/L (ref 136–145)
WBC MORPH BLD: NORMAL
WBC NRBC COR # BLD: 7.5 10*3/MM3 (ref 3.4–10.8)

## 2019-11-23 PROCEDURE — 99233 SBSQ HOSP IP/OBS HIGH 50: CPT | Performed by: INTERNAL MEDICINE

## 2019-11-23 PROCEDURE — 25010000002 ENOXAPARIN PER 10 MG: Performed by: INTERNAL MEDICINE

## 2019-11-23 PROCEDURE — 99232 SBSQ HOSP IP/OBS MODERATE 35: CPT | Performed by: INTERNAL MEDICINE

## 2019-11-23 PROCEDURE — 25010000002 CEFTRIAXONE PER 250 MG: Performed by: INTERNAL MEDICINE

## 2019-11-23 PROCEDURE — 97535 SELF CARE MNGMENT TRAINING: CPT

## 2019-11-23 PROCEDURE — 85025 COMPLETE CBC W/AUTO DIFF WBC: CPT | Performed by: INTERNAL MEDICINE

## 2019-11-23 PROCEDURE — 85007 BL SMEAR W/DIFF WBC COUNT: CPT | Performed by: INTERNAL MEDICINE

## 2019-11-23 PROCEDURE — 80048 BASIC METABOLIC PNL TOTAL CA: CPT | Performed by: INTERNAL MEDICINE

## 2019-11-23 PROCEDURE — 25010000002 ONDANSETRON PER 1 MG: Performed by: INTERNAL MEDICINE

## 2019-11-23 PROCEDURE — 97165 OT EVAL LOW COMPLEX 30 MIN: CPT

## 2019-11-23 PROCEDURE — 83880 ASSAY OF NATRIURETIC PEPTIDE: CPT | Performed by: INTERNAL MEDICINE

## 2019-11-23 PROCEDURE — 94799 UNLISTED PULMONARY SVC/PX: CPT

## 2019-11-23 PROCEDURE — 83735 ASSAY OF MAGNESIUM: CPT | Performed by: INTERNAL MEDICINE

## 2019-11-23 PROCEDURE — 25010000002 FUROSEMIDE PER 20 MG: Performed by: INTERNAL MEDICINE

## 2019-11-23 RX ADMIN — LORAZEPAM 1 MG: 1 TABLET ORAL at 09:47

## 2019-11-23 RX ADMIN — PANTOPRAZOLE SODIUM 40 MG: 40 INJECTION, POWDER, FOR SOLUTION INTRAVENOUS at 09:25

## 2019-11-23 RX ADMIN — Medication 10 ML: at 09:27

## 2019-11-23 RX ADMIN — GABAPENTIN 400 MG: 400 CAPSULE ORAL at 16:31

## 2019-11-23 RX ADMIN — GUAIFENESIN 1200 MG: 600 TABLET, EXTENDED RELEASE ORAL at 09:25

## 2019-11-23 RX ADMIN — DOXYCYCLINE 100 MG: 100 INJECTION, POWDER, LYOPHILIZED, FOR SOLUTION INTRAVENOUS at 22:17

## 2019-11-23 RX ADMIN — ASPIRIN 81 MG: 81 TABLET, COATED ORAL at 09:24

## 2019-11-23 RX ADMIN — FERROUS SULFATE TAB EC 324 MG (65 MG FE EQUIVALENT) 324 MG: 324 (65 FE) TABLET DELAYED RESPONSE at 09:24

## 2019-11-23 RX ADMIN — THYROID, PORCINE 60 MG: 30 TABLET ORAL at 09:24

## 2019-11-23 RX ADMIN — ESTRADIOL 1 MG: 1 TABLET ORAL at 16:32

## 2019-11-23 RX ADMIN — NYSTATIN 10 ML: 100000 SUSPENSION ORAL at 09:28

## 2019-11-23 RX ADMIN — METHOCARBAMOL TABLETS 500 MG: 500 TABLET, COATED ORAL at 09:48

## 2019-11-23 RX ADMIN — BENZONATATE 200 MG: 100 CAPSULE ORAL at 22:17

## 2019-11-23 RX ADMIN — GABAPENTIN 400 MG: 400 CAPSULE ORAL at 22:16

## 2019-11-23 RX ADMIN — ESTRADIOL 1 MG: 1 TABLET ORAL at 09:25

## 2019-11-23 RX ADMIN — LORAZEPAM 1 MG: 1 TABLET ORAL at 16:31

## 2019-11-23 RX ADMIN — SERTRALINE 100 MG: 100 TABLET, FILM COATED ORAL at 09:24

## 2019-11-23 RX ADMIN — NYSTATIN 10 ML: 100000 SUSPENSION ORAL at 22:16

## 2019-11-23 RX ADMIN — HYDROCODONE BITARTRATE AND ACETAMINOPHEN 1 TABLET: 10; 325 TABLET ORAL at 16:30

## 2019-11-23 RX ADMIN — NYSTATIN 10 ML: 100000 SUSPENSION ORAL at 14:07

## 2019-11-23 RX ADMIN — LOSARTAN POTASSIUM 25 MG: 25 TABLET, FILM COATED ORAL at 09:25

## 2019-11-23 RX ADMIN — Medication 10 ML: at 22:20

## 2019-11-23 RX ADMIN — IPRATROPIUM BROMIDE AND ALBUTEROL SULFATE 3 ML: .5; 3 SOLUTION RESPIRATORY (INHALATION) at 10:46

## 2019-11-23 RX ADMIN — GUAIFENESIN 1200 MG: 600 TABLET, EXTENDED RELEASE ORAL at 22:16

## 2019-11-23 RX ADMIN — ESTRADIOL 1 MG: 1 TABLET ORAL at 22:19

## 2019-11-23 RX ADMIN — IPRATROPIUM BROMIDE AND ALBUTEROL SULFATE 3 ML: .5; 3 SOLUTION RESPIRATORY (INHALATION) at 06:40

## 2019-11-23 RX ADMIN — NYSTATIN 10 ML: 100000 SUSPENSION ORAL at 18:46

## 2019-11-23 RX ADMIN — PANTOPRAZOLE SODIUM 40 MG: 40 INJECTION, POWDER, FOR SOLUTION INTRAVENOUS at 22:17

## 2019-11-23 RX ADMIN — RANOLAZINE 1000 MG: 500 TABLET, FILM COATED, EXTENDED RELEASE ORAL at 09:24

## 2019-11-23 RX ADMIN — ONDANSETRON 4 MG: 2 INJECTION INTRAMUSCULAR; INTRAVENOUS at 04:04

## 2019-11-23 RX ADMIN — BENZONATATE 200 MG: 100 CAPSULE ORAL at 09:25

## 2019-11-23 RX ADMIN — METHOCARBAMOL TABLETS 500 MG: 500 TABLET, COATED ORAL at 22:05

## 2019-11-23 RX ADMIN — HYDROCODONE BITARTRATE AND ACETAMINOPHEN 1 TABLET: 10; 325 TABLET ORAL at 22:05

## 2019-11-23 RX ADMIN — FUROSEMIDE 40 MG: 10 INJECTION, SOLUTION INTRAMUSCULAR; INTRAVENOUS at 09:25

## 2019-11-23 RX ADMIN — ROSUVASTATIN CALCIUM 10 MG: 10 TABLET, FILM COATED ORAL at 09:25

## 2019-11-23 RX ADMIN — IPRATROPIUM BROMIDE AND ALBUTEROL SULFATE 3 ML: .5; 3 SOLUTION RESPIRATORY (INHALATION) at 14:50

## 2019-11-23 RX ADMIN — CLOPIDOGREL BISULFATE 75 MG: 75 TABLET ORAL at 09:24

## 2019-11-23 RX ADMIN — LORAZEPAM 1 MG: 1 TABLET ORAL at 02:17

## 2019-11-23 RX ADMIN — DOXYCYCLINE 100 MG: 100 INJECTION, POWDER, LYOPHILIZED, FOR SOLUTION INTRAVENOUS at 11:13

## 2019-11-23 RX ADMIN — GABAPENTIN 400 MG: 400 CAPSULE ORAL at 09:25

## 2019-11-23 RX ADMIN — RANOLAZINE 1000 MG: 500 TABLET, FILM COATED, EXTENDED RELEASE ORAL at 22:12

## 2019-11-23 RX ADMIN — ENOXAPARIN SODIUM 40 MG: 40 INJECTION SUBCUTANEOUS at 16:31

## 2019-11-23 RX ADMIN — BENZONATATE 200 MG: 100 CAPSULE ORAL at 16:31

## 2019-11-23 RX ADMIN — ISOSORBIDE MONONITRATE 30 MG: 30 TABLET, EXTENDED RELEASE ORAL at 09:25

## 2019-11-23 RX ADMIN — CEFTRIAXONE SODIUM 1 G: 1 INJECTION, POWDER, FOR SOLUTION INTRAMUSCULAR; INTRAVENOUS at 09:29

## 2019-11-23 RX ADMIN — HYDROCODONE BITARTRATE AND ACETAMINOPHEN 1 TABLET: 10; 325 TABLET ORAL at 04:05

## 2019-11-23 RX ADMIN — FLUTICASONE PROPIONATE 2 SPRAY: 50 SPRAY, METERED NASAL at 12:04

## 2019-11-23 RX ADMIN — DOCUSATE SODIUM 100 MG: 100 CAPSULE, LIQUID FILLED ORAL at 09:24

## 2019-11-23 RX ADMIN — IPRATROPIUM BROMIDE AND ALBUTEROL SULFATE 3 ML: .5; 3 SOLUTION RESPIRATORY (INHALATION) at 20:00

## 2019-11-23 NOTE — ASSESSMENT & PLAN NOTE
The patient had an abdominal ultrasound which showed that her abdominal aortic aneurysm is unchanged from 18 months ago when it was last visualized.

## 2019-11-23 NOTE — PLAN OF CARE
Problem: Patient Care Overview  Goal: Plan of Care Review  Outcome: Ongoing (interventions implemented as appropriate)   11/23/19 8191   OTHER   Outcome Summary pt was unable to sleep well last night complained of nausea and neck pain see MAR. Was unable to collecct stool sample on pt   Coping/Psychosocial   Plan of Care Reviewed With patient   Plan of Care Review   Progress no change     Goal: Individualization and Mutuality  Outcome: Ongoing (interventions implemented as appropriate)    Goal: Discharge Needs Assessment  Outcome: Ongoing (interventions implemented as appropriate)    Goal: Interprofessional Rounds/Family Conf  Outcome: Ongoing (interventions implemented as appropriate)      Problem: Pneumonia (Adult)  Goal: Signs and Symptoms of Listed Potential Problems Will be Absent, Minimized or Managed (Pneumonia)  Outcome: Ongoing (interventions implemented as appropriate)

## 2019-11-23 NOTE — ASSESSMENT & PLAN NOTE
The patient reports that her diarrhea is markedly improved.  She does not see any blood in her stool.  She has had 3 negative stools.  Hold off on C. difficile.  Patient does not need Lomotil at this time either.

## 2019-11-23 NOTE — PLAN OF CARE
Problem: Patient Care Overview  Goal: Plan of Care Review   11/23/19 1008   OTHER   Outcome Summary 73 yo female adm with PNA. Pt normally independent with BADL at home, shares IADL with sister and son and resides with sister. Pt wears 3L O2 at baseline, and uses sisters pulse ox to check sats. Pt has a handicap accessible bathroom for safe toileting needs. Pt has a h/o 1x recent fall, but demo's good balance to complete self care and transfers at home this session. Rec return home with as needed assist of dtr and 3 local sons.   Coping/Psychosocial   Plan of Care Reviewed With patient

## 2019-11-23 NOTE — PROGRESS NOTES
Hematology/Oncology Inpatient Progress Note    PATIENT NAME: Gayathri Reddy  : 1945  MRN: [id number]    CHIEF COMPLAINT: Shortness of breath and anemia    HISTORY OF PRESENT ILLNESS:    74 y.o. female direct admitted 2019 secondary to increasing cough and shortness of breath with orthopnea.  She reported temperature up to 100.6 at home.  Admission labs showed WBC 9.8, hemoglobin 9.1, .0, and platelets 177,000.  Creatinine was 1.01.  There was no elevation of LFTs.  BNP was high (4031).  Troponin was normal.  Hemoglobin dropped to 7.7 post admission.  Anemia labs showed low iron, iron saturation (4%), and TIBC.  LDH was low.  Reticulocyte count was mildly elevated.  Vitamin B12 and folate were normal.  Hemoccult stool was negative. She reported chronic anemia on admission on oral iron therapy at home along with occasional bleeding from hemorrhoids.  Her last colonoscopy was over 10 years ago and done at [facility] where she had 4-6 polyps removed per her report.  She reported normal EGD at that time.  Chest x-ray showed stable bibasilar pulmonary opacities from prior exam done 10/28/2019 felt by the radiologist to possibly represent pneumonia, atelectasis, or progression of chronic fibrosis. Respiratory viral panel was negative.  An echocardiogram showed normal LVEF and mild to moderate mitral and pulmonic valve regurgitation.  There was borderline concentric hypertrophy and mild to moderate dilation of left atrial cavity. PMH significant for CAD status post multiple stent procedures and history of CABG in , and AAA.  Cardiology was consulted and she was started on Rocephin and diuresis.     19  Hematology/Oncology was consulted for anemia.  · S/p Venofer 400 mg IV x1 on 19     PCP: Deonte Hector MD     Subjective Patient is receiving breathing treatments today.  She still has some wheezing.  Otherwise doing well.  Received IV iron infusion.    ROS:  Review of  Systems   Constitutional: Positive for appetite change ( ) and fatigue. Negative for chills and fever.   HENT: Negative for ear pain, mouth sores, nosebleeds and sore throat.    Eyes: Negative for photophobia and visual disturbance.   Respiratory: Positive for cough and shortness of breath. Negative for wheezing and stridor.    Cardiovascular: Negative for chest pain and palpitations.   Gastrointestinal: Negative for abdominal pain, diarrhea, nausea and vomiting.   Endocrine: Negative for cold intolerance and heat intolerance.   Genitourinary: Negative for dysuria and hematuria.   Musculoskeletal: Negative for joint swelling and neck stiffness.   Skin: Negative for color change and rash.   Neurological: Positive for headaches. Negative for seizures and syncope.   Hematological: Negative for adenopathy. Bruises/bleeds easily.        No obvious bleeding   Psychiatric/Behavioral: Negative for agitation, confusion and hallucinations.   All other systems reviewed and are negative.    MEDICATIONS:    Scheduled Meds:    aspirin 81 mg Oral Daily   benzonatate 200 mg Oral TID   cefTRIAXone 1 g Intravenous Q24H   clopidogrel 75 mg Oral Daily   docusate sodium 100 mg Oral Daily   doxycycline 100 mg Intravenous Q12H   enoxaparin 40 mg Subcutaneous Q24H   estradiol 1 mg Oral TID   ferrous sulfate 324 mg Oral Daily With Breakfast   fluticasone 2 spray Nasal Daily   furosemide 40 mg Intravenous Daily   gabapentin 400 mg Oral TID   guaiFENesin 1,200 mg Oral Q12H   hydrocortisone-diphenhydramine-nystatin 10 mL Swish & Spit 4x Daily   ipratropium-albuterol 3 mL Nebulization 4x Daily - RT   isosorbide mononitrate 30 mg Oral Q24H   losartan 25 mg Oral Q24H   pantoprazole 40 mg Intravenous Q12H   ranolazine 1,000 mg Oral Q12H   rosuvastatin 10 mg Oral Daily   sertraline 100 mg Oral Daily   sodium chloride 10 mL Intravenous Q12H   Thyroid 60 mg Oral Daily      Continuous Infusions:      PRN Meds:  •  acetaminophen **OR** acetaminophen  "**OR** acetaminophen  •  benzocaine-menthol  •  benzonatate  •  hydrALAZINE  •  HYDROcodone-acetaminophen  •  HYDROcodone-acetaminophen  •  HYDROcodone-homatropine  •  LORazepam  •  magnesium sulfate **OR** magnesium sulfate **OR** magnesium sulfate  •  melatonin  •  methocarbamol  •  nitroglycerin  •  ondansetron **OR** ondansetron  •  potassium chloride  •  potassium chloride  •  sodium chloride  •  sodium chloride  •  traMADol     ALLERGIES:    Allergies   Allergen Reactions   • Naprosyn  [Naproxen] Rash   • Bactrim [Sulfamethoxazole-Trimethoprim] Rash       Objective    VITALS:   /77   Pulse 88   Temp 98.1 °F (36.7 °C)   Resp 18   Ht 162.6 cm (64\")   Wt 66.1 kg (145 lb 11.6 oz)   SpO2 97%   BMI 25.01 kg/m²     PHYSICAL EXAM:  Physical Exam   Constitutional: She is oriented to person, place, and time. She appears well-developed and well-nourished. No distress. Nasal cannula in place.   HENT:   Head: Normocephalic and atraumatic.   Mouth/Throat: She has dentures.   Eyes: Conjunctivae and EOM are normal. Right eye exhibits no discharge. Left eye exhibits no discharge. No scleral icterus.   Neck: Normal range of motion. Neck supple. No thyromegaly present.   Cardiovascular: Normal rate, regular rhythm and normal heart sounds. Exam reveals no gallop and no friction rub.   Pulmonary/Chest: Effort normal. No stridor. No respiratory distress. She has no wheezes. She has rales.   Abdominal: Soft. Bowel sounds are normal. She exhibits no mass. There is no tenderness. There is no rebound and no guarding.   Musculoskeletal: Normal range of motion. She exhibits no tenderness.   Neurological: She is alert and oriented to person, place, and time. She exhibits normal muscle tone.   Skin: Skin is warm and dry. No rash noted. She is not diaphoretic. No erythema.   Psychiatric: She has a normal mood and affect. Her behavior is normal.   Nursing note and vitals reviewed.      RECENT LABS:  Lab Results (last 24 hours)  "    Procedure Component Value Units Date/Time    Blood Culture - Blood, Wrist, Right [[id number]] Collected:  11/18/19 0714    Specimen:  Blood from Wrist, Right Updated:  11/23/19 0801     Blood Culture No growth at 5 days    CBC & Differential [[id number]] Collected:  11/23/19 0449    Specimen:  Blood Updated:  11/23/19 0730    Narrative:       The following orders were created for panel order CBC & Differential.  Procedure                               Abnormality         Status                     ---------                               -----------         ------                     CBC Auto Differential[[id number]]        Abnormal            Final result                 Please view results for these tests on the individual orders.    CBC Auto Differential [[id number]]  (Abnormal) Collected:  11/23/19 0449    Specimen:  Blood Updated:  11/23/19 0730     WBC 7.50 10*3/mm3      RBC 2.50 10*6/mm3      Hemoglobin 8.6 g/dL      Hematocrit 24.5 %      MCV 98.2 fL      MCH 34.6 pg      MCHC 35.2 g/dL      Comment: Result checked         RDW 13.3 %      RDW-SD 45.9 fl      MPV 7.2 fL      Platelets 222 10*3/mm3      Neutrophil % 66.5 %      Lymphocyte % 22.1 %      Monocyte % 7.7 %      Eosinophil % 3.5 %      Basophil % 0.2 %      Neutrophils, Absolute 5.00 10*3/mm3      Lymphocytes, Absolute 1.60 10*3/mm3      Monocytes, Absolute 0.60 10*3/mm3      Eosinophils, Absolute 0.30 10*3/mm3      Basophils, Absolute 0.00 10*3/mm3      nRBC 0.1 /100 WBC     Narrative:       Appended report. These results have been appended to a previously verified report.    Scan Slide [[id number]]  (Normal) Collected:  11/23/19 0449    Specimen:  Blood Updated:  11/23/19 0730     RBC Morphology Normal     WBC Morphology Normal     Platelet Morphology Normal    Narrative:       Slide Reviewed    Blood Culture - Blood, Arm, Left [[id number]] Collected:  11/18/19 0649    Specimen:  Blood from Arm, Left Updated:  11/23/19 0716     Blood Culture No  growth at 5 days    Basic Metabolic Panel [[id number]]  (Abnormal) Collected:  11/23/19 0449    Specimen:  Blood Updated:  11/23/19 0555     Glucose 116 mg/dL      BUN 14 mg/dL      Creatinine 0.81 mg/dL      Sodium 141 mmol/L      Potassium 3.7 mmol/L      Chloride 103 mmol/L      CO2 30.0 mmol/L      Calcium 9.0 mg/dL      eGFR Non African Amer 69 mL/min/1.73      BUN/Creatinine Ratio 17.3     Anion Gap 8.0 mmol/L     Narrative:       GFR Normal >60  Chronic Kidney Disease <60  Kidney Failure <15    Magnesium [[id number]]  (Normal) Collected:  11/23/19 0449    Specimen:  Blood Updated:  11/23/19 0555     Magnesium 1.6 mg/dL     BNP [[id number]]  (Abnormal) Collected:  11/23/19 0449    Specimen:  Blood Updated:  11/23/19 0545     proBNP 1,005.0 pg/mL     Narrative:       Among patients with dyspnea, NT-proBNP is highly sensitive for the detection of acute congestive heart failure. In addition NT-proBNP of <300 pg/ml effectively rules out acute congestive heart failure with 99% negative predictive value.    Haptoglobin [[id number]]  (Abnormal) Collected:  11/22/19 1559    Specimen:  Blood Updated:  11/22/19 2005     Haptoglobin 357 mg/dL           PENDING RESULTS: SPEP    IMAGING REVIEWED:  Xr Chest 1 View    Result Date: 11/22/2019   1. No significant interval change. 2. Status post CABG. 3. Basilar infiltrates, possibly representing fibrosis.  Electronically Signed By-Av Rosas On:11/22/2019 7:34 AM This report was finalized on [id number] by  Av Rosas, .    Us Aorta Limited    Result Date: 11/22/2019  IMPRESSION : Probably little interval change is seen in the caliber of the fusiform infrarenal abdominal aortic aneurysm, measuring 3.5 cm in maximum diameter, since the prior abdominal/pelvic CT study from 1/5/2018.  Electronically Signed By-Dr. Quoc Parker MD On:11/22/2019 8:31 AM This report was finalized on [id number] by Dr. Quoc Parker MD.      I have reviewed the patient's labs, imaging, reports,  and other clinician documentation.    Assessment/Plan   ASSESSMENT  1. Macrocytic anemia -  Iron studies with low iron, iron sat, and TIBC.  Chronic and home oral ferrous sulfate has been continued during admission.  Stool heme negative.  Last colonoscopy and EGD with polypectomy per patient report and normal EGD at least 10 years ago per patient. Not likely hemolysis with decreased LDH and mild elevation of reticulocytes, haptoglobin 357,  No vitamin B12 or folate deficiencies. ESR 72.  Stable AAA. On PPI. Prior creatinine levels do not indicate CKD. Paraproteinemia or bone marrow etiologies possible.   · Iron deficiency anemia: S/p Venofer 400 mg IV x1 on 11/22/19  2. Dyspnea/low grade fever - CXR findings due to PNA vs pulmonary fibrosis or cardiac etiologies.  RVP neg and blood cx NGTD.  Improved post admit with diuretics and Rocephin.   3. CAD/h/o stents and CABG - per cardiology.  4. Infrarenal AAA - ultrasound showed 3.5 cm and felt stable since 1/2018.  5. Renal insufficiency - improved post hydration.  6. Hypothyroidism -  Per primary team.     PLAN  1. CBC daily  2. Continue supportive care   3. Consider bone marrow biopsy as outpatient if not enough response to IV iron and treatment of infection     Electronically signed by RADHA Sinclair, 11/23/19, 5:58 PM.        I have personally performed a face-to-face diagnostic evaluation on this patient.  I have reviewed and agreed with the care plan.  Physical exam reveals decreased air entry in the lungs with some wheezing.  Hematology consulted for anemia.  Work-up shows anemia of chronic inflammation along with some evidence of iron deficiency.  No evidence of any occult GI bleeding.  Will order inflammatory markers to confirm if patient has inflammation.  Will give patient iron empirically to evaluate for response.  If patient does not have enough response to IV iron and to treatment of infection, I will consider doing bone marrow biopsy as  outpatient.  Reviewed creatinine patient has a normal creatinine.     I discussed the patients findings and my recommendations with patient.    Alanna Patterson, APRCHRISTINA  11/23/19  5:51 PM

## 2019-11-23 NOTE — ASSESSMENT & PLAN NOTE
Appreciate hematology's note.  Awaiting opinion as to whether or not the patient will need further intervention.  Overall the patient's maintain her hemoglobin and hematocrit.  At some point she likely needs a colonoscopy.  It does not seem that this is urgent as she has had 3 stools which have been negative for occult blood.

## 2019-11-23 NOTE — PROGRESS NOTES
[facility] Medicine Services Daily Progress Note      Hospitalist Team  LOS 5 days      Patient Care Team:  Deonte Hector MD as PCP - General    Patient Location: 232/1      Subjective   Subjective     Chief Complaint / Subjective  Chief Complaint   Patient presents with   • Shortness of Breath     The patient reports that her diarrhea is markedly improved and that she overall is feeling much better.  The patient has the cough but reports that is not anything like what it was when she came in.  The patient is able to move around better in her room without as much dyspnea though she does still use the oxygen.  Present on Admission:  • Community acquired pneumonia  • Congestive heart failure (CMS/HCC)  • Chronic obstructive pulmonary disease (CMS/HCC)  • Coronary artery disease  • Dyslipidemia  • Hypertension  • Abdominal aortic aneurysm (AAA) (CMS/Formerly [facility])      Brief Synopsis of Hospital Course/HPI  The patient is a 74-year-old female who was originally admitted for a severe cough and lower lobe infiltrates.  The patient was started on IV Rocephin.  The patient was also noted to be profoundly anemic.  The patient had a markedly elevated BNP.  The patient was started on Lasix 40 mg IV twice daily, she was given breathing treatments, incentive spirometry and a flutter valve.  The patient also was given Tessalon Perles and Hycodan as needed for her cough.  Overall the patient is markedly better.    The patient does have 18 abdominal aortic aneurysm which was resurveyed on this admission and found to be unchanged from approximately 18 months ago.  She also had an hematology consult for her anemia which was somewhat macrocytic but otherwise consistent with anemia of chronic disease.  She has had 3- stool samples for occult blood.  She is now receiving Crystal through hematology.  Overall this is improved as well.            Date: 11/23/19  :      Review of Systems   Constitution: Positive  "for weakness.   HENT: Negative.    Eyes: Negative.    Cardiovascular: Negative.    Respiratory: Positive for cough and shortness of breath.    Endocrine: Negative.    Hematologic/Lymphatic: Negative.    Skin: Negative.    Musculoskeletal: Negative.    Gastrointestinal: Negative.    Genitourinary: Negative.    Psychiatric/Behavioral: Negative.    Allergic/Immunologic: Negative.    All other systems reviewed and are negative.        Objective   Objective      Vital Signs  Temp:  [98.1 °F (36.7 °C)-100.4 °F (38 °C)] 98.1 °F (36.7 °C)  Heart Rate:  [76-92] 88  Resp:  [15-20] 18  BP: (125-171)/(67-77) 143/77  Oxygen Therapy  SpO2: 97 %  Pulse Oximetry Type: Intermittent  Device (Oxygen Therapy): nasal cannula  Flow (L/min): 3  Flowsheet Rows      First Filed Value   Admission Height  160 cm (63\") Documented at 11/18/2019 0629   Admission Weight  66.2 kg (145 lb 15.1 oz) Documented at 11/18/2019 0629        Intake & Output (last 3 days)       11/20 0701 - 11/21 0700 11/21 0701 - 11/22 0700 11/22 0701 - 11/23 0700 11/23 0701 - 11/24 0700    P.O.   240     IV Piggyback   200     Total Intake(mL/kg)   440 (6.7)     Urine (mL/kg/hr) 100 (0.1)  300 (0.2)     Stool 0  0     Total Output 100  300     Net -100  +140             Urine Unmeasured Occurrence   1 x     Stool Unmeasured Occurrence 0 x           Lines, Drains & Airways    Active LDAs     Name:   Placement date:   Placement time:   Site:   Days:    Peripheral IV 11/22/19 1653 Anterior;Left Forearm   11/22/19 1653    Forearm   less than 1                  Physical Exam:    Physical Exam   Constitutional: She is oriented to person, place, and time. She appears well-developed and well-nourished.   HENT:   Head: Normocephalic and atraumatic.   Nose: Nose normal.   Mouth/Throat: Oropharynx is clear and moist.   Eyes: Conjunctivae and EOM are normal. Pupils are equal, round, and reactive to light.   Neck: Normal range of motion. Neck supple.   Cardiovascular: Normal rate, " regular rhythm, normal heart sounds and intact distal pulses.   Pulmonary/Chest: Effort normal and breath sounds normal.   Abdominal: Soft. Bowel sounds are normal.   Genitourinary:   Genitourinary Comments: Deferred   Musculoskeletal: Normal range of motion.   Neurological: She is alert and oriented to person, place, and time.   Skin: Skin is warm.   Psychiatric: She has a normal mood and affect. Her behavior is normal. Judgment and thought content normal.       Results Review:     I reviewed the patient's new clinical results.      Lab Results (last 24 hours)     Procedure Component Value Units Date/Time    Blood Culture - Blood, Wrist, Right [[id number]] Collected:  11/18/19 0714    Specimen:  Blood from Wrist, Right Updated:  11/23/19 0801     Blood Culture No growth at 5 days    CBC & Differential [[id number]] Collected:  11/23/19 0449    Specimen:  Blood Updated:  11/23/19 0730    Narrative:       The following orders were created for panel order CBC & Differential.  Procedure                               Abnormality         Status                     ---------                               -----------         ------                     CBC Auto Differential[[id number]]        Abnormal            Final result                 Please view results for these tests on the individual orders.    CBC Auto Differential [[id number]]  (Abnormal) Collected:  11/23/19 0449    Specimen:  Blood Updated:  11/23/19 0730     WBC 7.50 10*3/mm3      RBC 2.50 10*6/mm3      Hemoglobin 8.6 g/dL      Hematocrit 24.5 %      MCV 98.2 fL      MCH 34.6 pg      MCHC 35.2 g/dL      Comment: Result checked         RDW 13.3 %      RDW-SD 45.9 fl      MPV 7.2 fL      Platelets 222 10*3/mm3      Neutrophil % 66.5 %      Lymphocyte % 22.1 %      Monocyte % 7.7 %      Eosinophil % 3.5 %      Basophil % 0.2 %      Neutrophils, Absolute 5.00 10*3/mm3      Lymphocytes, Absolute 1.60 10*3/mm3      Monocytes, Absolute 0.60 10*3/mm3      Eosinophils,  Absolute 0.30 10*3/mm3      Basophils, Absolute 0.00 10*3/mm3      nRBC 0.1 /100 WBC     Narrative:       Appended report. These results have been appended to a previously verified report.    Scan Slide [[id number]]  (Normal) Collected:  11/23/19 0449    Specimen:  Blood Updated:  11/23/19 0730     RBC Morphology Normal     WBC Morphology Normal     Platelet Morphology Normal    Narrative:       Slide Reviewed    Blood Culture - Blood, Arm, Left [[id number]] Collected:  11/18/19 0649    Specimen:  Blood from Arm, Left Updated:  11/23/19 0716     Blood Culture No growth at 5 days    Basic Metabolic Panel [[id number]]  (Abnormal) Collected:  11/23/19 0449    Specimen:  Blood Updated:  11/23/19 0555     Glucose 116 mg/dL      BUN 14 mg/dL      Creatinine 0.81 mg/dL      Sodium 141 mmol/L      Potassium 3.7 mmol/L      Chloride 103 mmol/L      CO2 30.0 mmol/L      Calcium 9.0 mg/dL      eGFR Non African Amer 69 mL/min/1.73      BUN/Creatinine Ratio 17.3     Anion Gap 8.0 mmol/L     Narrative:       GFR Normal >60  Chronic Kidney Disease <60  Kidney Failure <15    Magnesium [[id number]]  (Normal) Collected:  11/23/19 0449    Specimen:  Blood Updated:  11/23/19 0555     Magnesium 1.6 mg/dL     BNP [[id number]]  (Abnormal) Collected:  11/23/19 0449    Specimen:  Blood Updated:  11/23/19 0545     proBNP 1,005.0 pg/mL     Narrative:       Among patients with dyspnea, NT-proBNP is highly sensitive for the detection of acute congestive heart failure. In addition NT-proBNP of <300 pg/ml effectively rules out acute congestive heart failure with 99% negative predictive value.    Haptoglobin [[id number]]  (Abnormal) Collected:  11/22/19 1559    Specimen:  Blood Updated:  11/22/19 2005     Haptoglobin 357 mg/dL     Sedimentation Rate [[id number]]  (Abnormal) Collected:  11/22/19 0411    Specimen:  Blood Updated:  11/22/19 1621     Sed Rate 72 mm/hr     Protein Elec + Interp, Serum [[id number]] Collected:  11/22/19 1559    Specimen:  Blood  Updated:  11/22/19 1620        No results found for: HGBA1C                Microbiology Results (last 10 days)     Procedure Component Value - Date/Time    Legionella Antigen, Urine - Urine, Urine, Clean Catch [[id number]]  (Normal) Collected:  11/18/19 1623    Lab Status:  Final result Specimen:  Urine, Clean Catch Updated:  11/18/19 1715     LEGIONELLA ANTIGEN, URINE Negative    Blood Culture - Blood, Wrist, Right [[id number]] Collected:  11/18/19 0714    Lab Status:  Final result Specimen:  Blood from Wrist, Right Updated:  11/23/19 0801     Blood Culture No growth at 5 days    Respiratory Panel, PCR - Swab, Nasopharynx [[id number]]  (Normal) Collected:  11/18/19 0704    Lab Status:  Final result Specimen:  Swab from Nasopharynx Updated:  11/18/19 0838     ADENOVIRUS, PCR Not Detected     Coronavirus 229E Not Detected     Coronavirus HKU1 Not Detected     Coronavirus NL63 Not Detected     Coronavirus OC43 Not Detected     Human Metapneumovirus Not Detected     Human Rhinovirus/Enterovirus Not Detected     Influenza B PCR Not Detected     Parainfluenza Virus 1 Not Detected     Parainfluenza Virus 2 Not Detected     Parainfluenza Virus 3 Not Detected     Parainfluenza Virus 4 Not Detected     Bordetella pertussis pcr Not Detected     Influenza A H1 2009 PCR Not Detected     Chlamydophila pneumoniae PCR Not Detected     Mycoplasma pneumo by PCR Not Detected     Influenza A PCR Not Detected     Influenza A H3 Not Detected     Influenza A H1 Not Detected     RSV, PCR Not Detected    Blood Culture - Blood, Arm, Left [[id number]] Collected:  11/18/19 0649    Lab Status:  Final result Specimen:  Blood from Arm, Left Updated:  11/23/19 0716     Blood Culture No growth at 5 days          ECG/EMG Results (most recent)     Procedure Component Value Units Date/Time    ECG 12 Lead [[id number]] Collected:  11/18/19 0642     Updated:  11/19/19 0658    Narrative:       HEART RATE= 99  bpm  RR Interval= 608  ms  MD Interval= 205  ms  P  Horizontal Axis= 22  deg  P Front Axis= 35  deg  QRSD Interval= 85  ms  QT Interval= 348  ms  QRS Axis= -19  deg  T Wave Axis=   deg  - ABNORMAL ECG -  Sinus rhythm  Atrial premature complex  Anteroseptal infarct, old  When compared with ECG of 28-Oct-2018 21:02:36,  Significant rate increase  Significant axis, voltage or hypertrophy change  Electronically Signed By: Av Kurtz (Ac) 19-Nov-2019 06:58:11  Date and Time of Study: 2019-11-18 06:42:25    Adult Transthoracic Echo Complete W/ Cont if Necessary Per Protocol [[id number]] Collected:  11/18/19 1738     Updated:  11/19/19 1624     BSA 1.7 m^2      BH CV ECHO DANNY - RVDD 2.3 cm      IVSd 1.2 cm      LVIDd 4.4 cm      LVIDs 2.8 cm      LVPWd 1.2 cm      IVS/LVPW 0.94     FS 36.4 %      EDV(Teich) 88.3 ml      ESV(Teich) 29.8 ml      EF(Teich) 66.3 %      EDV(cubed) 86.0 ml      ESV(cubed) 22.1 ml      EF(cubed) 74.2 %      LV mass(C)d 193.4 grams      LV mass(C)dI 113.0 grams/m^2      SV(Teich) 58.6 ml      SI(Teich) 34.2 ml/m^2      SV(cubed) 63.8 ml      SI(cubed) 37.3 ml/m^2      Ao root diam 3.2 cm      Ao root area 8.0 cm^2      ACS 1.6 cm      asc Aorta Diam 2.8 cm      LVOT diam 2.0 cm      LVOT area 3.1 cm^2      RVOT diam 3.1 cm      RVOT area 7.5 cm^2      EDV(MOD-sp4) 76.2 ml      ESV(MOD-sp4) 23.0 ml      EF(MOD-sp4) 69.8 %      SV(MOD-sp4) 53.2 ml      SI(MOD-sp4) 31.1 ml/m^2      Ao root area (BSA corrected) 1.9     LV Warren Vol (BSA corrected) 44.5 ml/m^2      LV Sys Vol (BSA corrected) 13.4 ml/m^2      Aortic R-R 0.8 sec      Aortic HR 74.9 BPM      MV E max truman 136.6 cm/sec      MV A max truman 103.4 cm/sec      MV E/A 1.3     MV V2 max 136.0 cm/sec      MV max PG 7.4 mmHg      MV V2 mean 86.4 cm/sec      MV mean PG 3.3 mmHg      MV V2 VTI 32.6 cm      MVA(VTI) 2.5 cm^2      MV dec slope 914.1 cm/sec^2      MV dec time 0.15 sec      Ao pk truman 193.3 cm/sec      Ao max PG 15.1 mmHg      Ao max PG (full) 9.6 mmHg      Ao V2 mean 126.2 cm/sec      Ao  mean PG 7.6 mmHg      Ao mean PG (full) 4.7 mmHg      Ao V2 VTI 37.9 cm      OREN(I,A) 2.1 cm^2      OREN(I,D) 2.1 cm^2      OREN(V,A) 1.9 cm^2      OREN(V,D) 1.9 cm^2      LV V1 max PG 5.5 mmHg      LV V1 mean PG 2.9 mmHg      LV V1 max 117.1 cm/sec      LV V1 mean 76.4 cm/sec      LV V1 VTI 26.4 cm      MR max elmer 490.5 cm/sec      MR max PG 96.2 mmHg      CO(Ao) 22.6 l/min      CI(Ao) 13.2 l/min/m^2      SV(Ao) 301.3 ml      SI(Ao) 176.1 ml/m^2      CO(LVOT) 6.1 l/min      CI(LVOT) 3.6 l/min/m^2      SV(LVOT) 81.3 ml      SV(RVOT) 101.6 ml      SI(LVOT) 47.5 ml/m^2      PA V2 max 85.8 cm/sec      PA max PG 2.9 mmHg      PA max PG (full) 1.3 mmHg      PA V2 mean 57.3 cm/sec      PA mean PG 1.5 mmHg      PA mean PG (full) 0.73 mmHg      PA V2 VTI 15.5 cm      PVA(I,A) 6.6 cm^2      BH CV ECHO DANNY - PVA(I,D) 6.6 cm^2      BH CV ECHO DANNY - PVA(V,A) 5.6 cm^2      BH CV ECHO DANNY - PVA(V,D) 5.6 cm^2      PA acc time 0.13 sec      PI end-d elmer 125.6 cm/sec      PI max elmer 225.0 cm/sec      PI max PG 20.2 mmHg      RV V1 max PG 1.6 mmHg      RV V1 mean PG 0.79 mmHg      RV V1 max 64.1 cm/sec      RV V1 mean 41.0 cm/sec      RV V1 VTI 13.6 cm      TR max elmer 231.4 cm/sec      RVSP(TR) 24.4 mmHg      RAP systole 3.0 mmHg      PA pr(Accel) 20.8 mmHg      Pulm Sys Elmer 72.8 cm/sec      Pulm Warren Elmer 102.6 cm/sec      Pulm S/D 0.71     Qp/Qs 1.3     Pulm A Revs Dur 0.13 sec      Pulm A Revs Elmer 31.8 cm/sec      BH CV ECHO DANNY - BZI_BMI 25.1 kilograms/m^2      BH CV ECHO DANNY - BSA(HAYCOCK) 1.7 m^2      BH CV ECHO DANNY - BZI_METRIC_WEIGHT 66.2 kg      BH CV ECHO DANNY - BZI_METRIC_HEIGHT 162.6 cm      EF(MOD-bp) 70.0 %      LA dimension(2D) 4.3 cm     Narrative:       · Left ventricular systolic function is normal.  · Estimated EF was in agreement with the calculated EF. Estimated EF   appears to be in the range of 66 - 70%.  · Mild-to-moderate mitral valve regurgitation is present  · Left ventricular wall thickness is consistent  with borderline concentric   hypertrophy.  · Mild-to-moderate pulmonic valve regurgitation is present.  · Left atrial cavity size is mild-to-moderately dilated.             Results for orders placed during the hospital encounter of 18   Duplex Venous Lower Extremity - Bilateral CAR    Narrative                   Lower Extremity Venous Report                          [facility]                         Vascular Laboratory                            [address] [place] [address]    Name: KATEY CASEY               Study Date: 2018 01:10 PM  MRN: [id number]                       Patient Location: ED STATION  : 1945 (M/d/yyyy)           Gender: Female  Age: 73 yrs                          Account#: [id number]  Reason For Study: bruise, leg apin      Procedure  Venous study was carried out in left lower extremity. Gray scale, color flow,  and spectral doppler images were obtained. Images were obtained in transverse  and longitudinal views. Technically satisfactory study.    Right Lower Extremity Venous  Limited venous study on the right side was carried out only in the groin and  thigh area, which shows patent common femoral vein. The common femmoral vein  compresses well with no evidence of filling defects.    Left Lower Extremity Venous  On the left side the patient has patent common femoral, femoral, and popliteal  veins. The main veins compress well. Femoral vein was mapped in its entirety  through the course of the thigh. It is patent and compresses well. The  popliteal vein is patent along with its tributaries and compresses well with  no evidence of any filling defect. Augmentation test is positive. Respiratory  waves are biphasic. Distal veins are patent. Greater saphenous vein is patent.  Superficial vein thrombosis involving the left short saphenous vein.      Interpretation Summary  No evidence of left lower extremity deep venous thrombosis.  Superficial vein  thrombosis involving the left short saphenous vein. Chronic SVT involving the  left short saphenous vein.  _______________________________________________________________________________    Electronically signed by: MD Karen Hall  on 05/07/2018 05:10 PM    Ordering Physician: HIWOT PEREZ  Referring Physician: CONCETTA DAI  Performed By: RH         Results for orders placed during the hospital encounter of 11/18/19   Adult Transthoracic Echo Complete W/ Cont if Necessary Per Protocol    Narrative · Left ventricular systolic function is normal.  · Estimated EF was in agreement with the calculated EF. Estimated EF   appears to be in the range of 66 - 70%.  · Mild-to-moderate mitral valve regurgitation is present  · Left ventricular wall thickness is consistent with borderline concentric   hypertrophy.  · Mild-to-moderate pulmonic valve regurgitation is present.  · Left atrial cavity size is mild-to-moderately dilated.          Xr Chest 2 View    Result Date: 11/18/2019  Bibasilar pulmonary opacities are seen, which are similar or increased in degree since the prior study. The findings may represent progression of fibrosis. Atelectasis and/or pneumonia cannot be excluded.  Electronically Signed By-Dr. Quoc Parker MD On:11/18/2019 6:50 AM This report was finalized on [id number] by Dr. Quoc Parker MD.    Xr Chest 1 View    Result Date: 11/22/2019   1. No significant interval change. 2. Status post CABG. 3. Basilar infiltrates, possibly representing fibrosis.  Electronically Signed By-Av Rosas On:11/22/2019 7:34 AM This report was finalized on [id number] by  Av Rosas, .    Xr Chest 1 View    Result Date: 11/20/2019  No change in bibasilar airspace disease which again could relate to pneumonia, atelectasis, or progression of chronic fibrosis.  Electronically Signed By-Nahid Cooley On:11/20/2019 7:29 AM This report was finalized on [id number] by  Nahid Cooley .    Us  Aorta Limited    Result Date: 11/22/2019  IMPRESSION : Probably little interval change is seen in the caliber of the fusiform infrarenal abdominal aortic aneurysm, measuring 3.5 cm in maximum diameter, since the prior abdominal/pelvic CT study from 1/5/2018.  Electronically Signed By-Dr. Quoc Parker MD On:11/22/2019 8:31 AM This report was finalized on [id number] by Dr. Quoc Parker MD.      Xrays, labs reviewed personally by physician.    Medication Review:   I have reviewed the patient's current medication list      Scheduled Meds    aspirin 81 mg Oral Daily   benzonatate 200 mg Oral TID   cefTRIAXone 1 g Intravenous Q24H   clopidogrel 75 mg Oral Daily   docusate sodium 100 mg Oral Daily   doxycycline 100 mg Intravenous Q12H   enoxaparin 40 mg Subcutaneous Q24H   estradiol 1 mg Oral TID   ferrous sulfate 324 mg Oral Daily With Breakfast   fluticasone 2 spray Nasal Daily   furosemide 40 mg Intravenous Daily   gabapentin 400 mg Oral TID   guaiFENesin 1,200 mg Oral Q12H   hydrocortisone-diphenhydramine-nystatin 10 mL Swish & Spit 4x Daily   ipratropium-albuterol 3 mL Nebulization 4x Daily - RT   isosorbide mononitrate 30 mg Oral Q24H   losartan 25 mg Oral Q24H   pantoprazole 40 mg Intravenous Q12H   ranolazine 1,000 mg Oral Q12H   rosuvastatin 10 mg Oral Daily   sertraline 100 mg Oral Daily   sodium chloride 10 mL Intravenous Q12H   Thyroid 60 mg Oral Daily       Meds Infusions       Meds PRN  •  acetaminophen **OR** acetaminophen **OR** acetaminophen  •  benzocaine-menthol  •  benzonatate  •  hydrALAZINE  •  HYDROcodone-acetaminophen  •  HYDROcodone-acetaminophen  •  HYDROcodone-homatropine  •  LORazepam  •  magnesium sulfate **OR** magnesium sulfate **OR** magnesium sulfate  •  melatonin  •  methocarbamol  •  nitroglycerin  •  ondansetron **OR** ondansetron  •  potassium chloride  •  potassium chloride  •  sodium chloride  •  sodium chloride  •  traMADol    Assessment/Plan   Assessment/Plan     Active  Hospital Problems:  Diarrhea     The patient reports that her diarrhea is markedly improved.  She does not see any blood in her stool.  She has had 3 negative stools.  Hold off on C. difficile.  Patient does not need Lomotil at this time either.     Anemia of chronic disease    Appreciate hematology's note.  Awaiting opinion as to whether or not the patient will need further intervention.  Overall the patient's maintain her hemoglobin and hematocrit.  At some point she likely needs a colonoscopy.  It does not seem that this is urgent as she has had 3 stools which have been negative for occult blood.      Community acquired pneumonia- (present on admission)    The patient's repeat chest x-ray essentially was unchanged.  This may be difficult to see much improvement given that the patient has underlying pulmonary disease.  We will continue to monitor.      Hypertension- (present on admission)    Controlled on current medications.      Dyslipidemia- (present on admission)    Continue patient on her statin therapy for this issue.      Coronary artery disease- (present on admission)    Currently the patient has no issues with chest pain.  We will continue her on her home medications.      Congestive heart failure (CMS/HCC)- (present on admission)    The patient's echocardiogram showed good left ventricular function without evidence of congestive heart failure.  We will wean her off of the diuretics.  She seems to be much improved.     Chronic obstructive pulmonary disease (CMS/HCC)- (present on admission)    Today the patient reports that her cough is much improved.  She reports that she is overall feeling much better.  We will continue the Tessalon Perles and Hycodan as needed.     Abdominal aortic aneurysm (AAA) (CMS/HCC)- (present on admission)    The patient had an abdominal ultrasound which showed that her abdominal aortic aneurysm is unchanged from 18 months ago when it was last visualized.                  VTE  Prophylaxis -patient has SCDsTo the anemia at presentation.      Code Status -   Code Status and Medical Interventions:   Ordered at: 11/18/19 1129     Level Of Support Discussed With:    Patient     Code Status:    CPR     Medical Interventions (Level of Support Prior to Arrest):    Full       Discharge Planning    Destination      No service coordination in this encounter.      Durable Medical Equipment      No service coordination in this encounter.      Dialysis/Infusion      No service coordination in this encounter.      Home Medical Care      No service coordination in this encounter.      Therapy      No service coordination in this encounter.      Community Resources      No service coordination in this encounter.              Ashtyn Salter MD, 11/23/19 12:59 PM

## 2019-11-23 NOTE — THERAPY EVALUATION
Acute Care - Occupational Therapy Initial Evaluation   [facility]     Patient Name: Gayathri eRddy  : 1945  MRN: [id number]  Today's Date: 2019             Admit Date: 2019       ICD-10-CM ICD-9-CM   1. Community acquired pneumonia, unspecified laterality J18.9 486   2. Acute heart failure, unspecified heart failure type (CMS/HCC) I50.9 428.9   3. Fever, unspecified fever cause R50.9 780.60   4. Cough R05 786.2     Patient Active Problem List   Diagnosis   • Abdominal aortic aneurysm (AAA) (CMS/[facility])   • Chronic obstructive pulmonary disease (CMS/HCC)   • Congestive heart failure (CMS/HCC)   • Coronary artery disease   • Dyslipidemia   • Hypertension   • Community acquired pneumonia   • Anemia of chronic disease   • Diarrhea     Past Medical History:   Diagnosis Date   • Aneurysm (CMS/HCC)     AAA   • Arthritis    • CHF (congestive heart failure) (CMS/[facility])    • COPD (chronic obstructive pulmonary disease) (CMS/[facility])    • Dyslipidemia    • GERD (gastroesophageal reflux disease)    • History of right heart catheterization     2017; 10/2018   • Hypertension    • Hypothyroidism    • Myocardial infarct (CMS/[facility])    • Myocardial infarction (CMS/HCC)     x 3     Past Surgical History:   Procedure Laterality Date   • ABDOMINAL AORTIC ANEURYSM REPAIR N/A    • CARDIAC CATHETERIZATION     • CATARACT EXTRACTION Bilateral    • CORONARY ANGIOPLASTY WITH STENT PLACEMENT N/A     x 5   • CORONARY ARTERY BYPASS GRAFT      x 4    • HYSTERECTOMY N/A           OT ASSESSMENT FLOWSHEET (last 12 hours)      Occupational Therapy Evaluation     Row Name 19 1004                   OT Evaluation Time/Intention    Document Type  evaluation  -RADHA        Mode of Treatment  occupational therapy  -RADHA           General Information    Patient Profile Reviewed?  yes  -RADHA        Prior Level of Function  independent:;all household mobility;ADL's;community mobility shares IADL with sister, son  -RADHA        Existing  Precautions/Restrictions  oxygen therapy device and L/min;fall 3L O2 baseline; 1x recent fall  -RADHA           Resource/Environmental Concerns    Current Living Arrangements  home/apartment/condo  -           Bed Mobility Assessment/Treatment    Bed Mobility Assessment/Treatment  bed mobility (all) activities;supine-sit;sit-supine  -RADHA        [facility] Level (Bed Mobility)  supervision  -RADHA        Supine-Sit [facility] (Bed Mobility)  supervision  -        Comment (Bed Mobility)  HOB elevated, pt has adjustable bed at home  -           Transfer Assessment/Treatment    Transfer Assessment/Treatment  toilet transfer  -RADHA           Sit-Stand Transfer    Sit-Stand [facility] (Transfers)  independent  -RADHA           Toilet Transfer    Type (Toilet Transfer)  sit-stand;stand-sit  -        [facility] Level (Toilet Transfer)  independent  -        Assistive Device (Toilet Transfer)  commode  -           ADL Assessment/Intervention    BADL Assessment/Intervention  lower body dressing;grooming;toileting  -           Lower Body Dressing Assessment/Training    Lower Body Dressing [facility] Level  don;shoes/slippers;set up  -        Lower Body Dressing Position  edge of bed sitting  -           Grooming Assessment/Training    [facility] Level (Grooming)  wash face, hands;supervision  -        Grooming Position  supported standing  -           Toileting Assessment/Training    [facility] Level (Toileting)  toileting skills  -        Assistive Devices (Toileting)  commode  -        Toileting Position  unsupported sitting  -           BADL Safety/Performance    Impairments, BADL Safety/Performance  shortness of breath  -           General ROM    GENERAL ROM COMMENTS  BUE AROM WFL  -RADHA           MMT (Manual Muscle Testing)    General MMT Comments  BUE L  -RADHA           Positioning and Restraints    Pre-Treatment Position  in bed  -RADHA        Post Treatment Position  bed  -RADHA        In Bed   fowlers;call light within reach;encouraged to call for assist  -RADHA           Pain Scale: Numbers Pre/Post-Treatment    Pain Scale: Numbers, Pretreatment  0/10 - no pain  -RADHA        Pain Scale: Numbers, Post-Treatment  0/10 - no pain  -RADHA           Clinical Impression (OT)    Criteria for Skilled Therapeutic Interventions Met (OT Eval)  no  -RADHA        Therapy Frequency (OT Eval)  evaluation only  -RADHA        Anticipated Discharge Disposition (OT)  home with assist  -RADHA           Living Environment    Home Accessibility  wheelchair accessible  -RADHA          User Key  (r) = Recorded By, (t) = Taken By, (c) = Cosigned By    Initials Name Effective Dates    RADHA Lupe Sierra, OT 07/25/19 -          Occupational Therapy Education     Title: PT OT SLP Therapies (Done)     Topic: Occupational Therapy (Done)     Point: ADL training (Done)     Description: Instruct learner(s) on proper safety adaptation and remediation techniques during self care or transfers.   Instruct in proper use of assistive devices.    Learning Progress Summary           Patient Acceptance, E,TB, VU by RADHA at 11/23/2019 10:08 AM                   Point: Home exercise program (Done)     Description: Instruct learner(s) on appropriate technique for monitoring, assisting and/or progressing therapeutic exercises/activities.    Learning Progress Summary           Patient Acceptance, E,TB, VU by RADHA at 11/23/2019 10:08 AM                   Point: Precautions (Done)     Description: Instruct learner(s) on prescribed precautions during self-care and functional transfers.    Learning Progress Summary           Patient Acceptance, E,TB, VU by RADHA at 11/23/2019 10:08 AM                   Point: Body mechanics (Done)     Description: Instruct learner(s) on proper positioning and spine alignment during self-care, functional mobility activities and/or exercises.    Learning Progress Summary           Patient Acceptance, E,TB, VU by RADHA at 11/23/2019 10:08 AM                                User Key     Initials Effective Dates Name Provider Type Discipline     07/25/19 -  Lupe Sierra OT Occupational Therapist OT                  OT Recommendation and Plan  Outcome Summary/Treatment Plan (OT)  Anticipated Discharge Disposition (OT): home with assist  Therapy Frequency (OT Eval): evaluation only  Plan of Care Review  Plan of Care Reviewed With: patient  Plan of Care Reviewed With: patient  Outcome Summary: 75 yo female adm with PNA. Pt normally independent with BADL at home, shares IADL with sister and son and resides with sister. Pt wears 3L O2 at baseline, and uses sisters pulse ox to check sats. Pt has a handicap accessible bathroom for safe toileting needs. Pt has a h/o 1x recent fall, but demo's good balance to complete self care and transfers at home this session. Rec return home with as needed assist of dtr and 3 local sons.        Time Calculation:   Time Calculation- OT     Row Name 11/23/19 1011             Time Calculation- OT    OT Start Time  0920  -      OT Stop Time  0940  -      OT Time Calculation (min)  20 min  -      Total Timed Code Minutes- OT  8 minute(s)  -      OT Received On  11/23/19  -        User Key  (r) = Recorded By, (t) = Taken By, (c) = Cosigned By    Initials Name Provider Type    Lupe Jaime OT Occupational Therapist        Therapy Charges for Today     Code Description Service Date Service Provider Modifiers Qty    [id number]  OT EVAL LOW COMPLEXITY 4 11/23/2019 Lupe Sierra OT GO 1    [id number]  OT SELF CARE/MGMT/TRAIN EA 15 MIN 11/23/2019 Lupe Sierra OT GO 1               Lupe Sierra OT  11/23/2019

## 2019-11-24 VITALS
TEMPERATURE: 98.5 F | BODY MASS INDEX: 23.75 KG/M2 | DIASTOLIC BLOOD PRESSURE: 73 MMHG | SYSTOLIC BLOOD PRESSURE: 160 MMHG | HEART RATE: 76 BPM | OXYGEN SATURATION: 98 % | WEIGHT: 139.11 LBS | RESPIRATION RATE: 18 BRPM | HEIGHT: 64 IN

## 2019-11-24 LAB
ANION GAP SERPL CALCULATED.3IONS-SCNC: 11 MMOL/L (ref 5–15)
BUN BLD-MCNC: 17 MG/DL (ref 8–23)
BUN/CREAT SERPL: 19.8 (ref 7–25)
CALCIUM SPEC-SCNC: 9 MG/DL (ref 8.6–10.5)
CHLORIDE SERPL-SCNC: 99 MMOL/L (ref 98–107)
CO2 SERPL-SCNC: 27 MMOL/L (ref 22–29)
CREAT BLD-MCNC: 0.86 MG/DL (ref 0.57–1)
DEPRECATED RDW RBC AUTO: 49 FL (ref 37–54)
EOSINOPHIL # BLD MANUAL: 0.24 10*3/MM3 (ref 0–0.4)
EOSINOPHIL NFR BLD MANUAL: 3 % (ref 0.3–6.2)
ERYTHROCYTE [DISTWIDTH] IN BLOOD BY AUTOMATED COUNT: 13.8 % (ref 12.3–15.4)
GFR SERPL CREATININE-BSD FRML MDRD: 65 ML/MIN/1.73
GLUCOSE BLD-MCNC: 103 MG/DL (ref 65–99)
HCT VFR BLD AUTO: 24.8 % (ref 34–46.6)
HGB BLD-MCNC: 8.3 G/DL (ref 12–15.9)
LYMPHOCYTES # BLD MANUAL: 2.45 10*3/MM3 (ref 0.7–3.1)
LYMPHOCYTES NFR BLD MANUAL: 3 % (ref 5–12)
LYMPHOCYTES NFR BLD MANUAL: 31 % (ref 19.6–45.3)
MAGNESIUM SERPL-MCNC: 1.6 MG/DL (ref 1.6–2.4)
MCH RBC QN AUTO: 34 PG (ref 26.6–33)
MCHC RBC AUTO-ENTMCNC: 33.6 G/DL (ref 31.5–35.7)
MCV RBC AUTO: 101.3 FL (ref 79–97)
METAMYELOCYTES NFR BLD MANUAL: 1 % (ref 0–0)
MONOCYTES # BLD AUTO: 0.24 10*3/MM3 (ref 0.1–0.9)
MYELOCYTES NFR BLD MANUAL: 2 % (ref 0–0)
NEUTROPHILS # BLD AUTO: 4.74 10*3/MM3 (ref 1.7–7)
NEUTROPHILS NFR BLD MANUAL: 60 % (ref 42.7–76)
NT-PROBNP SERPL-MCNC: 902.8 PG/ML (ref 5–900)
PLAT MORPH BLD: NORMAL
PLATELET # BLD AUTO: 228 10*3/MM3 (ref 140–450)
PMV BLD AUTO: 7.2 FL (ref 6–12)
POTASSIUM BLD-SCNC: 3.3 MMOL/L (ref 3.5–5.2)
RBC # BLD AUTO: 2.45 10*6/MM3 (ref 3.77–5.28)
RBC MORPH BLD: NORMAL
SCAN SLIDE: NORMAL
SODIUM BLD-SCNC: 137 MMOL/L (ref 136–145)
TOXIC GRANULATION: ABNORMAL
WBC NRBC COR # BLD: 7.9 10*3/MM3 (ref 3.4–10.8)

## 2019-11-24 PROCEDURE — 99232 SBSQ HOSP IP/OBS MODERATE 35: CPT | Performed by: INTERNAL MEDICINE

## 2019-11-24 PROCEDURE — 25010000002 CEFTRIAXONE PER 250 MG: Performed by: INTERNAL MEDICINE

## 2019-11-24 PROCEDURE — 94799 UNLISTED PULMONARY SVC/PX: CPT

## 2019-11-24 PROCEDURE — 85025 COMPLETE CBC W/AUTO DIFF WBC: CPT | Performed by: INTERNAL MEDICINE

## 2019-11-24 PROCEDURE — 83735 ASSAY OF MAGNESIUM: CPT | Performed by: INTERNAL MEDICINE

## 2019-11-24 PROCEDURE — 85007 BL SMEAR W/DIFF WBC COUNT: CPT | Performed by: INTERNAL MEDICINE

## 2019-11-24 PROCEDURE — 80048 BASIC METABOLIC PNL TOTAL CA: CPT | Performed by: INTERNAL MEDICINE

## 2019-11-24 PROCEDURE — 99239 HOSP IP/OBS DSCHRG MGMT >30: CPT | Performed by: INTERNAL MEDICINE

## 2019-11-24 PROCEDURE — 83880 ASSAY OF NATRIURETIC PEPTIDE: CPT | Performed by: INTERNAL MEDICINE

## 2019-11-24 PROCEDURE — 25010000002 FUROSEMIDE PER 20 MG: Performed by: INTERNAL MEDICINE

## 2019-11-24 RX ORDER — FUROSEMIDE 20 MG/1
20 TABLET ORAL DAILY
Qty: 30 TABLET | Refills: 0 | Status: ON HOLD | OUTPATIENT
Start: 2019-11-24 | End: 2022-03-18 | Stop reason: SDUPTHER

## 2019-11-24 RX ORDER — GUAIFENESIN 600 MG/1
1200 TABLET, EXTENDED RELEASE ORAL EVERY 12 HOURS SCHEDULED
Qty: 40 TABLET | Refills: 0 | Status: SHIPPED | OUTPATIENT
Start: 2019-11-24 | End: 2019-12-04

## 2019-11-24 RX ORDER — PANTOPRAZOLE SODIUM 40 MG/1
40 TABLET, DELAYED RELEASE ORAL DAILY
Qty: 30 TABLET | Refills: 0 | Status: SHIPPED | OUTPATIENT
Start: 2019-11-24

## 2019-11-24 RX ORDER — LOSARTAN POTASSIUM 25 MG/1
25 TABLET ORAL
Qty: 30 TABLET | Refills: 0 | Status: SHIPPED | OUTPATIENT
Start: 2019-11-25 | End: 2020-02-18

## 2019-11-24 RX ADMIN — NYSTATIN 10 ML: 100000 SUSPENSION ORAL at 12:41

## 2019-11-24 RX ADMIN — CLOPIDOGREL BISULFATE 75 MG: 75 TABLET ORAL at 08:51

## 2019-11-24 RX ADMIN — GUAIFENESIN 1200 MG: 600 TABLET, EXTENDED RELEASE ORAL at 08:51

## 2019-11-24 RX ADMIN — ROSUVASTATIN CALCIUM 10 MG: 10 TABLET, FILM COATED ORAL at 08:53

## 2019-11-24 RX ADMIN — ISOSORBIDE MONONITRATE 30 MG: 30 TABLET, EXTENDED RELEASE ORAL at 08:52

## 2019-11-24 RX ADMIN — LORAZEPAM 1 MG: 1 TABLET ORAL at 08:53

## 2019-11-24 RX ADMIN — LORAZEPAM 1 MG: 1 TABLET ORAL at 00:48

## 2019-11-24 RX ADMIN — GABAPENTIN 400 MG: 400 CAPSULE ORAL at 08:52

## 2019-11-24 RX ADMIN — DOCUSATE SODIUM 100 MG: 100 CAPSULE, LIQUID FILLED ORAL at 08:51

## 2019-11-24 RX ADMIN — CEFTRIAXONE SODIUM 1 G: 1 INJECTION, POWDER, FOR SOLUTION INTRAMUSCULAR; INTRAVENOUS at 08:53

## 2019-11-24 RX ADMIN — IPRATROPIUM BROMIDE AND ALBUTEROL SULFATE 3 ML: .5; 3 SOLUTION RESPIRATORY (INHALATION) at 14:55

## 2019-11-24 RX ADMIN — DOXYCYCLINE 100 MG: 100 INJECTION, POWDER, LYOPHILIZED, FOR SOLUTION INTRAVENOUS at 09:39

## 2019-11-24 RX ADMIN — BENZONATATE 200 MG: 100 CAPSULE ORAL at 08:52

## 2019-11-24 RX ADMIN — FLUTICASONE PROPIONATE 2 SPRAY: 50 SPRAY, METERED NASAL at 08:58

## 2019-11-24 RX ADMIN — SERTRALINE 100 MG: 100 TABLET, FILM COATED ORAL at 08:53

## 2019-11-24 RX ADMIN — ASPIRIN 81 MG: 81 TABLET, COATED ORAL at 08:52

## 2019-11-24 RX ADMIN — ESTRADIOL 1 MG: 1 TABLET ORAL at 08:56

## 2019-11-24 RX ADMIN — PANTOPRAZOLE SODIUM 40 MG: 40 INJECTION, POWDER, FOR SOLUTION INTRAVENOUS at 08:54

## 2019-11-24 RX ADMIN — THYROID, PORCINE 60 MG: 30 TABLET ORAL at 08:51

## 2019-11-24 RX ADMIN — RANOLAZINE 1000 MG: 500 TABLET, FILM COATED, EXTENDED RELEASE ORAL at 08:52

## 2019-11-24 RX ADMIN — FERROUS SULFATE TAB EC 324 MG (65 MG FE EQUIVALENT) 324 MG: 324 (65 FE) TABLET DELAYED RESPONSE at 08:52

## 2019-11-24 RX ADMIN — FUROSEMIDE 40 MG: 10 INJECTION, SOLUTION INTRAMUSCULAR; INTRAVENOUS at 08:53

## 2019-11-24 RX ADMIN — IPRATROPIUM BROMIDE AND ALBUTEROL SULFATE 3 ML: .5; 3 SOLUTION RESPIRATORY (INHALATION) at 10:30

## 2019-11-24 RX ADMIN — LOSARTAN POTASSIUM 25 MG: 25 TABLET, FILM COATED ORAL at 08:53

## 2019-11-24 RX ADMIN — NYSTATIN 10 ML: 100000 SUSPENSION ORAL at 08:58

## 2019-11-24 RX ADMIN — IPRATROPIUM BROMIDE AND ALBUTEROL SULFATE 3 ML: .5; 3 SOLUTION RESPIRATORY (INHALATION) at 06:30

## 2019-11-24 RX ADMIN — Medication 10 ML: at 08:55

## 2019-11-24 NOTE — PLAN OF CARE
Problem: Patient Care Overview  Goal: Plan of Care Review  Outcome: Ongoing (interventions implemented as appropriate)   11/24/19 0570   OTHER   Outcome Summary pt states she feels better. Complained of neck and body pain that was relieved with meds. Will continue to monitor.   Coping/Psychosocial   Plan of Care Reviewed With patient   Plan of Care Review   Progress no change     Goal: Individualization and Mutuality  Outcome: Ongoing (interventions implemented as appropriate)    Goal: Discharge Needs Assessment  Outcome: Ongoing (interventions implemented as appropriate)    Goal: Interprofessional Rounds/Family Conf  Outcome: Ongoing (interventions implemented as appropriate)      Problem: Pneumonia (Adult)  Goal: Signs and Symptoms of Listed Potential Problems Will be Absent, Minimized or Managed (Pneumonia)  Outcome: Ongoing (interventions implemented as appropriate)

## 2019-11-24 NOTE — DISCHARGE SUMMARY
Date of Admission: 11/18/2019    Date of Discharge:  11/24/2019    Length of stay:  LOS: 6 days     Discharge Diagnosis:   Chronic obstructive pulmonary disease  Community-acquired pneumonia  Acute on chronic diastolic heart failure  Essential hypertension  Abdominal aortic aneurysm-stable  Cough-resolved  Anemia-macrocytic  Presenting Problem/History of Present Illness  Active Hospital Problems    Diagnosis  POA   • Anemia of chronic disease [D63.8]  Unknown   • Diarrhea [R19.7]  Unknown   • Community acquired pneumonia [J18.9]  Yes   • Congestive heart failure (CMS/HCC) [I50.9]  Yes   • Chronic obstructive pulmonary disease (CMS/HCC) [J44.9]  Yes   • Coronary artery disease [I25.10]  Yes   • Dyslipidemia [E78.5]  Yes   • Hypertension [I10]  Yes   • Abdominal aortic aneurysm (AAA) (CMS/HCC) [I71.4]  Yes      Resolved Hospital Problems   No resolved problems to display.        Hospital Course  Patient is a 74 y.o. female presented with a nonproductive cough.  The patient had been treated as an outpatient and it failed outpatient management.  The patient was admitted and was found to have bibasilar pneumonia.  Patient was placed on IV Rocephin and doxycycline with breathing treatments with DuoNeb and oxygen.  The patient also was noted to have profound anemia, part of which was due to volume expansion due to her diastolic congestive heart failure.  The patient had 3 guaiac negative stools.  The patient continued to be anemic with macrocytic indices but normal B12 and folate levels.  The patient had a low iron with low TIBC.  Her retake count was rather low.  Hematology consult was obtained and the patient was started on Benefiber.  The patient did not have any decline in her hemoglobin hematocrit during her hospital stay.    The patient in terms of her anemia will need to follow-up with hematology as they are considering a bone marrow biopsy should the patient not improve on the iron supplementation.  The patient  should follow-up with her pulmonologist for ongoing pulmonary issues.  She has had marked improvement in her cough.  Her chest x-ray is improved but has not resolved.  She will need a chest x-ray in the next 2 to 4 weeks.  The patient should also follow up with her primary care provider in 3 to 5 days.  The patient should follow-up with her outpatient cardiologist in the next couple of weeks.  The patient did have an abdominal ultrasound to work-up her known abdominal aortic aneurysm.  The study was compared to a study that had been done 18 months prior and there was essentially no change.  There was also no evidence of rupture.    The patient is a full code at the time of discharge.  The patient is on 2 to 3 L of oxygen 24 hours a day.  The patient's medications are as listed in that section of this report.  The patient is on a cardiac diet.  The patient not have any pending studies at the time of discharge.  Patient is discharged in satisfactory condition.    Past Medical History:     Past Medical History:   Diagnosis Date   • Aneurysm (CMS/HCC)     AAA   • Arthritis    • CHF (congestive heart failure) (CMS/[facility])    • COPD (chronic obstructive pulmonary disease) (CMS/[facility])    • Dyslipidemia    • GERD (gastroesophageal reflux disease)    • History of right heart catheterization     7/30/2017; 10/2018   • Hypertension    • Hypothyroidism    • Myocardial infarct (CMS/HCC)    • Myocardial infarction (CMS/HCC)     x 3       Past Surgical History:     Past Surgical History:   Procedure Laterality Date   • ABDOMINAL AORTIC ANEURYSM REPAIR N/A 2001   • CARDIAC CATHETERIZATION     • CATARACT EXTRACTION Bilateral    • CORONARY ANGIOPLASTY WITH STENT PLACEMENT N/A     x 5   • CORONARY ARTERY BYPASS GRAFT      x 4 1995   • HYSTERECTOMY N/A        Social History:   Social History     Socioeconomic History   • Marital status:      Spouse name: Not on file   • Number of children: Not on file   • Years of education: Not on  file   • Highest education level: Not on file   Tobacco Use   • Smoking status: Former Smoker     Last attempt to quit: 1994     Years since quittin.9   • Smokeless tobacco: Never Used   Substance and Sexual Activity   • Alcohol use: No     Frequency: Never   • Drug use: No   • Sexual activity: Defer       Procedures Performed         Consults:   Consults     Date and Time Order Name Status Description    2019 0840 Hematology & Oncology Inpatient Consult Completed     2019 1711 Inpatient Cardiology Consult Completed     2019 0816 Hospitalist (on-call MD unless specified) Completed           Pertinent Test Results:     Lab Results (last 72 hours)     Procedure Component Value Units Date/Time    CBC & Differential [[id number]] Collected:  19    Specimen:  Blood Updated:  19    Narrative:       The following orders were created for panel order CBC & Differential.  Procedure                               Abnormality         Status                     ---------                               -----------         ------                     CBC Auto Differential[[id number]]        Abnormal            Final result                 Please view results for these tests on the individual orders.    CBC Auto Differential [[id number]]  (Abnormal) Collected:  19    Specimen:  Blood Updated:  19     WBC 7.90 10*3/mm3      RBC 2.45 10*6/mm3      Hemoglobin 8.3 g/dL      Hematocrit 24.8 %      .3 fL      MCH 34.0 pg      MCHC 33.6 g/dL      RDW 13.8 %      RDW-SD 49.0 fl      MPV 7.2 fL      Platelets 228 10*3/mm3     Scan Slide [[id number]] Collected:  19    Specimen:  Blood Updated:  19     Scan Slide --     Comment: See Manual Differential Results       Manual Differential [[id number]]  (Abnormal) Collected:  19    Specimen:  Blood Updated:  19     Neutrophil % 60.0 %      Lymphocyte % 31.0 %      Monocyte % 3.0 %       Eosinophil % 3.0 %      Metamyelocyte % 1.0 %      Myelocyte % 2.0 %      Neutrophils Absolute 4.74 10*3/mm3      Lymphocytes Absolute 2.45 10*3/mm3      Monocytes Absolute 0.24 10*3/mm3      Eosinophils Absolute 0.24 10*3/mm3      RBC Morphology Normal     Toxic Granulation Slight/1+     Platelet Morphology Normal    Basic Metabolic Panel [[id number]]  (Abnormal) Collected:  11/24/19 0317    Specimen:  Blood Updated:  11/24/19 0438     Glucose 103 mg/dL      BUN 17 mg/dL      Creatinine 0.86 mg/dL      Sodium 137 mmol/L      Potassium 3.3 mmol/L      Chloride 99 mmol/L      CO2 27.0 mmol/L      Calcium 9.0 mg/dL      eGFR Non African Amer 65 mL/min/1.73      BUN/Creatinine Ratio 19.8     Anion Gap 11.0 mmol/L     Narrative:       GFR Normal >60  Chronic Kidney Disease <60  Kidney Failure <15    Magnesium [[id number]]  (Normal) Collected:  11/24/19 0317    Specimen:  Blood Updated:  11/24/19 0438     Magnesium 1.6 mg/dL     BNP [[id number]]  (Abnormal) Collected:  11/24/19 0317    Specimen:  Blood Updated:  11/24/19 0427     proBNP 902.8 pg/mL     Narrative:       Among patients with dyspnea, NT-proBNP is highly sensitive for the detection of acute congestive heart failure. In addition NT-proBNP of <300 pg/ml effectively rules out acute congestive heart failure with 99% negative predictive value.    Blood Culture - Blood, Wrist, Right [[id number]] Collected:  11/18/19 0714    Specimen:  Blood from Wrist, Right Updated:  11/23/19 0801     Blood Culture No growth at 5 days    CBC & Differential [[id number]] Collected:  11/23/19 0449    Specimen:  Blood Updated:  11/23/19 0730    Narrative:       The following orders were created for panel order CBC & Differential.  Procedure                               Abnormality         Status                     ---------                               -----------         ------                     CBC Auto Differential[[id number]]        Abnormal            Final result                  Please view results for these tests on the individual orders.    CBC Auto Differential [[id number]]  (Abnormal) Collected:  11/23/19 0449    Specimen:  Blood Updated:  11/23/19 0730     WBC 7.50 10*3/mm3      RBC 2.50 10*6/mm3      Hemoglobin 8.6 g/dL      Hematocrit 24.5 %      MCV 98.2 fL      MCH 34.6 pg      MCHC 35.2 g/dL      Comment: Result checked         RDW 13.3 %      RDW-SD 45.9 fl      MPV 7.2 fL      Platelets 222 10*3/mm3      Neutrophil % 66.5 %      Lymphocyte % 22.1 %      Monocyte % 7.7 %      Eosinophil % 3.5 %      Basophil % 0.2 %      Neutrophils, Absolute 5.00 10*3/mm3      Lymphocytes, Absolute 1.60 10*3/mm3      Monocytes, Absolute 0.60 10*3/mm3      Eosinophils, Absolute 0.30 10*3/mm3      Basophils, Absolute 0.00 10*3/mm3      nRBC 0.1 /100 WBC     Narrative:       Appended report. These results have been appended to a previously verified report.    Scan Slide [[id number]]  (Normal) Collected:  11/23/19 0449    Specimen:  Blood Updated:  11/23/19 0730     RBC Morphology Normal     WBC Morphology Normal     Platelet Morphology Normal    Narrative:       Slide Reviewed    Blood Culture - Blood, Arm, Left [[id number]] Collected:  11/18/19 0649    Specimen:  Blood from Arm, Left Updated:  11/23/19 0716     Blood Culture No growth at 5 days    Basic Metabolic Panel [[id number]]  (Abnormal) Collected:  11/23/19 0449    Specimen:  Blood Updated:  11/23/19 0555     Glucose 116 mg/dL      BUN 14 mg/dL      Creatinine 0.81 mg/dL      Sodium 141 mmol/L      Potassium 3.7 mmol/L      Chloride 103 mmol/L      CO2 30.0 mmol/L      Calcium 9.0 mg/dL      eGFR Non African Amer 69 mL/min/1.73      BUN/Creatinine Ratio 17.3     Anion Gap 8.0 mmol/L     Narrative:       GFR Normal >60  Chronic Kidney Disease <60  Kidney Failure <15    Magnesium [[id number]]  (Normal) Collected:  11/23/19 0449    Specimen:  Blood Updated:  11/23/19 0555     Magnesium 1.6 mg/dL     BNP [[id number]]  (Abnormal) Collected:   11/23/19 0449    Specimen:  Blood Updated:  11/23/19 0545     proBNP 1,005.0 pg/mL     Narrative:       Among patients with dyspnea, NT-proBNP is highly sensitive for the detection of acute congestive heart failure. In addition NT-proBNP of <300 pg/ml effectively rules out acute congestive heart failure with 99% negative predictive value.    Haptoglobin [[id number]]  (Abnormal) Collected:  11/22/19 1559    Specimen:  Blood Updated:  11/22/19 2005     Haptoglobin 357 mg/dL     Sedimentation Rate [[id number]]  (Abnormal) Collected:  11/22/19 0411    Specimen:  Blood Updated:  11/22/19 1621     Sed Rate 72 mm/hr     Protein Elec + Interp, Serum [[id number]] Collected:  11/22/19 1559    Specimen:  Blood Updated:  11/22/19 1620    Basic Metabolic Panel [[id number]]  (Abnormal) Collected:  11/22/19 0411    Specimen:  Blood Updated:  11/22/19 0522     Glucose 121 mg/dL      BUN 13 mg/dL      Creatinine 0.74 mg/dL      Sodium 139 mmol/L      Potassium 3.9 mmol/L      Chloride 102 mmol/L      CO2 28.0 mmol/L      Calcium 9.1 mg/dL      eGFR Non African Amer 77 mL/min/1.73      BUN/Creatinine Ratio 17.6     Anion Gap 9.0 mmol/L     Narrative:       GFR Normal >60  Chronic Kidney Disease <60  Kidney Failure <15    Magnesium [[id number]]  (Normal) Collected:  11/22/19 0411    Specimen:  Blood Updated:  11/22/19 0522     Magnesium 1.9 mg/dL     BNP [[id number]]  (Abnormal) Collected:  11/22/19 0411    Specimen:  Blood Updated:  11/22/19 0513     proBNP 996.1 pg/mL     Narrative:       Among patients with dyspnea, NT-proBNP is highly sensitive for the detection of acute congestive heart failure. In addition NT-proBNP of <300 pg/ml effectively rules out acute congestive heart failure with 99% negative predictive value.    CBC & Differential [[id number]] Collected:  11/22/19 0411    Specimen:  Blood Updated:  11/22/19 0453    Narrative:       The following orders were created for panel order CBC & Differential.  Procedure                                Abnormality         Status                     ---------                               -----------         ------                     CBC Auto Differential[[id number]]        Abnormal            Final result                 Please view results for these tests on the individual orders.    CBC Auto Differential [[id number]]  (Abnormal) Collected:  11/22/19 0411    Specimen:  Blood Updated:  11/22/19 0453     WBC 8.70 10*3/mm3      RBC 2.62 10*6/mm3      Hemoglobin 8.6 g/dL      Hematocrit 25.9 %      MCV 99.0 fL      MCH 32.8 pg      MCHC 33.2 g/dL      RDW 13.5 %      RDW-SD 47.7 fl      MPV 7.6 fL      Platelets 234 10*3/mm3      Neutrophil % 63.5 %      Lymphocyte % 25.4 %      Monocyte % 8.2 %      Eosinophil % 2.7 %      Basophil % 0.2 %      Neutrophils, Absolute 5.50 10*3/mm3      Lymphocytes, Absolute 2.20 10*3/mm3      Monocytes, Absolute 0.70 10*3/mm3      Eosinophils, Absolute 0.20 10*3/mm3      Basophils, Absolute 0.00 10*3/mm3      nRBC 0.0 /100 WBC     Occult Blood, Fecal By Immunoassay - Stool, Per Rectum [[id number]]  (Normal) Collected:  11/21/19 1454    Specimen:  Stool from Per Rectum Updated:  11/21/19 1600     Occult Blood, Fecal by Immunoassay Negative          Results for orders placed during the hospital encounter of 11/18/19   Adult Transthoracic Echo Complete W/ Cont if Necessary Per Protocol    Narrative · Left ventricular systolic function is normal.  · Estimated EF was in agreement with the calculated EF. Estimated EF   appears to be in the range of 66 - 70%.  · Mild-to-moderate mitral valve regurgitation is present  · Left ventricular wall thickness is consistent with borderline concentric   hypertrophy.  · Mild-to-moderate pulmonic valve regurgitation is present.  · Left atrial cavity size is mild-to-moderately dilated.          Imaging Results (All)     Procedure Component Value Units Date/Time    US Aorta Limited [[id number]] Collected:  11/22/19 0826     Updated:  11/22/19  0833    Narrative:          DATE OF EXAM:   11/22/2019 7:52 AM     PROCEDURE:   US AORTA LIMITED-     INDICATIONS:   History of abdominal aortic aneurysm with reported recurrence, which is  not been followed up on in several years; J18.9-Pneumonia, unspecified  organism; I50.9-Heart failure, unspecified; R50.9-Fever, unspecified;  R05-Cough.     COMPARISON:  CT examination of the abdomen and pelvis, 1/5/2018.     TECHNIQUE:   2-dimensional grayscale images as well as color and spectral Doppler  analysis are provided for review. The study was tailored in order to  evaluate the abdominal aorta and common iliac arteries.     FINDINGS:   There is a fusiform aneurysm of the infrarenal abdominal aorta, which  measures approximately 3.5 cm in greatest diameter by ultrasound  examination. It is probably similar in size to the prior CT study.  Atherosclerotic change is present in the imaged aortoiliac arterial  system. The aortic bifurcation is patent.  The maximum diameter of the  right common iliac artery is 0.9 cm.  The maximum diameter of the left  common iliac artery is 1.1 cm. Abdominal/pelvic CT examination follow-up  may be helpful in further imaging assessment if clinically warranted and  if not contraindicated.        Impression:       IMPRESSION :   Probably little interval change is seen in the caliber of the fusiform  infrarenal abdominal aortic aneurysm, measuring 3.5 cm in maximum  diameter, since the prior abdominal/pelvic CT study from 1/5/2018.     Electronically Signed By-Dr. Quoc Parker MD On:11/22/2019 8:31 AM  This report was finalized on [id number] by Dr. Quoc Parker MD.    XR Chest 1 View [[id number]] Collected:  11/22/19 0733     Updated:  11/22/19 0736    Narrative:       DATE OF EXAM:  11/22/2019 6:36 AM     PROCEDURE:  XR CHEST 1 VW-     INDICATIONS:  Follow-up pneumonia; J18.9-Pneumonia, unspecified organism; I50.9-Heart  failure, unspecified; R50.9-Fever, unspecified; R05-Cough      COMPARISON:  11/20/2019     TECHNIQUE:   Single radiographic AP view of the chest was obtained.     FINDINGS:  Cardiac size is stable. There are postoperative changes of prior CABG.  Predominantly interstitial infiltrates persist in the bases, unchanged.        Impression:          1. No significant interval change.  2. Status post CABG.  3. Basilar infiltrates, possibly representing fibrosis.     Electronically Signed By-Av Rosas On:11/22/2019 7:34 AM  This report was finalized on [id number] by  Av Rosas, .    XR Chest 1 View [[id number]] Collected:  11/20/19 0727     Updated:  11/20/19 0731    Narrative:          DATE OF EXAM:   11/20/2019 6:35 AM     PROCEDURE:   XR CHEST 1 VW-     INDICATIONS:   f/u pulmonary edema; J18.9-Pneumonia, unspecified organism; I50.9-Heart  failure, unspecified; R50.9-Fever, unspecified; R05-Cough     COMPARISON:  11/18/2019     TECHNIQUE:   [Portable chest radiograph]     FINDINGS:    Postoperative changes of sternotomy and CABG. Heart size normal. Mild  bibasilar airspace disease unchanged. No pneumothorax. No large  effusion. Stable appearance of biapical scarring. Granuloma the right  upper lung.       Impression:       No change in bibasilar airspace disease which again could relate to  pneumonia, atelectasis, or progression of chronic fibrosis.     Electronically Signed By-Nahid Cooley On:11/20/2019 7:29 AM  This report was finalized on [id number] by  Nahid Cooley, .    XR Chest 2 View [[id number]] Collected:  11/18/19 0648     Updated:  11/18/19 0652    Narrative:       DATE OF EXAM:  11/18/2019 6:42 AM     PROCEDURE:  XR CHEST 2 VW-     INDICATIONS:  CHF/COPD Protocol     COMPARISON:  10/28/2018.     TECHNIQUE:   Two radiologic views of the chest , PA and lateral were obtained.     FINDINGS:  Bibasilar pulmonary opacities are seen, which may represent atelectasis,  infiltrate, and/or fibrosis. A similar appearance was seen previously.  Pneumonia cannot be excluded. No  cardiac enlargement is seen. The  thoracic aorta is atherosclerotic. There is chronic calcified  granulomatous disease of the chest. Biapical pleural-parenchymal  scarring is noted. No pneumothorax is seen. The patient has undergone  median sternotomy and suspected CABG surgery. No pleural effusion is  suggested.        Impression:       Bibasilar pulmonary opacities are seen, which are similar or increased  in degree since the prior study. The findings may represent progression  of fibrosis. Atelectasis and/or pneumonia cannot be excluded.     Electronically Signed By-Dr. Quoc Parker MD On:11/18/2019 6:50 AM  This report was finalized on [id number] by Dr. Quoc Parker MD.            Condition on Discharge: Satisfactory    Vital Signs  Temp:  [97.3 °F (36.3 °C)-98.5 °F (36.9 °C)] 98.5 °F (36.9 °C)  Heart Rate:  [71-92] 76  Resp:  [15-18] 18  BP: (118-163)/(62-73) 160/73    Physical Exam:    General Appearance:    Alert, cooperative, in no acute distress   Head:    Normocephalic, without obvious abnormality, atraumatic   Eyes:            Lids and lashes normal, conjunctivae and sclerae normal, no   icterus, no pallor, corneas clear, PERRLA   Ears:    Ears appear intact with no abnormalities noted   Throat:   No oral lesions, no thrush, oral mucosa moist   Neck:   No adenopathy, supple, trachea midline, no thyromegaly, no     carotid bruit, no JVD   Back:     No kyphosis present, no scoliosis present, no skin lesions,       erythema or scars, no tenderness to percussion or                   palpation,   range of motion normal   Lungs:     Clear to auscultation,respirations regular, even and                   unlabored    Heart:    Regular rhythm and normal rate, normal S1 and S2, no            murmur, no gallop, no rub, no click   Breast Exam:    Deferred   Abdomen:     Normal bowel sounds, no masses, no organomegaly, soft        non-tender, non-distended, no guarding, no rebound                 tenderness    Genitalia:    Deferred   Extremities:   Moves all extremities well, no edema, no cyanosis, no              redness   Pulses:   Pulses palpable and equal bilaterally   Skin:   No bleeding, bruising or rash   Lymph nodes:   No palpable adenopathy   Neurologic:   Cranial nerves 2 - 12 grossly intact, sensation intact, DTR        present and equal bilaterally         Discharge Disposition  Home or Self Care    Discharge Medications     Discharge Medications      New Medications      Instructions Start Date   furosemide 20 MG tablet  Commonly known as:  LASIX   20 mg, Oral, Daily      guaiFENesin 600 MG 12 hr tablet  Commonly known as:  MUCINEX   1,200 mg, Oral, Every 12 Hours Scheduled      losartan 25 MG tablet  Commonly known as:  COZAAR   25 mg, Oral, Every 24 Hours Scheduled   Start Date:  11/25/2019     pantoprazole 40 MG EC tablet  Commonly known as:  PROTONIX   40 mg, Oral, Daily         Continue These Medications      Instructions Start Date   aspirin 81 MG EC tablet   81 mg, Oral, Daily      benzonatate 100 MG capsule  Commonly known as:  TESSALON   100 mg, Oral, 3 Times Daily PRN      BIOTIN 5000 5 MG capsule  Generic drug:  Biotin   1 capsule, Oral, Daily      CRESTOR 10 MG tablet  Generic drug:  rosuvastatin   10 mg, Oral, Daily      estradiol 1 MG tablet  Commonly known as:  ESTRACE   1 mg, Oral, 3 Times Daily      ferrous sulfate 324 (65 Fe) MG tablet delayed-release EC tablet   324 mg, Oral, Daily With Breakfast      fluticasone 50 MCG/ACT nasal spray  Commonly known as:  FLONASE   2 sprays, Nasal, Daily      gabapentin 400 MG capsule  Commonly known as:  NEURONTIN   400 mg, Oral, 3 Times Daily      HYDROcodone-acetaminophen  MG per tablet  Commonly known as:  NORCO   1 tablet, Oral, Every 6 Hours PRN      isosorbide mononitrate 30 MG 24 hr tablet  Commonly known as:  IMDUR   30 mg, Oral, Every 24 Hours      LORazepam 1 MG tablet  Commonly known as:  ATIVAN   1 mg, Oral, 4 Times Daily PRN       methocarbamol 500 MG tablet  Commonly known as:  ROBAXIN   500 mg, Oral, 3 Times Daily PRN      NITROSTAT 0.4 MG SL tablet  Generic drug:  nitroglycerin   0.4 mg, Sublingual, Every 5 Minutes PRN      PLAVIX 75 MG tablet  Generic drug:  clopidogrel   75 mg, Oral, Daily      RANEXA 1000 MG 12 hr tablet  Generic drug:  ranolazine   1,000 mg, Oral, Every 12 Hours Scheduled      sertraline 100 MG tablet  Commonly known as:  ZOLOFT   100 mg, Oral, Daily      Thyroid 60 MG tablet  Commonly known as:  ARMOUR   60 mg, Oral, Daily      Tiotropium Bromide Monohydrate 1.25 MCG/ACT aerosol solution inhaler  Commonly known as:  SPIRIVA RESPIMAT   2 puffs, Inhalation, 2 Times Daily         Stop These Medications    Unable to find            Discharge Diet:     Activity at Discharge:     Follow-up Appointments  Future Appointments   Date Time Provider Department Center   2/3/2020 11:15 AM Roc Butler DO [facility] None         Test Results Pending at Discharge   Order Current Status    Protein Elec + Interp, Serum In process           Risk for Readmission (LACE) Score: 12 (11/24/2019  6:01 AM)          Ashtyn Salter MD  11/24/19  3:42 PM    Time: Discharge 35 min

## 2019-11-24 NOTE — NURSING NOTE
Exercise Oximetry    Patient Name:Gayathri Reddy   MRN: [id number]   Date: 11/24/19             ROOM AIR BASELINE   SpO2% 77   Heart Rate 88   Blood Pressure      EXERCISE ON ROOM AIR SpO2% EXERCISE ON O2 @  LPM SpO2%   1 MINUTE  1 MINUTE    2 MINUTES  2 MINUTES    3 MINUTES  3 MINUTES    4 MINUTES  4 MINUTES    5 MINUTES  5 MINUTES    6 MINUTES  6 MINUTES               Distance Walked   Distance Walked   Dyspnea (Ly Scale)   Dyspnea (Ly Scale)   Fatigue (Ly Scale)   Fatigue (Ly Scale)   SpO2% Post Exercise   SpO2% Post Exercise   HR Post Exercise   HR Post Exercise   Time to Recovery   Time to Recovery     Comments: O2 SAT ON ROOM AIR AT REST 77%, PLACED ON 2 LPM O2 SAT INCREASED TO 88%, INCREASED O2 TO 3 LPM O2 SATS INCREASED TO 93%

## 2019-11-24 NOTE — PROGRESS NOTES
Hematology/Oncology Inpatient Progress Note    PATIENT NAME: Gayathri Reddy  : 1945  MRN: [id number]    CHIEF COMPLAINT: Shortness of breath and anemia    HISTORY OF PRESENT ILLNESS:    74 y.o. female direct admitted 2019 secondary to increasing cough and shortness of breath with orthopnea.  She reported temperature up to 100.6° Fahrenheit at home.  Admission labs showed WBC 9.8, hemoglobin 9.1, .0, and platelets 177,000.  Creatinine was 1.01.  There was no elevation of LFTs.  BNP was high (4031).  Troponin was normal.  Hemoglobin dropped to 7.7 post admission.  Anemia labs showed low iron, iron saturation (4%), and TIBC.  LDH was low.  Reticulocyte count was mildly elevated.  Vitamin B12 and folate were normal.  Hemoccult stool was negative. She reported chronic anemia on admission on oral iron therapy at home along with occasional bleeding from hemorrhoids.  Her last colonoscopy was over 10 years ago and done at [facility] where she had 4-6 polyps removed per her report.  She reported normal EGD at that time.  Chest x-ray showed stable bibasilar pulmonary opacities from prior exam done 10/28/2019 felt by the radiologist to possibly represent pneumonia, atelectasis, or progression of chronic fibrosis. Respiratory viral panel was negative.  An echocardiogram showed normal LVEF and mild to moderate mitral and pulmonic valve regurgitation.  There was borderline concentric hypertrophy and mild to moderate dilation of left atrial cavity. PMH significant for CAD status post multiple stent procedures and history of CABG in , and AAA.  Cardiology was consulted and she was started on Rocephin and diuresis.     19  Hematology/Oncology was consulted for anemia.  · S/p Venofer 400 mg IV x1 on 19     PCP: Deonte Hector MD     Subjective Patient is doing well.  She thinks she wants to go home.  N.    ROS:  Review of Systems   Constitutional: Positive for appetite change (  ) and fatigue. Negative for chills and fever.   HENT: Negative for ear pain, mouth sores, nosebleeds and sore throat.    Eyes: Negative for photophobia and visual disturbance.   Respiratory: Positive for cough and shortness of breath. Negative for wheezing and stridor.    Cardiovascular: Negative for chest pain and palpitations.   Gastrointestinal: Negative for abdominal pain, diarrhea, nausea and vomiting.   Endocrine: Negative for cold intolerance and heat intolerance.   Genitourinary: Negative for dysuria and hematuria.   Musculoskeletal: Negative for joint swelling and neck stiffness.   Skin: Negative for color change and rash.   Neurological: Positive for headaches. Negative for seizures and syncope.   Hematological: Negative for adenopathy. Bruises/bleeds easily.        No obvious bleeding   Psychiatric/Behavioral: Negative for agitation, confusion and hallucinations.   All other systems reviewed and are negative.    MEDICATIONS:    Scheduled Meds:      aspirin 81 mg Oral Daily   benzonatate 200 mg Oral TID   cefTRIAXone 1 g Intravenous Q24H   clopidogrel 75 mg Oral Daily   docusate sodium 100 mg Oral Daily   doxycycline 100 mg Intravenous Q12H   enoxaparin 40 mg Subcutaneous Q24H   estradiol 1 mg Oral TID   ferrous sulfate 324 mg Oral Daily With Breakfast   fluticasone 2 spray Nasal Daily   furosemide 40 mg Intravenous Daily   gabapentin 400 mg Oral TID   guaiFENesin 1,200 mg Oral Q12H   hydrocortisone-diphenhydramine-nystatin 10 mL Swish & Spit 4x Daily   ipratropium-albuterol 3 mL Nebulization 4x Daily - RT   isosorbide mononitrate 30 mg Oral Q24H   losartan 25 mg Oral Q24H   pantoprazole 40 mg Intravenous Q12H   ranolazine 1,000 mg Oral Q12H   rosuvastatin 10 mg Oral Daily   sertraline 100 mg Oral Daily   sodium chloride 10 mL Intravenous Q12H   Thyroid 60 mg Oral Daily      Continuous Infusions:      PRN Meds:  •  acetaminophen **OR** acetaminophen **OR** acetaminophen  •  benzocaine-menthol  •   "benzonatate  •  hydrALAZINE  •  HYDROcodone-acetaminophen  •  HYDROcodone-acetaminophen  •  HYDROcodone-homatropine  •  LORazepam  •  magnesium sulfate **OR** magnesium sulfate **OR** magnesium sulfate  •  melatonin  •  methocarbamol  •  nitroglycerin  •  ondansetron **OR** ondansetron  •  potassium chloride  •  potassium chloride  •  sodium chloride  •  sodium chloride  •  traMADol     ALLERGIES:    Allergies   Allergen Reactions   • Naprosyn  [Naproxen] Rash   • Bactrim [Sulfamethoxazole-Trimethoprim] Rash       Objective    VITALS:   /73   Pulse 76   Temp 98.5 °F (36.9 °C)   Resp 18   Ht 162.6 cm (64\")   Wt 63.1 kg (139 lb 1.8 oz)   SpO2 98%   BMI 23.88 kg/m²     PHYSICAL EXAM:  Physical Exam   Constitutional: She is oriented to person, place, and time. She appears well-developed and well-nourished. No distress. Nasal cannula in place.   HENT:   Head: Normocephalic and atraumatic.   Mouth/Throat: She has dentures.   Eyes: Conjunctivae and EOM are normal. Right eye exhibits no discharge. Left eye exhibits no discharge. No scleral icterus.   Neck: Normal range of motion. Neck supple. No thyromegaly present.   Cardiovascular: Normal rate, regular rhythm and normal heart sounds. Exam reveals no gallop and no friction rub.   Pulmonary/Chest: Effort normal. No stridor. No respiratory distress. She has no wheezes. She has rales.   Abdominal: Soft. Bowel sounds are normal. She exhibits no mass. There is no tenderness. There is no rebound and no guarding.   Musculoskeletal: Normal range of motion. She exhibits no tenderness.   Neurological: She is alert and oriented to person, place, and time. She exhibits normal muscle tone.   Skin: Skin is warm and dry. No rash noted. She is not diaphoretic. No erythema.   Psychiatric: She has a normal mood and affect. Her behavior is normal.   Nursing note and vitals reviewed.    RECENT LABS:  Lab Results (last 24 hours)     Procedure Component Value Units Date/Time    CBC " & Differential [[id number]] Collected:  11/24/19 0317    Specimen:  Blood Updated:  11/24/19 0525    Narrative:       The following orders were created for panel order CBC & Differential.  Procedure                               Abnormality         Status                     ---------                               -----------         ------                     CBC Auto Differential[[id number]]        Abnormal            Final result                 Please view results for these tests on the individual orders.    CBC Auto Differential [[id number]]  (Abnormal) Collected:  11/24/19 0317    Specimen:  Blood Updated:  11/24/19 0525     WBC 7.90 10*3/mm3      RBC 2.45 10*6/mm3      Hemoglobin 8.3 g/dL      Hematocrit 24.8 %      .3 fL      MCH 34.0 pg      MCHC 33.6 g/dL      RDW 13.8 %      RDW-SD 49.0 fl      MPV 7.2 fL      Platelets 228 10*3/mm3     Scan Slide [[id number]] Collected:  11/24/19 0317    Specimen:  Blood Updated:  11/24/19 0525     Scan Slide --     Comment: See Manual Differential Results       Manual Differential [[id number]]  (Abnormal) Collected:  11/24/19 0317    Specimen:  Blood Updated:  11/24/19 0525     Neutrophil % 60.0 %      Lymphocyte % 31.0 %      Monocyte % 3.0 %      Eosinophil % 3.0 %      Metamyelocyte % 1.0 %      Myelocyte % 2.0 %      Neutrophils Absolute 4.74 10*3/mm3      Lymphocytes Absolute 2.45 10*3/mm3      Monocytes Absolute 0.24 10*3/mm3      Eosinophils Absolute 0.24 10*3/mm3      RBC Morphology Normal     Toxic Granulation Slight/1+     Platelet Morphology Normal    Basic Metabolic Panel [[id number]]  (Abnormal) Collected:  11/24/19 0317    Specimen:  Blood Updated:  11/24/19 0438     Glucose 103 mg/dL      BUN 17 mg/dL      Creatinine 0.86 mg/dL      Sodium 137 mmol/L      Potassium 3.3 mmol/L      Chloride 99 mmol/L      CO2 27.0 mmol/L      Calcium 9.0 mg/dL      eGFR Non African Amer 65 mL/min/1.73      BUN/Creatinine Ratio 19.8     Anion Gap 11.0 mmol/L     Narrative:        GFR Normal >60  Chronic Kidney Disease <60  Kidney Failure <15    Magnesium [[id number]]  (Normal) Collected:  11/24/19 0317    Specimen:  Blood Updated:  11/24/19 0438     Magnesium 1.6 mg/dL     BNP [[id number]]  (Abnormal) Collected:  11/24/19 0317    Specimen:  Blood Updated:  11/24/19 0427     proBNP 902.8 pg/mL     Narrative:       Among patients with dyspnea, NT-proBNP is highly sensitive for the detection of acute congestive heart failure. In addition NT-proBNP of <300 pg/ml effectively rules out acute congestive heart failure with 99% negative predictive value.        PENDING RESULTS: SPEP    IMAGING REVIEWED:  No radiology results for the last day    I have reviewed the patient's labs, imaging, reports, and other clinician documentation.    Assessment/Plan   ASSESSMENT  1. Macrocytic anemia -  Iron studies with low iron, iron sat, and TIBC.  Chronic and home oral ferrous sulfate has been continued during admission.  Stool heme negative.  Last colonoscopy and EGD with polypectomy per patient report and normal EGD at least 10 years ago per patient. Not likely hemolysis with decreased LDH and mild elevation of reticulocytes, haptoglobin 357, no vitamin B12 or folate deficiencies. ESR 72.  Stable AAA. On PPI. Prior creatinine levels do not indicate CKD. Paraproteinemia or bone marrow etiologies possible.   · Iron deficiency anemia: S/p Venofer 400 mg IV x1 on 11/22/19  2. Dyspnea/low grade fever - CXR findings due to PNA vs pulmonary fibrosis or cardiac etiologies.  RVP neg and blood cx NGTD.  Improved post admit with diuretics and Rocephin.   3. CAD/h/o stents and CABG - per Cardiology.  4. Infrarenal AAA - ultrasound showed 3.5 cm and felt stable since 1/2018.  5. Renal insufficiency - improved post hydration.  6. Hypothyroidism -  Per primary team.     PLAN  1. CBC daily  2. Continue supportive care   3. Consider bone marrow biopsy as outpatient if not enough response to IV iron and treatment of  infection     Electronically signed by RADHA Sinclair, 11/24/19, 2:41 PM.      I have personally performed a face-to-face diagnostic evaluation on this patient.  I have reviewed and agreed with the care plan.  Physical exam reveals decreased air entry in the lungs with some wheezing.  Hematology consulted for anemia.  Work-up shows anemia of chronic inflammation along with some evidence of iron deficiency.  No evidence of any occult GI bleeding.    Status post IV iron infusion yesterday..    If patient does not have enough response to IV iron and to treatment of infection, I will consider doing bone marrow biopsy as outpatient.  Reviewed creatinine patient has a normal creatinine.     Okay to discharge and follow-up with us in about 2 weeks.  I discussed the patients findings and my recommendations with patient.    Alfredo Lozoya MD  11/24/19  3:03 PM

## 2019-11-25 ENCOUNTER — READMISSION MANAGEMENT (OUTPATIENT)
Dept: CALL CENTER | Facility: HOSPITAL | Age: 74
End: 2019-11-25

## 2019-11-25 LAB
ALBUMIN SERPL-MCNC: 3 G/DL (ref 2.9–4.4)
ALBUMIN/GLOB SERPL: 0.9 {RATIO} (ref 0.7–1.7)
ALPHA1 GLOB FLD ELPH-MCNC: 0.5 G/DL (ref 0–0.4)
ALPHA2 GLOB SERPL ELPH-MCNC: 1 G/DL (ref 0.4–1)
B-GLOBULIN SERPL ELPH-MCNC: 1 G/DL (ref 0.7–1.3)
GAMMA GLOB SERPL ELPH-MCNC: 0.7 G/DL (ref 0.4–1.8)
GLOBULIN SER CALC-MCNC: 3.2 G/DL (ref 2.2–3.9)
Lab: ABNORMAL
M-SPIKE: ABNORMAL G/DL
PROT PATTERN SERPL ELPH-IMP: ABNORMAL
PROT SERPL-MCNC: 6.2 G/DL (ref 6–8.5)

## 2019-11-25 NOTE — PROGRESS NOTES
Case Management Discharge Note      Final Note: home    Provided post acute provider list?: (deneis needs)                 Final Discharge Disposition Code: 01 - home or self-care

## 2019-11-25 NOTE — OUTREACH NOTE
Prep Survey      Responses   Facility patient discharged from?  [facility]   Is patient eligible?  Yes   Discharge diagnosis  PNA, A/C CHF, anemia   Does the patient have one of the following disease processes/diagnoses(primary or secondary)?  COPD/Pneumonia   Does the patient have Home health ordered?  No   Is there a DME ordered?  No   Prep survey completed?  Yes          Arlene Justice RN

## 2019-11-26 ENCOUNTER — READMISSION MANAGEMENT (OUTPATIENT)
Dept: CALL CENTER | Facility: HOSPITAL | Age: 74
End: 2019-11-26

## 2019-11-26 NOTE — OUTREACH NOTE
COPD/PN Week 1 Survey      Responses   Facility patient discharged from?  [facility]   Does the patient have one of the following disease processes/diagnoses(primary or secondary)?  COPD/Pneumonia   Is there a successful TCM telephone encounter documented?  No   Was the primary reason for admission:  COPD exacerbation   Week 1 attempt successful?  Yes   Call start time  1456   Call end time  1459   Meds reviewed with patient/caregiver?  Yes   Is the patient having any side effects they believe may be caused by any medication additions or changes?  No   Does the patient have all medications ordered at discharge?  Yes   Is the patient taking all medications as directed (includes completed medication regime)?  Yes   Does the patient have a primary care provider?   Yes   Does the patient have an appointment with their PCP or pulmonologist within 7 days of discharge?  Greater than 7 days   Comments regarding PCP  PATIENT HAS FOLLOW UP APPOINTMENT WITH DR. DAI NEXT WEEK.    What is preventing the patient from scheduling follow up appointments within 7 days of discharge?  Earlier appointment not available   Nursing Interventions  Verified appointment date/time/provider   Has the patient kept scheduled appointments due by today?  N/A   Has home health visited the patient within 72 hours of discharge?  N/A   Did the patient receive a copy of their discharge instructions?  Yes   Nursing interventions  Reviewed instructions with patient   What is the patient's perception of their health status since discharge?  Improving   Is the patient/caregiver able to teach back the hierarchy of who to call/visit for symptoms/problems? PCP, Specialist, Home health nurse, Urgent Care, ED, 911  Yes   Is the patient able to teach back COPD zones?  Yes   Nursing interventions  Education provided on various zones   Patient reports what zone on this call?  Green Zone   Green Zone  Reports doing well, Breathing without shortness of breath, Usual  activity and exercise level, Usual amount of phlegm/mucus without difficulty coughing up, Sleeping well, Appetite is good   Green Zone interventions:  Take daily medications, Avoid indoor/outdoor triggers, Use oxygen as prescribed, Continue regular exercise/diet plan   Week 1 call completed?  Yes          Sanjuana Presley LPN

## 2019-12-03 ENCOUNTER — READMISSION MANAGEMENT (OUTPATIENT)
Dept: CALL CENTER | Facility: HOSPITAL | Age: 74
End: 2019-12-03

## 2019-12-03 NOTE — OUTREACH NOTE
COPD/PN Week 2 Survey      Responses   Facility patient discharged from?  [facility]   Does the patient have one of the following disease processes/diagnoses(primary or secondary)?  COPD/Pneumonia   Was the primary reason for admission:  COPD exacerbation   Week 2 attempt successful?  Yes   Call start time  1703   Call end time  1706   Discharge diagnosis  PNA, A/C CHF, anemia   Meds reviewed with patient/caregiver?  Yes   Is the patient having any side effects they believe may be caused by any medication additions or changes?  No   Does the patient have all medications ordered at discharge?  Yes   Is the patient taking all medications as directed (includes completed medication regime)?  Yes   Does the patient have a primary care provider?   Yes   Does the patient have an appointment with their PCP or pulmonologist within 7 days of discharge?  Yes   Has the patient kept scheduled appointments due by today?  Yes   Has home health visited the patient within 72 hours of discharge?  N/A   Psychosocial issues?  No   Did the patient receive a copy of their discharge instructions?  Yes   Nursing interventions  Reviewed instructions with patient   What is the patient's perception of their health status since discharge?  Improving   Nursing Interventions  Nurse provided patient education   Are the patient's immunizations up to date?   No   Nursing interventions  Educated on importance of maintaining up to date immunizations as advised by provider   Is the patient/caregiver able to teach back the hierarchy of who to call/visit for symptoms/problems? PCP, Specialist, Home health nurse, Urgent Care, ED, 911  Yes   Is the patient/caregiver able to teach back signs and symptoms of worsening condition:  Fever/chills, Shortness of breath, Chest pain   Is the patient/caregiver able to teach back importance of completing antibiotic course of treatment?  Yes   Week 2 call completed?  Yes          Masood Gomez RN

## 2019-12-05 ENCOUNTER — TRANSCRIBE ORDERS (OUTPATIENT)
Dept: ADMINISTRATIVE | Facility: HOSPITAL | Age: 74
End: 2019-12-05

## 2019-12-05 DIAGNOSIS — R93.89 ABNORMAL RADIOLOGICAL FINDINGS IN SKIN AND SUBCUTANEOUS TISSUE: Primary | ICD-10-CM

## 2019-12-09 ENCOUNTER — HOSPITAL ENCOUNTER (OUTPATIENT)
Dept: CT IMAGING | Facility: HOSPITAL | Age: 74
Discharge: HOME | End: 2019-12-09
Admitting: NURSE PRACTITIONER

## 2019-12-09 DIAGNOSIS — R93.89 ABNORMAL RADIOLOGICAL FINDINGS IN SKIN AND SUBCUTANEOUS TISSUE: ICD-10-CM

## 2019-12-09 PROCEDURE — 71250 CT THORAX DX C-: CPT

## 2019-12-10 ENCOUNTER — READMISSION MANAGEMENT (OUTPATIENT)
Dept: CALL CENTER | Facility: HOSPITAL | Age: 74
End: 2019-12-10

## 2019-12-10 NOTE — OUTREACH NOTE
COPD/PN Week 3 Survey      Responses   Facility patient discharged from?  [facility]   Does the patient have one of the following disease processes/diagnoses(primary or secondary)?  COPD/Pneumonia   Was the primary reason for admission:  COPD exacerbation   Week 3 attempt successful?  Yes   Call start time  1748   Call end time  1751   Discharge diagnosis  PNA, A/C CHF, anemia   Is patient permission given to speak with other caregiver?  No   Meds reviewed with patient/caregiver?  Yes   Is the patient having any side effects they believe may be caused by any medication additions or changes?  No   Does the patient have all medications ordered at discharge?  Yes   Is the patient taking all medications as directed (includes completed medication regime)?  Yes   Does the patient have a primary care provider?   Yes   Does the patient have an appointment with their PCP or pulmonologist within 7 days of discharge?  Yes   Has the patient kept scheduled appointments due by today?  Yes   Comments  Patient states that she had CT scan of her chest yesterday, but has not heard any results yet.    Has home health visited the patient within 72 hours of discharge?  N/A   Psychosocial issues?  No   Did the patient receive a copy of their discharge instructions?  Yes   Nursing interventions  Reviewed instructions with patient   What is the patient's perception of their health status since discharge?  Improving   Is the patient/caregiver able to teach back the hierarchy of who to call/visit for symptoms/problems? PCP, Specialist, Home health nurse, Urgent Care, ED, 911  Yes   Is the patient/caregiver able to teach back signs and symptoms of worsening condition:  Fever/chills, Shortness of breath, Chest pain   Is the patient/caregiver able to teach back importance of completing antibiotic course of treatment?  Yes   Week 3 call completed?  Yes          Margie Feliz RN

## 2019-12-17 ENCOUNTER — READMISSION MANAGEMENT (OUTPATIENT)
Dept: CALL CENTER | Facility: HOSPITAL | Age: 74
End: 2019-12-17

## 2019-12-17 NOTE — OUTREACH NOTE
COPD/PN Week 4 Survey      Responses   Facility patient discharged from?  [facility]   Does the patient have one of the following disease processes/diagnoses(primary or secondary)?  COPD/Pneumonia   Was the primary reason for admission:  COPD exacerbation   Week 4 attempt successful?  No [Left message]          Sinai Leon LPN

## 2020-02-18 ENCOUNTER — OFFICE VISIT (OUTPATIENT)
Dept: CARDIOLOGY | Facility: CLINIC | Age: 75
End: 2020-02-18

## 2020-02-18 VITALS
SYSTOLIC BLOOD PRESSURE: 159 MMHG | HEART RATE: 74 BPM | DIASTOLIC BLOOD PRESSURE: 65 MMHG | BODY MASS INDEX: 24.52 KG/M2 | HEIGHT: 64 IN | OXYGEN SATURATION: 96 % | WEIGHT: 143.6 LBS

## 2020-02-18 DIAGNOSIS — I20.8 CHRONIC STABLE ANGINA (HCC): Primary | ICD-10-CM

## 2020-02-18 DIAGNOSIS — J44.9 CHRONIC OBSTRUCTIVE PULMONARY DISEASE, UNSPECIFIED COPD TYPE (HCC): ICD-10-CM

## 2020-02-18 DIAGNOSIS — I50.22 CHRONIC SYSTOLIC CONGESTIVE HEART FAILURE (HCC): ICD-10-CM

## 2020-02-18 DIAGNOSIS — E78.2 MIXED HYPERLIPIDEMIA: ICD-10-CM

## 2020-02-18 DIAGNOSIS — I71.40 ABDOMINAL AORTIC ANEURYSM (AAA) WITHOUT RUPTURE (HCC): ICD-10-CM

## 2020-02-18 DIAGNOSIS — I25.10 CORONARY ARTERY DISEASE INVOLVING NATIVE CORONARY ARTERY OF NATIVE HEART WITHOUT ANGINA PECTORIS: ICD-10-CM

## 2020-02-18 DIAGNOSIS — I10 ESSENTIAL HYPERTENSION: ICD-10-CM

## 2020-02-18 PROCEDURE — 99214 OFFICE O/P EST MOD 30 MIN: CPT | Performed by: INTERNAL MEDICINE

## 2020-02-18 PROCEDURE — 93000 ELECTROCARDIOGRAM COMPLETE: CPT | Performed by: INTERNAL MEDICINE

## 2020-02-18 RX ORDER — BUDESONIDE AND FORMOTEROL FUMARATE DIHYDRATE 160; 4.5 UG/1; UG/1
2 AEROSOL RESPIRATORY (INHALATION)
COMMUNITY
End: 2022-02-18 | Stop reason: ALTCHOICE

## 2020-02-18 NOTE — PROGRESS NOTES
Cardiology Office Visit      Encounter Date:  08/01/2019    Patient ID:   Gayathri Reddy is a 74 y.o. female.    Reason For Followup:  CAD  Tako-Tsubo cardiomyopathy  HTN with hypertensive cardiovascular disease     Brief Clinical History:  Dear Dr. Hector, Deonte Patel MD    I had the pleasure of seeing Gayathri Reddy today. As you are well aware, this is a 74 y.o. female with an established history of CAD.  She has undergone 4-V CABG in 1995.  She has additional history of hypertension with hypertensive cardiovascular disease, HLD, COPD, and cardiomyopathy.  She presents today to follow-up on the above conditions.    Interval History:  She does report some intermittent chest discomfort.  She reports this has occurred since she has been off of her Ranexa.  She has been unable to afford this as she has no part D medication coverage.  She reports shortness of breath however it is not out of character.  She denies any PND orthopnea.  She denies any syncope or near-syncope. She has some palpitations from time to time as well.  She reports no recent falls.    She underwent Cardiac catheterization in October 2018 with FFR and no significant treatable lesion was noted.  She did report a 75% improvement with Ranexa.  She is unable to afford this medication.  We looked at [facility] pricing and it appears that it may be obtainable for as low as $50 a month.  She will check into this and if so she thinks she may be able to afford this.    Assessment & Plan    Impressions:  CAD s/p CABG with attrition of 3/4 grafts. 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     Last echo with LVEF 45%  Oxygen dependant COPD  Chest pain with typical and atypical features.  HTN with HTCVD  AAA followed by Dr Lang  Angina pectoris significantly improved with Ranexa     Currently off Ranexa due to cost  Skin cancer on nose s/p surgery    Recommendations:  Check in officially with  "[facility] to see if Ranexa can be obtained for a less expensive cost  Continuation of her current medical regimen  Follow-up in 6 months sooner should there be difficulties      Objective:    Vitals:  Vitals:    02/18/20 1110   BP: 159/65   Pulse: 74   SpO2: 96%   Weight: 65.1 kg (143 lb 9.6 oz)   Height: 162.6 cm (64\")       Physical Exam:    General: Alert, cooperative, no distress, appears stated age  Head:  Normocephalic, atraumatic, mucous membranes moist.  Nasal cannula  Eyes:  Conjunctiva/corneas clear, EOM's intact     Neck:  Supple,  no bruit  Lungs: Coarse and diminished bilaterally  Chest wall: No tenderness  Heart::  Regular rate and rhythm, S1 and S2 normal, 1/6 holosystolic murmur.  No rub or gallop  Abdomen: Soft, non-tender, nondistended bowel sounds active  Extremities: No cyanosis, clubbing, or edema  Pulses: 2+ and symmetric all extremities  Skin:  No rashes or lesions  Neuro/psych: A&O x3. CN II through XII are grossly intact with appropriate affect      Allergies:  Allergies   Allergen Reactions   • Naprosyn  [Naproxen] Rash   • Bactrim [Sulfamethoxazole-Trimethoprim] Rash       Medication Review:     Current Outpatient Medications:   •  aspirin 81 MG EC tablet, Take 81 mg by mouth Daily., Disp: , Rfl:   •  benzonatate (TESSALON) 100 MG capsule, Take 1 capsule by mouth 3 (Three) Times a Day As Needed for Cough., Disp: 21 capsule, Rfl: 0  •  Biotin (BIOTIN 5000) 5 MG capsule, Take 1 capsule by mouth Daily., Disp: , Rfl:   •  budesonide-formoterol (SYMBICORT) 160-4.5 MCG/ACT inhaler, Inhale 2 puffs 2 (Two) Times a Day., Disp: , Rfl:   •  clopidogrel (PLAVIX) 75 MG tablet, Take 75 mg by mouth Daily., Disp: , Rfl:   •  estradiol (ESTRACE) 1 MG tablet, Take 1 mg by mouth 3 (Three) Times a Day., Disp: , Rfl:   •  ferrous sulfate 324 (65 Fe) MG tablet delayed-release EC tablet, Take 324 mg by mouth Daily With Breakfast., Disp: , Rfl:   •  fluticasone (FLONASE) 50 MCG/ACT nasal spray, 2 sprays into the " nostril(s) as directed by provider Daily., Disp: 9.9 mL, Rfl: 0  •  furosemide (LASIX) 20 MG tablet, Take 1 tablet by mouth Daily., Disp: 30 tablet, Rfl: 0  •  gabapentin (NEURONTIN) 400 MG capsule, Take 400 mg by mouth 3 (Three) Times a Day., Disp: , Rfl:   •  HYDROcodone-acetaminophen (NORCO)  MG per tablet, Take 1 tablet by mouth Every 6 (Six) Hours As Needed., Disp: , Rfl:   •  isosorbide mononitrate (IMDUR) 30 MG 24 hr tablet, Take 30 mg by mouth Daily., Disp: , Rfl:   •  LORazepam (ATIVAN) 1 MG tablet, Take 1 mg by mouth 4 (Four) Times a Day As Needed., Disp: , Rfl:   •  methocarbamol (ROBAXIN) 500 MG tablet, Take 500 mg by mouth 3 (Three) Times a Day As Needed for Muscle Spasms., Disp: , Rfl:   •  nitroglycerin (NITROSTAT) 0.4 MG SL tablet, Place 0.4 mg under the tongue Every 5 (Five) Minutes As Needed for Chest Pain., Disp: , Rfl:   •  pantoprazole (PROTONIX) 40 MG EC tablet, Take 1 tablet by mouth Daily., Disp: 30 tablet, Rfl: 0  •  rosuvastatin (CRESTOR) 10 MG tablet, Take 10 mg by mouth Daily., Disp: , Rfl:   •  sertraline (ZOLOFT) 100 MG tablet, Take 100 mg by mouth Daily., Disp: , Rfl:   •  Thyroid 60 MG PO tablet, Take 60 mg by mouth Daily., Disp: , Rfl:   •  Tiotropium Bromide Monohydrate (SPIRIVA RESPIMAT) 1.25 MCG/ACT aerosol solution inhaler, Inhale 2 puffs 2 (Two) Times a Day., Disp: , Rfl:     Family History:  Family History   Problem Relation Age of Onset   • Heart disease Mother    • Aneurysm Mother    • Heart disease Father    • Colon cancer Sister         Date of onset unknown to patient.  States her sister  from colon cancer at age 59.   • Cancer Paternal Grandfather         Type unknown to patient       Past Medical History:  Past Medical History:   Diagnosis Date   • Aneurysm (CMS/HCC)     AAA   • Arthritis    • CHF (congestive heart failure) (CMS/HCC)    • COPD (chronic obstructive pulmonary disease) (CMS/HCC)    • Dyslipidemia    • GERD (gastroesophageal reflux disease)    •  History of right heart catheterization     2017; 10/2018   • Hypertension    • Hypothyroidism    • Myocardial infarct (CMS/HCC)    • Myocardial infarction (CMS/HCC)     x 3   • Pneumonia 2019    bilateral        Past surgical History:  Past Surgical History:   Procedure Laterality Date   • ABDOMINAL AORTIC ANEURYSM REPAIR N/A    • CARDIAC CATHETERIZATION     • CATARACT EXTRACTION Bilateral    • CORONARY ANGIOPLASTY WITH STENT PLACEMENT N/A     x 5   • CORONARY ARTERY BYPASS GRAFT      x 4    • HYSTERECTOMY N/A        Social History:  Social History     Socioeconomic History   • Marital status:      Spouse name: Not on file   • Number of children: Not on file   • Years of education: Not on file   • Highest education level: Not on file   Tobacco Use   • Smoking status: Former Smoker     Packs/day: 1.50     Types: Cigarettes     Last attempt to quit:      Years since quittin.1   • Smokeless tobacco: Never Used   Substance and Sexual Activity   • Alcohol use: No     Frequency: Never   • Drug use: No   • Sexual activity: Defer       Review of Systems:  The following systems were reviewed as they relate to the cardiovascular system: Constitutional, Eyes, ENT, Cardiovascular, Respiratory, Gastrointestinal, Integumentary, Neurological, Psychiatric, Hematologic, Endocrine, Musculoskeletal, and Genitourinary. The pertinent cardiovascular findings are reported above with all other cardiovascular points within those systems being negative.    Diagnostic Study Review:     Current Electrocardiogram:    ECG 12 Lead  Date/Time: 2020 7:13 PM  Performed by: Roc Butler DO  Authorized by: Roc Butler DO   Comparison: not compared with previous ECG   Previous ECG: no previous ECG available  Comments: Normal sinus rhythm with a ventricular rate of 74 bpm.  Nonspecific interventricular conduction delay.  Old anteroseptal MI.  Normal QT and QTc  intervals.              NOTE: The following portions of the patient's history were reviewed and updated this visit as appropriate: allergies, current medications, past family history, past medical history, past social history, past surgical history and problem list.

## 2020-02-18 NOTE — PATIENT INSTRUCTIONS
Use [facility] to look up medication prices and see if there are discounts.  Follow-up in 6 months  Call if need prescription for Ranexa

## 2020-03-04 RX ORDER — RANOLAZINE 1000 MG/1
1000 TABLET, EXTENDED RELEASE ORAL DAILY
Qty: 30 TABLET | Refills: 3 | Status: SHIPPED | OUTPATIENT
Start: 2020-03-04 | End: 2020-03-12 | Stop reason: DRUGHIGH

## 2020-03-12 RX ORDER — RANOLAZINE 1000 MG/1
1000 TABLET, EXTENDED RELEASE ORAL EVERY 12 HOURS SCHEDULED
Qty: 180 TABLET | Refills: 3 | Status: SHIPPED | OUTPATIENT
Start: 2020-03-12 | End: 2021-03-15

## 2020-03-12 NOTE — TELEPHONE ENCOUNTER
Call from [facility] re: clarification on Ranexa.   Noted sent as daily , but also q 12 hours.   Called patient / was to check with [facility] .  Patient reports takes Ranexa BID and [facility] is going to lower cost .   Called [facility] and will resend rx.

## 2020-08-11 PROBLEM — D50.9 IRON DEFICIENCY ANEMIA: Status: ACTIVE | Noted: 2017-08-21

## 2020-08-11 PROBLEM — Z83.3 FAMILY HISTORY OF DIABETES MELLITUS: Status: ACTIVE | Noted: 2020-08-11

## 2020-08-11 PROBLEM — R07.9 CHEST PAIN: Status: ACTIVE | Noted: 2017-12-11

## 2020-08-11 PROBLEM — M25.512 PAIN IN LEFT SHOULDER: Status: ACTIVE | Noted: 2017-05-04

## 2020-08-11 PROBLEM — Z80.0 FAMILY HISTORY OF MALIGNANT NEOPLASM OF DIGESTIVE ORGAN: Status: ACTIVE | Noted: 2020-08-11

## 2020-08-11 PROBLEM — K21.9 GASTROESOPHAGEAL REFLUX DISEASE: Status: ACTIVE | Noted: 2017-08-21

## 2020-08-11 PROBLEM — I65.23 BILATERAL CAROTID ARTERY STENOSIS: Status: ACTIVE | Noted: 2017-06-28

## 2020-08-18 ENCOUNTER — OFFICE VISIT (OUTPATIENT)
Dept: CARDIOLOGY | Facility: CLINIC | Age: 75
End: 2020-08-18

## 2020-08-18 VITALS
HEIGHT: 64 IN | SYSTOLIC BLOOD PRESSURE: 161 MMHG | DIASTOLIC BLOOD PRESSURE: 77 MMHG | RESPIRATION RATE: 18 BRPM | OXYGEN SATURATION: 90 % | WEIGHT: 135 LBS | BODY MASS INDEX: 23.05 KG/M2 | HEART RATE: 82 BPM

## 2020-08-18 DIAGNOSIS — E78.2 MIXED HYPERLIPIDEMIA: ICD-10-CM

## 2020-08-18 DIAGNOSIS — I25.10 CORONARY ARTERY DISEASE INVOLVING NATIVE CORONARY ARTERY OF NATIVE HEART WITHOUT ANGINA PECTORIS: ICD-10-CM

## 2020-08-18 DIAGNOSIS — I20.8 CHRONIC STABLE ANGINA (HCC): ICD-10-CM

## 2020-08-18 DIAGNOSIS — J44.9 CHRONIC OBSTRUCTIVE PULMONARY DISEASE, UNSPECIFIED COPD TYPE (HCC): ICD-10-CM

## 2020-08-18 DIAGNOSIS — I25.118 CORONARY ARTERY DISEASE OF NATIVE ARTERY OF NATIVE HEART WITH STABLE ANGINA PECTORIS (HCC): Primary | ICD-10-CM

## 2020-08-18 DIAGNOSIS — I50.22 CHRONIC SYSTOLIC CONGESTIVE HEART FAILURE (HCC): ICD-10-CM

## 2020-08-18 DIAGNOSIS — I10 ESSENTIAL HYPERTENSION: ICD-10-CM

## 2020-08-18 PROCEDURE — 99214 OFFICE O/P EST MOD 30 MIN: CPT | Performed by: INTERNAL MEDICINE

## 2020-08-18 PROCEDURE — 93000 ELECTROCARDIOGRAM COMPLETE: CPT | Performed by: INTERNAL MEDICINE

## 2020-08-18 NOTE — PROGRESS NOTES
Cardiology Office Visit      Encounter Date:  08/18/2020    Patient ID:   Gayathri Reddy is a 75 y.o. female.    Reason For Followup:  CAD  Tako-Tsubo cardiomyopathy  HTN with hypertensive cardiovascular disease     Brief Clinical History:  Dear Dr. Hector, Deonte Patel MD    I had the pleasure of seeing Gayathri Reddy today. As you are well aware, this is a 75 y.o. female with an established history of CAD.  She has undergone 4-V CABG in 1995.  She has additional history of hypertension with hypertensive cardiovascular disease, HLD, COPD, and cardiomyopathy.  She presents today to follow-up on the above conditions.    Interval History:  She does report some intermittent chest discomfort.  Not feel this is out of the ordinary.  She reports shortness of breath however it is not out of character.  She denies any PND orthopnea.  She denies any syncope or near-syncope. She has some palpitations from time to time as well.  She reports no recent falls.    She underwent Cardiac catheterization in October 2018 with FFR and no significant treatable lesion was noted.  She did report a 75% improvement with Ranexa.  She is getting this medication with [facility] assistance.    Her blood pressure is elevated today but she reports better pressures at home.    Assessment & Plan    Impressions:  CAD s/p CABG with attrition of 3/4 grafts. 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     Last echo with LVEF 45%  Oxygen dependant COPD  Chest pain with typical and atypical features.  HTN with HTCVD  AAA followed by Dr Lang  Angina pectoris significantly improved with Ranexa     Currently off Ranexa due to cost  Skin cancer on nose s/p surgery    Recommendations:  Check in officially with [facility] to see if Ranexa can be obtained for a less expensive cost  Continuation of her current medical regimen  Follow-up in 6 months sooner should there be  "difficulties    Objective:    Vitals:  Vitals:    08/18/20 1541   BP: 161/77   BP Location: Left arm   Patient Position: Sitting   Cuff Size: Large Adult   Pulse: 82   Resp: 18   SpO2: 90%   Weight: 61.2 kg (135 lb)   Height: 162.6 cm (64\")       Physical Exam:    General: Alert, cooperative, no distress, appears stated age  Head:  Normocephalic, atraumatic, mucous membranes moist.  Nasal cannula  Eyes:  Conjunctiva/corneas clear, EOM's intact     Neck:  Supple,  no bruit  Lungs: Coarse and diminished bilaterally  Chest wall: No tenderness  Heart::  Regular rate and rhythm, S1 and S2 normal, 1/6 holosystolic murmur.  No rub or gallop  Abdomen: Soft, non-tender, nondistended bowel sounds active  Extremities: No cyanosis, clubbing, or edema  Pulses: 2+ and symmetric all extremities  Skin:  No rashes or lesions  Neuro/psych: A&O x3. CN II through XII are grossly intact with appropriate affect      Allergies:  Allergies   Allergen Reactions   • Naprosyn  [Naproxen] Rash   • Bactrim [Sulfamethoxazole-Trimethoprim] Rash       Medication Review:     Current Outpatient Medications:   •  aspirin 81 MG EC tablet, Take 81 mg by mouth Daily., Disp: , Rfl:   •  benzonatate (TESSALON) 100 MG capsule, Take 1 capsule by mouth 3 (Three) Times a Day As Needed for Cough., Disp: 21 capsule, Rfl: 0  •  Biotin (BIOTIN 5000) 5 MG capsule, Take 1 capsule by mouth Daily., Disp: , Rfl:   •  budesonide-formoterol (SYMBICORT) 160-4.5 MCG/ACT inhaler, Inhale 2 puffs 2 (Two) Times a Day., Disp: , Rfl:   •  clopidogrel (PLAVIX) 75 MG tablet, Take 75 mg by mouth Daily., Disp: , Rfl:   •  estradiol (ESTRACE) 1 MG tablet, Take 1 mg by mouth 3 (Three) Times a Day., Disp: , Rfl:   •  ferrous sulfate 324 (65 Fe) MG tablet delayed-release EC tablet, Take 324 mg by mouth Daily With Breakfast., Disp: , Rfl:   •  fluticasone (FLONASE) 50 MCG/ACT nasal spray, 2 sprays into the nostril(s) as directed by provider Daily., Disp: 9.9 mL, Rfl: 0  •  furosemide " (LASIX) 20 MG tablet, Take 1 tablet by mouth Daily., Disp: 30 tablet, Rfl: 0  •  gabapentin (NEURONTIN) 400 MG capsule, Take 400 mg by mouth 3 (Three) Times a Day., Disp: , Rfl:   •  HYDROcodone-acetaminophen (NORCO)  MG per tablet, Take 1 tablet by mouth Every 6 (Six) Hours As Needed., Disp: , Rfl:   •  isosorbide mononitrate (IMDUR) 30 MG 24 hr tablet, Take 30 mg by mouth Daily., Disp: , Rfl:   •  LORazepam (ATIVAN) 1 MG tablet, Take 1 mg by mouth 4 (Four) Times a Day As Needed., Disp: , Rfl:   •  methocarbamol (ROBAXIN) 500 MG tablet, Take 500 mg by mouth 3 (Three) Times a Day As Needed for Muscle Spasms., Disp: , Rfl:   •  nitroglycerin (NITROSTAT) 0.4 MG SL tablet, Place 0.4 mg under the tongue Every 5 (Five) Minutes As Needed for Chest Pain., Disp: , Rfl:   •  pantoprazole (PROTONIX) 40 MG EC tablet, Take 1 tablet by mouth Daily., Disp: 30 tablet, Rfl: 0  •  ranolazine (RANEXA) 1000 MG 12 hr tablet, Take 1 tablet by mouth Every 12 (Twelve) Hours., Disp: 180 tablet, Rfl: 3  •  rosuvastatin (CRESTOR) 10 MG tablet, Take 10 mg by mouth Daily., Disp: , Rfl:   •  sertraline (ZOLOFT) 100 MG tablet, Take 100 mg by mouth Daily., Disp: , Rfl:   •  Thyroid 60 MG PO tablet, Take 60 mg by mouth Daily., Disp: , Rfl:   •  Tiotropium Bromide Monohydrate (SPIRIVA RESPIMAT) 1.25 MCG/ACT aerosol solution inhaler, Inhale 2 puffs 2 (Two) Times a Day., Disp: , Rfl:     Family History:  Family History   Problem Relation Age of Onset   • Heart disease Mother    • Aneurysm Mother    • Heart disease Father    • Colon cancer Sister         Date of onset unknown to patient.  States her sister  from colon cancer at age 59.   • Cancer Paternal Grandfather         Type unknown to patient       Past Medical History:  Past Medical History:   Diagnosis Date   • Aneurysm (CMS/HCC)     AAA   • Arthritis    • CHF (congestive heart failure) (CMS/HCC)    • COPD (chronic obstructive pulmonary disease) (CMS/HCC)    • Dyslipidemia    •  GERD (gastroesophageal reflux disease)    • History of right heart catheterization     2017; 10/2018   • Hypertension    • Hypothyroidism    • Myocardial infarct (CMS/HCC)    • Myocardial infarction (CMS/HCC)     x 3   • Pneumonia 2019    bilateral        Past surgical History:  Past Surgical History:   Procedure Laterality Date   • ABDOMINAL AORTIC ANEURYSM REPAIR N/A    • CARDIAC CATHETERIZATION     • CATARACT EXTRACTION Bilateral    • CORONARY ANGIOPLASTY WITH STENT PLACEMENT N/A     x 5   • CORONARY ARTERY BYPASS GRAFT      x 4    • HYSTERECTOMY N/A        Social History:  Social History     Socioeconomic History   • Marital status:      Spouse name: Not on file   • Number of children: Not on file   • Years of education: Not on file   • Highest education level: Not on file   Tobacco Use   • Smoking status: Former Smoker     Packs/day: 1.50     Types: Cigarettes     Last attempt to quit:      Years since quittin.6   • Smokeless tobacco: Never Used   Substance and Sexual Activity   • Alcohol use: No     Frequency: Never   • Drug use: No   • Sexual activity: Defer       Review of Systems:  The following systems were reviewed as they relate to the cardiovascular system: Constitutional, Eyes, ENT, Cardiovascular, Respiratory, Gastrointestinal, Integumentary, Neurological, Psychiatric, Hematologic, Endocrine, Musculoskeletal, and Genitourinary. The pertinent cardiovascular findings are reported above with all other cardiovascular points within those systems being negative.    Diagnostic Study Review:     Current Electrocardiogram:    ECG 12 Lead  Date/Time: 2020 4:49 PM  Performed by: Roc Butler DO  Authorized by: Roc Butler DO   Comparison: not compared with previous ECG   Previous ECG: no previous ECG available  Comments: Normal sinus rhythm with a ventricular rate of 77 bpm.  Atrial premature complex.  Nonspecific interventricular conduction  delay.  Normal QT and QTc intervals.  Right axis deviation.              NOTE: The following portions of the patient's history were reviewed and updated this visit as appropriate: allergies, current medications, past family history, past medical history, past social history, past surgical history and problem list.

## 2020-10-15 ENCOUNTER — TRANSCRIBE ORDERS (OUTPATIENT)
Dept: ADMINISTRATIVE | Facility: HOSPITAL | Age: 75
End: 2020-10-15

## 2020-10-15 DIAGNOSIS — R10.9 ABDOMINAL PAIN, UNSPECIFIED ABDOMINAL LOCATION: Primary | ICD-10-CM

## 2020-10-28 ENCOUNTER — APPOINTMENT (OUTPATIENT)
Dept: CT IMAGING | Facility: HOSPITAL | Age: 75
End: 2020-10-28

## 2020-11-04 ENCOUNTER — HOSPITAL ENCOUNTER (OUTPATIENT)
Dept: CT IMAGING | Facility: HOSPITAL | Age: 75
Discharge: HOME | End: 2020-11-04
Admitting: FAMILY MEDICINE

## 2020-11-04 DIAGNOSIS — R10.9 ABDOMINAL PAIN, UNSPECIFIED ABDOMINAL LOCATION: ICD-10-CM

## 2020-11-04 LAB — CREAT BLDA-MCNC: 1.1 MG/DL (ref 0.6–1.3)

## 2020-11-04 PROCEDURE — 74177 CT ABD & PELVIS W/CONTRAST: CPT

## 2020-11-04 PROCEDURE — 82565 ASSAY OF CREATININE: CPT

## 2020-11-04 PROCEDURE — 0 IOPAMIDOL PER 1 ML: Performed by: FAMILY MEDICINE

## 2020-11-04 RX ADMIN — IOPAMIDOL 100 ML: 755 INJECTION, SOLUTION INTRAVENOUS at 16:40

## 2021-01-14 ENCOUNTER — TELEPHONE (OUTPATIENT)
Dept: CARDIAC REHAB | Facility: HOSPITAL | Age: 76
End: 2021-01-14

## 2021-01-14 NOTE — TELEPHONE ENCOUNTER
Cardiac Rehab referral received from [facility]. Pt with PCI to 2 vessels on 1/7/21.  Called pt to discuss but she is not interested in CR at this time.  Contact info given to pt in case she changes her mind.

## 2021-02-01 ENCOUNTER — OFFICE VISIT (OUTPATIENT)
Dept: CARDIOLOGY | Facility: CLINIC | Age: 76
End: 2021-02-01

## 2021-02-01 VITALS
SYSTOLIC BLOOD PRESSURE: 121 MMHG | OXYGEN SATURATION: 89 % | HEART RATE: 79 BPM | WEIGHT: 138 LBS | DIASTOLIC BLOOD PRESSURE: 68 MMHG | BODY MASS INDEX: 23.69 KG/M2

## 2021-02-01 DIAGNOSIS — I42.0 CARDIOMYOPATHY, DILATED (HCC): ICD-10-CM

## 2021-02-01 DIAGNOSIS — Z95.810 USES WEARABLE GARMENT CONTAINING EXTERNAL DEFIBRILLATOR WITH ATTACHED MONITOR: Primary | ICD-10-CM

## 2021-02-01 DIAGNOSIS — I10 ESSENTIAL HYPERTENSION: ICD-10-CM

## 2021-02-01 DIAGNOSIS — I25.5 ISCHEMIC CARDIOMYOPATHY: ICD-10-CM

## 2021-02-01 DIAGNOSIS — E78.2 MIXED HYPERLIPIDEMIA: ICD-10-CM

## 2021-02-01 DIAGNOSIS — I25.10 CORONARY ARTERY DISEASE INVOLVING NATIVE CORONARY ARTERY OF NATIVE HEART WITHOUT ANGINA PECTORIS: ICD-10-CM

## 2021-02-01 DIAGNOSIS — I50.22 CHRONIC SYSTOLIC CONGESTIVE HEART FAILURE (HCC): ICD-10-CM

## 2021-02-01 PROCEDURE — 93000 ELECTROCARDIOGRAM COMPLETE: CPT | Performed by: NURSE PRACTITIONER

## 2021-02-01 PROCEDURE — 99214 OFFICE O/P EST MOD 30 MIN: CPT | Performed by: NURSE PRACTITIONER

## 2021-02-01 RX ORDER — LISINOPRIL 2.5 MG/1
2.5 TABLET ORAL DAILY
Qty: 90 TABLET | Refills: 3 | Status: SHIPPED | OUTPATIENT
Start: 2021-02-01 | End: 2022-03-18 | Stop reason: HOSPADM

## 2021-02-01 RX ORDER — CHOLECALCIFEROL (VITAMIN D3) 125 MCG
5 CAPSULE ORAL
COMMUNITY
End: 2022-03-03

## 2021-02-01 NOTE — PROGRESS NOTES
[facility] CARDIOLOGY      REASON FOR FOLLOW-UP:  Follow-up hospitalization for chest pain          Chief Complaint   Patient presents with   • Hypertension     f/u from [facility] from a MI          Dear Dr. Hector,    History of Present Illness     I had the pleasure of seeing Gayathri Reddy today. As you are well aware, this is a 75 y.o. female with a well established history of CAD.  She has undergone 4-V CABG in 1995.  She has additional history of hypertension with hypertensive cardiovascular disease, HLD, COPD, and cardiomyopathy.  Patient was recently evaluated at [facility] for symptoms of chest pain and non-ST elevation MI.  proBNP was elevated at 20,200 and she was diuresed for congestive heart failure as well.  2D echocardiogram demonstrated significant reduction in LV systolic function EF 32% with moderate MR.  Heart catheterization: PCI-SVG to the diagonal x2.  Patient had polymorphic VT during procedure that was successfully cardioverted.  She was fitted for LifeVest for primary protection and presents today in follow-up.    Today, the patient reports no further chest pain, pressure or tightness.  She denies any shortness of breath at rest, dyspnea with exertion.  She has occasional lower extremity edema.  She reports no dizziness, lightheadedness, presyncopal or syncopal episodes.  No orthopnea or PND.  Patient's only complaint today is insomnia-frequently awake until 5 AM.  We discussed fluid/salt management for her dilated cardiomyopathy.  She does report significant fluid intake during the day.  She was given guidelines for fluid intake.  She is on oxygen 3 L 24/7.  EKG in the office today shows normal sinus rhythm with old anterior/septal/lateral wall MI.  Blood pressures well controlled at 121/68    Assessment:    Severe dilated cardiomyopathy  LifeVest in situ  Recent percutaneous coronary intervention to SVG graft  CAD s/p CABG with attrition of 3/4  grafts. 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     Last echo with LVEF 45%  Oxygen dependant COPD  Chest pain with typical and atypical features.  HTN with HTCVD  AAA followed by Dr Lang  Angina pectoris significantly improved with Ranexa     Currently off Ranexa due to cost  Skin cancer on nose s/p surgery        Plan:  Continue LifeVest for primary protection  Decrease fluid/salt intake  Start low-dose ACE inhibitor per goal-directed therapy  I discussed patient's cardiomyopathy, treatment plan and indications for ICD implant.  Questions were answered.  I cautioned patient against prescribed sleep aids.  Recommend she continue melatonin and practice good sleep hygiene.  Follow-up in 4 weeks        The following portions of the patient's history were reviewed and updated as appropriate: allergies, current medications, past family history, past medical history, past social history, past surgical history and problem list.    REVIEW OF SYSTEMS:    Review of Systems   Cardiovascular: Positive for leg swelling.   Neurological:        Insomnia   All other systems reviewed and are negative.      Vitals:    02/01/21 1509   BP: 121/68   Pulse: 79   SpO2: (!) 89%         PHYSICAL EXAM:    General: Alert, cooperative, no distress, appears stated age  Head:  Normocephalic, atraumatic, mucous membranes moist  Eyes:  Conjunctiva/corneas clear, EOM's intact     Neck:  Supple,  no JVD or bruit     Lungs: Very diminished but clear bilaterally, nasal O2 noted.  Chest wall: No tenderness  Musculoskeletal:   Ambulates slowly without assistive device  Heart::  Regular rate and rhythm, S1 and S2 normal, no murmur, rub or gallop  Abdomen: Soft, non-tender, nondistended, bowel sounds active, no abdominal bruit  Extremities: No cyanosis, clubbing, or edema   Pulses: 2+ and symmetric all extremities  Skin:  No rashes or lesions  Neuro/psych: A&O x3. CN II through XII are  grossly intact with appropriate affect        Past Medical History:   Diagnosis Date   • Aneurysm (CMS/HCC)     AAA   • Arthritis    • CHF (congestive heart failure) (CMS/HCC)    • COPD (chronic obstructive pulmonary disease) (CMS/HCC)    • Dyslipidemia    • GERD (gastroesophageal reflux disease)    • History of right heart catheterization     7/30/2017; 10/2018   • Hypertension    • Hypothyroidism    • Myocardial infarct (CMS/HCC)    • Myocardial infarction (CMS/HCC)     x 3   • Pneumonia 11/2019    bilateral        Past Surgical History:   Procedure Laterality Date   • ABDOMINAL AORTIC ANEURYSM REPAIR N/A 2001   • CARDIAC CATHETERIZATION     • CATARACT EXTRACTION Bilateral    • CORONARY ANGIOPLASTY WITH STENT PLACEMENT N/A     x 5   • CORONARY ARTERY BYPASS GRAFT      x 4 1995   • HYSTERECTOMY N/A          Current Outpatient Medications:   •  aspirin 81 MG EC tablet, Take 81 mg by mouth Daily., Disp: , Rfl:   •  Biotin (BIOTIN 5000) 5 MG capsule, Take 1 capsule by mouth Daily., Disp: , Rfl:   •  clopidogrel (PLAVIX) 75 MG tablet, Take 75 mg by mouth Daily., Disp: , Rfl:   •  estradiol (ESTRACE) 1 MG tablet, Take 1 mg by mouth 3 (Three) Times a Day., Disp: , Rfl:   •  ferrous sulfate 324 (65 Fe) MG tablet delayed-release EC tablet, Take 324 mg by mouth Daily With Breakfast., Disp: , Rfl:   •  furosemide (LASIX) 20 MG tablet, Take 1 tablet by mouth Daily. (Patient taking differently: Take 40 mg by mouth 2 (Two) Times a Day.), Disp: 30 tablet, Rfl: 0  •  gabapentin (NEURONTIN) 400 MG capsule, Take 400 mg by mouth 3 (Three) Times a Day., Disp: , Rfl:   •  HYDROcodone-acetaminophen (NORCO)  MG per tablet, Take 1 tablet by mouth Every 6 (Six) Hours As Needed., Disp: , Rfl:   •  isosorbide mononitrate (IMDUR) 30 MG 24 hr tablet, Take 30 mg by mouth Daily., Disp: , Rfl:   •  LORazepam (ATIVAN) 1 MG tablet, Take 1 mg by mouth 4 (Four) Times a Day As Needed., Disp: , Rfl:   •  melatonin 5 MG tablet tablet, Take 5 mg  by mouth., Disp: , Rfl:   •  methocarbamol (ROBAXIN) 500 MG tablet, Take 500 mg by mouth 3 (Three) Times a Day As Needed for Muscle Spasms., Disp: , Rfl:   •  metoprolol tartrate (LOPRESSOR) 25 MG tablet, Take 25 mg by mouth 2 (Two) Times a Day. 1/2 tab daily, Disp: , Rfl:   •  nitroglycerin (NITROSTAT) 0.4 MG SL tablet, Place 0.4 mg under the tongue Every 5 (Five) Minutes As Needed for Chest Pain., Disp: , Rfl:   •  pantoprazole (PROTONIX) 40 MG EC tablet, Take 1 tablet by mouth Daily., Disp: 30 tablet, Rfl: 0  •  ranolazine (RANEXA) 1000 MG 12 hr tablet, Take 1 tablet by mouth Every 12 (Twelve) Hours., Disp: 180 tablet, Rfl: 3  •  rosuvastatin (CRESTOR) 10 MG tablet, Take 10 mg by mouth Daily., Disp: , Rfl:   •  sertraline (ZOLOFT) 100 MG tablet, Take 100 mg by mouth Daily., Disp: , Rfl:   •  Thyroid 60 MG PO tablet, Take 60 mg by mouth Daily., Disp: , Rfl:   •  Tiotropium Bromide Monohydrate (SPIRIVA RESPIMAT) 1.25 MCG/ACT aerosol solution inhaler, Inhale 2 puffs 2 (Two) Times a Day., Disp: , Rfl:   •  benzonatate (TESSALON) 100 MG capsule, Take 1 capsule by mouth 3 (Three) Times a Day As Needed for Cough., Disp: 21 capsule, Rfl: 0  •  budesonide-formoterol (SYMBICORT) 160-4.5 MCG/ACT inhaler, Inhale 2 puffs 2 (Two) Times a Day., Disp: , Rfl:   •  fluticasone (FLONASE) 50 MCG/ACT nasal spray, 2 sprays into the nostril(s) as directed by provider Daily., Disp: 9.9 mL, Rfl: 0  •  lisinopril (PRINIVIL,ZESTRIL) 2.5 MG tablet, Take 1 tablet by mouth Daily., Disp: 90 tablet, Rfl: 3    Allergies   Allergen Reactions   • Naprosyn  [Naproxen] Rash   • Bactrim [Sulfamethoxazole-Trimethoprim] Rash       Family History   Problem Relation Age of Onset   • Heart disease Mother    • Aneurysm Mother    • Heart disease Father    • Colon cancer Sister         Date of onset unknown to patient.  States her sister  from colon cancer at age 59.   • Cancer Paternal Grandfather         Type unknown to patient       Social History  "    Tobacco Use   • Smoking status: Former Smoker     Packs/day: 1.50     Types: Cigarettes     Quit date:      Years since quittin.1   • Smokeless tobacco: Never Used   Substance Use Topics   • Alcohol use: No     Frequency: Never           Current Electrocardiogram:    ECG 12 Lead    Date/Time: 2021 12:48 PM  Performed by: Alix Bernardo APRN  Authorized by: Alix Bernardo APRN   Comparison: not compared with previous ECG   Rhythm: sinus rhythm  BPM: 79  Q waves: I, V1, V2, V3 and aVL                    EMR Dragon/Transcription:   \"Dictated utilizing Dragon dictation\".     Disclaimer: Please note that areas of this note were completed with computer voice recognition software.  Quite often unanticipated grammatical, syntax, homophones, and other interpretive errors are inadvertently transcribed by the computer software. Please excuse any errors that have escaped final proofreading    "

## 2021-02-02 PROBLEM — I25.5 ISCHEMIC CARDIOMYOPATHY: Status: ACTIVE | Noted: 2021-02-02

## 2021-02-02 PROBLEM — I42.0 CARDIOMYOPATHY, DILATED: Status: ACTIVE | Noted: 2021-02-02

## 2021-02-02 PROBLEM — Z95.810 USES WEARABLE GARMENT CONTAINING EXTERNAL DEFIBRILLATOR WITH ATTACHED MONITOR: Status: ACTIVE | Noted: 2021-02-02

## 2021-03-04 RX ORDER — AMOXICILLIN 250 MG
2 CAPSULE ORAL
COMMUNITY
Start: 2021-01-11 | End: 2022-03-03

## 2021-03-04 RX ORDER — POLYETHYLENE GLYCOL 3350 17 G/17G
17 POWDER, FOR SOLUTION ORAL
COMMUNITY
Start: 2021-01-11 | End: 2022-03-03

## 2021-03-04 RX ORDER — METOPROLOL SUCCINATE 25 MG/1
12.5 TABLET, EXTENDED RELEASE ORAL DAILY
COMMUNITY
Start: 2021-01-13 | End: 2021-07-21 | Stop reason: SDUPTHER

## 2021-03-04 RX ORDER — ALBUTEROL SULFATE 90 UG/1
2 AEROSOL, METERED RESPIRATORY (INHALATION)
COMMUNITY
Start: 2021-01-12 | End: 2022-02-18

## 2021-03-15 RX ORDER — RANOLAZINE 1000 MG/1
TABLET, EXTENDED RELEASE ORAL
Qty: 180 TABLET | Refills: 2 | Status: SHIPPED | OUTPATIENT
Start: 2021-03-15 | End: 2021-10-08 | Stop reason: SDUPTHER

## 2021-03-18 ENCOUNTER — OFFICE VISIT (OUTPATIENT)
Dept: CARDIOLOGY | Facility: CLINIC | Age: 76
End: 2021-03-18

## 2021-03-18 VITALS
HEART RATE: 67 BPM | WEIGHT: 136 LBS | DIASTOLIC BLOOD PRESSURE: 75 MMHG | HEIGHT: 64 IN | RESPIRATION RATE: 20 BRPM | OXYGEN SATURATION: 94 % | SYSTOLIC BLOOD PRESSURE: 153 MMHG | BODY MASS INDEX: 23.22 KG/M2

## 2021-03-18 DIAGNOSIS — I42.0 CARDIOMYOPATHY, DILATED (HCC): Primary | ICD-10-CM

## 2021-03-18 DIAGNOSIS — E78.2 MIXED HYPERLIPIDEMIA: ICD-10-CM

## 2021-03-18 DIAGNOSIS — Z95.810 USES LIFEVEST DEFIBRILLATOR: ICD-10-CM

## 2021-03-18 DIAGNOSIS — I25.10 CORONARY ARTERY DISEASE INVOLVING NATIVE CORONARY ARTERY OF NATIVE HEART WITHOUT ANGINA PECTORIS: ICD-10-CM

## 2021-03-18 DIAGNOSIS — J44.9 CHRONIC OBSTRUCTIVE PULMONARY DISEASE, UNSPECIFIED COPD TYPE (HCC): ICD-10-CM

## 2021-03-18 PROCEDURE — 93000 ELECTROCARDIOGRAM COMPLETE: CPT | Performed by: INTERNAL MEDICINE

## 2021-03-18 PROCEDURE — 99214 OFFICE O/P EST MOD 30 MIN: CPT | Performed by: INTERNAL MEDICINE

## 2021-03-18 NOTE — PROGRESS NOTES
Cardiology Office Visit      Encounter Date:  03/18/2021    Patient ID:   Gayathri Reddy is a 75 y.o. female.    Reason For Followup:  CAD  Tako-Tsubo cardiomyopathy  HTN with hypertensive cardiovascular disease     Brief Clinical History:  Dear Dr. Hector, Deonte Patel MD    I had the pleasure of seeing Gayathri Reddy today. As you are well aware, this is a 75 y.o. female with an established history of CAD.  She has undergone 4-V CABG in 1995.  She has additional history of hypertension with hypertensive cardiovascular disease, HLD, COPD, and cardiomyopathy.  She presents today to follow-up on the above conditions.    Interval History:  She denies any chest pain pressure heaviness or tightness.  She is reporting shortness of breath but this is not out of character.  She denies any PND orthopnea.  She denies any syncope or near syncope.    She is reporting a significant amount of fatigue.  She reports significant fatigue with just activities of daily living like showering.      As I am sure you are aware, she underwent cardiac catheterization in January 2021 for a myocardial infarction.  She had PCI with stent to the vein graft to the diagonal.  Her hospitalization was complicated by polymorphic ventricular tachycardia that occurred during the intervention requiring cardioversion.  She was placed in a LifeVest and is currently wearing this.  We reviewed her catheterization report today in detail.    Assessment & Plan    Impressions:  CAD s/p CABG with attrition of 3/4 grafts. SVG - Diag remains patent      Negative FFR SVG-Dg 10/2018      PCI ALFREDO SVG-DG January 2021  Ischemic cardiomyopathy with most recent ejection fraction 32% January 2021  Oxygen dependant COPD  Chest pain with typical and atypical features.  HTN with HTCVD  AAA followed by Dr Lang  Angina pectoris significantly improved with Ranexa     Currently off Ranexa due to cost  Skin cancer on nose s/p surgery    Recommendations:  Continuation of  "her current cardiovascular regimen at the present time.  2D echocardiogram in mid April  If EF less than 35% we will plan for EP evaluation and ICD implantation  If EF above 35% will discontinue LifeVest and continue with medical therapy.  Follow-up in 6 weeks    Objective:    Vitals:  Vitals:    03/18/21 1415   BP: 153/75   BP Location: Left arm   Patient Position: Sitting   Cuff Size: Large Adult   Pulse: 67   Resp: 20   SpO2: 94%   Weight: 61.7 kg (136 lb)   Height: 162.6 cm (64\")       Physical Exam:    General: Alert, cooperative, no distress, appears stated age  Head:  Normocephalic, atraumatic, mucous membranes moist.  Nasal cannula  Eyes:  Conjunctiva/corneas clear, EOM's intact     Neck:  Supple,  no bruit  Lungs: Coarse and diminished bilaterally  Chest wall: No tenderness  Heart::  Regular rate and rhythm, S1 and S2 normal, 1/6 holosystolic murmur.  No rub or gallop  Abdomen: Soft, non-tender, nondistended bowel sounds active  Extremities: No cyanosis, clubbing, or edema  Pulses: 2+ and symmetric all extremities  Skin:  No rashes or lesions  Neuro/psych: A&O x3. CN II through XII are grossly intact with appropriate affect      Allergies:  Allergies   Allergen Reactions   • Naprosyn  [Naproxen] Rash   • Bactrim [Sulfamethoxazole-Trimethoprim] Rash       Medication Review:     Current Outpatient Medications:   •  albuterol sulfate  (90 Base) MCG/ACT inhaler, Inhale 2 puffs., Disp: , Rfl:   •  metoprolol succinate XL (TOPROL-XL) 25 MG 24 hr tablet, Take 12.5 mg by mouth Daily., Disp: , Rfl:   •  mometasone-formoterol (DULERA 200) 200-5 MCG/ACT inhaler, Inhale 1 puff., Disp: , Rfl:   •  polyethylene glycol (MIRALAX) 17 g packet, Take 17 g by mouth., Disp: , Rfl:   •  sennosides-docusate (PERICOLACE) 8.6-50 MG per tablet, Take 2 tablets by mouth., Disp: , Rfl:   •  aspirin 81 MG EC tablet, Take 81 mg by mouth Daily., Disp: , Rfl:   •  benzonatate (TESSALON) 100 MG capsule, Take 1 capsule by mouth 3 " (Three) Times a Day As Needed for Cough., Disp: 21 capsule, Rfl: 0  •  Biotin (BIOTIN 5000) 5 MG capsule, Take 1 capsule by mouth Daily., Disp: , Rfl:   •  budesonide-formoterol (SYMBICORT) 160-4.5 MCG/ACT inhaler, Inhale 2 puffs 2 (Two) Times a Day., Disp: , Rfl:   •  clopidogrel (PLAVIX) 75 MG tablet, Take 75 mg by mouth Daily., Disp: , Rfl:   •  estradiol (ESTRACE) 1 MG tablet, Take 1 mg by mouth 3 (Three) Times a Day., Disp: , Rfl:   •  ferrous sulfate 324 (65 Fe) MG tablet delayed-release EC tablet, Take 324 mg by mouth Daily With Breakfast., Disp: , Rfl:   •  fluticasone (FLONASE) 50 MCG/ACT nasal spray, 2 sprays into the nostril(s) as directed by provider Daily., Disp: 9.9 mL, Rfl: 0  •  furosemide (LASIX) 20 MG tablet, Take 1 tablet by mouth Daily. (Patient taking differently: Take 40 mg by mouth 2 (Two) Times a Day.), Disp: 30 tablet, Rfl: 0  •  gabapentin (NEURONTIN) 400 MG capsule, Take 400 mg by mouth 3 (Three) Times a Day., Disp: , Rfl:   •  HYDROcodone-acetaminophen (NORCO)  MG per tablet, Take 1 tablet by mouth Every 6 (Six) Hours As Needed., Disp: , Rfl:   •  isosorbide mononitrate (IMDUR) 30 MG 24 hr tablet, Take 30 mg by mouth Daily., Disp: , Rfl:   •  lisinopril (PRINIVIL,ZESTRIL) 2.5 MG tablet, Take 1 tablet by mouth Daily., Disp: 90 tablet, Rfl: 3  •  LORazepam (ATIVAN) 1 MG tablet, Take 1 mg by mouth 4 (Four) Times a Day As Needed., Disp: , Rfl:   •  melatonin 5 MG tablet tablet, Take 5 mg by mouth., Disp: , Rfl:   •  methocarbamol (ROBAXIN) 500 MG tablet, Take 500 mg by mouth 3 (Three) Times a Day As Needed for Muscle Spasms., Disp: , Rfl:   •  metoprolol tartrate (LOPRESSOR) 25 MG tablet, Take 25 mg by mouth 2 (Two) Times a Day. 1/2 tab daily, Disp: , Rfl:   •  nitroglycerin (NITROSTAT) 0.4 MG SL tablet, Place 0.4 mg under the tongue Every 5 (Five) Minutes As Needed for Chest Pain., Disp: , Rfl:   •  pantoprazole (PROTONIX) 40 MG EC tablet, Take 1 tablet by mouth Daily., Disp: 30  tablet, Rfl: 0  •  ranolazine (RANEXA) 1000 MG 12 hr tablet, TAKE ONE TABLET BY MOUTH EVERY 12 HOURS, Disp: 180 tablet, Rfl: 2  •  rosuvastatin (CRESTOR) 10 MG tablet, Take 10 mg by mouth Daily., Disp: , Rfl:   •  sertraline (ZOLOFT) 100 MG tablet, Take 100 mg by mouth Daily., Disp: , Rfl:   •  Thyroid 60 MG PO tablet, Take 60 mg by mouth Daily., Disp: , Rfl:   •  Tiotropium Bromide Monohydrate (SPIRIVA RESPIMAT) 1.25 MCG/ACT aerosol solution inhaler, Inhale 2 puffs 2 (Two) Times a Day., Disp: , Rfl:     Family History:  Family History   Problem Relation Age of Onset   • Heart disease Mother    • Aneurysm Mother    • Heart disease Father    • Colon cancer Sister         Date of onset unknown to patient.  States her sister  from colon cancer at age 59.   • Cancer Paternal Grandfather         Type unknown to patient       Past Medical History:  Past Medical History:   Diagnosis Date   • Aneurysm (CMS/HCC)     AAA   • Arthritis    • CHF (congestive heart failure) (CMS/HCC)    • COPD (chronic obstructive pulmonary disease) (CMS/HCC)    • Dyslipidemia    • GERD (gastroesophageal reflux disease)    • History of right heart catheterization     2017; 10/2018   • Hypertension    • Hypothyroidism    • Myocardial infarct (CMS/HCC)    • Myocardial infarction (CMS/HCC)     x 3   • Pneumonia 2019    bilateral        Past surgical History:  Past Surgical History:   Procedure Laterality Date   • ABDOMINAL AORTIC ANEURYSM REPAIR N/A    • CARDIAC CATHETERIZATION     • CATARACT EXTRACTION Bilateral    • CORONARY ANGIOPLASTY WITH STENT PLACEMENT N/A     x 5   • CORONARY ARTERY BYPASS GRAFT      x 4    • HYSTERECTOMY N/A        Social History:  Social History     Socioeconomic History   • Marital status:      Spouse name: Not on file   • Number of children: Not on file   • Years of education: Not on file   • Highest education level: Not on file   Tobacco Use   • Smoking status: Former Smoker     Packs/day:  1.50     Types: Cigarettes     Quit date:      Years since quittin.2   • Smokeless tobacco: Never Used   Vaping Use   • Vaping Use: Never used   Substance and Sexual Activity   • Alcohol use: No   • Drug use: No   • Sexual activity: Defer       Review of Systems:  The following systems were reviewed as they relate to the cardiovascular system: Constitutional, Eyes, ENT, Cardiovascular, Respiratory, Gastrointestinal, Integumentary, Neurological, Psychiatric, Hematologic, Endocrine, Musculoskeletal, and Genitourinary. The pertinent cardiovascular findings are reported above with all other cardiovascular points within those systems being negative.    Diagnostic Study Review:     Current Electrocardiogram:    ECG 12 Lead    Date/Time: 3/18/2021 4:37 PM  Performed by: Roc Butler DO  Authorized by: Roc Butler DO   Comparison: not compared with previous ECG   Previous ECG: no previous ECG available  Comments: Normal sinus rhythm with a ventricular rate of 75 bpm.  Old anterior MI.  Nonspecific repolarization changes.  Prolonged QT and QTc intervals of 536 and 599 ms respectively.  Normal QRS axis.  Low voltage in the precordial leads.              NOTE: The following portions of the patient's note were reviewed, confirmed and/or updated this visit as appropriate: History of present illness/Interval history, physical examination, assessment & plan, allergies, current medications, past family history, past medical history, past social history, past surgical history and problem list.

## 2021-04-14 ENCOUNTER — TELEPHONE (OUTPATIENT)
Dept: CARDIOLOGY | Facility: CLINIC | Age: 76
End: 2021-04-14

## 2021-04-14 NOTE — TELEPHONE ENCOUNTER
Attempted to reach pt to schedule an consult appt with Dr Quarles. I left a voice mail requesting return call.

## 2021-04-16 ENCOUNTER — HOSPITAL ENCOUNTER (OUTPATIENT)
Dept: CARDIOLOGY | Facility: HOSPITAL | Age: 76
Discharge: HOME | End: 2021-04-16
Admitting: INTERNAL MEDICINE

## 2021-04-16 VITALS
HEIGHT: 64 IN | DIASTOLIC BLOOD PRESSURE: 54 MMHG | SYSTOLIC BLOOD PRESSURE: 105 MMHG | HEART RATE: 87 BPM | BODY MASS INDEX: 23.22 KG/M2 | WEIGHT: 136 LBS

## 2021-04-16 DIAGNOSIS — I42.0 CARDIOMYOPATHY, DILATED (HCC): ICD-10-CM

## 2021-04-16 PROCEDURE — 93306 TTE W/DOPPLER COMPLETE: CPT

## 2021-04-16 PROCEDURE — 93306 TTE W/DOPPLER COMPLETE: CPT | Performed by: INTERNAL MEDICINE

## 2021-04-21 LAB
BH CV ECHO MEAS - ACS: 1.5 CM
BH CV ECHO MEAS - AO MAX PG (FULL): 15 MMHG
BH CV ECHO MEAS - AO MAX PG: 19.5 MMHG
BH CV ECHO MEAS - AO MEAN PG (FULL): 7.2 MMHG
BH CV ECHO MEAS - AO MEAN PG: 9.7 MMHG
BH CV ECHO MEAS - AO ROOT AREA (BSA CORRECTED): 1.7
BH CV ECHO MEAS - AO ROOT AREA: 6 CM^2
BH CV ECHO MEAS - AO ROOT DIAM: 2.8 CM
BH CV ECHO MEAS - AO V2 MAX: 220.9 CM/SEC
BH CV ECHO MEAS - AO V2 MEAN: 144.3 CM/SEC
BH CV ECHO MEAS - AO V2 VTI: 45.8 CM
BH CV ECHO MEAS - ASC AORTA: 3.2 CM
BH CV ECHO MEAS - AVA(I,A): 1.5 CM^2
BH CV ECHO MEAS - AVA(I,D): 1.5 CM^2
BH CV ECHO MEAS - AVA(V,A): 1.5 CM^2
BH CV ECHO MEAS - AVA(V,D): 1.5 CM^2
BH CV ECHO MEAS - BSA(HAYCOCK): 1.7 M^2
BH CV ECHO MEAS - BSA: 1.7 M^2
BH CV ECHO MEAS - BZI_BMI: 23.3 KILOGRAMS/M^2
BH CV ECHO MEAS - BZI_METRIC_HEIGHT: 162.6 CM
BH CV ECHO MEAS - BZI_METRIC_WEIGHT: 61.7 KG
BH CV ECHO MEAS - EDV(CUBED): 74 ML
BH CV ECHO MEAS - EDV(MOD-SP2): 51.5 ML
BH CV ECHO MEAS - EDV(MOD-SP4): 38.2 ML
BH CV ECHO MEAS - EDV(TEICH): 78.5 ML
BH CV ECHO MEAS - EF(CUBED): 59.7 %
BH CV ECHO MEAS - EF(MOD-BP): 49 %
BH CV ECHO MEAS - EF(MOD-SP2): 46.8 %
BH CV ECHO MEAS - EF(MOD-SP4): 48.3 %
BH CV ECHO MEAS - EF(TEICH): 51.7 %
BH CV ECHO MEAS - ESV(CUBED): 29.8 ML
BH CV ECHO MEAS - ESV(MOD-SP2): 27.4 ML
BH CV ECHO MEAS - ESV(MOD-SP4): 19.8 ML
BH CV ECHO MEAS - ESV(TEICH): 37.9 ML
BH CV ECHO MEAS - FS: 26.1 %
BH CV ECHO MEAS - IVS/LVPW: 1.4
BH CV ECHO MEAS - IVSD: 1.4 CM
BH CV ECHO MEAS - LA DIMENSION(2D): 3.9 CM
BH CV ECHO MEAS - LA DIMENSION: 3.7 CM
BH CV ECHO MEAS - LA/AO: 1.3
BH CV ECHO MEAS - LAT PEAK E' VEL: 8 CM/SEC
BH CV ECHO MEAS - LV DIASTOLIC VOL/BSA (35-75): 23 ML/M^2
BH CV ECHO MEAS - LV IVRT: 0.11 SEC
BH CV ECHO MEAS - LV MASS(C)D: 176.5 GRAMS
BH CV ECHO MEAS - LV MASS(C)DI: 106.3 GRAMS/M^2
BH CV ECHO MEAS - LV MAX PG: 4.5 MMHG
BH CV ECHO MEAS - LV MEAN PG: 2.5 MMHG
BH CV ECHO MEAS - LV SYSTOLIC VOL/BSA (12-30): 11.9 ML/M^2
BH CV ECHO MEAS - LV V1 MAX: 106 CM/SEC
BH CV ECHO MEAS - LV V1 MEAN: 72.8 CM/SEC
BH CV ECHO MEAS - LV V1 VTI: 23.2 CM
BH CV ECHO MEAS - LVIDD: 4.2 CM
BH CV ECHO MEAS - LVIDS: 3.1 CM
BH CV ECHO MEAS - LVOT AREA: 3 CM^2
BH CV ECHO MEAS - LVOT DIAM: 2 CM
BH CV ECHO MEAS - LVPWD: 1 CM
BH CV ECHO MEAS - MED PEAK E' VEL: 4 CM/SEC
BH CV ECHO MEAS - MR MAX PG: 54.7 MMHG
BH CV ECHO MEAS - MR MAX VEL: 369.8 CM/SEC
BH CV ECHO MEAS - MV A MAX VEL: 88.4 CM/SEC
BH CV ECHO MEAS - MV DEC SLOPE: 186.1 CM/SEC^2
BH CV ECHO MEAS - MV DEC TIME: 0.35 SEC
BH CV ECHO MEAS - MV E MAX VEL: 65.4 CM/SEC
BH CV ECHO MEAS - MV E/A: 0.74
BH CV ECHO MEAS - MV MAX PG: 4 MMHG
BH CV ECHO MEAS - MV MEAN PG: 1.5 MMHG
BH CV ECHO MEAS - MV P1/2T: 71 MSEC
BH CV ECHO MEAS - MV V2 MAX: 99.4 CM/SEC
BH CV ECHO MEAS - MV V2 MEAN: 58.4 CM/SEC
BH CV ECHO MEAS - MV V2 VTI: 24.8 CM
BH CV ECHO MEAS - MVA(P1/2T): 3.1 CM2
BH CV ECHO MEAS - MVA(VTI): 2.8 CM^2
BH CV ECHO MEAS - PA MAX PG (FULL): 0.84 MMHG
BH CV ECHO MEAS - PA MAX PG: 2.7 MMHG
BH CV ECHO MEAS - PA MEAN PG (FULL): 0.56 MMHG
BH CV ECHO MEAS - PA MEAN PG: 1.6 MMHG
BH CV ECHO MEAS - PA V2 MAX: 82 CM/SEC
BH CV ECHO MEAS - PA V2 MEAN: 60.5 CM/SEC
BH CV ECHO MEAS - PA V2 VTI: 17.3 CM
BH CV ECHO MEAS - PAPD(PI EDV): 8.42 MMHG
BH CV ECHO MEAS - PI END-D VEL: 116.2 CM/SEC
BH CV ECHO MEAS - PULM A REVS DUR: 0.1 SEC
BH CV ECHO MEAS - PULM A REVS VEL: 31.6 CM/SEC
BH CV ECHO MEAS - PULM DIAS VEL: 40.3 CM/SEC
BH CV ECHO MEAS - PULM S/D: 1.4
BH CV ECHO MEAS - PULM SYS VEL: 58 CM/SEC
BH CV ECHO MEAS - PVA(I,A): 3.5 CM^2
BH CV ECHO MEAS - PVA(I,D): 3.5 CM^2
BH CV ECHO MEAS - PVA(V,A): 3.5 CM^2
BH CV ECHO MEAS - PVA(V,D): 3.5 CM^2
BH CV ECHO MEAS - QP/QS: 0.85
BH CV ECHO MEAS - RAP SYSTOLE: 3 MMHG
BH CV ECHO MEAS - RV MAX PG: 1.9 MMHG
BH CV ECHO MEAS - RV MEAN PG: 1.1 MMHG
BH CV ECHO MEAS - RV V1 MAX: 68.1 CM/SEC
BH CV ECHO MEAS - RV V1 MEAN: 49.3 CM/SEC
BH CV ECHO MEAS - RV V1 VTI: 14.2 CM
BH CV ECHO MEAS - RVOT AREA: 4.2 CM^2
BH CV ECHO MEAS - RVOT DIAM: 2.3 CM
BH CV ECHO MEAS - RVSP: 23.1 MMHG
BH CV ECHO MEAS - SI(AO): 164.7 ML/M^2
BH CV ECHO MEAS - SI(CUBED): 26.6 ML/M^2
BH CV ECHO MEAS - SI(LVOT): 42.5 ML/M^2
BH CV ECHO MEAS - SI(MOD-SP2): 14.5 ML/M^2
BH CV ECHO MEAS - SI(MOD-SP4): 11.1 ML/M^2
BH CV ECHO MEAS - SI(TEICH): 24.4 ML/M^2
BH CV ECHO MEAS - SUP REN AO DIAM: 2.2 CM
BH CV ECHO MEAS - SV(AO): 273.5 ML
BH CV ECHO MEAS - SV(CUBED): 44.2 ML
BH CV ECHO MEAS - SV(LVOT): 70.6 ML
BH CV ECHO MEAS - SV(MOD-SP2): 24.1 ML
BH CV ECHO MEAS - SV(MOD-SP4): 18.5 ML
BH CV ECHO MEAS - SV(RVOT): 60 ML
BH CV ECHO MEAS - SV(TEICH): 40.6 ML
BH CV ECHO MEAS - TAPSE (>1.6): 1.4 CM
BH CV ECHO MEAS - TR MAX VEL: 223.9 CM/SEC
BH CV ECHO MEASUREMENTS AVERAGE E/E' RATIO: 10.9
BH CV XLRA - TDI S': 5 CM/SEC
IVRT: 112 MSEC
LEFT ATRIUM VOLUME INDEX: 42.4 ML/M2

## 2021-04-26 ENCOUNTER — OFFICE VISIT (OUTPATIENT)
Dept: CARDIOLOGY | Facility: CLINIC | Age: 76
End: 2021-04-26

## 2021-04-26 VITALS
DIASTOLIC BLOOD PRESSURE: 58 MMHG | HEART RATE: 85 BPM | BODY MASS INDEX: 23.34 KG/M2 | OXYGEN SATURATION: 92 % | WEIGHT: 136 LBS | SYSTOLIC BLOOD PRESSURE: 112 MMHG

## 2021-04-26 DIAGNOSIS — I25.5 ISCHEMIC CARDIOMYOPATHY: Primary | ICD-10-CM

## 2021-04-26 DIAGNOSIS — I25.118 CORONARY ARTERY DISEASE OF NATIVE ARTERY OF NATIVE HEART WITH STABLE ANGINA PECTORIS (HCC): ICD-10-CM

## 2021-04-26 DIAGNOSIS — I50.22 CHRONIC SYSTOLIC CONGESTIVE HEART FAILURE (HCC): ICD-10-CM

## 2021-04-26 DIAGNOSIS — I10 ESSENTIAL HYPERTENSION: ICD-10-CM

## 2021-04-26 PROCEDURE — 99204 OFFICE O/P NEW MOD 45 MIN: CPT | Performed by: INTERNAL MEDICINE

## 2021-04-26 NOTE — PROGRESS NOTES
CC--ischemic cardiomyopathy on LifeVest    Sub--75 y.o. female with an established history of CAD.  She has undergone 4-V CABG in 1995.  She has additional history of hypertension with hypertensive cardiovascular disease, HLD, COPD, and cardiomyopathy.  Patient recently had declining EF with EF less than 35% and after myocardial infarction underwent stent placement to the graft to the diagonal vessel.  She came for evaluation for possible ICD implantation.  Repeat echocardiography was performed on her since she was seen by interventional cardiology.  She has known history of four-vessel bypass surgery with dictation of 3 out of 4 grafts and last ejection fraction prior to the previous echocardiography was 32%.  She has oxygen dependent COPD and history of hypertension and abdominal aneurysm.        Past Medical History:   Diagnosis Date   • Aneurysm (CMS/[facility])     AAA   • Arthritis    • CHF (congestive heart failure) (CMS/[facility])    • COPD (chronic obstructive pulmonary disease) (CMS/[facility])    • Dyslipidemia    • GERD (gastroesophageal reflux disease)    • History of right heart catheterization     7/30/2017; 10/2018   • Hypertension    • Hypothyroidism    • Myocardial infarct (CMS/[facility])    • Myocardial infarction (CMS/[facility])     x 3   • Pneumonia 11/2019    bilateral      Past Surgical History:   Procedure Laterality Date   • ABDOMINAL AORTIC ANEURYSM REPAIR N/A 2001   • CARDIAC CATHETERIZATION     • CATARACT EXTRACTION Bilateral    • CORONARY ANGIOPLASTY WITH STENT PLACEMENT N/A     x 5   • CORONARY ARTERY BYPASS GRAFT      x 4 1995   • HYSTERECTOMY N/A      Review of Systems   General:  positive for fatigue and tiredness  Eyes: No redness  Cardiovascular: No chest pain, no palpitations  Respiratory:   positive for class 3 shortness of breath      Physical Exam  VITALS REVIEWED    General:      well developed, well nourished, in no acute distress.    Head:      normocephalic and atraumatic.    Eyes:      PERRL/EOM intact,  conjunctiva and sclera clear with out nystagmus.    Neck:      no masses, thyromegaly,  trachea central with normal respiratory effort   Lungs:      clear bilaterally to auscultation.    Heart:      Sinus rhythm without any murmurs or gallops        Assessment plan    Ischemic cardiomyopathy with EF of 32%--- repeat echocardiography EF improved between 45 to 50%  LifeVest can be removed  No indication for ICD  Follow-up in 1 year  Coronary  artery disease stable  COPD  Peripheral vascular disease  Hypertension and hyperlipidemia  Medications reviewed  Patient and family educated      Cardiology notes, echocardiography personally reviewed  And labs and EKGs reviewed    Electronically signed by Saroj Quarles MD, 04/26/21, 1:28 PM EDT.

## 2021-05-05 ENCOUNTER — TRANSCRIBE ORDERS (OUTPATIENT)
Dept: ADMINISTRATIVE | Facility: HOSPITAL | Age: 76
End: 2021-05-05

## 2021-05-05 DIAGNOSIS — G43.909 MIGRAINE WITHOUT STATUS MIGRAINOSUS, NOT INTRACTABLE, UNSPECIFIED MIGRAINE TYPE: Primary | ICD-10-CM

## 2021-05-11 ENCOUNTER — HOSPITAL ENCOUNTER (OUTPATIENT)
Dept: CT IMAGING | Facility: HOSPITAL | Age: 76
Discharge: HOME | End: 2021-05-11
Admitting: FAMILY MEDICINE

## 2021-05-11 DIAGNOSIS — G43.909 MIGRAINE WITHOUT STATUS MIGRAINOSUS, NOT INTRACTABLE, UNSPECIFIED MIGRAINE TYPE: ICD-10-CM

## 2021-05-11 PROCEDURE — 70450 CT HEAD/BRAIN W/O DYE: CPT

## 2021-07-20 NOTE — PROGRESS NOTES
Cardiology Office Visit      Encounter Date:  07/21/2021    Patient ID:   Gayathri Reddy is a 76 y.o. female.    Reason For Followup:  CAD  Tako-Tsubo cardiomyopathy  HTN with hypertensive cardiovascular disease     Brief Clinical History:  Dear Dr. Hector, Deonte Patel MD    I had the pleasure of seeing Gayathri Reddy today. As you are well aware, this is a 76 y.o. female with an established history of CAD.  She has undergone 4-V CABG in 1995.  She has additional history of hypertension with hypertensive cardiovascular disease, HLD, COPD, and cardiomyopathy.  She presents today to follow-up on the above conditions.    Interval History:  She denies any chest pain pressure heaviness or tightness.  She is reporting shortness of breath but this is not out of character.  She denies any PND orthopnea.  She denies any syncope or near syncope.    As I am sure you are aware, she underwent cardiac catheterization in January 2021 for a myocardial infarction.  She had PCI with stent to the vein graft to the diagonal.  Her hospitalization was complicated by polymorphic ventricular tachycardia that occurred during the intervention requiring cardioversion.    She was placed in a LifeVest due to reduced LV systolic function in the setting of polymorphic ventricular tachycardia.  Her ejection fraction on her most recent echocardiogram from April 2021 had improved to 45% so her LifeVest was discontinued and she no longer needs an ICD.    Additionally there appears to be some confusion with her medications.  She was supposed to be taking a beta-blocker however this was not on her med list.  We will start her on Toprol-XL 12.5 mg daily.  She will continue on lisinopril.    Assessment & Plan    Impressions:  CAD s/p CABG with attrition of 3/4 grafts. SVG - Diag remains patent      Negative FFR SVG-Dg 10/2018      PCI ALFREDO SVG-DG January 2021  Ischemic cardiomyopathy with most recent ejection fraction 45% April 2021  Oxygen  dependant COPD  Chest pain with typical and atypical features.  HTN with HTCVD  AAA followed by Dr Lang  Angina pectoris significantly improved with Ranexa     Currently off Ranexa due to cost  Skin cancer on nose s/p surgery    Recommendations:  Toprol-XL 12.5 mg daily  Monitor blood pressure and notify of low values or symptoms of hypotension  Follow-up in 3 months time    Objective:    Vitals:  Vitals:    07/21/21 1517   BP: 134/90   Pulse: 78   SpO2: 90%   Weight: 59.9 kg (132 lb)       Physical Exam:    General: Alert, cooperative, no distress, appears stated age  Head:  Normocephalic, atraumatic, mucous membranes moist.  Nasal cannula  Eyes:  Conjunctiva/corneas clear, EOM's intact     Neck:  Supple,  no bruit  Lungs: Coarse and diminished bilaterally  Chest wall: No tenderness  Heart::  Regular rate and rhythm, S1 and S2 normal, 1/6 holosystolic murmur.  No rub or gallop  Abdomen: Soft, non-tender, nondistended bowel sounds active  Extremities: No cyanosis, clubbing, or edema  Pulses: 2+ and symmetric all extremities  Skin:  No rashes or lesions  Neuro/psych: A&O x3. CN II through XII are grossly intact with appropriate affect      Allergies:  Allergies   Allergen Reactions   • Naprosyn  [Naproxen] Rash   • Bactrim [Sulfamethoxazole-Trimethoprim] Rash       Medication Review:     Current Outpatient Medications:   •  albuterol sulfate  (90 Base) MCG/ACT inhaler, Inhale 2 puffs., Disp: , Rfl:   •  aspirin 81 MG EC tablet, Take 81 mg by mouth Daily., Disp: , Rfl:   •  benzonatate (TESSALON) 100 MG capsule, Take 1 capsule by mouth 3 (Three) Times a Day As Needed for Cough., Disp: 21 capsule, Rfl: 0  •  Biotin (BIOTIN 5000) 5 MG capsule, Take 1 capsule by mouth Daily., Disp: , Rfl:   •  budesonide-formoterol (SYMBICORT) 160-4.5 MCG/ACT inhaler, Inhale 2 puffs 2 (Two) Times a Day., Disp: , Rfl:   •  clopidogrel (PLAVIX) 75 MG tablet, Take 75 mg by mouth Daily., Disp: , Rfl:   •  estradiol (ESTRACE) 1 MG  tablet, Take 1 mg by mouth 3 (Three) Times a Day., Disp: , Rfl:   •  ferrous sulfate 324 (65 Fe) MG tablet delayed-release EC tablet, Take 324 mg by mouth Daily With Breakfast., Disp: , Rfl:   •  fluticasone (FLONASE) 50 MCG/ACT nasal spray, 2 sprays into the nostril(s) as directed by provider Daily., Disp: 9.9 mL, Rfl: 0  •  furosemide (LASIX) 20 MG tablet, Take 1 tablet by mouth Daily. (Patient taking differently: Take  by mouth Daily. Once daily), Disp: 30 tablet, Rfl: 0  •  gabapentin (NEURONTIN) 400 MG capsule, Take 400 mg by mouth 3 (Three) Times a Day., Disp: , Rfl:   •  HYDROcodone-acetaminophen (NORCO)  MG per tablet, Take 1 tablet by mouth Every 6 (Six) Hours As Needed., Disp: , Rfl:   •  isosorbide mononitrate (IMDUR) 30 MG 24 hr tablet, Take 30 mg by mouth Daily., Disp: , Rfl:   •  lisinopril (PRINIVIL,ZESTRIL) 2.5 MG tablet, Take 1 tablet by mouth Daily., Disp: 90 tablet, Rfl: 3  •  LORazepam (ATIVAN) 1 MG tablet, Take 1 mg by mouth 4 (Four) Times a Day As Needed., Disp: , Rfl:   •  melatonin 5 MG tablet tablet, Take 5 mg by mouth., Disp: , Rfl:   •  methocarbamol (ROBAXIN) 500 MG tablet, Take 500 mg by mouth 3 (Three) Times a Day As Needed for Muscle Spasms., Disp: , Rfl:   •  metoprolol succinate XL (TOPROL-XL) 25 MG 24 hr tablet, Take 12.5 mg by mouth Daily., Disp: , Rfl:   •  metoprolol tartrate (LOPRESSOR) 25 MG tablet, Take 25 mg by mouth 2 (Two) Times a Day. 1/2 tab daily, Disp: , Rfl:   •  mometasone-formoterol (DULERA 200) 200-5 MCG/ACT inhaler, Inhale 1 puff., Disp: , Rfl:   •  nitroglycerin (NITROSTAT) 0.4 MG SL tablet, Place 0.4 mg under the tongue Every 5 (Five) Minutes As Needed for Chest Pain., Disp: , Rfl:   •  pantoprazole (PROTONIX) 40 MG EC tablet, Take 1 tablet by mouth Daily., Disp: 30 tablet, Rfl: 0  •  polyethylene glycol (MIRALAX) 17 g packet, Take 17 g by mouth., Disp: , Rfl:   •  Potassium 99 MG tablet, Take  by mouth., Disp: , Rfl:   •  ranolazine (RANEXA) 1000 MG 12 hr  tablet, TAKE ONE TABLET BY MOUTH EVERY 12 HOURS, Disp: 180 tablet, Rfl: 2  •  rosuvastatin (CRESTOR) 10 MG tablet, Take 10 mg by mouth Daily., Disp: , Rfl:   •  sennosides-docusate (PERICOLACE) 8.6-50 MG per tablet, Take 2 tablets by mouth., Disp: , Rfl:   •  sertraline (ZOLOFT) 100 MG tablet, Take 100 mg by mouth Daily., Disp: , Rfl:   •  Thyroid 60 MG PO tablet, Take 60 mg by mouth Daily., Disp: , Rfl:   •  Tiotropium Bromide Monohydrate (SPIRIVA RESPIMAT) 1.25 MCG/ACT aerosol solution inhaler, Inhale 2 puffs 2 (Two) Times a Day., Disp: , Rfl:     Family History:  Family History   Problem Relation Age of Onset   • Heart disease Mother    • Aneurysm Mother    • Heart disease Father    • Colon cancer Sister         Date of onset unknown to patient.  States her sister  from colon cancer at age 59.   • Cancer Paternal Grandfather         Type unknown to patient       Past Medical History:  Past Medical History:   Diagnosis Date   • Aneurysm (CMS/HCC)     AAA   • Arthritis    • CHF (congestive heart failure) (CMS/HCC)    • COPD (chronic obstructive pulmonary disease) (CMS/HCC)    • Dyslipidemia    • GERD (gastroesophageal reflux disease)    • History of right heart catheterization     2017; 10/2018   • Hypertension    • Hypothyroidism    • Myocardial infarct (CMS/HCC)    • Myocardial infarction (CMS/HCC)     x 3   • Pneumonia 2019    bilateral        Past surgical History:  Past Surgical History:   Procedure Laterality Date   • ABDOMINAL AORTIC ANEURYSM REPAIR N/A    • CARDIAC CATHETERIZATION     • CATARACT EXTRACTION Bilateral    • CORONARY ANGIOPLASTY WITH STENT PLACEMENT N/A     x 5   • CORONARY ARTERY BYPASS GRAFT      x 4    • HYSTERECTOMY N/A        Social History:  Social History     Socioeconomic History   • Marital status:      Spouse name: Not on file   • Number of children: Not on file   • Years of education: Not on file   • Highest education level: Not on file   Tobacco Use   •  Smoking status: Former Smoker     Packs/day: 1.50     Types: Cigarettes     Quit date:      Years since quittin.5   • Smokeless tobacco: Never Used   Vaping Use   • Vaping Use: Never used   Substance and Sexual Activity   • Alcohol use: No   • Drug use: No   • Sexual activity: Defer       Review of Systems:  The following systems were reviewed as they relate to the cardiovascular system: Constitutional, Eyes, ENT, Cardiovascular, Respiratory, Gastrointestinal, Integumentary, Neurological, Psychiatric, Hematologic, Endocrine, Musculoskeletal, and Genitourinary. The pertinent cardiovascular findings are reported above with all other cardiovascular points within those systems being negative.    Diagnostic Study Review:     Current Electrocardiogram:  Procedures no new EKG.  EKG dated 3/18/2021 demonstrates sinus rhythm with a ventricular rate of 75 bpm.  Old anterior MI.      NOTE: The following portions of the patient's note were reviewed, confirmed and/or updated this visit as appropriate: History of present illness/Interval history, physical examination, assessment & plan, allergies, current medications, past family history, past medical history, past social history, past surgical history and problem list.

## 2021-07-21 ENCOUNTER — OFFICE VISIT (OUTPATIENT)
Dept: CARDIOLOGY | Facility: CLINIC | Age: 76
End: 2021-07-21

## 2021-07-21 VITALS
HEART RATE: 78 BPM | BODY MASS INDEX: 22.66 KG/M2 | DIASTOLIC BLOOD PRESSURE: 90 MMHG | SYSTOLIC BLOOD PRESSURE: 134 MMHG | WEIGHT: 132 LBS | OXYGEN SATURATION: 90 %

## 2021-07-21 DIAGNOSIS — I42.0 CARDIOMYOPATHY, DILATED (HCC): Primary | ICD-10-CM

## 2021-07-21 DIAGNOSIS — I10 ESSENTIAL HYPERTENSION: ICD-10-CM

## 2021-07-21 DIAGNOSIS — I25.10 CORONARY ARTERY DISEASE INVOLVING NATIVE CORONARY ARTERY OF NATIVE HEART WITHOUT ANGINA PECTORIS: ICD-10-CM

## 2021-07-21 DIAGNOSIS — E78.5 DYSLIPIDEMIA: ICD-10-CM

## 2021-07-21 PROCEDURE — 99214 OFFICE O/P EST MOD 30 MIN: CPT | Performed by: INTERNAL MEDICINE

## 2021-07-21 RX ORDER — NITROGLYCERIN 0.4 MG/1
0.4 TABLET SUBLINGUAL
Qty: 25 TABLET | Refills: 3 | Status: SHIPPED | OUTPATIENT
Start: 2021-07-21

## 2021-07-21 RX ORDER — METOPROLOL SUCCINATE 25 MG/1
12.5 TABLET, EXTENDED RELEASE ORAL DAILY
Qty: 45 TABLET | Refills: 3 | Status: SHIPPED | OUTPATIENT
Start: 2021-07-21

## 2021-10-08 RX ORDER — RANOLAZINE 1000 MG/1
1 TABLET, EXTENDED RELEASE ORAL EVERY 12 HOURS
Qty: 180 TABLET | Refills: 2 | Status: SHIPPED | OUTPATIENT
Start: 2021-10-08

## 2021-12-13 ENCOUNTER — TRANSCRIBE ORDERS (OUTPATIENT)
Dept: ADMINISTRATIVE | Facility: HOSPITAL | Age: 76
End: 2021-12-13

## 2021-12-13 ENCOUNTER — HOSPITAL ENCOUNTER (OUTPATIENT)
Dept: GENERAL RADIOLOGY | Facility: HOSPITAL | Age: 76
Discharge: HOME | End: 2021-12-13
Admitting: FAMILY MEDICINE

## 2021-12-13 DIAGNOSIS — M54.2 NECK PAIN: ICD-10-CM

## 2021-12-13 DIAGNOSIS — M25.552 HIP PAIN, LEFT: Primary | ICD-10-CM

## 2021-12-13 DIAGNOSIS — M25.552 HIP PAIN, LEFT: ICD-10-CM

## 2021-12-13 PROCEDURE — 72040 X-RAY EXAM NECK SPINE 2-3 VW: CPT

## 2021-12-13 PROCEDURE — 73502 X-RAY EXAM HIP UNI 2-3 VIEWS: CPT

## 2022-02-18 ENCOUNTER — LAB (OUTPATIENT)
Dept: LAB | Facility: HOSPITAL | Age: 77
End: 2022-02-18

## 2022-02-18 ENCOUNTER — OFFICE VISIT (OUTPATIENT)
Dept: CARDIOLOGY | Facility: CLINIC | Age: 77
End: 2022-02-18

## 2022-02-18 VITALS
HEIGHT: 64 IN | BODY MASS INDEX: 23.05 KG/M2 | WEIGHT: 135 LBS | SYSTOLIC BLOOD PRESSURE: 100 MMHG | OXYGEN SATURATION: 93 % | HEART RATE: 83 BPM | DIASTOLIC BLOOD PRESSURE: 60 MMHG

## 2022-02-18 DIAGNOSIS — I25.5 ISCHEMIC CARDIOMYOPATHY: ICD-10-CM

## 2022-02-18 DIAGNOSIS — I25.10 CORONARY ARTERY DISEASE INVOLVING NATIVE CORONARY ARTERY OF NATIVE HEART WITHOUT ANGINA PECTORIS: ICD-10-CM

## 2022-02-18 DIAGNOSIS — I50.22 CHRONIC SYSTOLIC CONGESTIVE HEART FAILURE: Primary | ICD-10-CM

## 2022-02-18 DIAGNOSIS — I10 PRIMARY HYPERTENSION: ICD-10-CM

## 2022-02-18 DIAGNOSIS — I50.22 CHRONIC SYSTOLIC CONGESTIVE HEART FAILURE: ICD-10-CM

## 2022-02-18 DIAGNOSIS — E78.2 MIXED HYPERLIPIDEMIA: ICD-10-CM

## 2022-02-18 LAB
ALBUMIN SERPL-MCNC: 3.9 G/DL (ref 3.5–5.2)
ALBUMIN/GLOB SERPL: 1.5 G/DL
ALP SERPL-CCNC: 51 U/L (ref 39–117)
ALT SERPL W P-5'-P-CCNC: 5 U/L (ref 1–33)
ANION GAP SERPL CALCULATED.3IONS-SCNC: 11.3 MMOL/L (ref 5–15)
AST SERPL-CCNC: 10 U/L (ref 1–32)
BASOPHILS # BLD AUTO: 0.06 10*3/MM3 (ref 0–0.2)
BASOPHILS NFR BLD AUTO: 0.4 % (ref 0–1.5)
BILIRUB SERPL-MCNC: 0.2 MG/DL (ref 0–1.2)
BUN SERPL-MCNC: 25 MG/DL (ref 8–23)
BUN/CREAT SERPL: 13.9 (ref 7–25)
CALCIUM SPEC-SCNC: 9.3 MG/DL (ref 8.6–10.5)
CHLORIDE SERPL-SCNC: 98 MMOL/L (ref 98–107)
CHOLEST SERPL-MCNC: 197 MG/DL (ref 0–200)
CO2 SERPL-SCNC: 28.7 MMOL/L (ref 22–29)
CREAT SERPL-MCNC: 1.8 MG/DL (ref 0.57–1)
DEPRECATED RDW RBC AUTO: 41.1 FL (ref 37–54)
EOSINOPHIL # BLD AUTO: 0.6 10*3/MM3 (ref 0–0.4)
EOSINOPHIL NFR BLD AUTO: 3.9 % (ref 0.3–6.2)
ERYTHROCYTE [DISTWIDTH] IN BLOOD BY AUTOMATED COUNT: 11.4 % (ref 12.3–15.4)
GFR SERPL CREATININE-BSD FRML MDRD: 27 ML/MIN/1.73
GLOBULIN UR ELPH-MCNC: 2.6 GM/DL
GLUCOSE SERPL-MCNC: 85 MG/DL (ref 65–99)
HCT VFR BLD AUTO: 29.2 % (ref 34–46.6)
HDLC SERPL-MCNC: 78 MG/DL (ref 40–60)
HGB BLD-MCNC: 10 G/DL (ref 12–15.9)
IMM GRANULOCYTES # BLD AUTO: 0.12 10*3/MM3 (ref 0–0.05)
IMM GRANULOCYTES NFR BLD AUTO: 0.8 % (ref 0–0.5)
LDLC SERPL CALC-MCNC: 74 MG/DL (ref 0–100)
LDLC/HDLC SERPL: 0.79 {RATIO}
LYMPHOCYTES # BLD AUTO: 3.54 10*3/MM3 (ref 0.7–3.1)
LYMPHOCYTES NFR BLD AUTO: 23.2 % (ref 19.6–45.3)
MCH RBC QN AUTO: 34.1 PG (ref 26.6–33)
MCHC RBC AUTO-ENTMCNC: 34.2 G/DL (ref 31.5–35.7)
MCV RBC AUTO: 99.7 FL (ref 79–97)
MONOCYTES # BLD AUTO: 0.83 10*3/MM3 (ref 0.1–0.9)
MONOCYTES NFR BLD AUTO: 5.4 % (ref 5–12)
NEUTROPHILS NFR BLD AUTO: 10.11 10*3/MM3 (ref 1.7–7)
NEUTROPHILS NFR BLD AUTO: 66.3 % (ref 42.7–76)
NRBC BLD AUTO-RTO: 0 /100 WBC (ref 0–0.2)
PLATELET # BLD AUTO: 218 10*3/MM3 (ref 140–450)
PMV BLD AUTO: 10.4 FL (ref 6–12)
POTASSIUM SERPL-SCNC: 5.2 MMOL/L (ref 3.5–5.2)
PROT SERPL-MCNC: 6.5 G/DL (ref 6–8.5)
RBC # BLD AUTO: 2.93 10*6/MM3 (ref 3.77–5.28)
SODIUM SERPL-SCNC: 138 MMOL/L (ref 136–145)
TRIGL SERPL-MCNC: 285 MG/DL (ref 0–150)
VLDLC SERPL-MCNC: 45 MG/DL (ref 5–40)
WBC NRBC COR # BLD: 15.26 10*3/MM3 (ref 3.4–10.8)

## 2022-02-18 PROCEDURE — 80053 COMPREHEN METABOLIC PANEL: CPT

## 2022-02-18 PROCEDURE — 36415 COLL VENOUS BLD VENIPUNCTURE: CPT

## 2022-02-18 PROCEDURE — 85025 COMPLETE CBC W/AUTO DIFF WBC: CPT

## 2022-02-18 PROCEDURE — 93000 ELECTROCARDIOGRAM COMPLETE: CPT | Performed by: INTERNAL MEDICINE

## 2022-02-18 PROCEDURE — 99214 OFFICE O/P EST MOD 30 MIN: CPT | Performed by: INTERNAL MEDICINE

## 2022-02-18 PROCEDURE — 80061 LIPID PANEL: CPT

## 2022-03-02 ENCOUNTER — TELEPHONE (OUTPATIENT)
Dept: CARDIOLOGY | Facility: CLINIC | Age: 77
End: 2022-03-02

## 2022-03-02 NOTE — TELEPHONE ENCOUNTER
Message left for patient to call back and have her medication bottles so we could reconcile her list in the system with what she is taking at home.

## 2022-03-03 RX ORDER — MULTIVIT WITH MINERALS/LUTEIN
250 TABLET ORAL DAILY
COMMUNITY

## 2022-03-03 RX ORDER — LEVOTHYROXINE SODIUM 88 UG/1
88 TABLET ORAL DAILY
COMMUNITY

## 2022-03-03 RX ORDER — DIPHENHYDRAMINE HCL 25 MG
50 CAPSULE ORAL
COMMUNITY

## 2022-03-03 RX ORDER — OMEGA-3S/DHA/EPA/FISH OIL/D3 300MG-1000
400 CAPSULE ORAL DAILY
COMMUNITY

## 2022-03-04 ENCOUNTER — TRANSCRIBE ORDERS (OUTPATIENT)
Dept: ADMINISTRATIVE | Facility: HOSPITAL | Age: 77
End: 2022-03-04

## 2022-03-04 DIAGNOSIS — J84.9 TROPICAL PULMONARY ALVEOLITIS: Primary | ICD-10-CM

## 2022-03-06 ENCOUNTER — APPOINTMENT (OUTPATIENT)
Dept: GENERAL RADIOLOGY | Facility: HOSPITAL | Age: 77
End: 2022-03-06

## 2022-03-06 ENCOUNTER — HOSPITAL ENCOUNTER (INPATIENT)
Facility: HOSPITAL | Age: 77
LOS: 12 days | Discharge: HOME HEALTH | End: 2022-03-18
Attending: EMERGENCY MEDICINE | Admitting: HOSPITALIST

## 2022-03-06 ENCOUNTER — APPOINTMENT (OUTPATIENT)
Dept: CT IMAGING | Facility: HOSPITAL | Age: 77
End: 2022-03-06

## 2022-03-06 DIAGNOSIS — I42.0 CARDIOMYOPATHY, DILATED: ICD-10-CM

## 2022-03-06 DIAGNOSIS — D63.8 ANEMIA OF CHRONIC DISEASE: ICD-10-CM

## 2022-03-06 DIAGNOSIS — I50.9 ACUTE ON CHRONIC CONGESTIVE HEART FAILURE, UNSPECIFIED HEART FAILURE TYPE: ICD-10-CM

## 2022-03-06 DIAGNOSIS — R06.02 SHORTNESS OF BREATH: ICD-10-CM

## 2022-03-06 DIAGNOSIS — J18.9 PNEUMONIA OF BOTH LUNGS DUE TO INFECTIOUS ORGANISM, UNSPECIFIED PART OF LUNG: Primary | ICD-10-CM

## 2022-03-06 LAB
ALBUMIN SERPL-MCNC: 3.3 G/DL (ref 3.5–5.2)
ALBUMIN/GLOB SERPL: 1.1 G/DL
ALP SERPL-CCNC: 56 U/L (ref 39–117)
ALT SERPL W P-5'-P-CCNC: <5 U/L (ref 1–33)
ANION GAP SERPL CALCULATED.3IONS-SCNC: 10 MMOL/L (ref 5–15)
ANION GAP SERPL CALCULATED.3IONS-SCNC: 14 MMOL/L (ref 5–15)
ARTERIAL PATENCY WRIST A: POSITIVE
AST SERPL-CCNC: 5 U/L (ref 1–32)
ATMOSPHERIC PRESS: ABNORMAL MM[HG]
B PARAPERT DNA SPEC QL NAA+PROBE: NOT DETECTED
B PERT DNA SPEC QL NAA+PROBE: NOT DETECTED
BASE EXCESS BLDA CALC-SCNC: 4.6 MMOL/L (ref 0–3)
BASOPHILS # BLD AUTO: 0 10*3/MM3 (ref 0–0.2)
BASOPHILS NFR BLD AUTO: 0.2 % (ref 0–1.5)
BDY SITE: ABNORMAL
BILIRUB SERPL-MCNC: 0.2 MG/DL (ref 0–1.2)
BUN SERPL-MCNC: 24 MG/DL (ref 8–23)
BUN SERPL-MCNC: 26 MG/DL (ref 8–23)
BUN/CREAT SERPL: 20.5 (ref 7–25)
BUN/CREAT SERPL: 23.5 (ref 7–25)
C PNEUM DNA NPH QL NAA+NON-PROBE: NOT DETECTED
CALCIUM SPEC-SCNC: 8.5 MG/DL (ref 8.6–10.5)
CALCIUM SPEC-SCNC: 8.8 MG/DL (ref 8.6–10.5)
CHLORIDE SERPL-SCNC: 103 MMOL/L (ref 98–107)
CHLORIDE SERPL-SCNC: 99 MMOL/L (ref 98–107)
CO2 BLDA-SCNC: 32 MMOL/L (ref 22–29)
CO2 SERPL-SCNC: 25 MMOL/L (ref 22–29)
CO2 SERPL-SCNC: 27 MMOL/L (ref 22–29)
CREAT SERPL-MCNC: 1.02 MG/DL (ref 0.57–1)
CREAT SERPL-MCNC: 1.27 MG/DL (ref 0.57–1)
DEPRECATED RDW RBC AUTO: 42.4 FL (ref 37–54)
DEPRECATED RDW RBC AUTO: 43.8 FL (ref 37–54)
EGFRCR SERPLBLD CKD-EPI 2021: 43.9 ML/MIN/1.73
EGFRCR SERPLBLD CKD-EPI 2021: 57.1 ML/MIN/1.73
EOSINOPHIL # BLD AUTO: 0 10*3/MM3 (ref 0–0.4)
EOSINOPHIL NFR BLD AUTO: 0 % (ref 0.3–6.2)
ERYTHROCYTE [DISTWIDTH] IN BLOOD BY AUTOMATED COUNT: 12.4 % (ref 12.3–15.4)
ERYTHROCYTE [DISTWIDTH] IN BLOOD BY AUTOMATED COUNT: 12.6 % (ref 12.3–15.4)
FERRITIN SERPL-MCNC: 293.3 NG/ML (ref 13–150)
FLUAV SUBTYP SPEC NAA+PROBE: NOT DETECTED
FLUBV RNA ISLT QL NAA+PROBE: NOT DETECTED
GLOBULIN UR ELPH-MCNC: 3.1 GM/DL
GLUCOSE SERPL-MCNC: 124 MG/DL (ref 65–99)
GLUCOSE SERPL-MCNC: 161 MG/DL (ref 65–99)
HADV DNA SPEC NAA+PROBE: NOT DETECTED
HCO3 BLDA-SCNC: 30.4 MMOL/L (ref 21–28)
HCOV 229E RNA SPEC QL NAA+PROBE: NOT DETECTED
HCOV HKU1 RNA SPEC QL NAA+PROBE: NOT DETECTED
HCOV NL63 RNA SPEC QL NAA+PROBE: NOT DETECTED
HCOV OC43 RNA SPEC QL NAA+PROBE: NOT DETECTED
HCT VFR BLD AUTO: 24 % (ref 34–46.6)
HCT VFR BLD AUTO: 25.2 % (ref 34–46.6)
HEMODILUTION: NO
HGB BLD-MCNC: 8.5 G/DL (ref 12–15.9)
HGB BLD-MCNC: 8.5 G/DL (ref 12–15.9)
HMPV RNA NPH QL NAA+NON-PROBE: NOT DETECTED
HPIV1 RNA ISLT QL NAA+PROBE: NOT DETECTED
HPIV2 RNA SPEC QL NAA+PROBE: NOT DETECTED
HPIV3 RNA NPH QL NAA+PROBE: NOT DETECTED
HPIV4 P GENE NPH QL NAA+PROBE: NOT DETECTED
INHALED O2 CONCENTRATION: <21 %
IRON 24H UR-MRATE: 39 MCG/DL (ref 37–145)
IRON SATN MFR SERPL: 15 % (ref 20–50)
L PNEUMO1 AG UR QL IA: NEGATIVE
LYMPHOCYTES # BLD AUTO: 1 10*3/MM3 (ref 0.7–3.1)
LYMPHOCYTES NFR BLD AUTO: 7.7 % (ref 19.6–45.3)
M PNEUMO IGG SER IA-ACNC: NOT DETECTED
MCH RBC QN AUTO: 33.1 PG (ref 26.6–33)
MCH RBC QN AUTO: 34.3 PG (ref 26.6–33)
MCHC RBC AUTO-ENTMCNC: 33.8 G/DL (ref 31.5–35.7)
MCHC RBC AUTO-ENTMCNC: 35.2 G/DL (ref 31.5–35.7)
MCV RBC AUTO: 97.6 FL (ref 79–97)
MCV RBC AUTO: 98.2 FL (ref 79–97)
MODALITY: ABNORMAL
MONOCYTES # BLD AUTO: 0.9 10*3/MM3 (ref 0.1–0.9)
MONOCYTES NFR BLD AUTO: 6.5 % (ref 5–12)
NEUTROPHILS NFR BLD AUTO: 11.2 10*3/MM3 (ref 1.7–7)
NEUTROPHILS NFR BLD AUTO: 85.6 % (ref 42.7–76)
NRBC BLD AUTO-RTO: 0.1 /100 WBC (ref 0–0.2)
NT-PROBNP SERPL-MCNC: ABNORMAL PG/ML (ref 0–1800)
PCO2 BLDA: 51.6 MM HG (ref 35–48)
PH BLDA: 7.38 PH UNITS (ref 7.35–7.45)
PLATELET # BLD AUTO: 196 10*3/MM3 (ref 140–450)
PLATELET # BLD AUTO: 207 10*3/MM3 (ref 140–450)
PMV BLD AUTO: 7.9 FL (ref 6–12)
PMV BLD AUTO: 8.2 FL (ref 6–12)
PO2 BLDA: 74.8 MM HG (ref 83–108)
POTASSIUM SERPL-SCNC: 3.9 MMOL/L (ref 3.5–5.2)
POTASSIUM SERPL-SCNC: 4.4 MMOL/L (ref 3.5–5.2)
PROCALCITONIN SERPL-MCNC: 0.04 NG/ML (ref 0–0.25)
PROT SERPL-MCNC: 6.4 G/DL (ref 6–8.5)
RBC # BLD AUTO: 2.46 10*6/MM3 (ref 3.77–5.28)
RBC # BLD AUTO: 2.57 10*6/MM3 (ref 3.77–5.28)
RHINOVIRUS RNA SPEC NAA+PROBE: NOT DETECTED
RSV RNA NPH QL NAA+NON-PROBE: NOT DETECTED
S PNEUM AG SPEC QL LA: NEGATIVE
SAO2 % BLDCOA: 94.2 % (ref 94–98)
SARS-COV-2 RNA NPH QL NAA+NON-PROBE: NOT DETECTED
SODIUM SERPL-SCNC: 138 MMOL/L (ref 136–145)
SODIUM SERPL-SCNC: 140 MMOL/L (ref 136–145)
TIBC SERPL-MCNC: 268 MCG/DL (ref 298–536)
TRANSFERRIN SERPL-MCNC: 180 MG/DL (ref 200–360)
TROPONIN T SERPL-MCNC: <0.01 NG/ML (ref 0–0.03)
WBC NRBC COR # BLD: 10.9 10*3/MM3 (ref 3.4–10.8)
WBC NRBC COR # BLD: 13.1 10*3/MM3 (ref 3.4–10.8)

## 2022-03-06 PROCEDURE — 25010000002 HEPARIN (PORCINE) PER 1000 UNITS: Performed by: NURSE PRACTITIONER

## 2022-03-06 PROCEDURE — 0202U NFCT DS 22 TRGT SARS-COV-2: CPT | Performed by: PHYSICIAN ASSISTANT

## 2022-03-06 PROCEDURE — 99284 EMERGENCY DEPT VISIT MOD MDM: CPT

## 2022-03-06 PROCEDURE — 85027 COMPLETE CBC AUTOMATED: CPT | Performed by: NURSE PRACTITIONER

## 2022-03-06 PROCEDURE — 87899 AGENT NOS ASSAY W/OPTIC: CPT | Performed by: STUDENT IN AN ORGANIZED HEALTH CARE EDUCATION/TRAINING PROGRAM

## 2022-03-06 PROCEDURE — 25010000002 METHYLPREDNISOLONE PER 125 MG: Performed by: PHYSICIAN ASSISTANT

## 2022-03-06 PROCEDURE — 63710000001 DIPHENHYDRAMINE PER 50 MG: Performed by: NURSE PRACTITIONER

## 2022-03-06 PROCEDURE — 84466 ASSAY OF TRANSFERRIN: CPT | Performed by: STUDENT IN AN ORGANIZED HEALTH CARE EDUCATION/TRAINING PROGRAM

## 2022-03-06 PROCEDURE — 94640 AIRWAY INHALATION TREATMENT: CPT

## 2022-03-06 PROCEDURE — 82803 BLOOD GASES ANY COMBINATION: CPT

## 2022-03-06 PROCEDURE — 83880 ASSAY OF NATRIURETIC PEPTIDE: CPT | Performed by: PHYSICIAN ASSISTANT

## 2022-03-06 PROCEDURE — 94799 UNLISTED PULMONARY SVC/PX: CPT

## 2022-03-06 PROCEDURE — 87040 BLOOD CULTURE FOR BACTERIA: CPT | Performed by: PHYSICIAN ASSISTANT

## 2022-03-06 PROCEDURE — 80053 COMPREHEN METABOLIC PANEL: CPT | Performed by: PHYSICIAN ASSISTANT

## 2022-03-06 PROCEDURE — 25010000002 AMPICILLIN-SULBACTAM PER 1.5 G: Performed by: STUDENT IN AN ORGANIZED HEALTH CARE EDUCATION/TRAINING PROGRAM

## 2022-03-06 PROCEDURE — 84484 ASSAY OF TROPONIN QUANT: CPT | Performed by: PHYSICIAN ASSISTANT

## 2022-03-06 PROCEDURE — 25010000002 FUROSEMIDE PER 20 MG: Performed by: PHYSICIAN ASSISTANT

## 2022-03-06 PROCEDURE — 36600 WITHDRAWAL OF ARTERIAL BLOOD: CPT

## 2022-03-06 PROCEDURE — 83540 ASSAY OF IRON: CPT | Performed by: STUDENT IN AN ORGANIZED HEALTH CARE EDUCATION/TRAINING PROGRAM

## 2022-03-06 PROCEDURE — 71250 CT THORAX DX C-: CPT

## 2022-03-06 PROCEDURE — 84145 PROCALCITONIN (PCT): CPT | Performed by: PHYSICIAN ASSISTANT

## 2022-03-06 PROCEDURE — 36415 COLL VENOUS BLD VENIPUNCTURE: CPT | Performed by: NURSE PRACTITIONER

## 2022-03-06 PROCEDURE — 25010000002 CEFTRIAXONE PER 250 MG: Performed by: PHYSICIAN ASSISTANT

## 2022-03-06 PROCEDURE — 82728 ASSAY OF FERRITIN: CPT | Performed by: STUDENT IN AN ORGANIZED HEALTH CARE EDUCATION/TRAINING PROGRAM

## 2022-03-06 PROCEDURE — 71045 X-RAY EXAM CHEST 1 VIEW: CPT

## 2022-03-06 PROCEDURE — 85025 COMPLETE CBC W/AUTO DIFF WBC: CPT | Performed by: PHYSICIAN ASSISTANT

## 2022-03-06 PROCEDURE — 99223 1ST HOSP IP/OBS HIGH 75: CPT | Performed by: STUDENT IN AN ORGANIZED HEALTH CARE EDUCATION/TRAINING PROGRAM

## 2022-03-06 PROCEDURE — 25010000002 AMPICILLIN-SULBACTAM PER 1.5 G: Performed by: NURSE PRACTITIONER

## 2022-03-06 PROCEDURE — 93005 ELECTROCARDIOGRAM TRACING: CPT | Performed by: EMERGENCY MEDICINE

## 2022-03-06 RX ORDER — LORAZEPAM 1 MG/1
1 TABLET ORAL EVERY 6 HOURS PRN
Status: DISCONTINUED | OUTPATIENT
Start: 2022-03-06 | End: 2022-03-18 | Stop reason: HOSPADM

## 2022-03-06 RX ORDER — NITROGLYCERIN 0.4 MG/1
0.4 TABLET SUBLINGUAL
Status: DISCONTINUED | OUTPATIENT
Start: 2022-03-06 | End: 2022-03-18 | Stop reason: HOSPADM

## 2022-03-06 RX ORDER — ONDANSETRON 4 MG/1
4 TABLET, FILM COATED ORAL EVERY 6 HOURS PRN
Status: DISCONTINUED | OUTPATIENT
Start: 2022-03-06 | End: 2022-03-18 | Stop reason: HOSPADM

## 2022-03-06 RX ORDER — ASCORBIC ACID 500 MG
250 TABLET ORAL DAILY
Status: DISCONTINUED | OUTPATIENT
Start: 2022-03-07 | End: 2022-03-18 | Stop reason: HOSPADM

## 2022-03-06 RX ORDER — SERTRALINE HYDROCHLORIDE 100 MG/1
100 TABLET, FILM COATED ORAL DAILY
Status: DISCONTINUED | OUTPATIENT
Start: 2022-03-07 | End: 2022-03-18 | Stop reason: HOSPADM

## 2022-03-06 RX ORDER — SODIUM CHLORIDE 0.9 % (FLUSH) 0.9 %
10 SYRINGE (ML) INJECTION EVERY 12 HOURS SCHEDULED
Status: DISCONTINUED | OUTPATIENT
Start: 2022-03-06 | End: 2022-03-18 | Stop reason: HOSPADM

## 2022-03-06 RX ORDER — SODIUM CHLORIDE 0.9 % (FLUSH) 0.9 %
10 SYRINGE (ML) INJECTION AS NEEDED
Status: DISCONTINUED | OUTPATIENT
Start: 2022-03-06 | End: 2022-03-18 | Stop reason: HOSPADM

## 2022-03-06 RX ORDER — ISOSORBIDE MONONITRATE 30 MG/1
30 TABLET, EXTENDED RELEASE ORAL EVERY 24 HOURS
Status: DISCONTINUED | OUTPATIENT
Start: 2022-03-07 | End: 2022-03-18 | Stop reason: HOSPADM

## 2022-03-06 RX ORDER — MELATONIN
500 DAILY
Status: DISCONTINUED | OUTPATIENT
Start: 2022-03-07 | End: 2022-03-18 | Stop reason: HOSPADM

## 2022-03-06 RX ORDER — CLOPIDOGREL BISULFATE 75 MG/1
75 TABLET ORAL DAILY
Status: DISCONTINUED | OUTPATIENT
Start: 2022-03-07 | End: 2022-03-18 | Stop reason: HOSPADM

## 2022-03-06 RX ORDER — FUROSEMIDE 10 MG/ML
40 INJECTION INTRAMUSCULAR; INTRAVENOUS ONCE
Status: COMPLETED | OUTPATIENT
Start: 2022-03-06 | End: 2022-03-06

## 2022-03-06 RX ORDER — HYDROCODONE BITARTRATE AND ACETAMINOPHEN 10; 325 MG/1; MG/1
1 TABLET ORAL EVERY 6 HOURS PRN
Status: DISCONTINUED | OUTPATIENT
Start: 2022-03-06 | End: 2022-03-18 | Stop reason: HOSPADM

## 2022-03-06 RX ORDER — ASPIRIN 81 MG/1
81 TABLET ORAL DAILY
Status: DISCONTINUED | OUTPATIENT
Start: 2022-03-07 | End: 2022-03-18 | Stop reason: HOSPADM

## 2022-03-06 RX ORDER — METOPROLOL SUCCINATE 25 MG/1
12.5 TABLET, EXTENDED RELEASE ORAL DAILY
Status: DISCONTINUED | OUTPATIENT
Start: 2022-03-07 | End: 2022-03-07

## 2022-03-06 RX ORDER — FERROUS SULFATE TAB EC 324 MG (65 MG FE EQUIVALENT) 324 (65 FE) MG
324 TABLET DELAYED RESPONSE ORAL
Status: DISCONTINUED | OUTPATIENT
Start: 2022-03-07 | End: 2022-03-18 | Stop reason: HOSPADM

## 2022-03-06 RX ORDER — METHOCARBAMOL 500 MG/1
500 TABLET, FILM COATED ORAL 3 TIMES DAILY PRN
Status: DISCONTINUED | OUTPATIENT
Start: 2022-03-06 | End: 2022-03-18 | Stop reason: HOSPADM

## 2022-03-06 RX ORDER — METHYLPREDNISOLONE SODIUM SUCCINATE 125 MG/2ML
125 INJECTION, POWDER, LYOPHILIZED, FOR SOLUTION INTRAMUSCULAR; INTRAVENOUS ONCE
Status: COMPLETED | OUTPATIENT
Start: 2022-03-06 | End: 2022-03-06

## 2022-03-06 RX ORDER — PANTOPRAZOLE SODIUM 40 MG/1
40 TABLET, DELAYED RELEASE ORAL
Status: DISCONTINUED | OUTPATIENT
Start: 2022-03-07 | End: 2022-03-18 | Stop reason: HOSPADM

## 2022-03-06 RX ORDER — DIPHENHYDRAMINE HCL 25 MG
25 CAPSULE ORAL EVERY 6 HOURS PRN
Status: DISCONTINUED | OUTPATIENT
Start: 2022-03-06 | End: 2022-03-07

## 2022-03-06 RX ORDER — ONDANSETRON 2 MG/ML
4 INJECTION INTRAMUSCULAR; INTRAVENOUS EVERY 6 HOURS PRN
Status: DISCONTINUED | OUTPATIENT
Start: 2022-03-06 | End: 2022-03-18 | Stop reason: HOSPADM

## 2022-03-06 RX ORDER — HEPARIN SODIUM 5000 [USP'U]/ML
5000 INJECTION, SOLUTION INTRAVENOUS; SUBCUTANEOUS EVERY 8 HOURS SCHEDULED
Status: DISCONTINUED | OUTPATIENT
Start: 2022-03-06 | End: 2022-03-13

## 2022-03-06 RX ORDER — ESTRADIOL 1 MG/1
3 TABLET ORAL NIGHTLY
Status: DISCONTINUED | OUTPATIENT
Start: 2022-03-06 | End: 2022-03-18 | Stop reason: HOSPADM

## 2022-03-06 RX ORDER — ALBUTEROL SULFATE 90 UG/1
2 AEROSOL, METERED RESPIRATORY (INHALATION) ONCE
Status: COMPLETED | OUTPATIENT
Start: 2022-03-06 | End: 2022-03-06

## 2022-03-06 RX ORDER — ESTRADIOL 1 MG/1
1 TABLET ORAL 3 TIMES DAILY
Status: DISCONTINUED | OUTPATIENT
Start: 2022-03-06 | End: 2022-03-06

## 2022-03-06 RX ORDER — RANOLAZINE 500 MG/1
1000 TABLET, EXTENDED RELEASE ORAL EVERY 12 HOURS
Status: DISCONTINUED | OUTPATIENT
Start: 2022-03-06 | End: 2022-03-18 | Stop reason: HOSPADM

## 2022-03-06 RX ORDER — LEVOTHYROXINE SODIUM 88 UG/1
88 TABLET ORAL
Status: DISCONTINUED | OUTPATIENT
Start: 2022-03-07 | End: 2022-03-18 | Stop reason: HOSPADM

## 2022-03-06 RX ORDER — ATORVASTATIN CALCIUM 40 MG/1
80 TABLET, FILM COATED ORAL NIGHTLY
Status: DISCONTINUED | OUTPATIENT
Start: 2022-03-06 | End: 2022-03-18 | Stop reason: HOSPADM

## 2022-03-06 RX ORDER — ROSUVASTATIN CALCIUM 10 MG/1
10 TABLET, COATED ORAL DAILY
Status: DISCONTINUED | OUTPATIENT
Start: 2022-03-07 | End: 2022-03-06

## 2022-03-06 RX ORDER — GABAPENTIN 400 MG/1
400 CAPSULE ORAL EVERY 8 HOURS SCHEDULED
Status: DISCONTINUED | OUTPATIENT
Start: 2022-03-06 | End: 2022-03-18 | Stop reason: HOSPADM

## 2022-03-06 RX ORDER — IPRATROPIUM BROMIDE AND ALBUTEROL SULFATE 2.5; .5 MG/3ML; MG/3ML
3 SOLUTION RESPIRATORY (INHALATION)
Status: DISCONTINUED | OUTPATIENT
Start: 2022-03-06 | End: 2022-03-14

## 2022-03-06 RX ADMIN — Medication 10 ML: at 22:26

## 2022-03-06 RX ADMIN — AMPICILLIN SODIUM AND SULBACTAM SODIUM 3 G: 2; 1 INJECTION, POWDER, FOR SOLUTION INTRAMUSCULAR; INTRAVENOUS at 21:43

## 2022-03-06 RX ADMIN — METHYLPREDNISOLONE SODIUM SUCCINATE 125 MG: 125 INJECTION, POWDER, FOR SOLUTION INTRAMUSCULAR; INTRAVENOUS at 11:36

## 2022-03-06 RX ADMIN — DIPHENHYDRAMINE HYDROCHLORIDE 25 MG: 25 CAPSULE ORAL at 22:26

## 2022-03-06 RX ADMIN — Medication 10 ML: at 17:51

## 2022-03-06 RX ADMIN — DOXYCYCLINE 100 MG: 100 INJECTION, POWDER, LYOPHILIZED, FOR SOLUTION INTRAVENOUS at 12:46

## 2022-03-06 RX ADMIN — FUROSEMIDE 40 MG: 10 INJECTION, SOLUTION INTRAVENOUS at 12:46

## 2022-03-06 RX ADMIN — GABAPENTIN 400 MG: 400 CAPSULE ORAL at 23:10

## 2022-03-06 RX ADMIN — IPRATROPIUM BROMIDE AND ALBUTEROL SULFATE 3 ML: .5; 3 SOLUTION RESPIRATORY (INHALATION) at 20:50

## 2022-03-06 RX ADMIN — LORAZEPAM 1 MG: 1 TABLET ORAL at 23:10

## 2022-03-06 RX ADMIN — WATER 2 G: 1000 INJECTION, SOLUTION INTRAVENOUS at 12:48

## 2022-03-06 RX ADMIN — HEPARIN SODIUM 5000 UNITS: 5000 INJECTION INTRAVENOUS; SUBCUTANEOUS at 22:31

## 2022-03-06 RX ADMIN — ALBUTEROL SULFATE 2 PUFF: 108 INHALANT RESPIRATORY (INHALATION) at 11:40

## 2022-03-06 RX ADMIN — ATORVASTATIN CALCIUM 80 MG: 40 TABLET, FILM COATED ORAL at 22:26

## 2022-03-06 RX ADMIN — HYDROCODONE BITARTRATE AND ACETAMINOPHEN 1 TABLET: 10; 325 TABLET ORAL at 23:10

## 2022-03-06 RX ADMIN — AMPICILLIN SODIUM AND SULBACTAM SODIUM 3 G: 2; 1 INJECTION, POWDER, FOR SOLUTION INTRAMUSCULAR; INTRAVENOUS at 17:48

## 2022-03-06 NOTE — PLAN OF CARE
Goal Outcome Evaluation:  Plan of Care Reviewed With: patient     AOX4. Patient is complaining of some chronic pain in back and head. Patient has no current skin issues. Vitals are WNL. Increased Oxygen is required when ambulating.   Patient answered all assessments with no issues. Currently no nursing concerns.

## 2022-03-06 NOTE — H&P
[facility] Medicine Services      Patient Name: Gayathri Reddy  : 1945  MRN: [id number]  Primary Care Physician:  Deonte Hector MD  Date of admission: 3/6/2022      Subjective      Chief Complaint: Dyspnea    History of Present Illness: Gayathri Reddy is a 76 y.o. female with PMHx of HTN, HLD, Hypothyroid, GERD, cAD, AAA, CHF, COPD (3L NC chronically) who presents due to dyspnea.  Admits to dyspnea with exertion complicated by productive cough ongoing for the past three days and similar to previous experiences with pneumonia.  Denies chest pain, abdominal pain, numbness, tingling, hemoptysis.  Admits to increasing home oxygen from 3 to 4L latelty.  Due to multiple risk factors, went to [facility] for evaluation    In the ED, vitals 98.8F, HR 84, RR 18, 135/64, 97 4L.  Labs notable for troponin < 0.01, proBNP 59342, glucose 124, creatinine 1.02, white count 13.1, Hgb 8.5.  CXR suggestive of multi-focal pneumonia.  Respiratory panel negative for covid or other virus.  Patient started on abx, lasix, and admitted to medicine for evaluation.      Review of Systems   Constitutional: Positive for malaise/fatigue. Negative for chills and fever.   HENT: Negative for hearing loss, hoarse voice and nosebleeds.    Eyes: Negative for discharge, double vision and pain.   Cardiovascular: Positive for dyspnea on exertion. Negative for leg swelling.   Respiratory: Positive for cough and shortness of breath. Negative for hemoptysis.    Endocrine: Negative for polydipsia, polyphagia and polyuria.   Skin: Negative for dry skin, flushing and itching.   Musculoskeletal: Negative for arthritis, back pain and falls.   Gastrointestinal: Negative for abdominal pain and constipation.   Genitourinary: Negative for dysuria, flank pain and frequency.   Neurological: Negative for headaches, light-headedness and weakness.   Psychiatric/Behavioral: Negative for altered mental status, depression and  hallucinations.        Personal History     Past Medical History:   Diagnosis Date   • Aneurysm (HCC)     AAA   • Arthritis    • CHF (congestive heart failure) (HCC)    • COPD (chronic obstructive pulmonary disease) (HCC)    • Coronary artery disease    • Dyslipidemia    • GERD (gastroesophageal reflux disease)    • History of right heart catheterization     7/30/2017; 10/2018   • Hyperlipidemia    • Hypertension    • Hypothyroidism    • Myocardial infarct (HCC)    • Myocardial infarction (HCC)     x 3   • Pneumonia 11/2019    bilateral        Past Surgical History:   Procedure Laterality Date   • ABDOMINAL AORTIC ANEURYSM REPAIR N/A 2001   • CARDIAC CATHETERIZATION     • CATARACT EXTRACTION Bilateral    • CORONARY ANGIOPLASTY WITH STENT PLACEMENT N/A     x 5   • CORONARY ARTERY BYPASS GRAFT      x 4 1995   • HYSTERECTOMY N/A        Family History: family history includes Aneurysm in her mother; Cancer in her paternal grandfather; Colon cancer in her sister; Heart disease in her father and mother. Otherwise pertinent FHx was reviewed and not pertinent to current issue.    Social History:  reports that she quit smoking about 28 years ago. Her smoking use included cigarettes. She smoked 1.50 packs per day. She has never used smokeless tobacco. She reports that she does not drink alcohol and does not use drugs.    Home Medications:  Prior to Admission Medications     Prescriptions Last Dose Informant Patient Reported? Taking?    aspirin 81 MG EC tablet   Yes No    Take 81 mg by mouth Daily.    Budeson-Glycopyrrol-Formoterol (BREZTRI AEROSPHERE IN)   Yes No    Inhale.    cholecalciferol (VITAMIN D3) 10 MCG (400 UNIT) tablet   Yes No    Take 400 Units by mouth Daily.    clopidogrel (PLAVIX) 75 MG tablet   Yes No    Take 75 mg by mouth Daily.    diphenhydrAMINE (BENADRYL) 25 mg capsule   Yes No    Take 25 mg by mouth Every 6 (Six) Hours As Needed.    estradiol (ESTRACE) 1 MG tablet   Yes No    Take 1 mg by mouth 3 (Three)  Times a Day.    ferrous sulfate 324 (65 Fe) MG tablet delayed-release EC tablet  Self Yes No    Take 324 mg by mouth Daily With Breakfast.    furosemide (LASIX) 20 MG tablet   No No    Take 1 tablet by mouth Daily.    Patient taking differently:  Take  by mouth Daily. Once daily    gabapentin (NEURONTIN) 400 MG capsule   Yes No    Take 400 mg by mouth 3 (Three) Times a Day.    HYDROcodone-acetaminophen (NORCO)  MG per tablet   Yes No    Take 1 tablet by mouth Every 6 (Six) Hours As Needed.    isosorbide mononitrate (IMDUR) 30 MG 24 hr tablet   Yes No    Take 30 mg by mouth Daily.    levothyroxine (SYNTHROID, LEVOTHROID) 88 MCG tablet   Yes No    Take 88 mcg by mouth Daily.    lisinopril (PRINIVIL,ZESTRIL) 2.5 MG tablet   No No    Take 1 tablet by mouth Daily.    LORazepam (ATIVAN) 1 MG tablet   Yes No    Take 1 mg by mouth 4 (Four) Times a Day As Needed.    methocarbamol (ROBAXIN) 500 MG tablet  Self Yes No    Take 500 mg by mouth 3 (Three) Times a Day As Needed for Muscle Spasms.    metoprolol succinate XL (TOPROL-XL) 25 MG 24 hr tablet   No No    Take 0.5 tablets by mouth Daily.    nitroglycerin (Nitrostat) 0.4 MG SL tablet   No No    Place 1 tablet under the tongue Every 5 (Five) Minutes As Needed for Chest Pain.    pantoprazole (PROTONIX) 40 MG EC tablet   No No    Take 1 tablet by mouth Daily.    Potassium 99 MG tablet   Yes No    Take  by mouth.    ranolazine (RANEXA) 1000 MG 12 hr tablet   No No    Take 1 tablet by mouth Every 12 (Twelve) Hours.    rosuvastatin (CRESTOR) 10 MG tablet   Yes No    Take 10 mg by mouth Daily.    sertraline (ZOLOFT) 100 MG tablet   Yes No    Take 100 mg by mouth Daily.    vitamin C (ASCORBIC ACID) 250 MG tablet   Yes No    Take 250 mg by mouth Daily.            Allergies:  Allergies   Allergen Reactions   • Naprosyn  [Naproxen] Rash   • Bactrim [Sulfamethoxazole-Trimethoprim] Rash       Objective      Vitals:   Temp:  [98.8 °F (37.1 °C)] 98.8 °F (37.1 °C)  Heart Rate:   [84-88] 84  Resp:  [16-18] 18  BP: (135-168)/(57-64) 135/64  Flow (L/min):  [4] 4    Physical Exam  Constitutional:       General: She is not in acute distress.     Appearance: She is not toxic-appearing.   HENT:      Head: Normocephalic and atraumatic.      Nose: Nose normal. No congestion.      Mouth/Throat:      Pharynx: Oropharynx is clear. No oropharyngeal exudate.   Eyes:      General: No scleral icterus.  Cardiovascular:      Rate and Rhythm: Normal rate and regular rhythm.      Heart sounds: No murmur heard.    No friction rub. No gallop.   Pulmonary:      Effort: No respiratory distress.      Breath sounds: Rales present. No wheezing.      Comments: Patient on 4L NC  Abdominal:      General: There is no distension.      Tenderness: There is no abdominal tenderness. There is no guarding.   Musculoskeletal:         General: No swelling or deformity.      Cervical back: Normal range of motion. No rigidity.      Right lower leg: No edema.      Left lower leg: No edema.   Skin:     Coloration: Skin is not jaundiced.      Findings: No bruising or lesion.   Neurological:      General: No focal deficit present.      Mental Status: She is alert and oriented to person, place, and time.      Motor: No weakness.   Psychiatric:         Mood and Affect: Mood normal.         Behavior: Behavior normal.         Thought Content: Thought content normal.         Judgment: Judgment normal.          Result Review    Result Review:  I have personally reviewed the results from the time of this admission to 3/6/2022 13:01 EST and agree with these findings:  [x]  Laboratory  []  Microbiology  [x]  Radiology  []  EKG/Telemetry   []  Cardiology/Vascular   []  Pathology  []  Old records  []  Other:        Assessment/Plan        Active Hospital Problems:  There are no active hospital problems to display for this patient.    Plan:     #Acute on chronic hypoxia with hypercapnia  #Multi-focal pneumonia    - ABG 7.379/51.6/74.8/30.4    -CXR  shows multi-focal pneumonai    - CT chest to better evaluate as proBNP elevated    - Unasyn 3g IV q6h    - RVP negative     - blood cultures NTD    - respiratory culture    - legionella and strep    - white count 13.1 on admission    #COPD    -wears 3L chronically, currently on 4L NC    - suspect pneumonia    - Duonebs QID    #Diastolic and systolic HF    - appears chronic    - proBNP 27483    - CT chest to assess for pleural effusions    - continue home lasix    #CAD    - resume home aspirin    - resume home statin    - resume home Toprol    - resume home imdur an dRanexa once verfired    #AAA    - CT a/p shows infra-renal abdominal aorta at 3.0x3.7cm on 11/4/2020    - will need to be re-evaluated as otpt    #HTN    - hold lisinopril     - resume home toprol once verified    #HLD    - resume home statin    #GERD    - PPI    #Hypothyroid    - resume home synthroid once verified    #Anemia    -hgb 8.5 on admission, base ~ 10.0    - check iron panel and ferritin    #Anxiety     - resume home Zoloft once verified    #DVT prophylaxis    -heparin      DVT prophylaxis:  No DVT prophylaxis order currently exists.    CODE STATUS:       Admission Status:  I believe this patient meets inpatient status.    I discussed the patient's findings and my recommendations with patient.    This patient has been examined wearing appropriate Personal Protective Equipment and discussed with Patient. 03/06/22      Signature: Electronically signed by Palomo Baldwin DO, 03/06/22, 1:17 PM EST.

## 2022-03-06 NOTE — ED PROVIDER NOTES
Subjective   Patient is a 76-year-old female who presents via EMS from home with complaints of increase shortness of breath over the past 2 days.  Patient reports he feels similar to when she had pneumonia in the past.  She does report a productive cough which is new.  She denies hemoptysis.  She denies any lower extremity edema or heart palpitations.  No significant chest pain lightheadedness or dizziness.  She reports low-grade fever at home.  She denies any vomiting or abdominal pain.  She reports some nausea she rates her symptoms as moderate in severity.  Patient states she is normally on 3 L of oxygen via nasal cannula but has had to increase it to 4 L at home.  Patient states she did last see her pulmonologist Dr. Queen last week but this was prior to her symptoms starting.  Patient is currently on Plavix.  Patient denies any other alleviating or exacerbating factors of her symptoms.      History provided by:  Patient      Review of Systems   Constitutional: Positive for fever. Negative for activity change, appetite change, chills, diaphoresis, fatigue and unexpected weight change.   HENT: Negative.    Eyes: Negative for photophobia and visual disturbance.   Respiratory: Positive for cough and shortness of breath. Negative for apnea, choking, chest tightness, wheezing and stridor.    Cardiovascular: Negative.    Gastrointestinal: Positive for nausea. Negative for abdominal distention, abdominal pain, constipation, diarrhea and vomiting.   Genitourinary: Negative.    Musculoskeletal: Negative for back pain, neck pain and neck stiffness.   Skin: Negative.    Neurological: Negative.    Hematological: Negative.        Past Medical History:   Diagnosis Date   • Aneurysm (HCC)     AAA   • Arthritis    • CHF (congestive heart failure) ([facility])    • COPD (chronic obstructive pulmonary disease) ([facility])    • Coronary artery disease    • Dyslipidemia    • GERD (gastroesophageal reflux disease)    • History of right heart  catheterization     2017; 10/2018   • Hyperlipidemia    • Hypertension    • Hypothyroidism    • Myocardial infarct (HCC)    • Myocardial infarction (HCC)     x 3   • Pneumonia 2019    bilateral        Allergies   Allergen Reactions   • Naprosyn  [Naproxen] Rash   • Bactrim [Sulfamethoxazole-Trimethoprim] Rash       Past Surgical History:   Procedure Laterality Date   • ABDOMINAL AORTIC ANEURYSM REPAIR N/A    • CARDIAC CATHETERIZATION     • CATARACT EXTRACTION Bilateral    • CORONARY ANGIOPLASTY WITH STENT PLACEMENT N/A     x 5   • CORONARY ARTERY BYPASS GRAFT      x 4    • HYSTERECTOMY N/A        Family History   Problem Relation Age of Onset   • Heart disease Mother    • Aneurysm Mother    • Heart disease Father    • Colon cancer Sister         Date of onset unknown to patient.  States her sister  from colon cancer at age 59.   • Cancer Paternal Grandfather         Type unknown to patient       Social History     Socioeconomic History   • Marital status:    Tobacco Use   • Smoking status: Former Smoker     Packs/day: 1.50     Types: Cigarettes     Quit date:      Years since quittin.1   • Smokeless tobacco: Never Used   Vaping Use   • Vaping Use: Never used   Substance and Sexual Activity   • Alcohol use: No   • Drug use: No   • Sexual activity: Defer           Objective   Physical Exam  Vitals and nursing note reviewed.   Constitutional:       General: She is not in acute distress.     Appearance: Normal appearance. She is well-developed. She is not ill-appearing, toxic-appearing or diaphoretic.   HENT:      Head: Normocephalic and atraumatic.      Right Ear: Tympanic membrane, ear canal and external ear normal.      Left Ear: Tympanic membrane, ear canal and external ear normal.      Nose: Nose normal. No congestion or rhinorrhea.      Mouth/Throat:      Mouth: Mucous membranes are moist.      Pharynx: Oropharynx is clear.   Eyes:      General: No scleral icterus.      "Extraocular Movements: Extraocular movements intact.      Pupils: Pupils are equal, round, and reactive to light.   Cardiovascular:      Rate and Rhythm: Normal rate and regular rhythm.      Pulses: Normal pulses.      Heart sounds: No murmur heard.    No friction rub. No gallop.   Pulmonary:      Effort: Pulmonary effort is normal. No respiratory distress.      Breath sounds: No stridor. Examination of the right-upper field reveals wheezing and rhonchi. Examination of the left-upper field reveals wheezing and rhonchi. Examination of the right-middle field reveals rhonchi. Examination of the left-middle field reveals rhonchi. Examination of the right-lower field reveals rhonchi. Examination of the left-lower field reveals rhonchi. Wheezing and rhonchi present. No rales.   Chest:      Chest wall: No tenderness.   Abdominal:      General: Bowel sounds are normal. There is no distension. There are no signs of injury.      Palpations: Abdomen is soft.      Tenderness: There is no abdominal tenderness. There is no right CVA tenderness, left CVA tenderness, guarding or rebound.      Hernia: No hernia is present.   Musculoskeletal:      Cervical back: Normal range of motion and neck supple. No rigidity.      Right lower leg: No edema.      Left lower leg: No edema.   Lymphadenopathy:      Cervical: No cervical adenopathy.   Skin:     General: Skin is warm.      Capillary Refill: Capillary refill takes less than 2 seconds.      Coloration: Skin is not cyanotic, jaundiced or pale.      Findings: No rash.   Neurological:      General: No focal deficit present.      Mental Status: She is alert and oriented to person, place, and time.   Psychiatric:         Mood and Affect: Mood normal.         Behavior: Behavior normal.         Procedures           ED Course    /64   Pulse 84   Temp 98.8 °F (37.1 °C) (Oral)   Resp 18   Ht 165.1 cm (65\")   Wt 59.9 kg (132 lb)   SpO2 97%   BMI 21.97 kg/m²   Medications   sodium " chloride 0.9 % flush 10 mL (has no administration in time range)   doxycycline (VIBRAMYCIN) 100 mg in sodium chloride 0.9 % 100 mL IVPB (100 mg Intravenous New Bag 3/6/22 1246)   albuterol sulfate HFA (PROVENTIL HFA;VENTOLIN HFA;PROAIR HFA) inhaler 2 puff (2 puffs Inhalation Given 3/6/22 1140)   methylPREDNISolone sodium succinate (SOLU-Medrol) injection 125 mg (125 mg Intravenous Given 3/6/22 1136)   cefTRIAXone (ROCEPHIN) in SWFI 2 GRAMS/20ml IV PUSH syringe (2 g Intravenous Given 3/6/22 1248)   furosemide (LASIX) injection 40 mg (40 mg Intravenous Given 3/6/22 1246)     Labs Reviewed   COMPREHENSIVE METABOLIC PANEL - Abnormal; Notable for the following components:       Result Value    Glucose 124 (*)     BUN 24 (*)     Creatinine 1.02 (*)     Calcium 8.5 (*)     Albumin 3.30 (*)     eGFR 57.1 (*)     All other components within normal limits    Narrative:     GFR Normal >60  Chronic Kidney Disease <60  Kidney Failure <15     BNP (IN-HOUSE) - Abnormal; Notable for the following components:    proBNP 15,023.0 (*)     All other components within normal limits    Narrative:     Among patients with dyspnea, NT-proBNP is highly sensitive for the detection of acute congestive heart failure. In addition NT-proBNP of <300 pg/ml effectively rules out acute congestive heart failure with 99% negative predictive value.    Results may be falsely decreased if patient taking Biotin.     CBC WITH AUTO DIFFERENTIAL - Abnormal; Notable for the following components:    WBC 13.10 (*)     RBC 2.46 (*)     Hemoglobin 8.5 (*)     Hematocrit 24.0 (*)     MCV 97.6 (*)     MCH 34.3 (*)     Neutrophil % 85.6 (*)     Lymphocyte % 7.7 (*)     Eosinophil % 0.0 (*)     Neutrophils, Absolute 11.20 (*)     All other components within normal limits   BLOOD GAS, ARTERIAL - Abnormal; Notable for the following components:    pCO2, Arterial 51.6 (*)     pO2, Arterial 74.8 (*)     HCO3, Arterial 30.4 (*)     Base Excess, Arterial 4.6 (*)     CO2  "Content 32.0 (*)     All other components within normal limits   RESPIRATORY PANEL PCR W/ COVID-19 (SARS-COV-2) [facility]AN/[facility]IS/[facility]UEL/[facility] IN-HOUSE, NP SWAB IN UTM/VTP, 3-4 HR TAT - Normal    Narrative:     In the setting of a positive respiratory panel with a viral infection PLUS a negative procalcitonin without other underlying concern for bacterial infection, consider observing off antibiotics or discontinuation of antibiotics and continue supportive care. If the respiratory panel is positive for atypical bacterial infection (Bordetella pertussis, Chlamydophila pneumoniae, or Mycoplasma pneumoniae), consider antibiotic de-escalation to target atypical bacterial infection.   TROPONIN (IN-HOUSE) - Normal    Narrative:     Troponin T Reference Range:  <= 0.03 ng/mL-   Negative for AMI  >0.03 ng/mL-     Abnormal for myocardial necrosis.  Clinicians would have to utilize clinical acumen, EKG, Troponin and serial changes to determine if it is an Acute Myocardial Infarction or myocardial injury due to an underlying chronic condition.       Results may be falsely decreased if patient taking Biotin.     PROCALCITONIN - Normal    Narrative:     As a Marker for Sepsis (Non-Neonates):    1. <0.5 ng/mL represents a low risk of severe sepsis and/or septic shock.  2. >2 ng/mL represents a high risk of severe sepsis and/or septic shock.    As a Marker for Lower Respiratory Tract Infections that require antibiotic therapy:    PCT on Admission    Antibiotic Therapy       6-12 Hrs later    >0.5                Strongly Recommended  >0.25 - <0.5        Recommended   0.1 - 0.25          Discouraged              Remeasure/reassess PCT  <0.1                Strongly Discouraged     Remeasure/reassess PCT    As 28 day mortality risk marker: \"Change in Procalcitonin Result\" (>80% or <=80%) if Day 0 (or Day 1) and Day 4 values are available. Refer to http://www.[employer]-pct-calculator.com    Change in PCT <=80%  A decrease of PCT levels " below or equal to 80% defines a positive change in PCT test result representing a higher risk for 28-day all-cause mortality of patients diagnosed with severe sepsis for septic shock.    Change in PCT >80%  A decrease of PCT levels of more than 80% defines a negative change in PCT result representing a lower risk for 28-day all-cause mortality of patients diagnosed with severe sepsis or septic shock.      BLOOD CULTURE   BLOOD CULTURE   BLOOD GAS, ARTERIAL   CBC AND DIFFERENTIAL    Narrative:     The following orders were created for panel order CBC & Differential.  Procedure                               Abnormality         Status                     ---------                               -----------         ------                     CBC Auto Differential[[id number]]        Abnormal            Final result                 Please view results for these tests on the individual orders.     XR Chest 1 View    Result Date: 3/6/2022  Patchy multifocal airspace disease involving the lung bases concerning for pneumonia.  Electronically Signed By-Nahid Cooley MD On:3/6/2022 12:03 PM This report was finalized on [id number] by  Nahid Cooley MD.                                                 MDM  Number of Diagnoses or Management Options  Acute on chronic congestive heart failure, unspecified heart failure type (HCC)  Pneumonia of both lungs due to infectious organism, unspecified part of lung  Shortness of breath  Diagnosis management comments: Chart Review:  Comorbidity: As per past medical history  Differentials: Pneumonia, CHF, COPD exacerbation, pleural effusions, pneumothorax, ACS     ;this list is not all inclusive and does not constitute the entirety of considered causes  ECG: Interpreted by myself and Dr. Johnson shows sinus rhythm rate 86 old anterior infarct when compared to previous EKG from 11/19/2019 PAC no longer present  Labs: As above  Imaging: Was interpreted by physician and reviewed by myself:  XR  Chest 1 View   Final Result    Patchy multifocal airspace disease involving the lung bases concerning    for pneumonia.    Electronically Signed By-Nahid Cooley MD On:3/6/2022 12:03 PM    This report was finalized on [id number] by  Nahid Cooley MD.     Disposition/Treatment:  Appropriate PPE was worn during exam and throughout all encounters with the patient.  When the ED patient was afebrile and appeared nontoxic patient's oxygen 4 L via nasal cannula which is slightly above her baseline of 3 L.  ABG was obtained as above which was discussed with attending Dr. Johnson and was in agreement to keep patient on 4 L via nasal cannula.  EKG showed no acute ST or T wave changes.  BNP was found to be elevated as above, patient shortness of breath likely secondary to acute on chronic CHF patient was given Lasix times her normal dose at 40 mg while in the ED per CHF protocol.  Chest x-ray was also significant for infiltrates blood cultures were obtained patient will be given Rocephin and doxycycline while in the ED to cover for pneumonia CBC was significant for elevated WBC of 13.1 consistent with pneumonia no bands noted.  Patient has a history of anemia chronic and stable.  Kidney function also chronically elevated and stable after reviewing old charts.  Respiratory panel unremarkable.    Upon reassessment patient is resting quietly lab results and findings were discussed with the patient and family at bedside who voiced understand admission for further treatment of her CHF and pneumonia.  Dr. Dr. Baldwin with hospitalist group who agreed for admission    Case was discussed with attending Dr. Johnson who was in agreement with plan.       Amount and/or Complexity of Data Reviewed  Clinical lab tests: reviewed  Tests in the radiology section of CPT®: reviewed  Tests in the medicine section of CPT®: reviewed        Final diagnoses:   Pneumonia of both lungs due to infectious organism, unspecified part of lung   Shortness of  breath   Acute on chronic congestive heart failure, unspecified heart failure type (HCC)       ED Disposition  ED Disposition     ED Disposition   Decision to Admit    Condition   --    Comment   Level of Care: Telemetry [5]   Admitting Physician: АНДРЕЙ GUADARRAMA [[id number]]   Attending Physician: АНДРЕЙ GUADARRAMA [[id number]]               No follow-up provider specified.       Medication List      No changes were made to your prescriptions during this visit.          Ibeth Castanon PA  03/06/22 [id number]

## 2022-03-07 LAB
BASOPHILS # BLD AUTO: 0 10*3/MM3 (ref 0–0.2)
BASOPHILS NFR BLD AUTO: 0.3 % (ref 0–1.5)
DEPRECATED RDW RBC AUTO: 43.8 FL (ref 37–54)
EOSINOPHIL # BLD AUTO: 0 10*3/MM3 (ref 0–0.4)
EOSINOPHIL NFR BLD AUTO: 0.1 % (ref 0.3–6.2)
ERYTHROCYTE [DISTWIDTH] IN BLOOD BY AUTOMATED COUNT: 12.6 % (ref 12.3–15.4)
FOLATE SERPL-MCNC: 4.66 NG/ML (ref 4.78–24.2)
HCT VFR BLD AUTO: 25.1 % (ref 34–46.6)
HGB BLD-MCNC: 8.7 G/DL (ref 12–15.9)
LYMPHOCYTES # BLD AUTO: 1.3 10*3/MM3 (ref 0.7–3.1)
LYMPHOCYTES NFR BLD AUTO: 8.7 % (ref 19.6–45.3)
MCH RBC QN AUTO: 34.1 PG (ref 26.6–33)
MCHC RBC AUTO-ENTMCNC: 34.7 G/DL (ref 31.5–35.7)
MCV RBC AUTO: 98.3 FL (ref 79–97)
MONOCYTES # BLD AUTO: 1.1 10*3/MM3 (ref 0.1–0.9)
MONOCYTES NFR BLD AUTO: 7.5 % (ref 5–12)
NEUTROPHILS NFR BLD AUTO: 12 10*3/MM3 (ref 1.7–7)
NEUTROPHILS NFR BLD AUTO: 83.4 % (ref 42.7–76)
NRBC BLD AUTO-RTO: 0 /100 WBC (ref 0–0.2)
PLATELET # BLD AUTO: 220 10*3/MM3 (ref 140–450)
PMV BLD AUTO: 8.1 FL (ref 6–12)
RBC # BLD AUTO: 2.55 10*6/MM3 (ref 3.77–5.28)
VIT B12 BLD-MCNC: 317 PG/ML (ref 211–946)
WBC NRBC COR # BLD: 14.3 10*3/MM3 (ref 3.4–10.8)

## 2022-03-07 PROCEDURE — 82607 VITAMIN B-12: CPT | Performed by: HOSPITALIST

## 2022-03-07 PROCEDURE — 99233 SBSQ HOSP IP/OBS HIGH 50: CPT | Performed by: HOSPITALIST

## 2022-03-07 PROCEDURE — 94799 UNLISTED PULMONARY SVC/PX: CPT

## 2022-03-07 PROCEDURE — 99222 1ST HOSP IP/OBS MODERATE 55: CPT | Performed by: INTERNAL MEDICINE

## 2022-03-07 PROCEDURE — 25010000002 CEFTRIAXONE PER 250 MG: Performed by: NURSE PRACTITIONER

## 2022-03-07 PROCEDURE — 82746 ASSAY OF FOLIC ACID SERUM: CPT | Performed by: HOSPITALIST

## 2022-03-07 PROCEDURE — 93010 ELECTROCARDIOGRAM REPORT: CPT | Performed by: INTERNAL MEDICINE

## 2022-03-07 PROCEDURE — 85025 COMPLETE CBC W/AUTO DIFF WBC: CPT | Performed by: HOSPITALIST

## 2022-03-07 PROCEDURE — 25010000002 DIPHENHYDRAMINE PER 50 MG: Performed by: HOSPITALIST

## 2022-03-07 PROCEDURE — 25010000002 HEPARIN (PORCINE) PER 1000 UNITS: Performed by: NURSE PRACTITIONER

## 2022-03-07 PROCEDURE — 25010000002 DEXAMETHASONE PER 1 MG: Performed by: HOSPITALIST

## 2022-03-07 PROCEDURE — 93005 ELECTROCARDIOGRAM TRACING: CPT | Performed by: HOSPITALIST

## 2022-03-07 PROCEDURE — 25010000002 AMPICILLIN-SULBACTAM PER 1.5 G: Performed by: NURSE PRACTITIONER

## 2022-03-07 RX ORDER — METRONIDAZOLE 500 MG/100ML
500 INJECTION, SOLUTION INTRAVENOUS EVERY 8 HOURS
Status: COMPLETED | OUTPATIENT
Start: 2022-03-07 | End: 2022-03-09

## 2022-03-07 RX ORDER — METOPROLOL SUCCINATE 25 MG/1
12.5 TABLET, EXTENDED RELEASE ORAL DAILY
Status: DISCONTINUED | OUTPATIENT
Start: 2022-03-07 | End: 2022-03-09

## 2022-03-07 RX ORDER — DIPHENHYDRAMINE HYDROCHLORIDE 50 MG/ML
25 INJECTION INTRAMUSCULAR; INTRAVENOUS ONCE
Status: COMPLETED | OUTPATIENT
Start: 2022-03-07 | End: 2022-03-07

## 2022-03-07 RX ORDER — DEXAMETHASONE SODIUM PHOSPHATE 4 MG/ML
6 INJECTION, SOLUTION INTRA-ARTICULAR; INTRALESIONAL; INTRAMUSCULAR; INTRAVENOUS; SOFT TISSUE ONCE
Status: COMPLETED | OUTPATIENT
Start: 2022-03-07 | End: 2022-03-07

## 2022-03-07 RX ADMIN — ISOSORBIDE MONONITRATE 30 MG: 30 TABLET, EXTENDED RELEASE ORAL at 13:33

## 2022-03-07 RX ADMIN — RANOLAZINE 1000 MG: 500 TABLET, FILM COATED, EXTENDED RELEASE ORAL at 21:38

## 2022-03-07 RX ADMIN — HEPARIN SODIUM 5000 UNITS: 5000 INJECTION INTRAVENOUS; SUBCUTANEOUS at 13:30

## 2022-03-07 RX ADMIN — DEXAMETHASONE SODIUM PHOSPHATE 6 MG: 4 INJECTION, SOLUTION INTRAMUSCULAR; INTRAVENOUS at 13:28

## 2022-03-07 RX ADMIN — METHOCARBAMOL 500 MG: 500 TABLET ORAL at 23:23

## 2022-03-07 RX ADMIN — CEFTRIAXONE 1 G: 1 INJECTION, POWDER, FOR SOLUTION INTRAMUSCULAR; INTRAVENOUS at 21:38

## 2022-03-07 RX ADMIN — AMPICILLIN SODIUM AND SULBACTAM SODIUM 3 G: 2; 1 INJECTION, POWDER, FOR SOLUTION INTRAMUSCULAR; INTRAVENOUS at 10:37

## 2022-03-07 RX ADMIN — METOPROLOL SUCCINATE 12.5 MG: 25 TABLET, FILM COATED, EXTENDED RELEASE ORAL at 13:33

## 2022-03-07 RX ADMIN — HYDROCODONE BITARTRATE AND ACETAMINOPHEN 1 TABLET: 10; 325 TABLET ORAL at 13:42

## 2022-03-07 RX ADMIN — METHOCARBAMOL 500 MG: 500 TABLET ORAL at 14:45

## 2022-03-07 RX ADMIN — GABAPENTIN 400 MG: 400 CAPSULE ORAL at 04:53

## 2022-03-07 RX ADMIN — SERTRALINE 100 MG: 100 TABLET, FILM COATED ORAL at 13:31

## 2022-03-07 RX ADMIN — HEPARIN SODIUM 5000 UNITS: 5000 INJECTION INTRAVENOUS; SUBCUTANEOUS at 04:52

## 2022-03-07 RX ADMIN — METRONIDAZOLE 500 MG: 500 INJECTION, SOLUTION INTRAVENOUS at 22:23

## 2022-03-07 RX ADMIN — OXYCODONE HYDROCHLORIDE AND ACETAMINOPHEN 250 MG: 500 TABLET ORAL at 13:31

## 2022-03-07 RX ADMIN — IPRATROPIUM BROMIDE AND ALBUTEROL SULFATE 3 ML: .5; 3 SOLUTION RESPIRATORY (INHALATION) at 09:02

## 2022-03-07 RX ADMIN — ESTRADIOL 3 MG: 1 TABLET ORAL at 21:38

## 2022-03-07 RX ADMIN — HEPARIN SODIUM 5000 UNITS: 5000 INJECTION INTRAVENOUS; SUBCUTANEOUS at 21:38

## 2022-03-07 RX ADMIN — HYDROCODONE BITARTRATE AND ACETAMINOPHEN 1 TABLET: 10; 325 TABLET ORAL at 04:53

## 2022-03-07 RX ADMIN — GABAPENTIN 400 MG: 400 CAPSULE ORAL at 13:33

## 2022-03-07 RX ADMIN — ASPIRIN 81 MG: 81 TABLET, COATED ORAL at 13:32

## 2022-03-07 RX ADMIN — CLOPIDOGREL BISULFATE 75 MG: 75 TABLET, FILM COATED ORAL at 13:30

## 2022-03-07 RX ADMIN — PANTOPRAZOLE SODIUM 40 MG: 40 TABLET, DELAYED RELEASE ORAL at 04:52

## 2022-03-07 RX ADMIN — DIPHENHYDRAMINE HYDROCHLORIDE 25 MG: 50 INJECTION, SOLUTION INTRAMUSCULAR; INTRAVENOUS at 13:25

## 2022-03-07 RX ADMIN — LEVOTHYROXINE SODIUM 88 MCG: 0.09 TABLET ORAL at 13:31

## 2022-03-07 RX ADMIN — GABAPENTIN 400 MG: 400 CAPSULE ORAL at 21:38

## 2022-03-07 RX ADMIN — FERROUS SULFATE TAB EC 324 MG (65 MG FE EQUIVALENT) 324 MG: 324 (65 FE) TABLET DELAYED RESPONSE at 13:31

## 2022-03-07 RX ADMIN — Medication 500 UNITS: at 13:32

## 2022-03-07 RX ADMIN — LORAZEPAM 1 MG: 1 TABLET ORAL at 08:52

## 2022-03-07 RX ADMIN — Medication 10 ML: at 21:37

## 2022-03-07 RX ADMIN — RANOLAZINE 1000 MG: 500 TABLET, FILM COATED, EXTENDED RELEASE ORAL at 13:31

## 2022-03-07 RX ADMIN — ATORVASTATIN CALCIUM 80 MG: 40 TABLET, FILM COATED ORAL at 21:38

## 2022-03-07 RX ADMIN — LORAZEPAM 1 MG: 1 TABLET ORAL at 21:53

## 2022-03-07 RX ADMIN — Medication 10 ML: at 13:32

## 2022-03-07 RX ADMIN — AMPICILLIN SODIUM AND SULBACTAM SODIUM 3 G: 2; 1 INJECTION, POWDER, FOR SOLUTION INTRAMUSCULAR; INTRAVENOUS at 04:51

## 2022-03-07 NOTE — CONSULTS
Cardiology Consult Note      REQUESTING PHYSICIAN    Sky Reeder MD    PATIENT IDENTIFICATION  Name: Gayathri Reddy  Age: 76 y.o.  Sex: female  :  1945  MRN: [id number]             REASON FOR CONSULTATION:  76 y.o. female with an established history of CAD.  She has undergone 4-V CABG in .  She has additional history of hypertension with hypertensive cardiovascular disease, HLD, COPD, and cardiomyopathy.    TTE 2021: EF 46-50%, LV wall thickness consistent with mild-moderate septal asymmetry hypertrophy, grade 1 DD      CC:  Shortness of breath    HISTORY OF PRESENT ILLNESS:   Patient presented to the emergency department [facility] 3/6/2022 with complaint of shortness of breath that began 2 days prior.  She reports positive orthopnea, worsening shortness of breath with exertion.  She denies any actual chest pain, pressure, tightness.  Patient reports recent frequent palpitations.  She denies any lower extremity edema, abdominal distention, nausea or vomiting, dizziness, lightheadedness, near syncopal or syncopal episodes.  She reports nonproductive cough initially, but is producing scant amount of clear phlegm today.  She wears home O2 that she is increased from 3 to 4 L recently.  In the ER, BNP was elevated at 15,023.  Patient reports she did have some mild chest discomfort this morning-but had not yet received a.m. Ranexa.      REVIEW OF SYSTEMS:  Pertinent items are noted in HPI, all other systems reviewed and negative    OBJECTIVE   Creatinine 1.27/BUN 26  WBC 10.9 (13)  Hemoglobin 8.5    Chest x-ray shows multifocal pneumonia    ASSESSMENT  Multifocal pneumonia  Acute on chronic hypoxia secondary to above  Acute on chronic heart failure with systolic and diastolic features  CAD s/p CABG with attrition of 3/4 grafts. SVG - Diag remains patent      Negative FFR SVG-Dg 10/2018      PCI ALFREDO SVG-DG 2021  Ischemic cardiomyopathy with most recent ejection fraction 45% April  "2021  Probable orthostatic hypotension  Oxygen dependant COPD  Chest pain with typical and atypical features.  HTN with HTCVD  AAA previously followed by Dr Lang  Angina pectoris significantly improved with Ranexa      PLAN  Antibiotics per admitting service.  Chest CT with no evidence of pleural effusion.  Continue Ranexa, aspirin, BB  Receiving subcutaneous heparin  Currently not on diuretic therapy, but does not appear overtly fluid overloaded.  Monitor volume status and diurese as appropriate  Patient reports palpitations.  Telemetry shows sinus rhythm with frequent PACs.  EKG was sinus rhythm and old anterior wall injury.  No evidence at this point of atrial fibrillation.  She reports no chest pain  Further recommendations per cardiologist        Vital Signs  Visit Vitals  /78 (BP Location: Left arm, Patient Position: Lying)   Pulse 93   Temp 97.8 °F (36.6 °C) (Oral)   Resp 16   Ht 165.1 cm (65\")   Wt 59.9 kg (132 lb)   SpO2 100%   BMI 21.97 kg/m²     Oxygen Therapy  SpO2: 100 %  Pulse Oximetry Type: Continuous  Device (Oxygen Therapy): nasal cannula  Flow (L/min): 3  Flowsheet Rows      Flowsheet Row First Filed Value   Admission Height 165.1 cm (65\") Documented at 03/06/2022 1049   Admission Weight 59.9 kg (132 lb) Documented at 03/06/2022 1049          Intake & Output (last 3 days)         03/04 0701  03/05 0700 03/05 0701  03/06 0700 03/06 0701  03/07 0700 03/07 0701  03/08 0700    IV Piggyback   200 100    Total Intake(mL/kg)   200 (3.3) 100 (1.7)    Net   +200 +100            Urine Unmeasured Occurrence   5 x           Lines, Drains & Airways       Active LDAs       Name Placement date Placement time Site Days    Peripheral IV 03/06/22 1125 Forearm 03/06/22  1125  Forearm  less than 1                    MEDICAL HISTORY    Past Medical History:   Diagnosis Date    Aneurysm (HCC)     AAA    Arthritis     CHF (congestive heart failure) (HCC)     COPD (chronic obstructive pulmonary disease) (HCC)     " "Coronary artery disease     Dyslipidemia     GERD (gastroesophageal reflux disease)     History of right heart catheterization     2017; 10/2018    Hyperlipidemia     Hypertension     Hypothyroidism     Myocardial infarct (HCC)     Myocardial infarction (HCC)     x 3    Pneumonia 2019    bilateral         SURGICAL HISTORY    Past Surgical History:   Procedure Laterality Date    ABDOMINAL AORTIC ANEURYSM REPAIR N/A     CARDIAC CATHETERIZATION      CATARACT EXTRACTION Bilateral     CORONARY ANGIOPLASTY WITH STENT PLACEMENT N/A     x 5    CORONARY ARTERY BYPASS GRAFT      x 4     HYSTERECTOMY N/A         FAMILY HISTORY    Family History   Problem Relation Age of Onset    Heart disease Mother     Aneurysm Mother     Heart disease Father     Colon cancer Sister         Date of onset unknown to patient.  States her sister  from colon cancer at age 59.    Cancer Paternal Grandfather         Type unknown to patient       SOCIAL HISTORY    Social History     Tobacco Use    Smoking status: Former Smoker     Packs/day: 1.50     Types: Cigarettes     Quit date:      Years since quittin.1    Smokeless tobacco: Never Used   Substance Use Topics    Alcohol use: No        ALLERGIES    Allergies   Allergen Reactions    Naprosyn  [Naproxen] Rash    Bactrim [Sulfamethoxazole-Trimethoprim] Rash              /78 (BP Location: Left arm, Patient Position: Lying)   Pulse 93   Temp 97.8 °F (36.6 °C) (Oral)   Resp 16   Ht 165.1 cm (65\")   Wt 59.9 kg (132 lb)   SpO2 100%   BMI 21.97 kg/m²   Intake/Output last 3 shifts:  I/O last 3 completed shifts:  In: 200 [IV Piggyback:200]  Out: -   Intake/Output this shift:  I/O this shift:  In: 100 [IV Piggyback:100]  Out: -     PHYSICAL EXAM:    General: Well-developed, well-nourished 76-year-old female who is alert, cooperative, no distress, appears stated age  Head:  Normocephalic, atraumatic, mucous membranes moist  Eyes:  Conjunctiva/corneas clear, EOM's " intact     Neck:  Supple,  no adenopathy; no JVD or bruit  Lungs: Scattered crackles bilaterally with no wheezes  Chest wall: No tenderness  Heart::  Regular rate and irregular rhythm, S1 and S2 normal, 1/6 murmur at the base  Abdomen: Soft, non-tender, nondistended bowel sounds active  Extremities: No cyanosis, clubbing, or edema   Pulses: 2+ and symmetric all extremities  Skin:  No rashes or lesions  Neuro/psych: A&O x3. CN II through XII are grossly intact with appropriate affect      Scheduled Meds:      ampicillin-sulbactam, 3 g, Intravenous, Q6H  vitamin C, 250 mg, Oral, Daily  aspirin, 81 mg, Oral, Daily  atorvastatin, 80 mg, Oral, Nightly  cholecalciferol, 500 Units, Oral, Daily  clopidogrel, 75 mg, Oral, Daily  estradiol, 3 mg, Oral, Nightly  ferrous sulfate, 324 mg, Oral, Daily With Breakfast  gabapentin, 400 mg, Oral, Q8H  heparin (porcine), 5,000 Units, Subcutaneous, Q8H  ipratropium-albuterol, 3 mL, Nebulization, 4x Daily - RT  isosorbide mononitrate, 30 mg, Oral, Q24H  levothyroxine, 88 mcg, Oral, Daily  metoprolol succinate XL, 12.5 mg, Oral, Daily  pantoprazole, 40 mg, Oral, Q AM  ranolazine, 1,000 mg, Oral, Q12H  sertraline, 100 mg, Oral, Daily  sodium chloride, 10 mL, Intravenous, Q12H        Continuous Infusions:         PRN Meds:    diphenhydrAMINE    HYDROcodone-acetaminophen    LORazepam    methocarbamol    nitroglycerin    ondansetron **OR** ondansetron    sodium chloride    sodium chloride        Results Review:     I reviewed the patient's new clinical results.    CBC    Results from last 7 days   Lab Units 03/06/22  2311 03/06/22  1129   WBC 10*3/mm3 10.90* 13.10*   HEMOGLOBIN g/dL 8.5* 8.5*   PLATELETS 10*3/mm3 196 207     Cr Clearance Estimated Creatinine Clearance: 35.6 mL/min (A) (by C-G formula based on SCr of 1.27 mg/dL (H)).  Coag     HbA1C   Lab Results   Component Value Date    HGBA1C 5.4 01/07/2021     Blood Glucose No results found for: POCGLU  Infection   Results from last 7  days   Lab Units 03/06/22  1129   PROCALCITONIN ng/mL 0.04     CMP   Results from last 7 days   Lab Units 03/06/22  2311 03/06/22  1129   SODIUM mmol/L 138 140   POTASSIUM mmol/L 3.9 4.4   CHLORIDE mmol/L 99 103   CO2 mmol/L 25.0 27.0   BUN mg/dL 26* 24*   CREATININE mg/dL 1.27* 1.02*   GLUCOSE mg/dL 161* 124*   ALBUMIN g/dL  --  3.30*   BILIRUBIN mg/dL  --  0.2   ALK PHOS U/L  --  56   AST (SGOT) U/L  --  5   ALT (SGPT) U/L  --  <5     ABG    Results from last 7 days   Lab Units 03/06/22  1147   PH, ARTERIAL pH units 7.379   PCO2, ARTERIAL mm Hg 51.6*   PO2 ART mm Hg 74.8*   O2 SATURATION ART % 94.2   BASE EXCESS ART mmol/L 4.6*     UA      ALFONZO  No results found for: POCMETH, POCAMPHET, POCBARBITUR, POCBENZO, POCCOCAINE, POCOPIATES, POCOXYCODO, POCPHENCYC, POCPROPOXY, POCTHC, POCTRICYC  Lysis Labs   Results from last 7 days   Lab Units 03/06/22  2311 03/06/22  1129   HEMOGLOBIN g/dL 8.5* 8.5*   PLATELETS 10*3/mm3 196 207   CREATININE mg/dL 1.27* 1.02*     Radiology(recent) CT Chest Without Contrast Diagnostic    Result Date: 3/6/2022  1. Multifocal groundglass opacities throughout the lungs most pronounced in the lower lobes which may relate to atypical infection or COVID-19 pneumonia. 2. Focal nodular airspace disease in right upper lobe measuring 18 x 14 mm. There was previously a cluster of nodules in this region and this could relate to chronic inflammation secondary to prior infection or inflammation, however recommend short-term follow-up in 2-3 months or PET/CT to evaluate this nodule. 3. Additional chronic findings above.  Electronically Signed By-Nahid Cooley MD On:3/6/2022 6:29 PM This report was finalized on [id number] by  Nahid Cooley MD.    XR Chest 1 View    Result Date: 3/6/2022  Patchy multifocal airspace disease involving the lung bases concerning for pneumonia.  Electronically Signed By-Nahid Cooley MD On:3/6/2022 12:03 PM This report was finalized on [id number] by  Nahid Cooley,  MD.        Results from last 7 days   Lab Units 03/06/22  1129   TROPONIN T ng/mL <0.010       Xrays, labs reviewed personally by physician.    ECG/EMG Results (most recent)       Procedure Component Value Units Date/Time    ECG 12 Lead [[id number]] Collected: 03/06/22 1107     Updated: 03/06/22 1108     QT Interval 349 ms     Narrative:      HEART RATE= 86  bpm  RR Interval= 700  ms  VA Interval= 190  ms  P Horizontal Axis= -40  deg  P Front Axis= 52  deg  QRSD Interval= 90  ms  QT Interval= 349  ms  QRS Axis= -3  deg  T Wave Axis= 44  deg  - ABNORMAL ECG -  Sinus rhythm  Anterior infarct, old  When compared with ECG of 18-Nov-2019 6:42:25,  No significant change  Electronically Signed By:   Date and Time of Study: 2022-03-06 11:07:39              Medication Review:   I have reviewed the patient's current medication list  Scheduled Meds:ampicillin-sulbactam, 3 g, Intravenous, Q6H  vitamin C, 250 mg, Oral, Daily  aspirin, 81 mg, Oral, Daily  atorvastatin, 80 mg, Oral, Nightly  cholecalciferol, 500 Units, Oral, Daily  clopidogrel, 75 mg, Oral, Daily  estradiol, 3 mg, Oral, Nightly  ferrous sulfate, 324 mg, Oral, Daily With Breakfast  gabapentin, 400 mg, Oral, Q8H  heparin (porcine), 5,000 Units, Subcutaneous, Q8H  ipratropium-albuterol, 3 mL, Nebulization, 4x Daily - RT  isosorbide mononitrate, 30 mg, Oral, Q24H  levothyroxine, 88 mcg, Oral, Daily  metoprolol succinate XL, 12.5 mg, Oral, Daily  pantoprazole, 40 mg, Oral, Q AM  ranolazine, 1,000 mg, Oral, Q12H  sertraline, 100 mg, Oral, Daily  sodium chloride, 10 mL, Intravenous, Q12H      Continuous Infusions:   PRN Meds:.diphenhydrAMINE    HYDROcodone-acetaminophen    LORazepam    methocarbamol    nitroglycerin    ondansetron **OR** ondansetron    sodium chloride    sodium chloride    Imaging:  Imaging Results (Last 72 Hours)       Procedure Component Value Units Date/Time    CT Chest Without Contrast Diagnostic [[id number]] Collected: 03/06/22 1824     Updated:  03/06/22 1832    Narrative:         DATE OF EXAM:  3/6/2022 3:20 PM     PROCEDURE:  CT CHEST WO CONTRAST DIAGNOSTIC-     INDICATIONS:   Respiratory failure; J18.9-Pneumonia, unspecified organism;  R06.02-Shortness of breath; I50.9-Heart failure, unspecified     COMPARISON:   CT chest high-resolution 12/09/2019     TECHNIQUE:  Routine transaxial slices were obtained through the chest without the  administration of intravenous contrast. Reconstructed coronal and  sagittal images were also obtained. Automated exposure control and  iterative construction methods were used.      FINDINGS:  Lung bases without consolidation. Lack of contrast limits assessment  mediastinum and vasculature. Postsurgical changes of sternotomy and CABG  noted. There are scattered calcified mediastinal and hilar lymph nodes  which are most consistent with granulomatous disease. No pericardial  effusion or pleural effusion.     Trachea and mainstem bronchi are patent. There are multifocal  groundglass opacities involving the lower lobes and to lesser extent the  upper lobes which are concerning for multifocal pneumonia. Within the  right upper lobe there is a more focal area of nodular consolidation  measuring 18 mm x 14 mm on image 32. Previously several small scattered  nodules were noted this area which now appear confluent. No pleural  effusion.     Upper abdomen demonstrates normal noncontrast visualized portions of the  liver and adrenal glands. Numerous splenic granulomatous calcifications  noted. No free fluid in the upper abdomen. No aggressive osseous lesion  or fracture. Aneurysmal dilatation of the upper abdominal aorta  partially imaged.       Impression:      1. Multifocal groundglass opacities throughout the lungs most pronounced  in the lower lobes which may relate to atypical infection or COVID-19  pneumonia.  2. Focal nodular airspace disease in right upper lobe measuring 18 x 14  mm. There was previously a cluster of nodules  "in this region and this  could relate to chronic inflammation secondary to prior infection or  inflammation, however recommend short-term follow-up in 2-3 months or  PET/CT to evaluate this nodule.  3. Additional chronic findings above.     Electronically Signed By-Nahid Cooley MD On:3/6/2022 6:29 PM  This report was finalized on [id number] by  Nahid Cooley MD.    XR Chest 1 View [[id number]] Collected: 03/06/22 1203     Updated: 03/06/22 1205    Narrative:         DATE OF EXAM:   3/6/2022 11:37 AM     PROCEDURE:   XR CHEST 1 VW-     INDICATIONS:   CHF/COPD Protocol     COMPARISON:  No Comparisons Available     TECHNIQUE:   Portable chest radiograph.     FINDINGS:    Sternotomy wires and changes of CABG noted. There is patchy multifocal  airspace disease at the lung bases most concerning for pneumonia. No  pneumothorax. No large pleural effusion. Osseous structures are intact.       Impression:      Patchy multifocal airspace disease involving the lung bases concerning  for pneumonia.     Electronically Signed By-Nahid Coolye MD On:3/6/2022 12:03 PM  This report was finalized on [id number] by  Nahid Cooley MD.              RADHA Rudd  03/07/22  10:59 EST       EMR Dragon/Transcription:   \"Dictated utilizing Dragon dictation\".       Electronically signed by RADHA Rudd, 03/07/22, 10:59 AM EST.    Cardiology attending:  Seen and examined.  Chart and labs reviewed.  History and exam findings are verified with above changes noted.  Assessment and plan notated by APC after being formulated by attending consultant.  Note that greater than 50% of the time spent in care of the patient was provided by attending consultant.  Patient did report some chest discomfort although this is resolved with the reinitiation of her Ranexa.  Continue to monitor volume status and diurese as needed.  Antimicrobials as per admitting service.  Monitor electrolytes.    "

## 2022-03-07 NOTE — CONSULTS
[facility] CONSULT NOTE    Referring Provider: Dr. ALONA Reeder  Reason for Consultation:Renal Insufficiency    Chief complaint. Shortness of breath    History of present illness:    Patient is 76 years old female with history of hypertension, COPD, CHF, AAA, hypothyroidism presented to the hospital with dyspnea at rest and was getting worse with exertion.  She also had cough with clear expectoration.  Denied any chest pain, nausea, vomiting, diarrhea, constipation, hematuria or dysuria.  CT scan was suggestive of multifocal pneumonia.  BNP was high so he received received IV Lasix.He also received IV antibiotics.  Patient overall feeling better    History  Past Medical History:   Diagnosis Date   • Aneurysm (HCC)     AAA   • Arthritis    • CHF (congestive heart failure) (HCC)    • COPD (chronic obstructive pulmonary disease) (HCC)    • Coronary artery disease    • Dyslipidemia    • GERD (gastroesophageal reflux disease)    • History of right heart catheterization     2017; 10/2018   • Hyperlipidemia    • Hypertension    • Hypothyroidism    • Myocardial infarct (HCC)    • Myocardial infarction (HCC)     x 3   • Pneumonia 2019    bilateral      Past Surgical History:   Procedure Laterality Date   • ABDOMINAL AORTIC ANEURYSM REPAIR N/A    • CARDIAC CATHETERIZATION     • CATARACT EXTRACTION Bilateral    • CORONARY ANGIOPLASTY WITH STENT PLACEMENT N/A     x 5   • CORONARY ARTERY BYPASS GRAFT      x 4    • HYSTERECTOMY N/A      Social History     Tobacco Use   • Smoking status: Former Smoker     Packs/day: 1.50     Types: Cigarettes     Quit date:      Years since quittin.1   • Smokeless tobacco: Never Used   Vaping Use   • Vaping Use: Never used   Substance Use Topics   • Alcohol use: No   • Drug use: No     Family History   Problem Relation Age of Onset   • Heart disease Mother    • Aneurysm Mother    • Heart disease Father    • Colon cancer Sister         Date of onset unknown to patient.   States her sister  from colon cancer at age 59.   • Cancer Paternal Grandfather         Type unknown to patient       Review of Systems  ROS  All systems reviewed, negative except as mentioned in HPI  Objective     Vital Signs  Temp:  [97.5 °F (36.4 °C)-98.1 °F (36.7 °C)] 97.8 °F (36.6 °C)  Heart Rate:  [66-93] 93  Resp:  [16-22] 16  BP: (135-152)/(46-78) 140/78    I/O this shift:  In: 100 [IV Piggyback:100]  Out: -   I/O last 3 completed shifts:  In: 200 [IV Piggyback:200]  Out: -     Physical Exam:  Physical Exam    General Appearance: Chronically ill-appearing, NAD  Skin: warm and dry  HEENT: oral mucosa normal, nonicteric sclera  Neck: supple, no JVD  Lungs: Bilateral wheezing  Heart: RRR, normal S1 and S2  Abdomen: soft, nontender, nondistended  : no palpable bladder  Extremities: no edema, cyanosis or clubbing  Neuro: normal speech and mental status     Results Review:   I reviewed the patient's new clinical results.    Lab Results   Component Value Date    CALCIUM 8.8 2022     Results from last 7 days   Lab Units 22  2311 22  1129   SODIUM mmol/L 138 140   POTASSIUM mmol/L 3.9 4.4   CHLORIDE mmol/L 99 103   CO2 mmol/L 25.0 27.0   BUN mg/dL 26* 24*   CREATININE mg/dL 1.27* 1.02*   GLUCOSE mg/dL 161* 124*   CALCIUM mg/dL 8.8 8.5*   WBC 10*3/mm3 10.90* 13.10*   HEMOGLOBIN g/dL 8.5* 8.5*   PLATELETS 10*3/mm3 196 207   ALT (SGPT) U/L  --  <5   AST (SGOT) U/L  --  5     Lab Results   Component Value Date    TROPONINI 8.820 (C) 2021    TROPONINT <0.010 2022     Estimated Creatinine Clearance: 35.6 mL/min (A) (by C-G formula based on SCr of 1.27 mg/dL (H)).No results found for: URICACID    Brief Urine Lab Results     None          Prior to Admission medications    Medication Sig Start Date End Date Taking? Authorizing Provider   aspirin 81 MG EC tablet Take 81 mg by mouth Daily.   Yes Provider, MD Raul   diphenhydrAMINE (BENADRYL) 25 mg capsule Take 50 mg by mouth every  night at bedtime.   Yes Raul Arevalo MD   estradiol (ESTRACE) 1 MG tablet Take 3 mg by mouth Every Night. 6/28/17  Yes Raul Arevalo MD   furosemide (LASIX) 20 MG tablet Take 1 tablet by mouth Daily. 11/24/19  Yes Ashtyn Salter MD   gabapentin (NEURONTIN) 400 MG capsule Take 400 mg by mouth 3 (Three) Times a Day. 5/4/17  Yes Raul Arevalo MD   HYDROcodone-acetaminophen (NORCO)  MG per tablet Take 1 tablet by mouth Every 6 (Six) Hours As Needed. 5/4/17  Yes Raul Arevalo MD   levothyroxine (SYNTHROID, LEVOTHROID) 88 MCG tablet Take 88 mcg by mouth Daily.   Yes Raul Arevalo MD   lisinopril (PRINIVIL,ZESTRIL) 2.5 MG tablet Take 1 tablet by mouth Daily. 2/1/21  Yes Alix Bernardo APRN   LORazepam (ATIVAN) 1 MG tablet Take 1 mg by mouth 4 (Four) Times a Day As Needed. 5/4/17  Yes Raul Arevalo MD   methocarbamol (ROBAXIN) 500 MG tablet Take 500 mg by mouth 3 (Three) Times a Day As Needed for Muscle Spasms.   Yes Raul Arevalo MD   metoprolol succinate XL (TOPROL-XL) 25 MG 24 hr tablet Take 0.5 tablets by mouth Daily. 7/21/21  Yes Roc Butler,    pantoprazole (PROTONIX) 40 MG EC tablet Take 1 tablet by mouth Daily. 11/24/19  Yes Ashtyn Salter MD   ranolazine (RANEXA) 1000 MG 12 hr tablet Take 1 tablet by mouth Every 12 (Twelve) Hours. 10/8/21  Yes Roc Butler DO   rosuvastatin (CRESTOR) 10 MG tablet Take 10 mg by mouth Daily. 5/4/17  Yes Raul Arevalo MD   sertraline (ZOLOFT) 100 MG tablet Take 100 mg by mouth Daily. 5/4/17  Yes Raul Arevalo MD   vitamin C (ASCORBIC ACID) 250 MG tablet Take 250 mg by mouth Daily.   Yes Raul Arevalo MD   Budeson-Glycopyrrol-Formoterol (BREZTRI AEROSPHERE IN) Inhale 1 puff 2 (Two) Times a Day. Getting Samples    Irina MD Raul   cholecalciferol (VITAMIN D3) 10 MCG (400 UNIT) tablet Take 400 Units by mouth Daily.    Provider, MD Raul    clopidogrel (PLAVIX) 75 MG tablet Take 75 mg by mouth Daily. 5/4/17   Raul Arevalo MD   ferrous sulfate 324 (65 Fe) MG tablet delayed-release EC tablet Take 324 mg by mouth Daily With Breakfast.    Raul Arevalo MD   isosorbide mononitrate (IMDUR) 30 MG 24 hr tablet Take 30 mg by mouth Daily. 6/27/18   Raul Arevalo MD   nitroglycerin (Nitrostat) 0.4 MG SL tablet Place 1 tablet under the tongue Every 5 (Five) Minutes As Needed for Chest Pain. 7/21/21   Roc Butler,    Potassium 99 MG tablet Take 1 tablet by mouth Daily.    ProviderRaul MD       ampicillin-sulbactam, 3 g, Intravenous, Q6H  vitamin C, 250 mg, Oral, Daily  aspirin, 81 mg, Oral, Daily  atorvastatin, 80 mg, Oral, Nightly  cholecalciferol, 500 Units, Oral, Daily  clopidogrel, 75 mg, Oral, Daily  estradiol, 3 mg, Oral, Nightly  ferrous sulfate, 324 mg, Oral, Daily With Breakfast  gabapentin, 400 mg, Oral, Q8H  heparin (porcine), 5,000 Units, Subcutaneous, Q8H  ipratropium-albuterol, 3 mL, Nebulization, 4x Daily - RT  isosorbide mononitrate, 30 mg, Oral, Q24H  levothyroxine, 88 mcg, Oral, Daily  metoprolol succinate XL, 12.5 mg, Oral, Daily  pantoprazole, 40 mg, Oral, Q AM  ranolazine, 1,000 mg, Oral, Q12H  sertraline, 100 mg, Oral, Daily  sodium chloride, 10 mL, Intravenous, Q12H           Assessment/Plan       1.Acute kidney injury.  Mild  Baseline creatinine seems to be around 0.8 MG/DL but has fluctuated in past due to diuretics.  A KI seems to be prerenal in etiology due to CHF or volume depletion.  Electrolytes, volume status okay  2.  Hypertension  Pressure well controlled  3.  COPD exacerbation/pneumonia  4.  Congestive heart failure.  Systolic.  Chronic      Recs:  Keep off diuretics  Agree holding ACE inhibitor  I will restart oral Lasix tomorrow if creatinine continue to improve    I discussed the patients findings and my recommendations with patient and nursing staff    Irving Yi,  MD  03/07/22  11:48 EST

## 2022-03-07 NOTE — PLAN OF CARE
Problem: Adult Inpatient Plan of Care  Goal: Plan of Care Review  Outcome: Ongoing, Progressing  Goal: Patient-Specific Goal (Individualized)  Outcome: Ongoing, Progressing  Goal: Absence of Hospital-Acquired Illness or Injury  Outcome: Ongoing, Progressing  Goal: Optimal Comfort and Wellbeing  Outcome: Ongoing, Progressing  Goal: Readiness for Transition of Care  Outcome: Ongoing, Progressing     Problem: Behavioral Health Comorbidity  Goal: Maintenance of Behavioral Health Symptom Control  Outcome: Ongoing, Progressing     Problem: COPD (Chronic Obstructive Pulmonary Disease) Comorbidity  Goal: Maintenance of COPD Symptom Control  Outcome: Ongoing, Progressing     Problem: Diabetes Comorbidity  Goal: Blood Glucose Level Within Targeted Range  Outcome: Ongoing, Progressing     Problem: Heart Failure Comorbidity  Goal: Maintenance of Heart Failure Symptom Control  Outcome: Ongoing, Progressing     Problem: Hypertension Comorbidity  Goal: Blood Pressure in Desired Range  Outcome: Ongoing, Progressing     Problem: Fall Injury Risk  Goal: Absence of Fall and Fall-Related Injury  Outcome: Ongoing, Progressing   Goal Outcome Evaluation:

## 2022-03-07 NOTE — PLAN OF CARE
Goal Outcome Evaluation:  Plan of Care Reviewed With: patient     Patient is Aox4, currently. No issues at this moment.

## 2022-03-07 NOTE — CASE MANAGEMENT/SOCIAL WORK
Discharge Planning Assessment  [facility]     Patient Name: Gayathri Reddy  MRN: [id number]  Today's Date: 3/7/2022    Admit Date: 3/6/2022     Discharge Needs Assessment     Row Name 03/07/22 1344       Living Environment    People in Home alone    Current Living Arrangements home    Primary Care Provided by self    Provides Primary Care For no one    Family Caregiver if Needed child(holden), adult    Family Caregiver Names Son JR or     Quality of Family Relationships involved;supportive    Able to Return to Prior Arrangements yes       Resource/Environmental Concerns    Resource/Environmental Concerns none    Transportation Concerns no car       Transition Planning    Patient/Family Anticipates Transition to home    Patient/Family Anticipated Services at Transition none    Transportation Anticipated family or friend will provide       Discharge Needs Assessment    Readmission Within the Last 30 Days no previous admission in last 30 days    Equipment Currently Used at Home oxygen    Concerns to be Addressed discharge planning    Anticipated Changes Related to Illness none    Equipment Needed After Discharge none               Discharge Plan     Row Name 03/07/22 1340       Plan    Plan From Home alone, Current home oxygen 3L with [employer]    Patient/Family in Agreement with Plan yes    Plan Comments Met with Patient at Bedside Lives at home alone but has 2 sons that assist her if she needs anything including transportation. PCP and Pharmacy verified, able to afford medications. Declined home health needs at this time.                   Demographic Summary     Row Name 03/07/22 1340       General Information    Admission Type inpatient    Arrived From emergency department    Required Notices Provided Important Message from Medicare    Referral Source admission list    Reason for Consult decision-making    Preferred Language English               Functional Status     Row Name 03/07/22 1346       Functional Status     Usual Activity Tolerance moderate    Current Activity Tolerance moderate       Functional Status, IADL    Medications independent    Meal Preparation independent    Housekeeping independent    Laundry independent    Shopping assistive person    IADL Comments Has 3 sons that assists with Transportation and SHopping       Mental Status    General Appearance WDL WDL       Mental Status Summary    Recent Changes in Mental Status/Cognitive Functioning no changes              Met with patient at bedside wearing mask and goggles, Spent less than 15 minutes in room at greater than 6 feet distance.       Vanessa Fraser RN

## 2022-03-07 NOTE — NURSING NOTE
Patient is AOX4 at this time. Patient is complaining of weakness and pain at the IV site. Unasyn is complete at this point. I notified physician of reaction, orders are as followed dexamethasone 6mg and benadryl 25mg. Patient also complaining of a headache hydrocodone given and muscle relaxer. Vitals are 100% on 3l, 18rr, temp 99.7 and /71 (93). Pharmacy also notified.

## 2022-03-07 NOTE — CONSULTS
[facility]: [facility]         CONSULT NOTE    Patient Identification:  Gayathri Reddy  76 y.o.  female  1945  [id number]            Requesting physician: Varinder    Reason for Consultation: Pneumonia      History of Present Illness: 76 y.o. female with PMHx of HTN, HLD, Hypothyroid, GERD, cAD, AAA, CHF, COPD (3L NC chronically) who presents due to dyspnea.  Admits to dyspnea with exertion complicated by productive cough ongoing for the past three days and similar to previous experiences with pneumonia.  Denies chest pain, abdominal pain, numbness, tingling, hemoptysis.  Admits to increasing home oxygen from 3 to 4L latelty.  Due to multiple risk factors, went to [facility] for evaluation     In the ED, vitals 98.8F, HR 84, RR 18, 135/64, 97 4L.  Labs notable for troponin < 0.01, proBNP 38019, glucose 124, creatinine 1.02, white count 13.1, Hgb 8.5.  CXR suggestive of multi-focal pneumonia.  Respiratory panel negative for covid or other virus.  Patient started on abx, lasix, and admitted to medicine for evaluation.      Assessment:  Acute on chronic hypoxia with hypercapnia- wears 3-4 L at home  Multifocal pneumonia  Allergic reaction to Unasyn in the form of urticaria  Right upper lobe nodule  NOE  COPD  Diastolic and systolic CHF-  BNP> 15,000  CAD s/p CABG April 2021  AAA  HTN  HLD  GERD  Hypothyroidism  Macrocytic hyperchromic anemia  Anxiety    CT with high resolution on 12/9/2019-mentions small cluster of 5 mm nodules and mild interstitial lung disease    Legionella and strep pneumonia antigens negative  Respiratory panel negative    ABG 3/6/2022 on nasal cannula-pH 7.37, CO2 51.6, PO2 74.8, bicarb 30.4    Recommendations:  Continue to titrate oxygen- Currently on 3L NC, and the patient is improving  Patient received 1 dose of dexamethasone and Lasix  DC Unasyn and patient was switched to Rocephin for PNA-finishes 3/13/2022  Urticaria resolved after steroids and antihistamine  Continue the same  treatment as long as the patient is improving  Inhaled corticosteroids  Bronchodilators  Electrolyte/Glycemic control  DVT/GI Prophylaxis-Heparin SQ and Protonix  Levothyroxine 88mcg    3/6/2022        Review of Sytems:  Review of Systems  Review of Systems       Constitutional: Negative for chills, fever and malaise/fatigue.   HENT: Negative.    Eyes: Negative.    Cardiovascular: Negative.    Respiratory: Positive for cough and shortness of breath.    Skin: Negative.    Musculoskeletal: Negative.    Gastrointestinal: Negative.    Genitourinary: Negative.    Neurological: Negative.    Psychiatric/Behavioral: Negative.      Past Medical History:  Past Medical History:   Diagnosis Date   • Aneurysm (HCC)     AAA   • Arthritis    • CHF (congestive heart failure) (formerly [facility])    • COPD (chronic obstructive pulmonary disease) (formerly [facility])    • Coronary artery disease    • Dyslipidemia    • GERD (gastroesophageal reflux disease)    • History of right heart catheterization     7/30/2017; 10/2018   • Hyperlipidemia    • Hypertension    • Hypothyroidism    • Myocardial infarct (formerly [facility])    • Myocardial infarction (formerly [facility])     x 3   • Pneumonia 11/2019    bilateral        Past Surgical History:  Past Surgical History:   Procedure Laterality Date   • ABDOMINAL AORTIC ANEURYSM REPAIR N/A 2001   • CARDIAC CATHETERIZATION     • CATARACT EXTRACTION Bilateral    • CORONARY ANGIOPLASTY WITH STENT PLACEMENT N/A     x 5   • CORONARY ARTERY BYPASS GRAFT      x 4 1995   • HYSTERECTOMY N/A         Home Meds:  Medications Prior to Admission   Medication Sig Dispense Refill Last Dose   • aspirin 81 MG EC tablet Take 81 mg by mouth Daily.   3/6/2022 at Unknown time   • diphenhydrAMINE (BENADRYL) 25 mg capsule Take 50 mg by mouth every night at bedtime.   3/5/2022 at Unknown time   • estradiol (ESTRACE) 1 MG tablet Take 3 mg by mouth Every Night.   3/5/2022 at Unknown time   • furosemide (LASIX) 20 MG tablet Take 1 tablet by mouth Daily. 30 tablet 0 3/6/2022 at  Unknown time   • gabapentin (NEURONTIN) 400 MG capsule Take 400 mg by mouth 3 (Three) Times a Day.   3/6/2022 at Unknown time   • HYDROcodone-acetaminophen (NORCO)  MG per tablet Take 1 tablet by mouth Every 6 (Six) Hours As Needed.   3/6/2022 at Unknown time   • levothyroxine (SYNTHROID, LEVOTHROID) 88 MCG tablet Take 88 mcg by mouth Daily.   3/6/2022 at Unknown time   • lisinopril (PRINIVIL,ZESTRIL) 2.5 MG tablet Take 1 tablet by mouth Daily. 90 tablet 3 3/6/2022 at Unknown time   • LORazepam (ATIVAN) 1 MG tablet Take 1 mg by mouth 4 (Four) Times a Day As Needed.   3/6/2022 at Unknown time   • methocarbamol (ROBAXIN) 500 MG tablet Take 500 mg by mouth 3 (Three) Times a Day As Needed for Muscle Spasms.   3/6/2022 at Unknown time   • metoprolol succinate XL (TOPROL-XL) 25 MG 24 hr tablet Take 0.5 tablets by mouth Daily. 45 tablet 3 3/6/2022 at Unknown time   • pantoprazole (PROTONIX) 40 MG EC tablet Take 1 tablet by mouth Daily. 30 tablet 0 3/6/2022 at Unknown time   • ranolazine (RANEXA) 1000 MG 12 hr tablet Take 1 tablet by mouth Every 12 (Twelve) Hours. 180 tablet 2 3/6/2022 at Unknown time   • rosuvastatin (CRESTOR) 10 MG tablet Take 10 mg by mouth Daily.   3/6/2022 at Unknown time   • sertraline (ZOLOFT) 100 MG tablet Take 100 mg by mouth Daily.   3/5/2022 at Unknown time   • vitamin C (ASCORBIC ACID) 250 MG tablet Take 250 mg by mouth Daily.   3/6/2022 at Unknown time   • Budeson-Glycopyrrol-Formoterol (BREZTRI AEROSPHERE IN) Inhale 1 puff 2 (Two) Times a Day. Getting Samples      • cholecalciferol (VITAMIN D3) 10 MCG (400 UNIT) tablet Take 400 Units by mouth Daily.   Unknown at Unknown time   • clopidogrel (PLAVIX) 75 MG tablet Take 75 mg by mouth Daily.      • ferrous sulfate 324 (65 Fe) MG tablet delayed-release EC tablet Take 324 mg by mouth Daily With Breakfast.   Unknown at Unknown time   • isosorbide mononitrate (IMDUR) 30 MG 24 hr tablet Take 30 mg by mouth Daily.      • nitroglycerin (Nitrostat)  "0.4 MG SL tablet Place 1 tablet under the tongue Every 5 (Five) Minutes As Needed for Chest Pain. 25 tablet 3 Unknown at Unknown time   • Potassium 99 MG tablet Take 1 tablet by mouth Daily.   Unknown at Unknown time       Allergies:  Allergies   Allergen Reactions   • Naprosyn  [Naproxen] Rash   • Bactrim [Sulfamethoxazole-Trimethoprim] Rash       Social History:   Social History     Socioeconomic History   • Marital status:    Tobacco Use   • Smoking status: Former Smoker     Packs/day: 1.50     Types: Cigarettes     Quit date:      Years since quittin.1   • Smokeless tobacco: Never Used   Vaping Use   • Vaping Use: Never used   Substance and Sexual Activity   • Alcohol use: No   • Drug use: No   • Sexual activity: Defer       Family History:  Family History   Problem Relation Age of Onset   • Heart disease Mother    • Aneurysm Mother    • Heart disease Father    • Colon cancer Sister         Date of onset unknown to patient.  States her sister  from colon cancer at age 59.   • Cancer Paternal Grandfather         Type unknown to patient       Physical Exam:  /78 (BP Location: Left arm, Patient Position: Lying)   Pulse 93   Temp 97.8 °F (36.6 °C) (Oral)   Resp 16   Ht 165.1 cm (65\")   Wt 59.9 kg (132 lb)   SpO2 100%   BMI 21.97 kg/m²  Body mass index is 21.97 kg/m². 100% 59.9 kg (132 lb)  Physical Exam  Physical Exam  General Appearance:  Alert.  No distress noted.  HEENT:  Normocephalic, without obvious abnormality, Conjunctiva/corneas clear,.  Normal external ear canals, Nares normal, no drainage     Neck:  Supple, symmetrical, trachea midline. No JVD.  Lungs /Chest wall:   Bilateral basal rhonchi, respirations unlabored symmetrical wall movement.     Heart:  Regular rate and rhythm, systolic murmur PMI left sternal border  Abdomen: Soft, non-tender, no masses, no organomegaly.    Extremities: No edema, no clubbing or cyanosis      LABS:  Lab Results   Component Value Date    " CALCIUM 8.8 03/06/2022     Results from last 7 days   Lab Units 03/06/22  2311 03/06/22  1129   SODIUM mmol/L 138 140   POTASSIUM mmol/L 3.9 4.4   CHLORIDE mmol/L 99 103   CO2 mmol/L 25.0 27.0   BUN mg/dL 26* 24*   CREATININE mg/dL 1.27* 1.02*   GLUCOSE mg/dL 161* 124*   CALCIUM mg/dL 8.8 8.5*   WBC 10*3/mm3 10.90* 13.10*   HEMOGLOBIN g/dL 8.5* 8.5*   PLATELETS 10*3/mm3 196 207   ALT (SGPT) U/L  --  <5   AST (SGOT) U/L  --  5   PROBNP pg/mL  --  15,023.0*   PROCALCITONIN ng/mL  --  0.04     Lab Results   Component Value Date    TROPONINI 8.820 (C) 01/07/2021    TROPONINT <0.010 03/06/2022     Results from last 7 days   Lab Units 03/06/22  1129   TROPONIN T ng/mL <0.010     Results from last 7 days   Lab Units 03/06/22  1239   BLOODCX  No growth at 24 hours  No growth at 24 hours     Results from last 7 days   Lab Units 03/06/22  1129   PROCALCITONIN ng/mL 0.04     Results from last 7 days   Lab Units 03/06/22  1147   PH, ARTERIAL pH units 7.379   PCO2, ARTERIAL mm Hg 51.6*   PO2 ART mm Hg 74.8*   O2 SATURATION ART % 94.2   MODALITY  Cannula     Results from last 7 days   Lab Units 03/06/22  1129   ADENOVIRUS DETECTION BY PCR  Not Detected   CORONAVIRUS 229E  Not Detected   CORONAVIRUS HKU1  Not Detected   CORONAVIRUS NL63  Not Detected   CORONAVIRUS OC43  Not Detected   HUMAN METAPNEUMOVIRUS  Not Detected   HUMAN RHINOVIRUS/ENTEROVIRUS  Not Detected   INFLUENZA B PCR  Not Detected   PARAINFLUENZA 1  Not Detected   PARAINFLUENZA VIRUS 2  Not Detected   PARAINFLUENZA VIRUS 3  Not Detected   PARAINFLUENZA VIRUS 4  Not Detected   BORDETELLA PERTUSSIS PCR  Not Detected   CHLAMYDOPHILA PNEUMONIAE PCR  Not Detected   MYCOPLAMA PNEUMO PCR  Not Detected   INFLUENZA A PCR  Not Detected   RSV, PCR  Not Detected         Results from last 7 days   Lab Units 03/06/22  1239   BLOODCX  No growth at 24 hours  No growth at 24 hours     No results found for: TSH  Estimated Creatinine Clearance: 35.6 mL/min (A) (by C-G formula based  on SCr of 1.27 mg/dL (H)).         Imaging:  Imaging Results (Last 24 Hours)     Procedure Component Value Units Date/Time    CT Chest Without Contrast Diagnostic [[id number]] Collected: 03/06/22 1824     Updated: 03/06/22 1832    Narrative:         DATE OF EXAM:  3/6/2022 3:20 PM     PROCEDURE:  CT CHEST WO CONTRAST DIAGNOSTIC-     INDICATIONS:   Respiratory failure; J18.9-Pneumonia, unspecified organism;  R06.02-Shortness of breath; I50.9-Heart failure, unspecified     COMPARISON:   CT chest high-resolution 12/09/2019     TECHNIQUE:  Routine transaxial slices were obtained through the chest without the  administration of intravenous contrast. Reconstructed coronal and  sagittal images were also obtained. Automated exposure control and  iterative construction methods were used.      FINDINGS:  Lung bases without consolidation. Lack of contrast limits assessment  mediastinum and vasculature. Postsurgical changes of sternotomy and CABG  noted. There are scattered calcified mediastinal and hilar lymph nodes  which are most consistent with granulomatous disease. No pericardial  effusion or pleural effusion.     Trachea and mainstem bronchi are patent. There are multifocal  groundglass opacities involving the lower lobes and to lesser extent the  upper lobes which are concerning for multifocal pneumonia. Within the  right upper lobe there is a more focal area of nodular consolidation  measuring 18 mm x 14 mm on image 32. Previously several small scattered  nodules were noted this area which now appear confluent. No pleural  effusion.     Upper abdomen demonstrates normal noncontrast visualized portions of the  liver and adrenal glands. Numerous splenic granulomatous calcifications  noted. No free fluid in the upper abdomen. No aggressive osseous lesion  or fracture. Aneurysmal dilatation of the upper abdominal aorta  partially imaged.       Impression:      1. Multifocal groundglass opacities throughout the lungs most  pronounced  in the lower lobes which may relate to atypical infection or COVID-19  pneumonia.  2. Focal nodular airspace disease in right upper lobe measuring 18 x 14  mm. There was previously a cluster of nodules in this region and this  could relate to chronic inflammation secondary to prior infection or  inflammation, however recommend short-term follow-up in 2-3 months or  PET/CT to evaluate this nodule.  3. Additional chronic findings above.     Electronically Signed By-Nahid Cooley MD On:3/6/2022 6:29 PM  This report was finalized on [id number] by  Nahid Cooley MD.            Current Meds:   SCHEDULE  ampicillin-sulbactam, 3 g, Intravenous, Q6H  vitamin C, 250 mg, Oral, Daily  aspirin, 81 mg, Oral, Daily  atorvastatin, 80 mg, Oral, Nightly  cholecalciferol, 500 Units, Oral, Daily  clopidogrel, 75 mg, Oral, Daily  estradiol, 3 mg, Oral, Nightly  ferrous sulfate, 324 mg, Oral, Daily With Breakfast  gabapentin, 400 mg, Oral, Q8H  heparin (porcine), 5,000 Units, Subcutaneous, Q8H  ipratropium-albuterol, 3 mL, Nebulization, 4x Daily - RT  isosorbide mononitrate, 30 mg, Oral, Q24H  levothyroxine, 88 mcg, Oral, Q AM  metoprolol succinate XL, 12.5 mg, Oral, Daily  pantoprazole, 40 mg, Oral, Q AM  ranolazine, 1,000 mg, Oral, Q12H  sertraline, 100 mg, Oral, Daily  sodium chloride, 10 mL, Intravenous, Q12H      Infusions     PRNs  HYDROcodone-acetaminophen  •  LORazepam  •  methocarbamol  •  nitroglycerin  •  ondansetron **OR** ondansetron  •  sodium chloride  •  sodium chloride        Lilliana Del Rosario, APRN  3/7/2022  14:52 EST    The NP scribed the note for me, I have examined the patient and reviewed and verified the above findings and and I formulated the assessment and plan as documented  Much of this encounter note is an electronic transcription/translation of spoken language to printed text using Dragon Software.

## 2022-03-07 NOTE — SIGNIFICANT NOTE
03/07/22 1447   Rehab Time/Intention   Session Not Performed patient/family declined evaluation  (having reaction to antibiotic, did not wan to attempt evaluation at this time. pt reports already ambulating self to/from bathroom.)   Recommendation   PT - Next Appointment 03/08/22

## 2022-03-07 NOTE — PROGRESS NOTES
[facility] Medicine Services        Patient Name: Gayathri Reddy  : 1945  MRN: [id number]  Primary Care Physician:  Deonte Hector MD  Date of admission: 3/6/2022           Subjective          Chief Complaint: Dyspnea     History of Present Illness: Gayathri Reddy is a 76 y.o. female with PMHx of HTN, HLD, Hypothyroid, GERD, cAD, AAA, CHF, COPD (3L NC chronically) who presents due to dyspnea.  Admits to dyspnea with exertion complicated by productive cough ongoing for the past three days and similar to previous experiences with pneumonia.  Denies chest pain, abdominal pain, numbness, tingling, hemoptysis.  Admits to increasing home oxygen from 3 to 4L latelty.  Due to multiple risk factors, went to [facility] for evaluation     In the ED, vitals 98.8F, HR 84, RR 18, 135/64, 97 4L.  Labs notable for troponin < 0.01, proBNP 04014, glucose 124, creatinine 1.02, white count 13.1, Hgb 8.5.  CXR suggestive of multi-focal pneumonia.  Respiratory panel negative for covid or other virus.  Patient started on abx, lasix, and admitted to medicine for evaluation.      3/7/2022;, still complaining of shortness of breath with minimal exertion, T-max of 36.6, vital signs are stable currently on 3 L of nasal cannula.  Spoke with RN.  Serum creatinine bumped up to 1.27 prerenal NOE consult nephrology, proBNP 41886, will consult cardiology.      Review of Systems   Constitutional: Positive for malaise/fatigue. Negative for chills and fever.   HENT: Negative for hearing loss, hoarse voice and nosebleeds.    Eyes: Negative for discharge, double vision and pain.   Cardiovascular: Positive for dyspnea on exertion. Negative for leg swelling.   Respiratory: Positive for cough and shortness of breath. Negative for hemoptysis.    Endocrine: Negative for polydipsia, polyphagia and polyuria.   Skin: Negative for dry skin, flushing and itching.   Musculoskeletal: Negative for arthritis, back pain and falls.    Gastrointestinal: Negative for abdominal pain and constipation.   Genitourinary: Negative for dysuria, flank pain and frequency.   Neurological: Negative for headaches, light-headedness and weakness.   Psychiatric/Behavioral: Negative for altered mental status, depression and hallucinations.         Personal History      Medical History        Past Medical History:   Diagnosis Date   • Aneurysm (HCC)       AAA   • Arthritis     • CHF (congestive heart failure) (HCC)     • COPD (chronic obstructive pulmonary disease) (HCC)     • Coronary artery disease     • Dyslipidemia     • GERD (gastroesophageal reflux disease)     • History of right heart catheterization       7/30/2017; 10/2018   • Hyperlipidemia     • Hypertension     • Hypothyroidism     • Myocardial infarct (HCC)     • Myocardial infarction (HCC)       x 3   • Pneumonia 11/2019     bilateral             Surgical History         Past Surgical History:   Procedure Laterality Date   • ABDOMINAL AORTIC ANEURYSM REPAIR N/A 2001   • CARDIAC CATHETERIZATION       • CATARACT EXTRACTION Bilateral     • CORONARY ANGIOPLASTY WITH STENT PLACEMENT N/A       x 5   • CORONARY ARTERY BYPASS GRAFT         x 4 1995   • HYSTERECTOMY N/A              Family History: family history includes Aneurysm in her mother; Cancer in her paternal grandfather; Colon cancer in her sister; Heart disease in her father and mother. Otherwise pertinent FHx was reviewed and not pertinent to current issue.     Social History:  reports that she quit smoking about 28 years ago. Her smoking use included cigarettes. She smoked 1.50 packs per day. She has never used smokeless tobacco. She reports that she does not drink alcohol and does not use drugs.     Home Medications:           Prior to Admission Medications      Prescriptions Last Dose Informant Patient Reported? Taking?     aspirin 81 MG EC tablet     Yes No     Take 81 mg by mouth Daily.     Budeson-Glycopyrrol-Formoterol (BREZTRI AEROSPHERE  IN)     Yes No     Inhale.     cholecalciferol (VITAMIN D3) 10 MCG (400 UNIT) tablet     Yes No     Take 400 Units by mouth Daily.     clopidogrel (PLAVIX) 75 MG tablet     Yes No     Take 75 mg by mouth Daily.     diphenhydrAMINE (BENADRYL) 25 mg capsule     Yes No     Take 25 mg by mouth Every 6 (Six) Hours As Needed.     estradiol (ESTRACE) 1 MG tablet     Yes No     Take 1 mg by mouth 3 (Three) Times a Day.     ferrous sulfate 324 (65 Fe) MG tablet delayed-release EC tablet   Self Yes No     Take 324 mg by mouth Daily With Breakfast.     furosemide (LASIX) 20 MG tablet     No No     Take 1 tablet by mouth Daily.     Patient taking differently:  Take  by mouth Daily. Once daily     gabapentin (NEURONTIN) 400 MG capsule     Yes No     Take 400 mg by mouth 3 (Three) Times a Day.     HYDROcodone-acetaminophen (NORCO)  MG per tablet     Yes No     Take 1 tablet by mouth Every 6 (Six) Hours As Needed.     isosorbide mononitrate (IMDUR) 30 MG 24 hr tablet     Yes No     Take 30 mg by mouth Daily.     levothyroxine (SYNTHROID, LEVOTHROID) 88 MCG tablet     Yes No     Take 88 mcg by mouth Daily.     lisinopril (PRINIVIL,ZESTRIL) 2.5 MG tablet     No No     Take 1 tablet by mouth Daily.     LORazepam (ATIVAN) 1 MG tablet     Yes No     Take 1 mg by mouth 4 (Four) Times a Day As Needed.     methocarbamol (ROBAXIN) 500 MG tablet   Self Yes No     Take 500 mg by mouth 3 (Three) Times a Day As Needed for Muscle Spasms.     metoprolol succinate XL (TOPROL-XL) 25 MG 24 hr tablet     No No     Take 0.5 tablets by mouth Daily.     nitroglycerin (Nitrostat) 0.4 MG SL tablet     No No     Place 1 tablet under the tongue Every 5 (Five) Minutes As Needed for Chest Pain.     pantoprazole (PROTONIX) 40 MG EC tablet     No No     Take 1 tablet by mouth Daily.     Potassium 99 MG tablet     Yes No     Take  by mouth.     ranolazine (RANEXA) 1000 MG 12 hr tablet     No No     Take 1 tablet by mouth Every 12 (Twelve) Hours.      rosuvastatin (CRESTOR) 10 MG tablet     Yes No     Take 10 mg by mouth Daily.     sertraline (ZOLOFT) 100 MG tablet     Yes No     Take 100 mg by mouth Daily.     vitamin C (ASCORBIC ACID) 250 MG tablet     Yes No     Take 250 mg by mouth Daily.                Allergies:       Allergies   Allergen Reactions   • Naprosyn  [Naproxen] Rash   • Bactrim [Sulfamethoxazole-Trimethoprim] Rash               Objective          Vitals:   Temp:  [97.5 °F (36.4 °C)-98.1 °F (36.7 °C)] 97.8 °F (36.6 °C)  Heart Rate:  [66-93] 93  Resp:  [16-22] 16  BP: (140-152)/(46-78) 140/78  Flow (L/min):  [3-4] 3     Physical Exam  Constitutional:       General: She is not in acute distress.     Appearance: She is not toxic-appearing.   HENT:      Head: Normocephalic and atraumatic.      Nose: Nose normal. No congestion.      Mouth/Throat:      Pharynx: Oropharynx is clear. No oropharyngeal exudate.   Eyes:      General: No scleral icterus.  Cardiovascular:      Rate and Rhythm: Normal rate and regular rhythm.      Heart sounds: No murmur heard.    No friction rub. No gallop.   Pulmonary:      Effort: No respiratory distress.      Breath sounds: Rales present. No wheezing.      Comments: Patient on 4L NC  Abdominal:      General: There is no distension.      Tenderness: There is no abdominal tenderness. There is no guarding.   Musculoskeletal:         General: No swelling or deformity.      Cervical back: Normal range of motion. No rigidity.      Right lower leg: No edema.      Left lower leg: No edema.   Skin:     Coloration: Skin is not jaundiced.      Findings: No bruising or lesion.   Neurological:      General: No focal deficit present.      Mental Status: She is alert and oriented to person, place, and time.      Motor: No weakness.   Psychiatric:         Mood and Affect: Mood normal.         Behavior: Behavior normal.         Thought Content: Thought content normal.         Judgment: Judgment normal.                  Result Review        Result Review:  I have personally reviewed the results from the time of this admission to 3/6/2022 13:01 EST and agree with these findings:  [x]?  Laboratory  []?  Microbiology  [x]?  Radiology  []?  EKG/Telemetry   []?  Cardiology/Vascular   []?  Pathology  []?  Old records  []?  Other:     Lab Results   Component Value Date    WBC 10.90 (H) 03/06/2022    HGB 8.5 (L) 03/06/2022    HCT 25.2 (L) 03/06/2022    MCV 98.2 (H) 03/06/2022     03/06/2022     Lab Results   Component Value Date    GLUCOSE 161 (H) 03/06/2022    CALCIUM 8.8 03/06/2022     03/06/2022    K 3.9 03/06/2022    CO2 25.0 03/06/2022    CL 99 03/06/2022    BUN 26 (H) 03/06/2022    CREATININE 1.27 (H) 03/06/2022    EGFRIFAFRI >60 01/05/2018    EGFRIFNONA 27 (L) 02/18/2022    BCR 20.5 03/06/2022    ANIONGAP 14.0 03/06/2022                 Assessment/Plan             Active Hospital Problems:  There are no active hospital problems to display for this patient.     Plan:      #Acute on chronic hypoxia with hypercapnia; wears baseline 3 L and 4 L  #Multi-focal pneumonia/focal nodular airspace disease in the right upper lobe measuring 18 x 14  mm;    - ABG 7.379/51.6/74.8/30.4    -CXR shows multi-focal pneumonai    - CT chest to better evaluate as proBNP elevated    - Unasyn 3g IV q6h    - RVP negative, procalcitonin normal    - blood cultures NTD    - respiratory culture    - legionella and strep, both negative.    - white count 13.1 on admission  -, Will consult pulmonary.  # Acute kidney injury; serum creatinine 1.27 likely prerenal nephrology consulted will hold Lasix   #COPD    -wears 3L chronically, currently on 4L NC    - suspect pneumonia    - Duonebs QID     #Diastolic and systolic HF    - appears chronic, clinically appears euvolemic    - proBNP 02546, consulted cardiology.    - CT chest to assess for pleural effusions    -Hold lasix.      #CAD s/p CABG with attrition 3/4 grafts/ischemic cardiomyopathy most recent ejection 45% April  2021      - resume home aspirin    - resume home statin    - resume home Toprol    - resume home imdur and Ranexa once verfired     #AAA    - CT a/p shows infra-renal abdominal aorta at 3.0x3.7cm on 11/4/2020    - will need to be re-evaluated as otpt     #HTN    - hold lisinopril     - resume home toprol      #HLD    - resume home statin     #GERD    - PPI     #Hypothyroid    - resume home synthroid once verified     #Macrocytic hyperchromic anemia    -hgb 8.5 on admission, base ~ 10.0    - check iron panel and ferritin, and vitamin B12 and folate     #Anxiety     - resume home Zoloft once verified     #DVT prophylaxis    -heparin        DVT prophylaxis:  No DVT prophylaxis order currently exists.     CODE STATUS:    Admission Status:  I believe this patient meets inpatient status.     I discussed the patient's findings and my recommendations with patient.     This patient has been examined wearing appropriate Personal Protective Equipment and discussed with Patisabas

## 2022-03-07 NOTE — NURSING NOTE
Patient suspected of having multiple PVC's. STAT EKG done which showed  abnormal with sinus tachy, and atrial premature complex. Notified RADHA Bernardo.

## 2022-03-08 LAB
ALBUMIN SERPL-MCNC: 3.1 G/DL (ref 3.5–5.2)
ANION GAP SERPL CALCULATED.3IONS-SCNC: 10 MMOL/L (ref 5–15)
BASOPHILS # BLD AUTO: 0 10*3/MM3 (ref 0–0.2)
BASOPHILS NFR BLD AUTO: 0.2 % (ref 0–1.5)
BUN SERPL-MCNC: 25 MG/DL (ref 8–23)
BUN/CREAT SERPL: 22.9 (ref 7–25)
CALCIUM SPEC-SCNC: 9.2 MG/DL (ref 8.6–10.5)
CHLORIDE SERPL-SCNC: 103 MMOL/L (ref 98–107)
CO2 SERPL-SCNC: 31 MMOL/L (ref 22–29)
CREAT SERPL-MCNC: 1.09 MG/DL (ref 0.57–1)
DEPRECATED RDW RBC AUTO: 42.9 FL (ref 37–54)
EGFRCR SERPLBLD CKD-EPI 2021: 52.8 ML/MIN/1.73
EOSINOPHIL # BLD AUTO: 0 10*3/MM3 (ref 0–0.4)
EOSINOPHIL NFR BLD AUTO: 0 % (ref 0.3–6.2)
ERYTHROCYTE [DISTWIDTH] IN BLOOD BY AUTOMATED COUNT: 12.4 % (ref 12.3–15.4)
GLUCOSE SERPL-MCNC: 108 MG/DL (ref 65–99)
HCT VFR BLD AUTO: 25.2 % (ref 34–46.6)
HGB BLD-MCNC: 8.6 G/DL (ref 12–15.9)
LYMPHOCYTES # BLD AUTO: 1.8 10*3/MM3 (ref 0.7–3.1)
LYMPHOCYTES NFR BLD AUTO: 15.3 % (ref 19.6–45.3)
MCH RBC QN AUTO: 33.8 PG (ref 26.6–33)
MCHC RBC AUTO-ENTMCNC: 34.1 G/DL (ref 31.5–35.7)
MCV RBC AUTO: 99.1 FL (ref 79–97)
MONOCYTES # BLD AUTO: 0.8 10*3/MM3 (ref 0.1–0.9)
MONOCYTES NFR BLD AUTO: 7.1 % (ref 5–12)
NEUTROPHILS NFR BLD AUTO: 77.4 % (ref 42.7–76)
NEUTROPHILS NFR BLD AUTO: 9.1 10*3/MM3 (ref 1.7–7)
NRBC BLD AUTO-RTO: 0.1 /100 WBC (ref 0–0.2)
PHOSPHATE SERPL-MCNC: 2.9 MG/DL (ref 2.5–4.5)
PLATELET # BLD AUTO: 243 10*3/MM3 (ref 140–450)
PMV BLD AUTO: 8 FL (ref 6–12)
POTASSIUM SERPL-SCNC: 4.1 MMOL/L (ref 3.5–5.2)
QT INTERVAL: 349 MS
QT INTERVAL: 378 MS
RBC # BLD AUTO: 2.54 10*6/MM3 (ref 3.77–5.28)
SODIUM SERPL-SCNC: 144 MMOL/L (ref 136–145)
WBC NRBC COR # BLD: 11.7 10*3/MM3 (ref 3.4–10.8)

## 2022-03-08 PROCEDURE — 25010000002 CEFTRIAXONE PER 250 MG: Performed by: HOSPITALIST

## 2022-03-08 PROCEDURE — 80069 RENAL FUNCTION PANEL: CPT | Performed by: INTERNAL MEDICINE

## 2022-03-08 PROCEDURE — 85025 COMPLETE CBC W/AUTO DIFF WBC: CPT | Performed by: HOSPITALIST

## 2022-03-08 PROCEDURE — 94761 N-INVAS EAR/PLS OXIMETRY MLT: CPT

## 2022-03-08 PROCEDURE — 93010 ELECTROCARDIOGRAM REPORT: CPT | Performed by: INTERNAL MEDICINE

## 2022-03-08 PROCEDURE — 25010000002 FUROSEMIDE PER 20 MG: Performed by: INTERNAL MEDICINE

## 2022-03-08 PROCEDURE — 93005 ELECTROCARDIOGRAM TRACING: CPT | Performed by: HOSPITALIST

## 2022-03-08 PROCEDURE — 94799 UNLISTED PULMONARY SVC/PX: CPT

## 2022-03-08 PROCEDURE — 25010000002 HEPARIN (PORCINE) PER 1000 UNITS: Performed by: NURSE PRACTITIONER

## 2022-03-08 PROCEDURE — 97162 PT EVAL MOD COMPLEX 30 MIN: CPT

## 2022-03-08 PROCEDURE — 63710000001 DIPHENHYDRAMINE PER 50 MG: Performed by: NURSE PRACTITIONER

## 2022-03-08 PROCEDURE — 99233 SBSQ HOSP IP/OBS HIGH 50: CPT | Performed by: HOSPITALIST

## 2022-03-08 PROCEDURE — 99232 SBSQ HOSP IP/OBS MODERATE 35: CPT | Performed by: INTERNAL MEDICINE

## 2022-03-08 RX ORDER — DOCUSATE SODIUM 100 MG/1
100 CAPSULE, LIQUID FILLED ORAL 2 TIMES DAILY PRN
Status: DISCONTINUED | OUTPATIENT
Start: 2022-03-08 | End: 2022-03-17

## 2022-03-08 RX ORDER — FUROSEMIDE 10 MG/ML
40 INJECTION INTRAMUSCULAR; INTRAVENOUS ONCE
Status: COMPLETED | OUTPATIENT
Start: 2022-03-08 | End: 2022-03-08

## 2022-03-08 RX ORDER — DILTIAZEM HYDROCHLORIDE 5 MG/ML
10 INJECTION INTRAVENOUS ONCE
Status: COMPLETED | OUTPATIENT
Start: 2022-03-08 | End: 2022-03-08

## 2022-03-08 RX ORDER — DILTIAZEM HYDROCHLORIDE 5 MG/ML
2 INJECTION INTRAVENOUS ONCE
Status: DISCONTINUED | OUTPATIENT
Start: 2022-03-08 | End: 2022-03-08

## 2022-03-08 RX ORDER — FUROSEMIDE 20 MG/1
20 TABLET ORAL DAILY
Status: DISCONTINUED | OUTPATIENT
Start: 2022-03-08 | End: 2022-03-11

## 2022-03-08 RX ORDER — DIPHENHYDRAMINE HCL 25 MG
50 CAPSULE ORAL NIGHTLY PRN
Status: DISCONTINUED | OUTPATIENT
Start: 2022-03-08 | End: 2022-03-18 | Stop reason: HOSPADM

## 2022-03-08 RX ADMIN — HEPARIN SODIUM 5000 UNITS: 5000 INJECTION INTRAVENOUS; SUBCUTANEOUS at 06:14

## 2022-03-08 RX ADMIN — HEPARIN SODIUM 5000 UNITS: 5000 INJECTION INTRAVENOUS; SUBCUTANEOUS at 14:06

## 2022-03-08 RX ADMIN — PANTOPRAZOLE SODIUM 40 MG: 40 TABLET, DELAYED RELEASE ORAL at 06:14

## 2022-03-08 RX ADMIN — METRONIDAZOLE 500 MG: 500 INJECTION, SOLUTION INTRAVENOUS at 06:13

## 2022-03-08 RX ADMIN — CLOPIDOGREL BISULFATE 75 MG: 75 TABLET, FILM COATED ORAL at 08:09

## 2022-03-08 RX ADMIN — IPRATROPIUM BROMIDE AND ALBUTEROL SULFATE 3 ML: .5; 3 SOLUTION RESPIRATORY (INHALATION) at 16:14

## 2022-03-08 RX ADMIN — DIPHENHYDRAMINE HYDROCHLORIDE 25 MG: 25 CAPSULE ORAL at 22:53

## 2022-03-08 RX ADMIN — GABAPENTIN 400 MG: 400 CAPSULE ORAL at 06:13

## 2022-03-08 RX ADMIN — ESTRADIOL 3 MG: 1 TABLET ORAL at 20:39

## 2022-03-08 RX ADMIN — LORAZEPAM 1 MG: 1 TABLET ORAL at 08:09

## 2022-03-08 RX ADMIN — GABAPENTIN 400 MG: 400 CAPSULE ORAL at 21:31

## 2022-03-08 RX ADMIN — METRONIDAZOLE 500 MG: 500 INJECTION, SOLUTION INTRAVENOUS at 14:06

## 2022-03-08 RX ADMIN — METRONIDAZOLE 500 MG: 500 INJECTION, SOLUTION INTRAVENOUS at 21:32

## 2022-03-08 RX ADMIN — LORAZEPAM 1 MG: 1 TABLET ORAL at 16:19

## 2022-03-08 RX ADMIN — RANOLAZINE 1000 MG: 500 TABLET, FILM COATED, EXTENDED RELEASE ORAL at 21:31

## 2022-03-08 RX ADMIN — METHOCARBAMOL 500 MG: 500 TABLET ORAL at 09:40

## 2022-03-08 RX ADMIN — IPRATROPIUM BROMIDE AND ALBUTEROL SULFATE 3 ML: .5; 3 SOLUTION RESPIRATORY (INHALATION) at 07:27

## 2022-03-08 RX ADMIN — ATORVASTATIN CALCIUM 80 MG: 40 TABLET, FILM COATED ORAL at 20:39

## 2022-03-08 RX ADMIN — SERTRALINE 100 MG: 100 TABLET, FILM COATED ORAL at 08:09

## 2022-03-08 RX ADMIN — IPRATROPIUM BROMIDE AND ALBUTEROL SULFATE 3 ML: .5; 3 SOLUTION RESPIRATORY (INHALATION) at 11:29

## 2022-03-08 RX ADMIN — METOPROLOL SUCCINATE 12.5 MG: 25 TABLET, FILM COATED, EXTENDED RELEASE ORAL at 08:09

## 2022-03-08 RX ADMIN — ASPIRIN 81 MG: 81 TABLET, COATED ORAL at 08:10

## 2022-03-08 RX ADMIN — HEPARIN SODIUM 5000 UNITS: 5000 INJECTION INTRAVENOUS; SUBCUTANEOUS at 21:32

## 2022-03-08 RX ADMIN — IPRATROPIUM BROMIDE AND ALBUTEROL SULFATE 3 ML: .5; 3 SOLUTION RESPIRATORY (INHALATION) at 19:02

## 2022-03-08 RX ADMIN — CEFTRIAXONE 1 G: 1 INJECTION, POWDER, FOR SOLUTION INTRAMUSCULAR; INTRAVENOUS at 20:39

## 2022-03-08 RX ADMIN — Medication 500 UNITS: at 08:09

## 2022-03-08 RX ADMIN — FOLIC ACID-PYRIDOXINE-CYANOCOBALAMIN TAB 2.5-25-2 MG 1 TABLET: 2.5-25-2 TAB at 09:40

## 2022-03-08 RX ADMIN — FUROSEMIDE 20 MG: 20 TABLET ORAL at 09:40

## 2022-03-08 RX ADMIN — LEVOTHYROXINE SODIUM 88 MCG: 0.09 TABLET ORAL at 06:14

## 2022-03-08 RX ADMIN — FUROSEMIDE 40 MG: 10 INJECTION, SOLUTION INTRAVENOUS at 16:19

## 2022-03-08 RX ADMIN — FERROUS SULFATE TAB EC 324 MG (65 MG FE EQUIVALENT) 324 MG: 324 (65 FE) TABLET DELAYED RESPONSE at 08:09

## 2022-03-08 RX ADMIN — DILTIAZEM HYDROCHLORIDE 10 MG: 5 INJECTION INTRAVENOUS at 23:16

## 2022-03-08 RX ADMIN — ISOSORBIDE MONONITRATE 30 MG: 30 TABLET, EXTENDED RELEASE ORAL at 14:06

## 2022-03-08 RX ADMIN — RANOLAZINE 1000 MG: 500 TABLET, FILM COATED, EXTENDED RELEASE ORAL at 09:40

## 2022-03-08 RX ADMIN — GABAPENTIN 400 MG: 400 CAPSULE ORAL at 14:06

## 2022-03-08 RX ADMIN — LORAZEPAM 1 MG: 1 TABLET ORAL at 22:53

## 2022-03-08 RX ADMIN — Medication 10 ML: at 08:10

## 2022-03-08 RX ADMIN — Medication 10 ML: at 20:39

## 2022-03-08 RX ADMIN — OXYCODONE HYDROCHLORIDE AND ACETAMINOPHEN 250 MG: 500 TABLET ORAL at 08:09

## 2022-03-08 NOTE — PROGRESS NOTES
Daily Progress Note        Pneumonia of both lungs due to infectious organism, unspecified part of lung      Assessment  Acute on chronic hypoxia with hypercapnia- wears 3-4 L at home  Multifocal pneumonia  Allergic reaction to Unasyn in the form of urticaria  Right upper lobe nodule  NOE  COPD  Diastolic and systolic CHF-  BNP> 15,000  CAD s/p CABG April 2021  AAA  HTN  HLD  GERD  Hypothyroidism  Macrocytic hyperchromic anemia  Anxiety     CT with high resolution on 12/9/2019-mentions small cluster of 5 mm nodules and mild interstitial lung disease     Legionella and strep pneumonia antigens negative  Respiratory panel negative     ABG 3/6/2022 on nasal cannula-pH 7.37, CO2 51.6, PO2 74.8, bicarb 30.4    Plan    Continue to titrate oxygen- Currently on 3L NC, continues to improve  Rocephin for PNA-finishes 3/13/2022  Flagyl for PNA-finishes 3/9/2022  Inhaled corticosteroids  Bronchodilators  Electrolyte/Glycemic control  DVT/GI Prophylaxis-Heparin SQ and Protonix  Levothyroxine 88mcg     3/6/2022       LOS: 2 days     Subjective   Feeling better      Objective     Vital signs for last 24 hours:  Vitals:    03/08/22 0316 03/08/22 0727 03/08/22 0729 03/08/22 0739   BP: 141/65   143/58   BP Location: Right arm   Right arm   Patient Position: Lying   Lying   Pulse: 89 89 93 95   Resp: 26 18 20 23   Temp: 98.4 °F (36.9 °C)   98.4 °F (36.9 °C)   TempSrc: Oral   Oral   SpO2: 96% 100% 100% 99%   Weight:       Height:           Intake/Output last 3 shifts:  I/O last 3 completed shifts:  In: 200 [IV Piggyback:200]  Out: -   Intake/Output this shift:  No intake/output data recorded.      Radiology  Imaging Results (Last 24 Hours)     ** No results found for the last 24 hours. **          Labs:  Results from last 7 days   Lab Units 03/08/22  0255   WBC 10*3/mm3 11.70*   HEMOGLOBIN g/dL 8.6*   HEMATOCRIT % 25.2*   PLATELETS 10*3/mm3 243     Results from last 7 days   Lab Units 03/08/22  0255 03/06/22  2311 03/06/22  1129    SODIUM mmol/L 144   < > 140   POTASSIUM mmol/L 4.1   < > 4.4   CHLORIDE mmol/L 103   < > 103   CO2 mmol/L 31.0*   < > 27.0   BUN mg/dL 25*   < > 24*   CREATININE mg/dL 1.09*   < > 1.02*   CALCIUM mg/dL 9.2   < > 8.5*   BILIRUBIN mg/dL  --   --  0.2   ALK PHOS U/L  --   --  56   ALT (SGPT) U/L  --   --  <5   AST (SGOT) U/L  --   --  5   GLUCOSE mg/dL 108*   < > 124*    < > = values in this interval not displayed.     Results from last 7 days   Lab Units 03/06/22  1147   PH, ARTERIAL pH units 7.379   PO2 ART mm Hg 74.8*   PCO2, ARTERIAL mm Hg 51.6*   HCO3 ART mmol/L 30.4*     Results from last 7 days   Lab Units 03/08/22  0255 03/06/22  1129   ALBUMIN g/dL 3.10* 3.30*     Results from last 7 days   Lab Units 03/06/22  1129   TROPONIN T ng/mL <0.010                           Meds:   SCHEDULE  vitamin C, 250 mg, Oral, Daily  aspirin, 81 mg, Oral, Daily  atorvastatin, 80 mg, Oral, Nightly  cefTRIAXone, 1 g, Intravenous, Q24H  cholecalciferol, 500 Units, Oral, Daily  clopidogrel, 75 mg, Oral, Daily  estradiol, 3 mg, Oral, Nightly  ferrous sulfate, 324 mg, Oral, Daily With Breakfast  folic acid-pyridoxine-cyanocobalamin, 1 tablet, Oral, Daily  gabapentin, 400 mg, Oral, Q8H  heparin (porcine), 5,000 Units, Subcutaneous, Q8H  ipratropium-albuterol, 3 mL, Nebulization, 4x Daily - RT  isosorbide mononitrate, 30 mg, Oral, Q24H  levothyroxine, 88 mcg, Oral, Q AM  metoprolol succinate XL, 12.5 mg, Oral, Daily  metroNIDAZOLE, 500 mg, Intravenous, Q8H  pantoprazole, 40 mg, Oral, Q AM  ranolazine, 1,000 mg, Oral, Q12H  sertraline, 100 mg, Oral, Daily  sodium chloride, 10 mL, Intravenous, Q12H      Infusions     PRNs  HYDROcodone-acetaminophen  •  LORazepam  •  methocarbamol  •  nitroglycerin  •  ondansetron **OR** ondansetron  •  sodium chloride  •  sodium chloride    Physical Exam:  Physical Exam  General Appearance:  Alert.  No distress noted.  HEENT:  Normocephalic, without obvious abnormality, Conjunctiva/corneas clear,.   Normal external ear canals, Nares normal, no drainage     Neck:  Supple, symmetrical, trachea midline. No JVD.  Lungs /Chest wall:   Bilateral basal rhonchi, respirations unlabored symmetrical wall movement.     Heart:  Regular rate and rhythm, systolic murmur PMI left sternal border  Abdomen: Soft, non-tender, no masses, no organomegaly.    Extremities: No edema, no clubbing or cyanosis    ROS  Review of Systems  Constitutional: Negative for chills, fever and malaise/fatigue.   HENT: Negative.    Eyes: Negative.    Cardiovascular: Negative.    Respiratory: Positive for cough and shortness of breath.    Skin: Negative.    Musculoskeletal: Negative.    Gastrointestinal: Negative.    Genitourinary: Negative.    Neurological: Negative.    Psychiatric/Behavioral: Negative.        I have reviewed current clinicals.     Electronically signed by RADHA Rodriguez, 03/08/22, 8:20 AM EST.     The NP scribed the note for me, I have examined the patient and reviewed and verified the above findings and and I formulated the assessment and plan as documented

## 2022-03-08 NOTE — THERAPY EVALUATION
Patient Name: Gayathri Reddy  : 1945    MRN: [id number]                              Today's Date: 3/8/2022       Admit Date: 3/6/2022    Visit Dx:     ICD-10-CM ICD-9-CM   1. Pneumonia of both lungs due to infectious organism, unspecified part of lung  J18.9 483.8   2. Shortness of breath  R06.02 786.05   3. Acute on chronic congestive heart failure, unspecified heart failure type (Formerly [facility])  I50.9 428.0     Patient Active Problem List   Diagnosis   • Abdominal aortic aneurysm (AAA) (Formerly [facility])   • Chronic obstructive pulmonary disease (Formerly [facility])   • Congestive heart failure (Formerly [facility])   • Coronary artery disease   • Dyslipidemia   • Hypertension   • Community acquired pneumonia   • Anemia of chronic disease   • Diarrhea   • Mixed hyperlipidemia   • Chronic stable angina (Formerly [facility])   • Bilateral carotid artery stenosis   • Chest pain   • Family history of diabetes mellitus   • Family history of malignant neoplasm of digestive organ   • Gastroesophageal reflux disease   • Iron deficiency anemia   • Pain in left shoulder   • Uses LifeVest defibrillator   • Cardiomyopathy, dilated (Formerly [facility])   • Ischemic cardiomyopathy   • Pneumonia of both lungs due to infectious organism, unspecified part of lung     Past Medical History:   Diagnosis Date   • Aneurysm (Formerly [facility])     AAA   • Arthritis    • CHF (congestive heart failure) (Formerly [facility])    • COPD (chronic obstructive pulmonary disease) (Formerly [facility])    • Coronary artery disease    • Dyslipidemia    • GERD (gastroesophageal reflux disease)    • History of right heart catheterization     2017; 10/2018   • Hyperlipidemia    • Hypertension    • Hypothyroidism    • Myocardial infarct (Formerly [facility])    • Myocardial infarction (Formerly [facility])     x 3   • Pneumonia 2019    bilateral      Past Surgical History:   Procedure Laterality Date   • ABDOMINAL AORTIC ANEURYSM REPAIR N/A    • CARDIAC CATHETERIZATION     • CATARACT EXTRACTION Bilateral    • CORONARY ANGIOPLASTY WITH STENT PLACEMENT N/A     x 5   • CORONARY ARTERY BYPASS  GRAFT      x 4 1995   • HYSTERECTOMY N/A       General Information     Row Name 03/08/22 1054          Physical Therapy Time and Intention    Document Type evaluation  -SS (r) SK (t) SS (c)     Mode of Treatment individual therapy;physical therapy  -SS (r) SK (t) SS (c)     Row Name 03/08/22 1054          General Information    Patient Profile Reviewed yes  -SS (r) SK (t) SS (c)     Prior Level of Function independent:;all household mobility;feeding;dressing;bathing  Pt is generally independent with all ADLs in the house using no AD. Her sons live close by to assist for community access such as grocery shopping. Pt has a rollator and manual w/c that she only uses when she is ambulating a long distance.  -SS (r) SK (t) SS (c)     Row Name 03/08/22 1054          Living Environment    People in Home alone  -SS (r) SK (t) SS (c)     Row Name 03/08/22 1054          Home Main Entrance    Number of Stairs, Main Entrance three  -SS (r) SK (t) SS (c)     Stair Railings, Main Entrance railings safe and in good condition;railing on right side (ascending)  -SS (r) SK (t) SS (c)     Row Name 03/08/22 1054          Stairs Within Home, Primary    Number of Stairs, Within Home, Primary none  -SS (r) SK (t) SS (c)     Row Name 03/08/22 1054          Cognition    Orientation Status (Cognition) oriented x 4  -SS (r) SK (t) SS (c)     Row Name 03/08/22 1054          Safety Issues, Functional Mobility    Impairments Affecting Function (Mobility) endurance/activity tolerance;shortness of breath;pain  Pt on chronic supplemental O2 at home (3L); R hip pain and Left lateral thigh pain; new onset of pain in the anteiror mid chest; new onset of dizziness worsening with positional changes  -SS (r) SK (t) SS (c)           User Key  (r) = Recorded By, (t) = Taken By, (c) = Cosigned By    Initials Name Provider Type    SS Ramandeep Presley, PT Physical Therapist    Nancy Lay, PT Student PT Student               Mobility     Row Name  03/08/22 1059          Bed Mobility    Bed Mobility bed mobility (all) activities  -SS (r) SK (t) SS (c)     All Activities, [place] (Bed Mobility) modified independence  -SS (r) SK (t) SS (c)     Assistive Device (Bed Mobility) head of bed elevated  -SS (r) SK (t) SS (c)     Row Name 03/08/22 1059          Bed-Chair Transfer    Bed-Chair [place] (Transfers) standby assist  -SS (r) SK (t) SS (c)     Row Name 03/08/22 1059          Sit-Stand Transfer    Sit-Stand [place] (Transfers) standby assist  -SS (r) SK (t) SS (c)     Row Name 03/08/22 1059          Gait/Stairs (Locomotion)    [place] Level (Gait) contact guard  CGA progressing to independent  -SS (r) SK (t) SS (c)     Distance in Feet (Gait) 200ft  -SS (r) SK (t) SS (c)     Deviations/Abnormal Patterns (Gait) gait speed decreased;[place] decreased  -SS (r) SK (t) SS (c)     Right Sided Gait Deviations Trendelenburg sign  -SS (r) SK (t) SS (c)           User Key  (r) = Recorded By, (t) = Taken By, (c) = Cosigned By    Initials Name Provider Type    SS Ramandeep Presley, PT Physical Therapist    Nancy Lay PT Student PT Student               Obj/Interventions     Row Name 03/08/22 1102          Range of Motion Comprehensive    General Range of Motion bilateral lower extremity ROM WFL  -SS (r) SK (t) SS (c)     Row Name 03/08/22 1102          Strength Comprehensive (MMT)    General Manual Muscle Testing (MMT) Assessment no strength deficits identified  -SS (r) SK (t) SS (c)     Comment, General Manual Muscle Testing (MMT) Assessment BLE grossly 3+/5 with mild increase in pain of R hip with resistance  -SS (r) SK (t) SS (c)     Row Name 03/08/22 1102          Balance    Balance Assessment sitting static balance;sitting dynamic balance;standing static balance;standing dynamic balance  -SS (r) SK (t) SS (c)     Static Sitting Balance independent  -SS (r) SK (t) SS (c)     Dynamic Sitting Balance independent  -SS (r) SK (t) SS (c)      Position, Sitting Balance sitting edge of bed  -SS (r) SK (t) SS (c)     Static Standing Balance independent  -SS (r) SK (t) SS (c)     Dynamic Standing Balance contact guard  -SS (r) SK (t) SS (c)     Position/Device Used, Standing Balance unsupported  -SS (r) SK (t) SS (c)     Row Name 03/08/22 1102          Sensory Assessment (Somatosensory)    Sensory Assessment (Somatosensory) sensation intact  -SS (r) SK (t) SS (c)           User Key  (r) = Recorded By, (t) = Taken By, (c) = Cosigned By    Initials Name Provider Type    SS Ramandeep Presley, PT Physical Therapist    Nancy Lay, PT Student PT Student               Goals/Plan     Row Name 03/08/22 1106          Bed Mobility Goal 1 (PT)    Activity/Assistive Device (Bed Mobility Goal 1, PT) bed mobility activities, all  -SS (r) SK (t) SS (c)     [place] Level/Cues Needed (Bed Mobility Goal 1, PT) independent  -SS (r) SK (t) SS (c)     Time Frame (Bed Mobility Goal 1, PT) long term goal (LTG);2 weeks  -SS (r) SK (t) SS (c)     Row Name 03/08/22 1106          Transfer Goal 1 (PT)    Activity/Assistive Device (Transfer Goal 1, PT) transfers, all  -SS (r) SK (t) SS (c)     [place] Level/Cues Needed (Transfer Goal 1, PT) independent  -SS (r) SK (t) SS (c)     Time Frame (Transfer Goal 1, PT) long term goal (LTG);2 weeks  -SS (r) SK (t) SS (c)     Row Name 03/08/22 1106          Gait Training Goal 1 (PT)    Activity/Assistive Device (Gait Training Goal 1, PT) gait (walking locomotion)  -SS (r) SK (t) SS (c)     [place] Level (Gait Training Goal 1, PT) independent  -SS (r) SK (t) SS (c)     Time Frame (Gait Training Goal 1, PT) 2 weeks;long term goal (LTG)  -SS (r) SK (t) SS (c)     Row Name 03/08/22 1106          Stairs Goal 1 (PT)    Activity/Assistive Device (Stairs Goal 1, PT) stairs, all skills  -SS (r) SK (t) SS (c)     [place] Level/Cues Needed (Stairs Goal 1, PT) standby assist  -SS (r) SK (t) SS (c)     Time Frame (Stairs Goal 1,  PT) long term goal (LTG);2 weeks  -SS (r) SK (t) SS (c)           User Key  (r) = Recorded By, (t) = Taken By, (c) = Cosigned By    Initials Name Provider Type    SS Ramandeep Presley, PT Physical Therapist    Nancy Lay, PT Student PT Student               Clinical Impression     Row Name 03/08/22 1103          Pain    Pretreatment Pain Rating 3/10  -SS (r) SK (t) SS (c)     Posttreatment Pain Rating 3/10  -SS (r) SK (t) SS (c)     Pain Intervention(s) Ambulation/increased activity  -SS (r) SK (t) SS (c)     Row Name 03/08/22 1103          Plan of Care Review    Plan of Care Reviewed With patient  -SS (r) SK (t) SS (c)     Progress no change  -SS (r) SK (t) SS (c)     Outcome Evaluation 75 y/o F presents with c/o dyspnea with exertion complicated by productive cough x 3 days. Pt with PMH of HTN, hypothyroid, AAA, CAD, COPD (3L NC chronically), and GERD. On this admission, pt is diagnosed with acute on chronic hypoxia with hypercapnea, multifocal pneumonia and NOE. At baseline, pt is independent with ADLs in the house using no AD but receives assistance for community transportation from her sons who live close by. Pt has a rollator and manual w/c at home which she utilize occasionally for longer distance ambulation. On this date, pt completed all bed mobility with mod I, transfers with SBA and 200ft ambulation with CGA progressing to independent. Pt reported increase in dizziness with positional changes so monitored BP throughout with and WFL. Pt on 3L of supplemental O2 throughout mobility and maintained O2 sat >91%. However dyspnea apparent with exertion. Pt will benefit from IP rehab due to decrease in activity tolerance and moderate dyspnea with exertion. However, pt is not agreeable but willing to D/C home with HHPT. PT will assess pt progress while hospitalized.  -SS (r) SK (t) SS (c)     Row Name 03/08/22 1103          Therapy Assessment/Plan (PT)    Rehab Potential (PT) good, to achieve stated  therapy goals  -SS (r) SK (t) SS (c)     Criteria for Skilled Interventions Met (PT) yes;meets criteria  -SS (r) SK (t) SS (c)     Predicted Duration of Therapy Intervention (PT) Until D/C  -SS (r) SK (t) SS (c)     Row Name 03/08/22 1103          Vital Signs    Pre Systolic BP Rehab 169  -SS (r) SK (t) SS (c)     Pre Treatment Diastolic BP 66  -SS (r) SK (t) SS (c)     Intra Systolic BP Rehab 134  -SS (r) SK (t) SS (c)     Intra Treatment Diastolic BP 63  -SS (r) SK (t) SS (c)     Post Systolic BP Rehab 136  -SS (r) SK (t) SS (c)     Post Treatment Diastolic BP 76  -SS (r) SK (t) SS (c)     Pre SpO2 (%) 96  -SS (r) SK (t) SS (c)     O2 Delivery Pre Treatment supplemental O2  3L via NC  -SS (r) SK (t) SS (c)     Intra SpO2 (%) 91  -SS (r) SK (t) SS (c)     O2 Delivery Intra Treatment supplemental O2  3L via NC  -SS (r) SK (t) SS (c)     Post SpO2 (%) 97  -SS (r) SK (t) SS (c)     O2 Delivery Post Treatment supplemental O2  3L via NC  -SS (r) SK (t) SS (c)     Pre Patient Position Supine  -SS (r) SK (t) SS (c)     Intra Patient Position Standing  -SS (r) SK (t) SS (c)     Post Patient Position Sitting  -SS (r) SK (t) SS (c)     Row Name 03/08/22 1103          Positioning and Restraints    Pre-Treatment Position in bed  -SS (r) SK (t) SS (c)     Post Treatment Position chair  -SS (r) SK (t) SS (c)     In Chair notified nsg;call light within reach;encouraged to call for assist  -SS (r) SK (t) SS (c)           User Key  (r) = Recorded By, (t) = Taken By, (c) = Cosigned By    Initials Name Provider Type    SS Ramandeep Presley, PT Physical Therapist    Nancy Lay, PT Student PT Student               Outcome Measures     Row Name 03/08/22 1107          How much help from another person do you currently need...    Turning from your back to your side while in flat bed without using bedrails? 4  -SS (r) SK (t) SS (c)     Moving from lying on back to sitting on the side of a flat bed without bedrails? 4  -SS (r) SK  (t) SS (c)     Moving to and from a bed to a chair (including a wheelchair)? 3  -SS (r) SK (t) SS (c)     Standing up from a chair using your arms (e.g., wheelchair, bedside chair)? 3  -SS (r) SK (t) SS (c)     Climbing 3-5 steps with a railing? 1  -SS (r) SK (t) SS (c)     To walk in hospital room? 3  -SS (r) SK (t) SS (c)     AM-PAC 6 Clicks Score (PT) 18  -SS (r) SK (t)     Row Name 03/08/22 1107          Functional Assessment    Outcome Measure Options AM-PAC 6 Clicks Basic Mobility (PT)  -SS (r) SK (t) SS (c)           User Key  (r) = Recorded By, (t) = Taken By, (c) = Cosigned By    Initials Name Provider Type     Ramandeep Presley, PT Physical Therapist    Nancy Lay, PT Student PT Student                             Physical Therapy Education                 Title: PT OT SLP Therapies (Done)     Topic: Physical Therapy (Done)     Point: Mobility training (Done)     Learning Progress Summary           Patient Acceptance, E, VU by SK at 3/8/2022 1108                   Point: Precautions (Done)     Learning Progress Summary           Patient Acceptance, E, VU by SK at 3/8/2022 1108                               User Key     Initials Effective Dates Name Provider Type [facility] 01/25/22 -  Nancy Lyons, PT Student PT Student PT              PT Recommendation and Plan  Planned Therapy Interventions (PT): gait training, postural re-education, neuromuscular re-education, patient/family education, strengthening, home exercise program  Plan of Care Reviewed With: patient  Progress: no change  Outcome Evaluation: 75 y/o F presents with c/o dyspnea with exertion complicated by productive cough x 3 days. Pt with PMH of HTN, hypothyroid, AAA, CAD, COPD (3L NC chronically), and GERD. On this admission, pt is diagnosed with acute on chronic hypoxia with hypercapnea, multifocal pneumonia and NOE. At baseline, pt is independent with ADLs in the house using no AD but receives assistance for community  transportation from her sons who live close by. Pt has a rollator and manual w/c at home which she utilize occasionally for longer distance ambulation. On this date, pt completed all bed mobility with mod I, transfers with SBA and 200ft ambulation with CGA progressing to independent. Pt reported increase in dizziness with positional changes so monitored BP throughout with and WFL. Pt on 3L of supplemental O2 throughout mobility and maintained O2 sat >91%. However dyspnea apparent with exertion. Pt will benefit from IP rehab due to decrease in activity tolerance and moderate dyspnea with exertion. However, pt is not agreeable but willing to D/C home with HHPT. PT will assess pt progress while hospitalized.     Time Calculation:    PT Charges     Row Name 03/08/22 1108             Time Calculation    Start Time 0940  -SS (r) SK (t) SS (c)      Stop Time 1011  -SS (r) SK (t) SS (c)      Time Calculation (min) 31 min  -SS (r) SK (t)      PT Received On 03/08/22  -SS (r) SK (t) SS (c)      PT - Next Appointment 03/10/22  -SS (r) SK (t) SS (c)      PT Goal Re-Cert Due Date 03/22/22  -SS (r) SK (t) SS (c)            User Key  (r) = Recorded By, (t) = Taken By, (c) = Cosigned By    Initials Name Provider Type    SS Ramandeep Presley, PT Physical Therapist    Nancy Lay, PT Student PT Student              Therapy Charges for Today     Code Description Service Date Service Provider Modifiers Qty    [id number] HC PT EVAL MOD COMPLEXITY 4 3/8/2022 Nancy Lyons, PT Student GP 1          PT G-Codes  Outcome Measure Options: AM-PAC 6 Clicks Basic Mobility (PT)  AM-PAC 6 Clicks Score (PT): 18    Nancy Lyons PT Student  3/8/2022

## 2022-03-08 NOTE — PLAN OF CARE
Goal Outcome Evaluation:      Patient still complaining of some midsternal pain that started yesterday. EKG and stat troponin was drawn and MD notified yesterday upon onset. Patient switched from Unasyn to Flagyl and Rocephin. No facial flushing observed with new abx. She requested to take ativan, robaxin and 2 benadryl together last night. I did not feel this was safe med administration. Patient slept through the night.

## 2022-03-08 NOTE — PLAN OF CARE
Pt is on a 2000 fluid restriction now and IV lasix will continue to monitor  Problem: Adult Inpatient Plan of Care  Goal: Absence of Hospital-Acquired Illness or Injury  Intervention: Identify and Manage Fall Risk  Recent Flowsheet Documentation  Taken 3/8/2022 1600 by Gino Lowe, RN  Safety Promotion/Fall Prevention:   safety round/check completed   fall prevention program maintained   clutter free environment maintained   nonskid shoes/slippers when out of bed  Taken 3/8/2022 1200 by Gino Lowe, RN  Safety Promotion/Fall Prevention:   safety round/check completed   nonskid shoes/slippers when out of bed   fall prevention program maintained   clutter free environment maintained  Intervention: Prevent Skin Injury  Recent Flowsheet Documentation  Taken 3/8/2022 0800 by Gino Lowe, RN  Body Position: position changed independently   Goal Outcome Evaluation:

## 2022-03-08 NOTE — PLAN OF CARE
Goal Outcome Evaluation:  77 y/o F presents with c/o dyspnea with exertion complicated by productive cough x 3 days. Pt with PMH of HTN, hypothyroid, AAA, CAD, COPD (3L NC chronically), and GERD. On this admission, pt is diagnosed with acute on chronic hypoxia with hypercapnea, multifocal pneumonia and NOE. At baseline, pt is independent with ADLs in the house using no AD but receives assistance for community transportation from her sons who live close by. Pt has a rollator and manual w/c at home which she utilize occasionally for longer distance ambulation. On this date, pt completed all bed mobility with mod I, transfers with SBA and 200ft ambulation with CGA progressing to independent. Pt reported increase in dizziness with positional changes so monitored BP throughout with and WFL. Pt on 3L of supplemental O2 throughout mobility and maintained O2 sat >91%. However dyspnea apparent with exertion. Pt will benefit from IP rehab due to decrease in activity tolerance and moderate dyspnea with exertion. However, pt is not agreeable but willing to D/C home with HHPT. PT will assess pt progress while hospitalized.

## 2022-03-08 NOTE — PROGRESS NOTES
"[facility] PROGRESS NOTE     LOS: 2 days    Patient Care Team:  Deonte Hector MD as PCP - General      Subjective     Patient resting comfortably.  She is feeling very tired and dizzy.  Still has dyspnea with minor exertion but improving slowly.  Denies any chest pain, nausea or vomiting    Objective     Vital Sign Min/Max for last 24 hours  Temp:  [98.2 °F (36.8 °C)-98.6 °F (37 °C)] 98.4 °F (36.9 °C)  Heart Rate:  [71-95] 95  Resp:  [16-26] 23  BP: (133-143)/(45-68) 143/58                       Flowsheet Rows    Flowsheet Row First Filed Value   Admission Height 165.1 cm (65\") Documented at 03/06/2022 1049   Admission Weight 59.9 kg (132 lb) Documented at 03/06/2022 1049          No intake/output data recorded.  I/O last 3 completed shifts:  In: 200 [IV Piggyback:200]  Out: -     Physical Exam:  Physical Exam    General Appearance: Chronically ill-appearing, NAD  Skin: warm and dry  HEENT: oral mucosa normal, nonicteric sclera  Neck: supple, no JVD  Lungs: Bilateral wheezing  Heart: RRR, normal S1 and S2  Abdomen: soft, nontender, nondistended  : no palpable bladder  Extremities: no edema, cyanosis or clubbing  Neuro: normal speech and mental status       LABS:  Lab Results   Component Value Date    CALCIUM 9.2 03/08/2022    PHOS 2.9 03/08/2022     Results from last 7 days   Lab Units 03/08/22  0255 03/07/22  1458 03/06/22  2311 03/06/22  1129   SODIUM mmol/L 144  --  138 140   POTASSIUM mmol/L 4.1  --  3.9 4.4   CHLORIDE mmol/L 103  --  99 103   CO2 mmol/L 31.0*  --  25.0 27.0   BUN mg/dL 25*  --  26* 24*   CREATININE mg/dL 1.09*  --  1.27* 1.02*   GLUCOSE mg/dL 108*  --  161* 124*   CALCIUM mg/dL 9.2  --  8.8 8.5*   WBC 10*3/mm3 11.70* 14.30* 10.90* 13.10*   HEMOGLOBIN g/dL 8.6* 8.7* 8.5* 8.5*   PLATELETS 10*3/mm3 243 220 196 207   ALT (SGPT) U/L  --   --   --  <5   AST (SGOT) U/L  --   --   --  5     Lab Results   Component Value Date    TROPONINI 8.820 (C) 01/07/2021    TROPONINT <0.010 " 03/06/2022     Estimated Creatinine Clearance: 41.5 mL/min (A) (by C-G formula based on SCr of 1.09 mg/dL (H)).  Results from last 7 days   Lab Units 03/06/22  1147   PH, ARTERIAL pH units 7.379   PO2 ART mm Hg 74.8*   PCO2, ARTERIAL mm Hg 51.6*   HCO3 ART mmol/L 30.4*     Brief Urine Lab Results     None        WEIGHTS:     Wt Readings from Last 1 Encounters:   03/06/22 1049 59.9 kg (132 lb)       vitamin C, 250 mg, Oral, Daily  aspirin, 81 mg, Oral, Daily  atorvastatin, 80 mg, Oral, Nightly  cefTRIAXone, 1 g, Intravenous, Q24H  cholecalciferol, 500 Units, Oral, Daily  clopidogrel, 75 mg, Oral, Daily  estradiol, 3 mg, Oral, Nightly  ferrous sulfate, 324 mg, Oral, Daily With Breakfast  folic acid-pyridoxine-cyanocobalamin, 1 tablet, Oral, Daily  gabapentin, 400 mg, Oral, Q8H  heparin (porcine), 5,000 Units, Subcutaneous, Q8H  ipratropium-albuterol, 3 mL, Nebulization, 4x Daily - RT  isosorbide mononitrate, 30 mg, Oral, Q24H  levothyroxine, 88 mcg, Oral, Q AM  metoprolol succinate XL, 12.5 mg, Oral, Daily  metroNIDAZOLE, 500 mg, Intravenous, Q8H  pantoprazole, 40 mg, Oral, Q AM  ranolazine, 1,000 mg, Oral, Q12H  sertraline, 100 mg, Oral, Daily  sodium chloride, 10 mL, Intravenous, Q12H           Assessment/Plan       1.Acute kidney injury.  Mild  Baseline creatinine seems to be around 0.8 MG/DL but has fluctuated in past due to diuretics.  NOE seems to be prerenal in etiology. Creatinine improving slowly.  Electrolytes, volume status okay  2.  Hypertension  Blood Pressure well controlled  3.  COPD exacerbation/pneumonia  4.  Congestive heart failure.  Systolic.  Chronic        Recs:  Restart low-dose oral Lasix  I will monitor renal function and electrolytes and manage accordingly      Irving Yi MD  03/08/22  08:50 EST

## 2022-03-08 NOTE — PROGRESS NOTES
[facility] Medicine Services        Patient Name: Gayathri Reddy  : 1945  MRN: [id number]  Primary Care Physician:  Deonte Hector MD  Date of admission: 3/6/2022           Subjective          Chief Complaint: Dyspnea     History of Present Illness: Gayathri Reddy is a 76 y.o. female with PMHx of HTN, HLD, Hypothyroid, GERD, cAD, AAA, CHF, COPD (3L NC chronically) who presents due to dyspnea.  Admits to dyspnea with exertion complicated by productive cough ongoing for the past three days and similar to previous experiences with pneumonia.  Denies chest pain, abdominal pain, numbness, tingling, hemoptysis.  Admits to increasing home oxygen from 3 to 4L latelty.  Due to multiple risk factors, went to [facility] for evaluation     In the ED, vitals 98.8F, HR 84, RR 18, 135/64, 97 4L.  Labs notable for troponin < 0.01, proBNP 93618, glucose 124, creatinine 1.02, white count 13.1, Hgb 8.5.  CXR suggestive of multi-focal pneumonia.  Respiratory panel negative for covid or other virus.  Patient started on abx, lasix, and admitted to medicine for evaluation.     3/7/2022; still complaining of shortness of breath with minimal exertion, T-max of 36.6, vital signs are stable currently on 3 L of nasal cannula.  Spoke with RN.  Serum creatinine bumped up to 1.27 prerenal NOE consult nephrology, proBNP 12877, will consult cardiology.  3/8/2022; patient walked about 200 feet without getting hypoxia on supplemental oxygen but still increased work of breathing, otherwise hemodynamically stable.    Review of Systems   Constitutional: Positive for malaise/fatigue. Negative for chills and fever.   HENT: Negative for hearing loss, hoarse voice and nosebleeds.    Eyes: Negative for discharge, double vision and pain.   Cardiovascular: Positive for dyspnea on exertion. Negative for leg swelling.   Respiratory: Positive for cough and shortness of breath. Negative for hemoptysis.    Endocrine: Negative for  polydipsia, polyphagia and polyuria.   Skin: Negative for dry skin, flushing and itching.   Musculoskeletal: Negative for arthritis, back pain and falls.   Gastrointestinal: Negative for abdominal pain and constipation.   Genitourinary: Negative for dysuria, flank pain and frequency.   Neurological: Negative for headaches, light-headedness and weakness.   Psychiatric/Behavioral: Negative for altered mental status, depression and hallucinations.         Personal History      Medical History        Past Medical History:   Diagnosis Date   • Aneurysm (HCC)       AAA   • Arthritis     • CHF (congestive heart failure) (HCC)     • COPD (chronic obstructive pulmonary disease) (HCC)     • Coronary artery disease     • Dyslipidemia     • GERD (gastroesophageal reflux disease)     • History of right heart catheterization       7/30/2017; 10/2018   • Hyperlipidemia     • Hypertension     • Hypothyroidism     • Myocardial infarct (HCC)     • Myocardial infarction (HCC)       x 3   • Pneumonia 11/2019     bilateral             Surgical History         Past Surgical History:   Procedure Laterality Date   • ABDOMINAL AORTIC ANEURYSM REPAIR N/A 2001   • CARDIAC CATHETERIZATION       • CATARACT EXTRACTION Bilateral     • CORONARY ANGIOPLASTY WITH STENT PLACEMENT N/A       x 5   • CORONARY ARTERY BYPASS GRAFT         x 4 1995   • HYSTERECTOMY N/A              Family History: family history includes Aneurysm in her mother; Cancer in her paternal grandfather; Colon cancer in her sister; Heart disease in her father and mother. Otherwise pertinent FHx was reviewed and not pertinent to current issue.     Social History:  reports that she quit smoking about 28 years ago. Her smoking use included cigarettes. She smoked 1.50 packs per day. She has never used smokeless tobacco. She reports that she does not drink alcohol and does not use drugs.     Home Medications:           Prior to Admission Medications      Prescriptions Last Dose  Informant Patient Reported? Taking?     aspirin 81 MG EC tablet     Yes No     Take 81 mg by mouth Daily.     Budeson-Glycopyrrol-Formoterol (BREZTRI AEROSPHERE IN)     Yes No     Inhale.     cholecalciferol (VITAMIN D3) 10 MCG (400 UNIT) tablet     Yes No     Take 400 Units by mouth Daily.     clopidogrel (PLAVIX) 75 MG tablet     Yes No     Take 75 mg by mouth Daily.     diphenhydrAMINE (BENADRYL) 25 mg capsule     Yes No     Take 25 mg by mouth Every 6 (Six) Hours As Needed.     estradiol (ESTRACE) 1 MG tablet     Yes No     Take 1 mg by mouth 3 (Three) Times a Day.     ferrous sulfate 324 (65 Fe) MG tablet delayed-release EC tablet   Self Yes No     Take 324 mg by mouth Daily With Breakfast.     furosemide (LASIX) 20 MG tablet     No No     Take 1 tablet by mouth Daily.     Patient taking differently:  Take  by mouth Daily. Once daily     gabapentin (NEURONTIN) 400 MG capsule     Yes No     Take 400 mg by mouth 3 (Three) Times a Day.     HYDROcodone-acetaminophen (NORCO)  MG per tablet     Yes No     Take 1 tablet by mouth Every 6 (Six) Hours As Needed.     isosorbide mononitrate (IMDUR) 30 MG 24 hr tablet     Yes No     Take 30 mg by mouth Daily.     levothyroxine (SYNTHROID, LEVOTHROID) 88 MCG tablet     Yes No     Take 88 mcg by mouth Daily.     lisinopril (PRINIVIL,ZESTRIL) 2.5 MG tablet     No No     Take 1 tablet by mouth Daily.     LORazepam (ATIVAN) 1 MG tablet     Yes No     Take 1 mg by mouth 4 (Four) Times a Day As Needed.     methocarbamol (ROBAXIN) 500 MG tablet   Self Yes No     Take 500 mg by mouth 3 (Three) Times a Day As Needed for Muscle Spasms.     metoprolol succinate XL (TOPROL-XL) 25 MG 24 hr tablet     No No     Take 0.5 tablets by mouth Daily.     nitroglycerin (Nitrostat) 0.4 MG SL tablet     No No     Place 1 tablet under the tongue Every 5 (Five) Minutes As Needed for Chest Pain.     pantoprazole (PROTONIX) 40 MG EC tablet     No No     Take 1 tablet by mouth Daily.     Potassium  99 MG tablet     Yes No     Take  by mouth.     ranolazine (RANEXA) 1000 MG 12 hr tablet     No No     Take 1 tablet by mouth Every 12 (Twelve) Hours.     rosuvastatin (CRESTOR) 10 MG tablet     Yes No     Take 10 mg by mouth Daily.     sertraline (ZOLOFT) 100 MG tablet     Yes No     Take 100 mg by mouth Daily.     vitamin C (ASCORBIC ACID) 250 MG tablet     Yes No     Take 250 mg by mouth Daily.                Allergies:       Allergies   Allergen Reactions   • Naprosyn  [Naproxen] Rash   • Bactrim [Sulfamethoxazole-Trimethoprim] Rash               Objective          Vitals:   Temp:  [98.2 °F (36.8 °C)-98.6 °F (37 °C)] 98.4 °F (36.9 °C)  Heart Rate:  [71-95] 95  Resp:  [16-26] 23  BP: (133-143)/(45-68) 143/58  Flow (L/min):  [2-3] 2     Physical Exam  Constitutional:       General: She is not in acute distress.     Appearance: She is not toxic-appearing.   HENT:      Head: Normocephalic and atraumatic.      Nose: Nose normal. No congestion.      Mouth/Throat:      Pharynx: Oropharynx is clear. No oropharyngeal exudate.   Eyes:      General: No scleral icterus.  Cardiovascular:      Rate and Rhythm: Normal rate and regular rhythm.      Heart sounds: No murmur heard.    No friction rub. No gallop.   Pulmonary:      Effort: No respiratory distress.      Breath sounds: Rales present. No wheezing.      Comments: Patient on 4L NC  Abdominal:      General: There is no distension.      Tenderness: There is no abdominal tenderness. There is no guarding.   Musculoskeletal:         General: No swelling or deformity.      Cervical back: Normal range of motion. No rigidity.      Right lower leg: No edema.      Left lower leg: No edema.   Skin:     Coloration: Skin is not jaundiced.      Findings: No bruising or lesion.   Neurological:      General: No focal deficit present.      Mental Status: She is alert and oriented to person, place, and time.      Motor: No weakness.   Psychiatric:         Mood and Affect: Mood normal.          Behavior: Behavior normal.         Thought Content: Thought content normal.         Judgment: Judgment normal.                  Result Review       Result Review:  I have personally reviewed the results from the time of this admission to 3/6/2022 13:01 EST and agree with these findings:  [x]?  Laboratory  []?  Microbiology  [x]?  Radiology  []?  EKG/Telemetry   []?  Cardiology/Vascular   []?  Pathology  []?  Old records  []?  Other:     Lab Results   Component Value Date    WBC 11.70 (H) 03/08/2022    HGB 8.6 (L) 03/08/2022    HCT 25.2 (L) 03/08/2022    MCV 99.1 (H) 03/08/2022     03/08/2022     Lab Results   Component Value Date    GLUCOSE 108 (H) 03/08/2022    CALCIUM 9.2 03/08/2022     03/08/2022    K 4.1 03/08/2022    CO2 31.0 (H) 03/08/2022     03/08/2022    BUN 25 (H) 03/08/2022    CREATININE 1.09 (H) 03/08/2022    EGFRIFAFRI >60 01/05/2018    EGFRIFNONA 27 (L) 02/18/2022    BCR 22.9 03/08/2022    ANIONGAP 10.0 03/08/2022                 Assessment/Plan             Active Hospital Problems:  There are no active hospital problems to display for this patient.     Plan:      #Acute on chronic hypoxia with hypercapnia; wears baseline 3 L and 4 L  #Multi-focal pneumonia/focal nodular airspace disease in the right upper lobe measuring 18 x 14  mm;    - ABG 7.379/51.6/74.8/30.4    -CXR shows multi-focal pneumonai    - CT chest to better evaluate as proBNP elevated    - Unasyn 3g IV q6h patient had a urticarial reaction for Unasyn requiring 1 dose of Decadron and Benadryl, switched to Rocephin on 3/8/2022, end of treatment 3/13/2022 IV Flagyl finishing on 3/9/2022    - RVP negative, procalcitonin normal    - blood cultures NTD    - respiratory culture    - legionella and strep, both negative.    - white count 13.1 on admission  -,  consult pulmonary appreciated.  # Acute kidney injury; baseline creatinine seems to be around 0.8 mg/dL, but has fluctuated serum creatinine 1.27 likely prerenal,  nephrology consult appreciated, restarting low-dose oral Lasix diuretic  #COPD    -wears 3L chronically, currently on 4L NC    - suspect pneumonia    - Duonebs QID     #Diastolic and systolic HF    - appears chronic, clinically appears euvolemic    - proBNP 22888, consulted cardiology.    - CT chest to assess for pleural effusions    -Hold lasix.      #CAD s/p CABG with attrition 3/4 grafts/ischemic cardiomyopathy most recent ejection 45% April 2021      - resume home aspirin    - resume home statin    - resume home Toprol    - resume home imdur and Ranexa once verfired     #AAA    - CT a/p shows infra-renal abdominal aorta at 3.0x3.7cm on 11/4/2020    - will need to be re-evaluated as otpt     #HTN    - hold lisinopril     - resume home toprol      #HLD    - resume home statin     #GERD    - PPI     #Hypothyroid    - resume home synthroid once verified     #Macrocytic hyperchromic anemia    -hgb 8.5 on admission, base ~ 10.0    - check iron panel and ferritin, and vitamin B12 and folate     #Anxiety     - resume home Zoloft once verified     #DVT prophylaxis    -heparin        DVT prophylaxis:  No DVT prophylaxis order currently exists.     CODE STATUS:    Admission Status:  I believe this patient meets inpatient status.     I discussed the patient's findings and my recommendations with patient.   Disposition; PT OT consulted  This patient has been examined wearing appropriate Personal Protective Equipment and discussed with Patient    Electronically signed by Sky Reeder MD, 03/08/22, 3:00 PM EST.

## 2022-03-09 LAB
ALBUMIN SERPL-MCNC: 3.1 G/DL (ref 3.5–5.2)
ANION GAP SERPL CALCULATED.3IONS-SCNC: 13 MMOL/L (ref 5–15)
BUN SERPL-MCNC: 20 MG/DL (ref 8–23)
BUN/CREAT SERPL: 15 (ref 7–25)
CALCIUM SPEC-SCNC: 8.8 MG/DL (ref 8.6–10.5)
CHLORIDE SERPL-SCNC: 95 MMOL/L (ref 98–107)
CO2 SERPL-SCNC: 28 MMOL/L (ref 22–29)
CREAT SERPL-MCNC: 1.33 MG/DL (ref 0.57–1)
DEPRECATED RDW RBC AUTO: 43.3 FL (ref 37–54)
EGFRCR SERPLBLD CKD-EPI 2021: 41.6 ML/MIN/1.73
EOSINOPHIL # BLD MANUAL: 0.29 10*3/MM3 (ref 0–0.4)
EOSINOPHIL NFR BLD MANUAL: 2 % (ref 0.3–6.2)
ERYTHROCYTE [DISTWIDTH] IN BLOOD BY AUTOMATED COUNT: 12.7 % (ref 12.3–15.4)
GIANT PLATELETS: ABNORMAL
GLUCOSE SERPL-MCNC: 141 MG/DL (ref 65–99)
HCT VFR BLD AUTO: 25.5 % (ref 34–46.6)
HGB BLD-MCNC: 8.5 G/DL (ref 12–15.9)
LARGE PLATELETS: ABNORMAL
LYMPHOCYTES # BLD MANUAL: 2.61 10*3/MM3 (ref 0.7–3.1)
LYMPHOCYTES NFR BLD MANUAL: 1 % (ref 5–12)
MCH RBC QN AUTO: 32.5 PG (ref 26.6–33)
MCHC RBC AUTO-ENTMCNC: 33.4 G/DL (ref 31.5–35.7)
MCV RBC AUTO: 97.3 FL (ref 79–97)
METAMYELOCYTES NFR BLD MANUAL: 1 % (ref 0–0)
MONOCYTES # BLD: 0.15 10*3/MM3 (ref 0.1–0.9)
NEUTROPHILS # BLD AUTO: 11.31 10*3/MM3 (ref 1.7–7)
NEUTROPHILS NFR BLD MANUAL: 74 % (ref 42.7–76)
NEUTS BAND NFR BLD MANUAL: 4 % (ref 0–5)
PHOSPHATE SERPL-MCNC: 3.2 MG/DL (ref 2.5–4.5)
PLATELET # BLD AUTO: 251 10*3/MM3 (ref 140–450)
PMV BLD AUTO: 7.8 FL (ref 6–12)
POTASSIUM SERPL-SCNC: 2.9 MMOL/L (ref 3.5–5.2)
RBC # BLD AUTO: 2.62 10*6/MM3 (ref 3.77–5.28)
RBC MORPH BLD: NORMAL
SCAN SLIDE: NORMAL
SMALL PLATELETS BLD QL SMEAR: ADEQUATE
SODIUM SERPL-SCNC: 136 MMOL/L (ref 136–145)
VARIANT LYMPHS NFR BLD MANUAL: 18 % (ref 19.6–45.3)
WBC MORPH BLD: NORMAL
WBC NRBC COR # BLD: 14.5 10*3/MM3 (ref 3.4–10.8)

## 2022-03-09 PROCEDURE — 85007 BL SMEAR W/DIFF WBC COUNT: CPT | Performed by: HOSPITALIST

## 2022-03-09 PROCEDURE — 63710000001 ONDANSETRON PER 8 MG: Performed by: NURSE PRACTITIONER

## 2022-03-09 PROCEDURE — 99232 SBSQ HOSP IP/OBS MODERATE 35: CPT | Performed by: INTERNAL MEDICINE

## 2022-03-09 PROCEDURE — 99233 SBSQ HOSP IP/OBS HIGH 50: CPT | Performed by: HOSPITALIST

## 2022-03-09 PROCEDURE — 85025 COMPLETE CBC W/AUTO DIFF WBC: CPT | Performed by: HOSPITALIST

## 2022-03-09 PROCEDURE — 94664 DEMO&/EVAL PT USE INHALER: CPT

## 2022-03-09 PROCEDURE — 87449 NOS EACH ORGANISM AG IA: CPT | Performed by: HOSPITALIST

## 2022-03-09 PROCEDURE — 94799 UNLISTED PULMONARY SVC/PX: CPT

## 2022-03-09 PROCEDURE — 87324 CLOSTRIDIUM AG IA: CPT | Performed by: HOSPITALIST

## 2022-03-09 PROCEDURE — 80069 RENAL FUNCTION PANEL: CPT | Performed by: INTERNAL MEDICINE

## 2022-03-09 PROCEDURE — 25010000002 CEFTRIAXONE PER 250 MG: Performed by: HOSPITALIST

## 2022-03-09 PROCEDURE — 25010000002 HEPARIN (PORCINE) PER 1000 UNITS: Performed by: NURSE PRACTITIONER

## 2022-03-09 RX ORDER — POTASSIUM CHLORIDE 20 MEQ/1
40 TABLET, EXTENDED RELEASE ORAL ONCE
Status: COMPLETED | OUTPATIENT
Start: 2022-03-09 | End: 2022-03-09

## 2022-03-09 RX ORDER — METRONIDAZOLE 500 MG/100ML
500 INJECTION, SOLUTION INTRAVENOUS EVERY 8 HOURS
Status: COMPLETED | OUTPATIENT
Start: 2022-03-09 | End: 2022-03-11

## 2022-03-09 RX ORDER — METOPROLOL SUCCINATE 25 MG/1
25 TABLET, EXTENDED RELEASE ORAL DAILY
Status: DISCONTINUED | OUTPATIENT
Start: 2022-03-10 | End: 2022-03-18 | Stop reason: HOSPADM

## 2022-03-09 RX ORDER — GUAIFENESIN 600 MG/1
600 TABLET, EXTENDED RELEASE ORAL EVERY 12 HOURS SCHEDULED
Status: DISCONTINUED | OUTPATIENT
Start: 2022-03-09 | End: 2022-03-18 | Stop reason: HOSPADM

## 2022-03-09 RX ADMIN — GUAIFENESIN 600 MG: 600 TABLET, EXTENDED RELEASE ORAL at 22:14

## 2022-03-09 RX ADMIN — CLOPIDOGREL BISULFATE 75 MG: 75 TABLET, FILM COATED ORAL at 10:35

## 2022-03-09 RX ADMIN — FERROUS SULFATE TAB EC 324 MG (65 MG FE EQUIVALENT) 324 MG: 324 (65 FE) TABLET DELAYED RESPONSE at 10:34

## 2022-03-09 RX ADMIN — RANOLAZINE 1000 MG: 500 TABLET, FILM COATED, EXTENDED RELEASE ORAL at 10:35

## 2022-03-09 RX ADMIN — ISOSORBIDE MONONITRATE 30 MG: 30 TABLET, EXTENDED RELEASE ORAL at 15:18

## 2022-03-09 RX ADMIN — Medication 10 ML: at 22:19

## 2022-03-09 RX ADMIN — METRONIDAZOLE 500 MG: 500 INJECTION, SOLUTION INTRAVENOUS at 15:18

## 2022-03-09 RX ADMIN — OXYCODONE HYDROCHLORIDE AND ACETAMINOPHEN 250 MG: 500 TABLET ORAL at 10:35

## 2022-03-09 RX ADMIN — Medication 500 UNITS: at 10:34

## 2022-03-09 RX ADMIN — HEPARIN SODIUM 5000 UNITS: 5000 INJECTION INTRAVENOUS; SUBCUTANEOUS at 06:12

## 2022-03-09 RX ADMIN — ATORVASTATIN CALCIUM 80 MG: 40 TABLET, FILM COATED ORAL at 22:14

## 2022-03-09 RX ADMIN — GABAPENTIN 400 MG: 400 CAPSULE ORAL at 06:13

## 2022-03-09 RX ADMIN — GABAPENTIN 400 MG: 400 CAPSULE ORAL at 22:14

## 2022-03-09 RX ADMIN — FOLIC ACID-PYRIDOXINE-CYANOCOBALAMIN TAB 2.5-25-2 MG 1 TABLET: 2.5-25-2 TAB at 10:35

## 2022-03-09 RX ADMIN — HYDROCODONE BITARTRATE AND ACETAMINOPHEN 1 TABLET: 10; 325 TABLET ORAL at 15:17

## 2022-03-09 RX ADMIN — IPRATROPIUM BROMIDE AND ALBUTEROL SULFATE 3 ML: .5; 3 SOLUTION RESPIRATORY (INHALATION) at 07:20

## 2022-03-09 RX ADMIN — METRONIDAZOLE 500 MG: 500 INJECTION, SOLUTION INTRAVENOUS at 06:13

## 2022-03-09 RX ADMIN — LEVOTHYROXINE SODIUM 88 MCG: 0.09 TABLET ORAL at 06:13

## 2022-03-09 RX ADMIN — PANTOPRAZOLE SODIUM 40 MG: 40 TABLET, DELAYED RELEASE ORAL at 06:13

## 2022-03-09 RX ADMIN — GABAPENTIN 400 MG: 400 CAPSULE ORAL at 15:18

## 2022-03-09 RX ADMIN — POTASSIUM CHLORIDE 40 MEQ: 1500 TABLET, EXTENDED RELEASE ORAL at 10:35

## 2022-03-09 RX ADMIN — IPRATROPIUM BROMIDE AND ALBUTEROL SULFATE 3 ML: .5; 3 SOLUTION RESPIRATORY (INHALATION) at 10:47

## 2022-03-09 RX ADMIN — FUROSEMIDE 20 MG: 20 TABLET ORAL at 10:35

## 2022-03-09 RX ADMIN — ESTRADIOL 3 MG: 1 TABLET ORAL at 22:14

## 2022-03-09 RX ADMIN — LORAZEPAM 1 MG: 1 TABLET ORAL at 23:02

## 2022-03-09 RX ADMIN — LORAZEPAM 1 MG: 1 TABLET ORAL at 06:36

## 2022-03-09 RX ADMIN — IPRATROPIUM BROMIDE AND ALBUTEROL SULFATE 3 ML: .5; 3 SOLUTION RESPIRATORY (INHALATION) at 19:10

## 2022-03-09 RX ADMIN — ONDANSETRON HYDROCHLORIDE 4 MG: 4 TABLET, FILM COATED ORAL at 04:08

## 2022-03-09 RX ADMIN — IPRATROPIUM BROMIDE AND ALBUTEROL SULFATE 3 ML: .5; 3 SOLUTION RESPIRATORY (INHALATION) at 15:42

## 2022-03-09 RX ADMIN — CEFTRIAXONE 1 G: 1 INJECTION, POWDER, FOR SOLUTION INTRAMUSCULAR; INTRAVENOUS at 22:09

## 2022-03-09 RX ADMIN — HYDROCODONE BITARTRATE AND ACETAMINOPHEN 1 TABLET: 10; 325 TABLET ORAL at 23:02

## 2022-03-09 RX ADMIN — HEPARIN SODIUM 5000 UNITS: 5000 INJECTION INTRAVENOUS; SUBCUTANEOUS at 15:17

## 2022-03-09 RX ADMIN — GUAIFENESIN 600 MG: 600 TABLET, EXTENDED RELEASE ORAL at 10:35

## 2022-03-09 RX ADMIN — SERTRALINE 100 MG: 100 TABLET, FILM COATED ORAL at 15:21

## 2022-03-09 RX ADMIN — MINERAL OIL, PETROLATUM, PHENYLEPHRINE HCI: .25; 14; 74.9 OINTMENT TOPICAL at 04:08

## 2022-03-09 RX ADMIN — ASPIRIN 81 MG: 81 TABLET, COATED ORAL at 10:35

## 2022-03-09 RX ADMIN — HEPARIN SODIUM 5000 UNITS: 5000 INJECTION INTRAVENOUS; SUBCUTANEOUS at 22:14

## 2022-03-09 RX ADMIN — METHOCARBAMOL 500 MG: 500 TABLET ORAL at 04:08

## 2022-03-09 RX ADMIN — Medication 10 ML: at 10:34

## 2022-03-09 RX ADMIN — METOPROLOL SUCCINATE 12.5 MG: 25 TABLET, FILM COATED, EXTENDED RELEASE ORAL at 10:35

## 2022-03-09 RX ADMIN — RANOLAZINE 1000 MG: 500 TABLET, FILM COATED, EXTENDED RELEASE ORAL at 22:14

## 2022-03-09 RX ADMIN — METRONIDAZOLE 500 MG: 500 INJECTION, SOLUTION INTRAVENOUS at 23:14

## 2022-03-09 NOTE — DISCHARGE PLACEMENT REQUEST
"Katey Casey (76 y.o. Female)             Date of Birth   1945    Social Security Number       Address   [address] [place] [facility]    Home Phone   [phone]    MRN   [id number]       [facility]    Marital Status                               Admission Date   3/6/22    Admission Type   Emergency    Admitting Provider   Sky Reeder MD    Attending Provider   Sky Reeder MD    Department, Room/Bed   [address] PEDIATRICS, 204/1       Discharge Date       Discharge Disposition       Discharge Destination                               Attending Provider: Sky Reeder MD    Allergies: Naprosyn  [Naproxen], Unasyn [Ampicillin-sulbactam Sodium], Bactrim [Sulfamethoxazole-trimethoprim]    Isolation: None   Infection: C.difficile (rule out) (03/09/22)   Code Status: CPR   Advance Care Planning Activity    Ht: 165.1 cm (65\")   Wt: 57.5 kg (126 lb 12.2 oz)    Admission Cmt: None   Principal Problem: Pneumonia of both lungs due to infectious organism, unspecified part of lung [J18.9]                 Active Insurance as of 3/6/2022     Primary Coverage     Payor Plan Insurance Group Employer/Plan Group    MEDICARE MEDICARE A & B      Payor Plan Address Payor Plan Phone Number Payor Plan Fax Number Effective Dates    [address] [phone]  4/1/2010 - None Entered    [facility] [id number]       Subscriber Name Subscriber Birth Date Member ID       KATEY CASEY 1945 [id number]           Secondary Coverage     Payor Plan Insurance Group Employer/Plan Group    [facility] SUPP [id number]     Payor Plan Address Payor Plan Phone Number Payor Plan Fax Number Effective Dates    [address]   12/1/2016 - None Entered    [facility] [id number]       Subscriber Name Subscriber Birth Date Member ID       KATEY CASEY 1945 [id number]                 Emergency Contacts      (Rel.) Home Phone Work Phone Mobile Phone    " KATHY CASEY (son) [phone] -- --

## 2022-03-09 NOTE — CASE MANAGEMENT/SOCIAL WORK
Continued Stay Note   [facility]     Patient Name: Gayathri Reddy  MRN: [id number]  Today's Date: 3/9/2022    Admit Date: 3/6/2022     Discharge Plan     Row Name 03/09/22 1135       Plan    Plan Return home. Referral with [facility] [place], pending acceptance. HHC order needs placed prior to d/c. Watch for need of home IV abx. Current home O2 @ 3L with [facility].    Plan Comments Met with patient in room to discuss HHC.  Patient wants referral with [facility].  Sussy Coronel notified by text message.  Barriers to d/c: IV rocephin through 3/13. IV flagyl finishes today per pulmonary.                    Expected Discharge Date and Time     Expected Discharge Date Expected Discharge Time    Mar 13, 2022             Yuli Barrios RN

## 2022-03-09 NOTE — PROGRESS NOTES
[facility]        Patient Name: Gaytahri Reddy  : 1945  MRN: [id number]  Primary Care Physician:  Deonte Hector MD  Date of admission: 3/6/2022           Subjective          Chief Complaint: Dyspnea     History of Present Illness: Gayathri Reddy is a 76 y.o. female with PMHx of HTN, HLD, Hypothyroid, GERD, cAD, AAA, CHF, COPD (3L NC chronically) who presents due to dyspnea.  Admits to dyspnea with exertion complicated by productive cough ongoing for the past three days and similar to previous experiences with pneumonia.  Denies chest pain, abdominal pain, numbness, tingling, hemoptysis.  Admits to increasing home oxygen from 3 to 4L latelty.  Due to multiple risk factors, went to [facility] for evaluation     In the ED, vitals 98.8F, HR 84, RR 18, 135/64, 97 4L.  Labs notable for troponin < 0.01, proBNP 95530, glucose 124, creatinine 1.02, white count 13.1, Hgb 8.5.  CXR suggestive of multi-focal pneumonia.  Respiratory panel negative for covid or other virus.  Patient started on abx, lasix, and admitted to medicine for evaluation.     3/7/2022; still complaining of shortness of breath with minimal exertion, T-max of 36.6, vital signs are stable currently on 3 L of nasal cannula.  Spoke with RN.  Serum creatinine bumped up to 1.27 prerenal NOE consult nephrology, proBNP 16393, will consult cardiology.  3/8/2022; patient walked about 200 feet without getting hypoxia on supplemental oxygen but still increased work of breathing, otherwise hemodynamically stable.  3/9/2022; past patient still endorses shortness of breath with minimal exertion, has been endorsing diarrhea for the last 2 or 3 days, will check stool for C. difficile, T-max of 100.7, vital stable currently on 1 L of nasal cannula.    Review of Systems   Constitutional: Positive for malaise/fatigue. Negative for chills and fever.   HENT: Negative for hearing loss, hoarse voice and nosebleeds.    Eyes:  Negative for discharge, double vision and pain.   Cardiovascular: Positive for dyspnea on exertion. Negative for leg swelling.   Respiratory: Positive for cough and shortness of breath. Negative for hemoptysis.    Endocrine: Negative for polydipsia, polyphagia and polyuria.   Skin: Negative for dry skin, flushing and itching.   Musculoskeletal: Negative for arthritis, back pain and falls.   Gastrointestinal: Negative for abdominal pain and constipation.   Genitourinary: Negative for dysuria, flank pain and frequency.   Neurological: Negative for headaches, light-headedness and weakness.   Psychiatric/Behavioral: Negative for altered mental status, depression and hallucinations.         Personal History      Medical History        Past Medical History:   Diagnosis Date   • Aneurysm (HCC)       AAA   • Arthritis     • CHF (congestive heart failure) (HCC)     • COPD (chronic obstructive pulmonary disease) (HCC)     • Coronary artery disease     • Dyslipidemia     • GERD (gastroesophageal reflux disease)     • History of right heart catheterization       7/30/2017; 10/2018   • Hyperlipidemia     • Hypertension     • Hypothyroidism     • Myocardial infarct (HCC)     • Myocardial infarction (HCC)       x 3   • Pneumonia 11/2019     bilateral             Surgical History         Past Surgical History:   Procedure Laterality Date   • ABDOMINAL AORTIC ANEURYSM REPAIR N/A 2001   • CARDIAC CATHETERIZATION       • CATARACT EXTRACTION Bilateral     • CORONARY ANGIOPLASTY WITH STENT PLACEMENT N/A       x 5   • CORONARY ARTERY BYPASS GRAFT         x 4 1995   • HYSTERECTOMY N/A              Family History: family history includes Aneurysm in her mother; Cancer in her paternal grandfather; Colon cancer in her sister; Heart disease in her father and mother. Otherwise pertinent FHx was reviewed and not pertinent to current issue.     Social History:  reports that she quit smoking about 28 years ago. Her smoking use included  cigarettes. She smoked 1.50 packs per day. She has never used smokeless tobacco. She reports that she does not drink alcohol and does not use drugs.     Home Medications:           Prior to Admission Medications      Prescriptions Last Dose Informant Patient Reported? Taking?     aspirin 81 MG EC tablet     Yes No     Take 81 mg by mouth Daily.     Budeson-Glycopyrrol-Formoterol (BREZTRI AEROSPHERE IN)     Yes No     Inhale.     cholecalciferol (VITAMIN D3) 10 MCG (400 UNIT) tablet     Yes No     Take 400 Units by mouth Daily.     clopidogrel (PLAVIX) 75 MG tablet     Yes No     Take 75 mg by mouth Daily.     diphenhydrAMINE (BENADRYL) 25 mg capsule     Yes No     Take 25 mg by mouth Every 6 (Six) Hours As Needed.     estradiol (ESTRACE) 1 MG tablet     Yes No     Take 1 mg by mouth 3 (Three) Times a Day.     ferrous sulfate 324 (65 Fe) MG tablet delayed-release EC tablet   Self Yes No     Take 324 mg by mouth Daily With Breakfast.     furosemide (LASIX) 20 MG tablet     No No     Take 1 tablet by mouth Daily.     Patient taking differently:  Take  by mouth Daily. Once daily     gabapentin (NEURONTIN) 400 MG capsule     Yes No     Take 400 mg by mouth 3 (Three) Times a Day.     HYDROcodone-acetaminophen (NORCO)  MG per tablet     Yes No     Take 1 tablet by mouth Every 6 (Six) Hours As Needed.     isosorbide mononitrate (IMDUR) 30 MG 24 hr tablet     Yes No     Take 30 mg by mouth Daily.     levothyroxine (SYNTHROID, LEVOTHROID) 88 MCG tablet     Yes No     Take 88 mcg by mouth Daily.     lisinopril (PRINIVIL,ZESTRIL) 2.5 MG tablet     No No     Take 1 tablet by mouth Daily.     LORazepam (ATIVAN) 1 MG tablet     Yes No     Take 1 mg by mouth 4 (Four) Times a Day As Needed.     methocarbamol (ROBAXIN) 500 MG tablet   Self Yes No     Take 500 mg by mouth 3 (Three) Times a Day As Needed for Muscle Spasms.     metoprolol succinate XL (TOPROL-XL) 25 MG 24 hr tablet     No No     Take 0.5 tablets by mouth Daily.      nitroglycerin (Nitrostat) 0.4 MG SL tablet     No No     Place 1 tablet under the tongue Every 5 (Five) Minutes As Needed for Chest Pain.     pantoprazole (PROTONIX) 40 MG EC tablet     No No     Take 1 tablet by mouth Daily.     Potassium 99 MG tablet     Yes No     Take  by mouth.     ranolazine (RANEXA) 1000 MG 12 hr tablet     No No     Take 1 tablet by mouth Every 12 (Twelve) Hours.     rosuvastatin (CRESTOR) 10 MG tablet     Yes No     Take 10 mg by mouth Daily.     sertraline (ZOLOFT) 100 MG tablet     Yes No     Take 100 mg by mouth Daily.     vitamin C (ASCORBIC ACID) 250 MG tablet     Yes No     Take 250 mg by mouth Daily.                Allergies:       Allergies   Allergen Reactions   • Naprosyn  [Naproxen] Rash   • Bactrim [Sulfamethoxazole-Trimethoprim] Rash               Objective          Vitals:   Temp:  [98.1 °F (36.7 °C)-100.7 °F (38.2 °C)] 98.5 °F (36.9 °C)  Heart Rate:  [] 92  Resp:  [16-25] 16  BP: (113-147)/(58-98) 130/70  Flow (L/min):  [1-3] 1     Physical Exam  Constitutional:       General: She is not in acute distress.     Appearance: She is not toxic-appearing.   HENT:      Head: Normocephalic and atraumatic.      Nose: Nose normal. No congestion.      Mouth/Throat:      Pharynx: Oropharynx is clear. No oropharyngeal exudate.   Eyes:      General: No scleral icterus.  Cardiovascular:      Rate and Rhythm: Normal rate and regular rhythm.      Heart sounds: No murmur heard.    No friction rub. No gallop.   Pulmonary:      Effort: No respiratory distress.      Breath sounds: Rales present. No wheezing.       Abdominal:      General: There is no distension.      Tenderness: There is no abdominal tenderness. There is no guarding.   Musculoskeletal:         General: No swelling or deformity.      Cervical back: Normal range of motion. No rigidity.      Right lower leg: No edema.      Left lower leg: No edema.   Skin:     Coloration: Skin is not jaundiced.      Findings: No bruising or  lesion.   Neurological:      General: No focal deficit present.      Mental Status: She is alert and oriented to person, place, and time.      Motor: No weakness.   Psychiatric:         Mood and Affect: Mood normal.         Behavior: Behavior normal.         Thought Content: Thought content normal.         Judgment: Judgment normal.                  Result Review       Result Review:  I have personally reviewed the results from the time of this admission to 3/6/2022 13:01 EST and agree with these findings:  [x]?  Laboratory  []?  Microbiology  [x]?  Radiology  []?  EKG/Telemetry   []?  Cardiology/Vascular   []?  Pathology  []?  Old records  []?  Other:     Lab Results   Component Value Date    WBC 14.50 (H) 03/09/2022    HGB 8.5 (L) 03/09/2022    HCT 25.5 (L) 03/09/2022    MCV 97.3 (H) 03/09/2022     03/09/2022     Lab Results   Component Value Date    GLUCOSE 141 (H) 03/09/2022    CALCIUM 8.8 03/09/2022     03/09/2022    K 2.9 (L) 03/09/2022    CO2 28.0 03/09/2022    CL 95 (L) 03/09/2022    BUN 20 03/09/2022    CREATININE 1.33 (H) 03/09/2022    EGFRIFAFRI >60 01/05/2018    EGFRIFNONA 27 (L) 02/18/2022    BCR 15.0 03/09/2022    ANIONGAP 13.0 03/09/2022      CT Chest Without Contrast Diagnostic    Result Date: 3/6/2022  1. Multifocal groundglass opacities throughout the lungs most pronounced in the lower lobes which may relate to atypical infection or COVID-19 pneumonia. 2. Focal nodular airspace disease in right upper lobe measuring 18 x 14 mm. There was previously a cluster of nodules in this region and this could relate to chronic inflammation secondary to prior infection or inflammation, however recommend short-term follow-up in 2-3 months or PET/CT to evaluate this nodule. 3. Additional chronic findings above.  Electronically Signed By-Nahid Cooley MD On:3/6/2022 6:29 PM This report was finalized on [id number] by  Nahid Cooley MD.    XR Chest 1 View    Result Date: 3/6/2022  Patchy multifocal  airspace disease involving the lung bases concerning for pneumonia.  Electronically Signed By-Nahid Cooley MD On:3/6/2022 12:03 PM This report was finalized on [id number] by  Nahid Cooley MD.             Assessment/Plan              Active Hospital Problems:  There are no active hospital problems to display for this patient.     Assessment/plan;     #Acute on chronic hypoxia with hypercapnia; wears baseline 3 L and 4 L  #Multi-focal pneumonia/focal nodular airspace disease in the right upper lobe measuring 18 x 14  mm;    - ABG 7.379/51.6/74.8/30.4    -CXR shows multi-focal pneumonai    - CT chest  as  Above.    - Unasyn 3g IV q6h patient had a urticarial reaction for Unasyn requiring 1 dose of Decadron and Benadryl, switched to Rocephin on 3/8/2022, end of treatment 3/13/2022 IV Flagyl finishing on 3/9/2022    - RVP negative, procalcitonin normal    - blood cultures NTD    - respiratory culture    - legionella and strep, both negative.    - white count 13.1 --> 14.50  -,  consult pulmonary appreciated.  # Diarrhea; likely antibiotic associated, checked stool for C. Difficile, came back negative, start on Imodium 2 mg p.o. every 6 as needed  #Paroxysmal atrial tachycardia; rate controlled on metoprolol XL 25 mg daily  #Hypoalbuminemia; check prealbumin  # Acute kidney injury; baseline creatinine seems to be around 0.8 mg/dL, but has fluctuated serum creatinine 1.27 likely prerenal, nephrology consult appreciated, restarting low-dose oral Lasix diuretic.  #COPD    -wears 3L chronically, currently on 4L NC    - suspect pneumonia    - Duonebs QID     #Acute on chronic diastolic and systolic HF    -Fluid restriction 2 L/day    - proBNP 98323 recheck, consult cardiology appreciated.    - CT chest as above, resumed low-dose Lasix on 3/8/2022.      #CAD s/p CABG with attrition 3/4 grafts/ischemic cardiomyopathy most recent ejection 45% April 2021      - resume home aspirin, statin, Toprol, imdur and Ranexa .     #AAA     - CT a/p shows infra-renal abdominal aorta at 3.0x3.7cm on 11/4/2020    - will need to be re-evaluated as otpt     #HTN    - hold lisinopril     - resume home toprol      #HLD    - resume home statin     #GERD    - PPI     #Hypothyroid    - resume home synthroid      #Macrocytic hyperchromic anemia    -hgb 8.5 on admission, base ~ 10.0    - checked iron panel and ferritin, not suggestive of iron deficiency anemia, and vitamin B12 normal.  - folate deficiency replace.     #Anxiety     - resume home Zoloft once verified     #DVT prophylaxis    -heparin        DVT prophylaxis:  No DVT prophylaxis order currently exists.     CODE STATUS:    Admission Status:  I believe this patient meets inpatient status.     I discussed the patient's findings and my recommendations with patient.   Disposition; PT OT recommended inpatient rehab due to decrease in activity tolerance with moderate dyspnea with exertion however patient is not agreeable but willing to go home with home health and PT  This patient has been examined wearing appropriate Personal Protective Equipment and discussed with Patient      Electronically signed by Sky Reeder MD, 03/09/22, 1:01 PM EST.

## 2022-03-09 NOTE — PROGRESS NOTES
Cardiology Progress Note      Admiting Physician:  Sky Reeder MD   LOS: 3 days       Reason For Followup:  Coronary artery disease  Hypertension  Combined congestive heart failure    Subjective:    Interval History:  Seen and examined.  Chart labs reviewed.  Patient asleep upon entry with no labored respirations.  Discussed with attending nurse, patient was having intermittent atrial tachycardia during the night.  Heart rate at one point up to 138.  She was given 10 mg IV Cardizem with improvement in rate.  Telemetry reviewed and appears to be paroxysmal atrial tachycardia.  No evidence of atrial fibrillation.  Creatinine increased overnight 1.33 (1.09).  Low-grade temp overnight 100.7.  Heart rate primarily in the 80s otherwise.        Review of Systems:  A complete review of systems was undertaken with the pertinent cardiovascular findings listed in history of present illness and all other systems being negative.     Assessment & Plan    Impressions:  Congestive heart failure acute on chronic combined  Paroxysmal atrial tachycardia  Hypertension  Coronary artery disease    Recommendations:  Fluid restriction  We will increase beta-blocker for PAT.    Continue to monitor rhythm closely.  Follow electrolytes and renal function  Antimicrobials as per admitting service        Objective:    Medication Review:   Scheduled Meds:vitamin C, 250 mg, Oral, Daily  aspirin, 81 mg, Oral, Daily  atorvastatin, 80 mg, Oral, Nightly  cefTRIAXone, 1 g, Intravenous, Q24H  cholecalciferol, 500 Units, Oral, Daily  clopidogrel, 75 mg, Oral, Daily  estradiol, 3 mg, Oral, Nightly  ferrous sulfate, 324 mg, Oral, Daily With Breakfast  folic acid-pyridoxine-cyanocobalamin, 1 tablet, Oral, Daily  furosemide, 20 mg, Oral, Daily  gabapentin, 400 mg, Oral, Q8H  guaiFENesin, 600 mg, Oral, Q12H  heparin (porcine), 5,000 Units, Subcutaneous, Q8H  ipratropium-albuterol, 3 mL, Nebulization, 4x Daily - RT  isosorbide mononitrate, 30 mg, Oral,  Q24H  levothyroxine, 88 mcg, Oral, Q AM  metoprolol succinate XL, 12.5 mg, Oral, Daily  metroNIDAZOLE, 500 mg, Intravenous, Q8H  pantoprazole, 40 mg, Oral, Q AM  ranolazine, 1,000 mg, Oral, Q12H  sertraline, 100 mg, Oral, Daily  sodium chloride, 10 mL, Intravenous, Q12H      Continuous Infusions:   PRN Meds:.diphenhydrAMINE    docusate sodium    HYDROcodone-acetaminophen    LORazepam    methocarbamol    nitroglycerin    ondansetron **OR** ondansetron    phenylephrine-mineral oil-petrolatum    sodium chloride    sodium chloride    Patient Active Problem List   Diagnosis    Abdominal aortic aneurysm (AAA) (HCC)    Chronic obstructive pulmonary disease (HCC)    Congestive heart failure (HCC)    Coronary artery disease    Dyslipidemia    Hypertension    Community acquired pneumonia    Anemia of chronic disease    Diarrhea    Mixed hyperlipidemia    Chronic stable angina (HCC)    Bilateral carotid artery stenosis    Chest pain    Family history of diabetes mellitus    Family history of malignant neoplasm of digestive organ    Gastroesophageal reflux disease    Iron deficiency anemia    Pain in left shoulder    Uses LifeVest defibrillator    Cardiomyopathy, dilated (HCC)    Ischemic cardiomyopathy    Pneumonia of both lungs due to infectious organism, unspecified part of lung         Physical Exam:    General: Alert, cooperative, no distress, appears stated age  Head:  Normocephalic, atraumatic, mucous membranes moist  Eyes:  Conjunctivae/corneas clear, EOM's intact     Neck:  Supple,  no bruit  Lungs: Scattered crackles, increased air movement  Chest wall: No tenderness  Heart::  Regular rate and irregular rhythm, S1 and S2 normal, 1/6 holosystolic murmur.  No rub or gallop  Abdomen: Soft, non-tender, nondistended bowel sounds active  Extremities: No cyanosis, clubbing, or edema  Pulses: 2+ and symmetric all extremities  Skin:  No rashes or lesions  Neuro/psych: A&O x3. CN II through XII are grossly intact with  appropriate affect    Vital Signs:  Vitals:    03/09/22 0731 03/09/22 1031 03/09/22 1059 03/09/22 1103   BP:  130/70     BP Location:  Left arm     Patient Position:  Lying     Pulse:  94 90 92   Resp:  18 18 16   Temp:  98.5 °F (36.9 °C)     TempSrc:  Oral     SpO2: 100% 96% 99% 99%   Weight:       Height:         Wt Readings from Last 1 Encounters:   03/09/22 57.5 kg (126 lb 12.2 oz)       Intake/Output Summary (Last 24 hours) at 3/9/2022 1229  Last data filed at 3/9/2022 0430  Gross per 24 hour   Intake 950 ml   Output --   Net 950 ml         Results Review:     CBC    Results from last 7 days   Lab Units 03/09/22  0009 03/08/22  0255 03/07/22  1458 03/06/22  2311 03/06/22  1129   WBC 10*3/mm3 14.50* 11.70* 14.30* 10.90* 13.10*   HEMOGLOBIN g/dL 8.5* 8.6* 8.7* 8.5* 8.5*   PLATELETS 10*3/mm3 251 243 220 196 207     Cr Clearance Estimated Creatinine Clearance: 32.7 mL/min (A) (by C-G formula based on SCr of 1.33 mg/dL (H)).  Coag     HbA1C   Lab Results   Component Value Date    HGBA1C 5.4 01/07/2021     Blood Glucose No results found for: POCGLU  Infection   Results from last 7 days   Lab Units 03/06/22  1239 03/06/22  1129   BLOODCX  No growth at 2 days  No growth at 2 days  --    PROCALCITONIN ng/mL  --  0.04     CMP   Results from last 7 days   Lab Units 03/09/22  0009 03/08/22  0255 03/06/22  2311 03/06/22  1129   SODIUM mmol/L 136 144 138 140   POTASSIUM mmol/L 2.9* 4.1 3.9 4.4   CHLORIDE mmol/L 95* 103 99 103   CO2 mmol/L 28.0 31.0* 25.0 27.0   BUN mg/dL 20 25* 26* 24*   CREATININE mg/dL 1.33* 1.09* 1.27* 1.02*   GLUCOSE mg/dL 141* 108* 161* 124*   ALBUMIN g/dL 3.10* 3.10*  --  3.30*   BILIRUBIN mg/dL  --   --   --  0.2   ALK PHOS U/L  --   --   --  56   AST (SGOT) U/L  --   --   --  5   ALT (SGPT) U/L  --   --   --  <5     ABG    Results from last 7 days   Lab Units 03/06/22  1147   PH, ARTERIAL pH units 7.379   PCO2, ARTERIAL mm Hg 51.6*   PO2 ART mm Hg 74.8*   O2 SATURATION ART % 94.2   BASE EXCESS ART  mmol/L 4.6*     UA      ALFONZO  No results found for: POCMETH, POCAMPHET, POCBARBITUR, POCBENZO, POCCOCAINE, POCOPIATES, POCOXYCODO, POCPHENCYC, POCPROPOXY, POCTHC, POCTRICYC  Lysis Labs   Results from last 7 days   Lab Units 03/09/22  0009 03/08/22  0255 03/07/22  1458 03/06/22  2311 03/06/22  1129   HEMOGLOBIN g/dL 8.5* 8.6* 8.7* 8.5* 8.5*   PLATELETS 10*3/mm3 251 243 220 196 207   CREATININE mg/dL 1.33* 1.09*  --  1.27* 1.02*     Radiology(recent) No radiology results for the last day      Results from last 7 days   Lab Units 03/06/22  1129   TROPONIN T ng/mL <0.010       Imaging Results (Last 24 Hours)       ** No results found for the last 24 hours. **            Cardiac Studies:  Echo- Results for orders placed during the hospital encounter of 04/16/21    Adult Transthoracic Echo Complete W/ Cont if Necessary Per Protocol    Interpretation Summary  · Estimated left ventricular EF was in agreement with the calculated left ventricular EF. Left ventricular ejection fraction appears to be 46 - 50%. Left ventricular systolic function is mildly decreased.  · Left ventricular wall thickness is consistent with mild to moderate septal asymmetric hypertrophy.  · Estimated right ventricular systolic pressure from tricuspid regurgitation is normal (<35 mmHg).  · Left ventricular diastolic function is consistent with (grade I) impaired relaxation.    Stress Myoview-  Cath-        RADHA Rudd  03/09/22  12:29 EST     Electronically signed by RADHA Rudd, 03/09/22, 12:29 PM EST.    Cardiology attending:  Seen and examined.  Chart and labs reviewed.  History and exam findings are verified with above changes noted.  Assessment and plan notated by APC after being formulated by attending consultant.  Note that greater than 50% of the time spent in care of the patient was provided by attending consultant.  Patient denies any chest pain pressure heaviness or tightness.  Reports her shortness of breath is  better.  Is reporting some loose stools.  Is in C. difficile rule out.  Diuresed well from Lasix.  Continue to monitor rate and rhythm.  Did have PAT last night.  Will increase beta-blocker.

## 2022-03-09 NOTE — PROGRESS NOTES
"[facility] PROGRESS NOTE     LOS: 3 days    Patient Care Team:  Deonte Hector MD as PCP - General      Subjective     Patient feeling very tired.  Still has exertional dyspnea.  Denies any chest pain, nausea or vomiting    Objective     Vital Sign Min/Max for last 24 hours  Temp:  [98.1 °F (36.7 °C)-100.7 °F (38.2 °C)] 98.6 °F (37 °C)  Heart Rate:  [] 90  Resp:  [14-25] 23  BP: (113-147)/(58-98) 131/65                       Flowsheet Rows    Flowsheet Row First Filed Value   Admission Height 165.1 cm (65\") Documented at 03/06/2022 1049   Admission Weight 59.9 kg (132 lb) Documented at 03/06/2022 1049          No intake/output data recorded.  I/O last 3 completed shifts:  In: 950 [P.O.:600; I.V.:350]  Out: -     Physical Exam:  Physical Exam    General Appearance: Chronically ill-appearing, NAD  Skin: warm and dry  HEENT: oral mucosa normal, nonicteric sclera  Neck: supple, no JVD  Lungs: Bilateral wheezing.  Bibasilar crackles  Heart: RRR, normal S1 and S2  Abdomen: soft, nontender, nondistended  : no palpable bladder  Extremities: no edema, cyanosis or clubbing  Neuro: normal speech and mental status       LABS:  Lab Results   Component Value Date    CALCIUM 8.8 03/09/2022    PHOS 3.2 03/09/2022     Results from last 7 days   Lab Units 03/09/22  0009 03/08/22  0255 03/07/22  1458 03/06/22  2311 03/06/22  1129   SODIUM mmol/L 136 144  --  138 140   POTASSIUM mmol/L 2.9* 4.1  --  3.9 4.4   CHLORIDE mmol/L 95* 103  --  99 103   CO2 mmol/L 28.0 31.0*  --  25.0 27.0   BUN mg/dL 20 25*  --  26* 24*   CREATININE mg/dL 1.33* 1.09*  --  1.27* 1.02*   GLUCOSE mg/dL 141* 108*  --  161* 124*   CALCIUM mg/dL 8.8 9.2  --  8.8 8.5*   WBC 10*3/mm3 14.50* 11.70* 14.30* 10.90* 13.10*   HEMOGLOBIN g/dL 8.5* 8.6* 8.7* 8.5* 8.5*   PLATELETS 10*3/mm3 251 243 220 196 207   ALT (SGPT) U/L  --   --   --   --  <5   AST (SGOT) U/L  --   --   --   --  5     Lab Results   Component Value Date    TROPONINI 8.820 (C) " 01/07/2021    TROPONINT <0.010 03/06/2022     Estimated Creatinine Clearance: 32.7 mL/min (A) (by C-G formula based on SCr of 1.33 mg/dL (H)).  Results from last 7 days   Lab Units 03/06/22  1147   PH, ARTERIAL pH units 7.379   PO2 ART mm Hg 74.8*   PCO2, ARTERIAL mm Hg 51.6*   HCO3 ART mmol/L 30.4*     Brief Urine Lab Results     None        WEIGHTS:     Wt Readings from Last 1 Encounters:   03/09/22 0430 57.5 kg (126 lb 12.2 oz)   03/06/22 1049 59.9 kg (132 lb)       vitamin C, 250 mg, Oral, Daily  aspirin, 81 mg, Oral, Daily  atorvastatin, 80 mg, Oral, Nightly  cefTRIAXone, 1 g, Intravenous, Q24H  cholecalciferol, 500 Units, Oral, Daily  clopidogrel, 75 mg, Oral, Daily  estradiol, 3 mg, Oral, Nightly  ferrous sulfate, 324 mg, Oral, Daily With Breakfast  folic acid-pyridoxine-cyanocobalamin, 1 tablet, Oral, Daily  furosemide, 20 mg, Oral, Daily  gabapentin, 400 mg, Oral, Q8H  guaiFENesin, 600 mg, Oral, Q12H  heparin (porcine), 5,000 Units, Subcutaneous, Q8H  ipratropium-albuterol, 3 mL, Nebulization, 4x Daily - RT  isosorbide mononitrate, 30 mg, Oral, Q24H  levothyroxine, 88 mcg, Oral, Q AM  metoprolol succinate XL, 12.5 mg, Oral, Daily  metroNIDAZOLE, 500 mg, Intravenous, Q8H  pantoprazole, 40 mg, Oral, Q AM  ranolazine, 1,000 mg, Oral, Q12H  sertraline, 100 mg, Oral, Daily  sodium chloride, 10 mL, Intravenous, Q12H           Assessment/Plan       1.Acute kidney injury.  Mild  Baseline creatinine seems to be around 0.8 MG/DL but has fluctuated in past due to diuretics.    Creatinine increasing again after starting Lasix.  Hypokalemia noticed  2.  Hypertension  Blood Pressure well controlled  3.    Acute on chronic respiratory failure.  Pneumonia with underlying COPD  4.  Congestive heart failure.  Systolic.  Chronic  5.  Hypokalemia        Recs:  Oral potassium replacement  Continue low-dose Lasix.  Will have to accept some degree of azotemia related to diuretics to maintain volume status      Irving Yi,  MD  03/09/22  10:03 EST

## 2022-03-09 NOTE — PROGRESS NOTES
Daily Progress Note        Pneumonia of both lungs due to infectious organism, unspecified part of lung    Abdominal aortic aneurysm (AAA) (HCC)    Chronic obstructive pulmonary disease (HCC)    Congestive heart failure (HCC)    Coronary artery disease    Dyslipidemia    Hypertension    Anemia of chronic disease    Mixed hyperlipidemia    Bilateral carotid artery stenosis      Assessment  Acute on chronic hypoxia with hypercapnia- wears 3-4 L at home  Multifocal pneumonia  Allergic reaction to Unasyn in the form of urticaria  Right upper lobe nodule  NOE  COPD  Diastolic and systolic CHF-  BNP> 15,000  CAD s/p CABG April 2021  AAA  HTN  HLD  GERD  Hypothyroidism  Macrocytic hyperchromic anemia  Anxiety     CT with high resolution on 12/9/2019-mentions small cluster of 5 mm nodules and mild interstitial lung disease     Legionella and strep pneumonia antigens negative  Respiratory panel negative     ABG 3/6/2022 on nasal cannula-pH 7.37, CO2 51.6, PO2 74.8, bicarb 30.4    Plan  Adding Mucinex    Continue to titrate oxygen- Currently on 2L NC, continues to improve  Rocephin for PNA-finishes 3/13/2022  Flagyl for PNA-finishes today  Inhaled corticosteroids  Bronchodilators  Electrolyte/Glycemic control  DVT/GI Prophylaxis-Heparin SQ and Protonix  Levothyroxine 88mcg     3/6/2022       LOS: 3 days     Subjective   Feeling better, but still having difficulty coughing up secretions      Objective     Vital signs for last 24 hours:  Vitals:    03/09/22 0430 03/09/22 0720 03/09/22 0727 03/09/22 0731   BP: 131/65      BP Location: Left arm      Patient Position: Lying      Pulse: 88 85 90    Resp: 23 22 23    Temp: 98.6 °F (37 °C)      TempSrc: Oral      SpO2: 93% 98% 100% 100%   Weight: 57.5 kg (126 lb 12.2 oz)      Height:           Intake/Output last 3 shifts:  I/O last 3 completed shifts:  In: 950 [P.O.:600; I.V.:350]  Out: -   Intake/Output this shift:  No intake/output data recorded.      Radiology  Imaging Results  (Last 24 Hours)     ** No results found for the last 24 hours. **          Labs:  Results from last 7 days   Lab Units 03/09/22  0009   WBC 10*3/mm3 14.50*   HEMOGLOBIN g/dL 8.5*   HEMATOCRIT % 25.5*   PLATELETS 10*3/mm3 251     Results from last 7 days   Lab Units 03/09/22  0009 03/06/22  2311 03/06/22  1129   SODIUM mmol/L 136   < > 140   POTASSIUM mmol/L 2.9*   < > 4.4   CHLORIDE mmol/L 95*   < > 103   CO2 mmol/L 28.0   < > 27.0   BUN mg/dL 20   < > 24*   CREATININE mg/dL 1.33*   < > 1.02*   CALCIUM mg/dL 8.8   < > 8.5*   BILIRUBIN mg/dL  --   --  0.2   ALK PHOS U/L  --   --  56   ALT (SGPT) U/L  --   --  <5   AST (SGOT) U/L  --   --  5   GLUCOSE mg/dL 141*   < > 124*    < > = values in this interval not displayed.     Results from last 7 days   Lab Units 03/06/22  1147   PH, ARTERIAL pH units 7.379   PO2 ART mm Hg 74.8*   PCO2, ARTERIAL mm Hg 51.6*   HCO3 ART mmol/L 30.4*     Results from last 7 days   Lab Units 03/09/22  0009 03/08/22  0255 03/06/22  1129   ALBUMIN g/dL 3.10* 3.10* 3.30*     Results from last 7 days   Lab Units 03/06/22  1129   TROPONIN T ng/mL <0.010                           Meds:   SCHEDULE  vitamin C, 250 mg, Oral, Daily  aspirin, 81 mg, Oral, Daily  atorvastatin, 80 mg, Oral, Nightly  cefTRIAXone, 1 g, Intravenous, Q24H  cholecalciferol, 500 Units, Oral, Daily  clopidogrel, 75 mg, Oral, Daily  estradiol, 3 mg, Oral, Nightly  ferrous sulfate, 324 mg, Oral, Daily With Breakfast  folic acid-pyridoxine-cyanocobalamin, 1 tablet, Oral, Daily  furosemide, 20 mg, Oral, Daily  gabapentin, 400 mg, Oral, Q8H  heparin (porcine), 5,000 Units, Subcutaneous, Q8H  ipratropium-albuterol, 3 mL, Nebulization, 4x Daily - RT  isosorbide mononitrate, 30 mg, Oral, Q24H  levothyroxine, 88 mcg, Oral, Q AM  metoprolol succinate XL, 12.5 mg, Oral, Daily  metroNIDAZOLE, 500 mg, Intravenous, Q8H  pantoprazole, 40 mg, Oral, Q AM  ranolazine, 1,000 mg, Oral, Q12H  sertraline, 100 mg, Oral, Daily  sodium chloride, 10 mL,  Intravenous, Q12H      Infusions     PRNs  diphenhydrAMINE  •  docusate sodium  •  HYDROcodone-acetaminophen  •  LORazepam  •  methocarbamol  •  nitroglycerin  •  ondansetron **OR** ondansetron  •  phenylephrine-mineral oil-petrolatum  •  sodium chloride  •  sodium chloride    Physical Exam:  Physical Exam  General Appearance:  Alert.  No distress noted.  HEENT:  Normocephalic, without obvious abnormality, Conjunctiva/corneas clear,.  Normal external ear canals, Nares normal, no drainage     Neck:  Supple, symmetrical, trachea midline. No JVD.  Lungs /Chest wall:   Bilateral basal rhonchi, respirations unlabored symmetrical wall movement.     Heart:  Regular rate and rhythm, systolic murmur PMI left sternal border  Abdomen: Soft, non-tender, no masses, no organomegaly.    Extremities: No edema, no clubbing or cyanosis    ROS  Review of Systems  Constitutional: Negative for chills, fever and malaise/fatigue.   HENT: Negative.    Eyes: Negative.    Cardiovascular: Negative.    Respiratory: Positive for cough and shortness of breath.    Skin: Negative.    Musculoskeletal: Negative.    Gastrointestinal: Negative.    Genitourinary: Negative.    Neurological: Negative.    Psychiatric/Behavioral: Negative.        I have reviewed current clinicals.     Electronically signed by RADHA Rodriguez, 03/09/22, 8:52 AM EST.     The NP scribed the note for me, I have examined the patient and reviewed and verified the above findings and and I formulated the assessment and plan as documented

## 2022-03-09 NOTE — PLAN OF CARE
Goal Outcome Evaluation:      Patient experiencing increased ectopy and possible rhythm change at beginning of shift. EKG obtained and cardiology called. Highest heart rate seen was 137 while resting in bed. 1-time dose of 10mg cardizem given. Patient c/o of SOA at the time. She is still experiencing some ectopy which has decreased in frequency and heart rate appears more controlled, averaging 80s-90s.

## 2022-03-10 LAB
ALBUMIN SERPL-MCNC: 2.6 G/DL (ref 3.5–5.2)
ANION GAP SERPL CALCULATED.3IONS-SCNC: 13 MMOL/L (ref 5–15)
BASOPHILS # BLD AUTO: 0.1 10*3/MM3 (ref 0–0.2)
BASOPHILS NFR BLD AUTO: 0.5 % (ref 0–1.5)
BUN SERPL-MCNC: 20 MG/DL (ref 8–23)
BUN/CREAT SERPL: 16.9 (ref 7–25)
C DIFF GDH STL QL: NEGATIVE
CALCIUM SPEC-SCNC: 8.6 MG/DL (ref 8.6–10.5)
CHLORIDE SERPL-SCNC: 101 MMOL/L (ref 98–107)
CO2 SERPL-SCNC: 25 MMOL/L (ref 22–29)
CREAT SERPL-MCNC: 1.18 MG/DL (ref 0.57–1)
DEPRECATED RDW RBC AUTO: 44.2 FL (ref 37–54)
EGFRCR SERPLBLD CKD-EPI 2021: 48 ML/MIN/1.73
EOSINOPHIL # BLD AUTO: 0.5 10*3/MM3 (ref 0–0.4)
EOSINOPHIL NFR BLD AUTO: 3.3 % (ref 0.3–6.2)
ERYTHROCYTE [DISTWIDTH] IN BLOOD BY AUTOMATED COUNT: 12.9 % (ref 12.3–15.4)
GLUCOSE SERPL-MCNC: 108 MG/DL (ref 65–99)
HCT VFR BLD AUTO: 24.8 % (ref 34–46.6)
HGB BLD-MCNC: 8.5 G/DL (ref 12–15.9)
LYMPHOCYTES # BLD AUTO: 1.9 10*3/MM3 (ref 0.7–3.1)
LYMPHOCYTES NFR BLD AUTO: 13.5 % (ref 19.6–45.3)
MAGNESIUM SERPL-MCNC: 1.4 MG/DL (ref 1.6–2.4)
MCH RBC QN AUTO: 33.4 PG (ref 26.6–33)
MCHC RBC AUTO-ENTMCNC: 34.3 G/DL (ref 31.5–35.7)
MCV RBC AUTO: 97.4 FL (ref 79–97)
MONOCYTES # BLD AUTO: 0.9 10*3/MM3 (ref 0.1–0.9)
MONOCYTES NFR BLD AUTO: 6.3 % (ref 5–12)
NEUTROPHILS NFR BLD AUTO: 10.8 10*3/MM3 (ref 1.7–7)
NEUTROPHILS NFR BLD AUTO: 76.4 % (ref 42.7–76)
NRBC BLD AUTO-RTO: 0.1 /100 WBC (ref 0–0.2)
NT-PROBNP SERPL-MCNC: 2775 PG/ML (ref 0–1800)
PHOSPHATE SERPL-MCNC: 3.5 MG/DL (ref 2.5–4.5)
PLATELET # BLD AUTO: 225 10*3/MM3 (ref 140–450)
PMV BLD AUTO: 8.2 FL (ref 6–12)
POTASSIUM SERPL-SCNC: 3.5 MMOL/L (ref 3.5–5.2)
PREALB SERPL-MCNC: 11.2 MG/DL (ref 20–40)
RBC # BLD AUTO: 2.55 10*6/MM3 (ref 3.77–5.28)
SODIUM SERPL-SCNC: 139 MMOL/L (ref 136–145)
WBC NRBC COR # BLD: 14.1 10*3/MM3 (ref 3.4–10.8)

## 2022-03-10 PROCEDURE — 25010000002 HEPARIN (PORCINE) PER 1000 UNITS: Performed by: NURSE PRACTITIONER

## 2022-03-10 PROCEDURE — 83880 ASSAY OF NATRIURETIC PEPTIDE: CPT | Performed by: HOSPITALIST

## 2022-03-10 PROCEDURE — 25010000002 MAGNESIUM SULFATE 2 GM/50ML SOLUTION: Performed by: NURSE PRACTITIONER

## 2022-03-10 PROCEDURE — 94761 N-INVAS EAR/PLS OXIMETRY MLT: CPT

## 2022-03-10 PROCEDURE — 80069 RENAL FUNCTION PANEL: CPT | Performed by: INTERNAL MEDICINE

## 2022-03-10 PROCEDURE — 83735 ASSAY OF MAGNESIUM: CPT | Performed by: INTERNAL MEDICINE

## 2022-03-10 PROCEDURE — 85025 COMPLETE CBC W/AUTO DIFF WBC: CPT | Performed by: HOSPITALIST

## 2022-03-10 PROCEDURE — 63710000001 DIPHENHYDRAMINE PER 50 MG: Performed by: NURSE PRACTITIONER

## 2022-03-10 PROCEDURE — 25010000002 MAGNESIUM SULFATE 2 GM/50ML SOLUTION: Performed by: INTERNAL MEDICINE

## 2022-03-10 PROCEDURE — 99233 SBSQ HOSP IP/OBS HIGH 50: CPT | Performed by: HOSPITALIST

## 2022-03-10 PROCEDURE — 84134 ASSAY OF PREALBUMIN: CPT | Performed by: HOSPITALIST

## 2022-03-10 PROCEDURE — 99232 SBSQ HOSP IP/OBS MODERATE 35: CPT | Performed by: INTERNAL MEDICINE

## 2022-03-10 PROCEDURE — 25010000002 CEFTRIAXONE PER 250 MG: Performed by: NURSE PRACTITIONER

## 2022-03-10 PROCEDURE — 94640 AIRWAY INHALATION TREATMENT: CPT

## 2022-03-10 PROCEDURE — 94799 UNLISTED PULMONARY SVC/PX: CPT

## 2022-03-10 RX ORDER — ACETYLCYSTEINE 200 MG/ML
3 SOLUTION ORAL; RESPIRATORY (INHALATION)
Status: COMPLETED | OUTPATIENT
Start: 2022-03-10 | End: 2022-03-12

## 2022-03-10 RX ORDER — ACETYLCYSTEINE 200 MG/ML
3 SOLUTION ORAL; RESPIRATORY (INHALATION)
Status: DISCONTINUED | OUTPATIENT
Start: 2022-03-10 | End: 2022-03-10

## 2022-03-10 RX ORDER — LOPERAMIDE HYDROCHLORIDE 2 MG/1
2 CAPSULE ORAL 4 TIMES DAILY PRN
Status: DISCONTINUED | OUTPATIENT
Start: 2022-03-10 | End: 2022-03-18 | Stop reason: HOSPADM

## 2022-03-10 RX ORDER — MAGNESIUM SULFATE HEPTAHYDRATE 40 MG/ML
2 INJECTION, SOLUTION INTRAVENOUS AS NEEDED
Status: DISCONTINUED | OUTPATIENT
Start: 2022-03-10 | End: 2022-03-18 | Stop reason: HOSPADM

## 2022-03-10 RX ORDER — MAGNESIUM SULFATE HEPTAHYDRATE 40 MG/ML
4 INJECTION, SOLUTION INTRAVENOUS AS NEEDED
Status: DISCONTINUED | OUTPATIENT
Start: 2022-03-10 | End: 2022-03-18 | Stop reason: HOSPADM

## 2022-03-10 RX ORDER — MAGNESIUM SULFATE HEPTAHYDRATE 40 MG/ML
2 INJECTION, SOLUTION INTRAVENOUS ONCE
Status: COMPLETED | OUTPATIENT
Start: 2022-03-10 | End: 2022-03-10

## 2022-03-10 RX ADMIN — HYDROCODONE BITARTRATE AND ACETAMINOPHEN 1 TABLET: 10; 325 TABLET ORAL at 07:56

## 2022-03-10 RX ADMIN — SERTRALINE 100 MG: 100 TABLET, FILM COATED ORAL at 08:50

## 2022-03-10 RX ADMIN — PANTOPRAZOLE SODIUM 40 MG: 40 TABLET, DELAYED RELEASE ORAL at 05:05

## 2022-03-10 RX ADMIN — DIPHENHYDRAMINE HYDROCHLORIDE 50 MG: 25 CAPSULE ORAL at 21:48

## 2022-03-10 RX ADMIN — ACETYLCYSTEINE 3 ML: 200 SOLUTION ORAL; RESPIRATORY (INHALATION) at 11:08

## 2022-03-10 RX ADMIN — METOPROLOL SUCCINATE 25 MG: 25 TABLET, EXTENDED RELEASE ORAL at 08:50

## 2022-03-10 RX ADMIN — METRONIDAZOLE 500 MG: 500 INJECTION, SOLUTION INTRAVENOUS at 21:21

## 2022-03-10 RX ADMIN — HEPARIN SODIUM 5000 UNITS: 5000 INJECTION INTRAVENOUS; SUBCUTANEOUS at 05:05

## 2022-03-10 RX ADMIN — GABAPENTIN 400 MG: 400 CAPSULE ORAL at 21:21

## 2022-03-10 RX ADMIN — ACETYLCYSTEINE 3 ML: 200 SOLUTION ORAL; RESPIRATORY (INHALATION) at 20:50

## 2022-03-10 RX ADMIN — CEFTRIAXONE 1 G: 1 INJECTION, POWDER, FOR SOLUTION INTRAMUSCULAR; INTRAVENOUS at 22:33

## 2022-03-10 RX ADMIN — CLOPIDOGREL BISULFATE 75 MG: 75 TABLET, FILM COATED ORAL at 08:50

## 2022-03-10 RX ADMIN — Medication 500 UNITS: at 08:49

## 2022-03-10 RX ADMIN — MAGNESIUM SULFATE HEPTAHYDRATE 2 G: 2 INJECTION, SOLUTION INTRAVENOUS at 18:15

## 2022-03-10 RX ADMIN — IPRATROPIUM BROMIDE AND ALBUTEROL SULFATE 3 ML: .5; 3 SOLUTION RESPIRATORY (INHALATION) at 11:08

## 2022-03-10 RX ADMIN — METRONIDAZOLE 500 MG: 500 INJECTION, SOLUTION INTRAVENOUS at 14:21

## 2022-03-10 RX ADMIN — GUAIFENESIN 600 MG: 600 TABLET, EXTENDED RELEASE ORAL at 08:52

## 2022-03-10 RX ADMIN — GABAPENTIN 400 MG: 400 CAPSULE ORAL at 05:05

## 2022-03-10 RX ADMIN — MAGNESIUM SULFATE HEPTAHYDRATE 2 G: 2 INJECTION, SOLUTION INTRAVENOUS at 13:04

## 2022-03-10 RX ADMIN — LORAZEPAM 1 MG: 1 TABLET ORAL at 21:25

## 2022-03-10 RX ADMIN — HEPARIN SODIUM 5000 UNITS: 5000 INJECTION INTRAVENOUS; SUBCUTANEOUS at 14:21

## 2022-03-10 RX ADMIN — ATORVASTATIN CALCIUM 80 MG: 40 TABLET, FILM COATED ORAL at 21:20

## 2022-03-10 RX ADMIN — LORAZEPAM 1 MG: 1 TABLET ORAL at 07:56

## 2022-03-10 RX ADMIN — Medication 10 ML: at 08:48

## 2022-03-10 RX ADMIN — GUAIFENESIN 600 MG: 600 TABLET, EXTENDED RELEASE ORAL at 21:20

## 2022-03-10 RX ADMIN — RANOLAZINE 1000 MG: 500 TABLET, FILM COATED, EXTENDED RELEASE ORAL at 21:21

## 2022-03-10 RX ADMIN — ESTRADIOL 3 MG: 1 TABLET ORAL at 21:21

## 2022-03-10 RX ADMIN — ISOSORBIDE MONONITRATE 30 MG: 30 TABLET, EXTENDED RELEASE ORAL at 13:05

## 2022-03-10 RX ADMIN — ASPIRIN 81 MG: 81 TABLET, COATED ORAL at 08:52

## 2022-03-10 RX ADMIN — GABAPENTIN 400 MG: 400 CAPSULE ORAL at 14:21

## 2022-03-10 RX ADMIN — OXYCODONE HYDROCHLORIDE AND ACETAMINOPHEN 250 MG: 500 TABLET ORAL at 08:50

## 2022-03-10 RX ADMIN — IPRATROPIUM BROMIDE AND ALBUTEROL SULFATE 3 ML: .5; 3 SOLUTION RESPIRATORY (INHALATION) at 15:15

## 2022-03-10 RX ADMIN — HEPARIN SODIUM 5000 UNITS: 5000 INJECTION INTRAVENOUS; SUBCUTANEOUS at 21:21

## 2022-03-10 RX ADMIN — IPRATROPIUM BROMIDE AND ALBUTEROL SULFATE 3 ML: .5; 3 SOLUTION RESPIRATORY (INHALATION) at 20:40

## 2022-03-10 RX ADMIN — FUROSEMIDE 20 MG: 20 TABLET ORAL at 08:52

## 2022-03-10 RX ADMIN — METRONIDAZOLE 500 MG: 500 INJECTION, SOLUTION INTRAVENOUS at 05:05

## 2022-03-10 RX ADMIN — METHOCARBAMOL 500 MG: 500 TABLET ORAL at 14:23

## 2022-03-10 RX ADMIN — LOPERAMIDE HYDROCHLORIDE 2 MG: 2 CAPSULE ORAL at 11:41

## 2022-03-10 RX ADMIN — FERROUS SULFATE TAB EC 324 MG (65 MG FE EQUIVALENT) 324 MG: 324 (65 FE) TABLET DELAYED RESPONSE at 08:51

## 2022-03-10 RX ADMIN — Medication 10 ML: at 21:20

## 2022-03-10 RX ADMIN — LEVOTHYROXINE SODIUM 88 MCG: 0.09 TABLET ORAL at 05:05

## 2022-03-10 RX ADMIN — RANOLAZINE 1000 MG: 500 TABLET, FILM COATED, EXTENDED RELEASE ORAL at 10:08

## 2022-03-10 RX ADMIN — IPRATROPIUM BROMIDE AND ALBUTEROL SULFATE 3 ML: .5; 3 SOLUTION RESPIRATORY (INHALATION) at 07:22

## 2022-03-10 RX ADMIN — FOLIC ACID-PYRIDOXINE-CYANOCOBALAMIN TAB 2.5-25-2 MG 1 TABLET: 2.5-25-2 TAB at 08:50

## 2022-03-10 RX ADMIN — MAGNESIUM SULFATE HEPTAHYDRATE 2 G: 2 INJECTION, SOLUTION INTRAVENOUS at 10:09

## 2022-03-10 NOTE — PROGRESS NOTES
"[facility] PROGRESS NOTE     LOS: 4 days    Patient Care Team:  Deonte Hector MD as PCP - General      Subjective     Patient feeling tired.  Still has exertional dyspnea But improving slowly.  Denies any chest pain, nausea or vomiting    Objective     Vital Sign Min/Max for last 24 hours  Temp:  [98.4 °F (36.9 °C)-99.4 °F (37.4 °C)] 98.9 °F (37.2 °C)  Heart Rate:  [] 77  Resp:  [16-25] 18  BP: ()/(62-74) 105/62                       Flowsheet Rows    Flowsheet Row First Filed Value   Admission Height 165.1 cm (65\") Documented at 03/06/2022 1049   Admission Weight 59.9 kg (132 lb) Documented at 03/06/2022 1049          No intake/output data recorded.  I/O last 3 completed shifts:  In: 660 [P.O.:360; IV Piggyback:300]  Out: -     Physical Exam:  Physical Exam    General Appearance: Chronically ill-appearing, NAD  Skin: warm and dry  HEENT: oral mucosa normal, nonicteric sclera  Neck: supple, no JVD  Lungs: Bilateral wheezing.  Bibasilar crackles  Heart: RRR, normal S1 and S2  Abdomen: soft, nontender, nondistended  : no palpable bladder  Extremities: no edema, cyanosis or clubbing  Neuro: normal speech and mental status       LABS:  Lab Results   Component Value Date    CALCIUM 8.6 03/10/2022    PHOS 3.5 03/10/2022     Results from last 7 days   Lab Units 03/10/22  0137 03/09/22  0009 03/08/22  0255 03/06/22  2311 03/06/22  1129   MAGNESIUM mg/dL 1.4*  --   --   --   --    SODIUM mmol/L 139 136 144   < > 140   POTASSIUM mmol/L 3.5 2.9* 4.1   < > 4.4   CHLORIDE mmol/L 101 95* 103   < > 103   CO2 mmol/L 25.0 28.0 31.0*   < > 27.0   BUN mg/dL 20 20 25*   < > 24*   CREATININE mg/dL 1.18* 1.33* 1.09*   < > 1.02*   GLUCOSE mg/dL 108* 141* 108*   < > 124*   CALCIUM mg/dL 8.6 8.8 9.2   < > 8.5*   WBC 10*3/mm3 14.10* 14.50* 11.70*   < > 13.10*   HEMOGLOBIN g/dL 8.5* 8.5* 8.6*   < > 8.5*   PLATELETS 10*3/mm3 225 251 243   < > 207   ALT (SGPT) U/L  --   --   --   --  <5   AST (SGOT) U/L  --   --   -- "   --  5    < > = values in this interval not displayed.     Lab Results   Component Value Date    TROPONINI 8.820 (C) 01/07/2021    TROPONINT <0.010 03/06/2022     Estimated Creatinine Clearance: 36.9 mL/min (A) (by C-G formula based on SCr of 1.18 mg/dL (H)).  Results from last 7 days   Lab Units 03/06/22  1147   PH, ARTERIAL pH units 7.379   PO2 ART mm Hg 74.8*   PCO2, ARTERIAL mm Hg 51.6*   HCO3 ART mmol/L 30.4*     Brief Urine Lab Results     None        WEIGHTS:     Wt Readings from Last 1 Encounters:   03/10/22 0556 57.6 kg (126 lb 14.4 oz)   03/09/22 0430 57.5 kg (126 lb 12.2 oz)   03/06/22 1049 59.9 kg (132 lb)       acetylcysteine, 3 mL, Nebulization, TID - RT  vitamin C, 250 mg, Oral, Daily  aspirin, 81 mg, Oral, Daily  atorvastatin, 80 mg, Oral, Nightly  cholecalciferol, 500 Units, Oral, Daily  clopidogrel, 75 mg, Oral, Daily  estradiol, 3 mg, Oral, Nightly  ferrous sulfate, 324 mg, Oral, Daily With Breakfast  folic acid-pyridoxine-cyanocobalamin, 1 tablet, Oral, Daily  furosemide, 20 mg, Oral, Daily  gabapentin, 400 mg, Oral, Q8H  guaiFENesin, 600 mg, Oral, Q12H  heparin (porcine), 5,000 Units, Subcutaneous, Q8H  ipratropium-albuterol, 3 mL, Nebulization, 4x Daily - RT  isosorbide mononitrate, 30 mg, Oral, Q24H  levothyroxine, 88 mcg, Oral, Q AM  metoprolol succinate XL, 25 mg, Oral, Daily  metroNIDAZOLE, 500 mg, Intravenous, Q8H  pantoprazole, 40 mg, Oral, Q AM  ranolazine, 1,000 mg, Oral, Q12H  sertraline, 100 mg, Oral, Daily  sodium chloride, 10 mL, Intravenous, Q12H           Assessment/Plan       1.Acute kidney injury.  Mild  Baseline creatinine seems to be around 0.8 MG/DL but has fluctuated in past due to diuretics.    Creatinine 1.18 mg/DL this morning.  Volume status generous but improving  2.  Hypertension  Blood Pressure well controlled  3.    Acute on chronic respiratory failure.  Pneumonia with underlying COPD  4.  Congestive heart failure.  Systolic.  Chronic  5.  Hypokalemia.  Improved  5.Hypomagnesemia      Recs:  Add low-dose Aldactone.  Continue low-dose oral Lasix.  Will have to accept some degree of azotemia related to diuretics to maintain volume status  Monitor and replace electrolytes as needed      Irving iY MD  03/10/22  09:34 EST

## 2022-03-10 NOTE — PROGRESS NOTES
Cardiology Progress Note      Admiting Physician:  Sky Reeder MD   LOS: 4 days       Reason For Followup:  Coronary artery disease  Hypertension  Combined congestive heart failure    Subjective:    Interval History:  Seen and examined.  Chart labs reviewed.  Patient asleep upon entry with no labored respirations.  Discussed with attending nurse, no further PAT.  Patient reports feeling some better today.  C. difficile came back negative.  proBNP ordered today at 2775.  No overt findings of acute fluid overload.  Patient receiving Lasix 20 mg daily    proBNP 2775  Creatinine 1.18  Magnesium 1.4  WBC 14.1        Review of Systems:  A complete review of systems was undertaken with the pertinent cardiovascular findings listed in history of present illness and all other systems being negative.     Assessment & Plan    Impressions:  Congestive heart failure acute on chronic combined  Paroxysmal atrial tachycardia  Hypomagnesemia  Hypertension  Coronary artery disease    Recommendations:  Fluid restriction  Continue current dose BB  No further PAT noted  Continue to monitor rhythm closely.  Follow electrolytes and renal function.  Replace electrolytes per protocol-order placed  Antimicrobials as per admitting service    Cardiology status appropriate for discharge when okay with other services.  We will see as needed.  Please call if needed.  Follow-up in 4 weeks.        Objective:    Medication Review:   Scheduled Meds:acetylcysteine, 3 mL, Nebulization, TID - RT  vitamin C, 250 mg, Oral, Daily  aspirin, 81 mg, Oral, Daily  atorvastatin, 80 mg, Oral, Nightly  cholecalciferol, 500 Units, Oral, Daily  clopidogrel, 75 mg, Oral, Daily  estradiol, 3 mg, Oral, Nightly  ferrous sulfate, 324 mg, Oral, Daily With Breakfast  folic acid-pyridoxine-cyanocobalamin, 1 tablet, Oral, Daily  furosemide, 20 mg, Oral, Daily  gabapentin, 400 mg, Oral, Q8H  guaiFENesin, 600 mg, Oral, Q12H  heparin (porcine), 5,000 Units, Subcutaneous,  Q8H  ipratropium-albuterol, 3 mL, Nebulization, 4x Daily - RT  isosorbide mononitrate, 30 mg, Oral, Q24H  levothyroxine, 88 mcg, Oral, Q AM  metoprolol succinate XL, 25 mg, Oral, Daily  metroNIDAZOLE, 500 mg, Intravenous, Q8H  pantoprazole, 40 mg, Oral, Q AM  ranolazine, 1,000 mg, Oral, Q12H  sertraline, 100 mg, Oral, Daily  sodium chloride, 10 mL, Intravenous, Q12H      Continuous Infusions:   PRN Meds:.diphenhydrAMINE    docusate sodium    HYDROcodone-acetaminophen    loperamide    LORazepam    methocarbamol    nitroglycerin    ondansetron **OR** ondansetron    phenylephrine-mineral oil-petrolatum    sodium chloride    sodium chloride    Patient Active Problem List   Diagnosis    Abdominal aortic aneurysm (AAA) (HCC)    Chronic obstructive pulmonary disease (HCC)    Congestive heart failure (HCC)    Coronary artery disease    Dyslipidemia    Hypertension    Community acquired pneumonia    Anemia of chronic disease    Diarrhea    Mixed hyperlipidemia    Chronic stable angina (HCC)    Bilateral carotid artery stenosis    Chest pain    Family history of diabetes mellitus    Family history of malignant neoplasm of digestive organ    Gastroesophageal reflux disease    Iron deficiency anemia    Pain in left shoulder    Uses LifeVest defibrillator    Cardiomyopathy, dilated (HCC)    Ischemic cardiomyopathy    Pneumonia of both lungs due to infectious organism, unspecified part of lung         Physical Exam:    General: Alert, cooperative, no distress, appears stated age  Head:  Normocephalic, atraumatic, mucous membranes moist  Eyes:  Conjunctivae/corneas clear, EOM's intact     Neck:  Supple,  no bruit  Lungs: Scattered crackles, increased air movement  Chest wall: No tenderness  Heart::  Regular rate and irregular rhythm, S1 and S2 normal, 1/6 holosystolic murmur.  No rub or gallop  Abdomen: Soft, non-tender, nondistended bowel sounds active  Extremities: No cyanosis, clubbing, or edema  Pulses: 2+ and symmetric all  extremities  Skin:  No rashes or lesions  Neuro/psych: A&O x3. CN II through XII are grossly intact with appropriate affect    Vital Signs:  Vitals:    03/10/22 0556 03/10/22 0722 03/10/22 0726 03/10/22 0815   BP:    105/62   BP Location:       Patient Position:       Pulse:  74 74 77   Resp:  16 18 18   Temp:    98.9 °F (37.2 °C)   TempSrc:       SpO2:  96% 97% 97%   Weight: 57.6 kg (126 lb 14.4 oz)      Height:         Wt Readings from Last 1 Encounters:   03/10/22 57.6 kg (126 lb 14.4 oz)       Intake/Output Summary (Last 24 hours) at 3/10/2022 1013  Last data filed at 3/10/2022 0505  Gross per 24 hour   Intake 300 ml   Output --   Net 300 ml         Results Review:     CBC    Results from last 7 days   Lab Units 03/10/22  0137 03/09/22  0009 03/08/22  0255 03/07/22  1458 03/06/22 2311 03/06/22  1129   WBC 10*3/mm3 14.10* 14.50* 11.70* 14.30* 10.90* 13.10*   HEMOGLOBIN g/dL 8.5* 8.5* 8.6* 8.7* 8.5* 8.5*   PLATELETS 10*3/mm3 225 251 243 220 196 207     Cr Clearance Estimated Creatinine Clearance: 36.9 mL/min (A) (by C-G formula based on SCr of 1.18 mg/dL (H)).  Coag     HbA1C   Lab Results   Component Value Date    HGBA1C 5.4 01/07/2021     Blood Glucose No results found for: POCGLU  Infection   Results from last 7 days   Lab Units 03/06/22  1239 03/06/22  1129   BLOODCX  No growth at 3 days  No growth at 3 days  --    PROCALCITONIN ng/mL  --  0.04     CMP   Results from last 7 days   Lab Units 03/10/22  0137 03/09/22  0009 03/08/22  0255 03/06/22  2311 03/06/22  1129   SODIUM mmol/L 139 136 144 138 140   POTASSIUM mmol/L 3.5 2.9* 4.1 3.9 4.4   CHLORIDE mmol/L 101 95* 103 99 103   CO2 mmol/L 25.0 28.0 31.0* 25.0 27.0   BUN mg/dL 20 20 25* 26* 24*   CREATININE mg/dL 1.18* 1.33* 1.09* 1.27* 1.02*   GLUCOSE mg/dL 108* 141* 108* 161* 124*   ALBUMIN g/dL 2.60* 3.10* 3.10*  --  3.30*   BILIRUBIN mg/dL  --   --   --   --  0.2   ALK PHOS U/L  --   --   --   --  56   AST (SGOT) U/L  --   --   --   --  5   ALT (SGPT) U/L   --   --   --   --  <5     ABG    Results from last 7 days   Lab Units 03/06/22  1147   PH, ARTERIAL pH units 7.379   PCO2, ARTERIAL mm Hg 51.6*   PO2 ART mm Hg 74.8*   O2 SATURATION ART % 94.2   BASE EXCESS ART mmol/L 4.6*     UA      ALFONZO  No results found for: POCMETH, POCAMPHET, POCBARBITUR, POCBENZO, POCCOCAINE, POCOPIATES, POCOXYCODO, POCPHENCYC, POCPROPOXY, POCTHC, POCTRICYC  Lysis Labs   Results from last 7 days   Lab Units 03/10/22  0137 03/09/22  0009 03/08/22  0255 03/07/22  1458 03/06/22  2311 03/06/22  1129   HEMOGLOBIN g/dL 8.5* 8.5* 8.6* 8.7* 8.5* 8.5*   PLATELETS 10*3/mm3 225 251 243 220 196 207   CREATININE mg/dL 1.18* 1.33* 1.09*  --  1.27* 1.02*     Radiology(recent) No radiology results for the last day      Results from last 7 days   Lab Units 03/06/22  1129   TROPONIN T ng/mL <0.010       Imaging Results (Last 24 Hours)       ** No results found for the last 24 hours. **            Cardiac Studies:  Echo- Results for orders placed during the hospital encounter of 04/16/21    Adult Transthoracic Echo Complete W/ Cont if Necessary Per Protocol    Interpretation Summary  · Estimated left ventricular EF was in agreement with the calculated left ventricular EF. Left ventricular ejection fraction appears to be 46 - 50%. Left ventricular systolic function is mildly decreased.  · Left ventricular wall thickness is consistent with mild to moderate septal asymmetric hypertrophy.  · Estimated right ventricular systolic pressure from tricuspid regurgitation is normal (<35 mmHg).  · Left ventricular diastolic function is consistent with (grade I) impaired relaxation.    Stress Myoview-  Cath-        RADHA Rudd  03/10/22  10:13 EST     Electronically signed by RADHA Rudd, 03/10/22, 10:14 AM EST.      Cardiology attending:  Seen and examined.  Chart and labs reviewed.  History and exam findings are verified with above changes noted.  Assessment and plan notated by APC after being  formulated by attending consultant.  Note that greater than 50% of the time spent in care of the patient was provided by attending consultant.  Patient denies any chest pain pressure heaviness or tightness.  Did have a little bit of palpitations this morning.  No further arrhythmias noted on telemetry.  Continue with current cardiovascular regimen at the present time.  Cardiology status appropriate for discharge when okay with other services.  Follow-up in 4 weeks as outpatient.

## 2022-03-10 NOTE — PLAN OF CARE
Problem: Adult Inpatient Plan of Care  Goal: Plan of Care Review  Outcome: Ongoing, Progressing  Flowsheets (Taken 3/9/2022 1905)  Plan of Care Reviewed With: patient  Outcome Evaluation: patient stable on 3 liters oxygen. continues to have loose bowel movements. cdiff specimen sent to lab.awaiting results. no complaints or concerns. will continue to monitor  Goal: Patient-Specific Goal (Individualized)  Outcome: Ongoing, Progressing  Goal: Absence of Hospital-Acquired Illness or Injury  Outcome: Ongoing, Progressing  Intervention: Identify and Manage Fall Risk  Description: Perform standard risk assessment on admission using a validated tool or comprehensive approach appropriate to the patient; reassess fall risk frequently, with change in status or transfer to another level of care.  Communicate fall injury risk to interprofessional healthcare team.  Determine need for increased observation, equipment and environmental modification, such as low bed, signage and supportive, nonskid footwear.  Adjust safety measures to individual developmental age, stage and identified risk factors.  Reinforce the importance of safety and physical activity with patient and family.  Perform regular intentional rounding to assess need for position change, pain assessment and personal needs, including assistance with toileting.  Recent Flowsheet Documentation  Taken 3/9/2022 1201 by Charlene Pickering, RN  Safety Promotion/Fall Prevention:   assistive device/personal items within reach   clutter free environment maintained   fall prevention program maintained   gait belt   nonskid shoes/slippers when out of bed   room organization consistent   safety round/check completed  Taken 3/9/2022 0801 by Charlene Pickering, RN  Safety Promotion/Fall Prevention:   assistive device/personal items within reach   clutter free environment maintained   fall prevention program maintained   gait belt   nonskid shoes/slippers when out of bed   room organization  consistent   safety round/check completed  Intervention: Prevent Skin Injury  Description: Perform a screening for skin injury risk, such as pressure or moisture associated skin damage on admission and at regular intervals throughout hospital stay.  Keep all areas of skin (especially folds) clean and dry.  Maintain adequate skin hydration.  Relieve and redistribute pressure and protect bony prominences; implement measures based on patient-specific risk factors.  Match turning and repositioning schedule to clinical condition.  Encourage weight shift frequently; assist with reposition if unable to complete independently.  Float heels off bed; avoid pressure on the Achilles tendon.  Keep skin free from extended contact with medical devices.  Encourage functional activity and mobility, as early as tolerated.  Use aids (e.g., slide boards, mechanical lift) during transfer.  Recent Flowsheet Documentation  Taken 3/9/2022 1201 by Charlene Pickering RN  Body Position: position changed independently  Taken 3/9/2022 0801 by Charlene Pickering RN  Body Position: position changed independently  Skin Protection:   tubing/devices free from skin contact   incontinence pads utilized  Intervention: Prevent and Manage VTE (Venous Thromboembolism) Risk  Description: Assess for VTE (venous thromboembolism) risk.  Encourage and assist with early ambulation.  Initiate and maintain compression or other therapy, as indicated, based on identified risk in accordance with organizational protocol and provider order.  Encourage both active and passive leg exercises while in bed, if unable to ambulate.  Recent Flowsheet Documentation  Taken 3/9/2022 0801 by Charlene Pickering RN  Activity Management:   activity adjusted per tolerance   up to bedside commode  Goal: Optimal Comfort and Wellbeing  Outcome: Ongoing, Progressing  Intervention: Provide Person-Centered Care  Description: Use a family-focused approach to care.  Develop trust and rapport by  proactively providing information, encouraging questions, addressing concerns and offering reassurance.  Acknowledge emotional response to hospitalization.  Recognize and utilize personal coping strategies.  Honor spiritual and cultural preferences.  Recent Flowsheet Documentation  Taken 3/9/2022 1201 by Charlene Pickering RN  Trust Relationship/Rapport:   care explained   choices provided   reassurance provided   thoughts/feelings acknowledged  Taken 3/9/2022 0801 by Charlene Pickering RN  Trust Relationship/Rapport:   care explained   choices provided   reassurance provided   thoughts/feelings acknowledged  Goal: Readiness for Transition of Care  Outcome: Ongoing, Progressing   Goal Outcome Evaluation:  Plan of Care Reviewed With: patient           Outcome Evaluation: patient stable on 3 liters oxygen. continues to have loose bowel movements. cdiff specimen sent to lab.awaiting results. no complaints or concerns. will continue to monitor

## 2022-03-10 NOTE — PROGRESS NOTES
[facility] Medicine Services        Patient Name: Gayathri Reddy  : 1945  MRN: [id number]  Primary Care Physician:  Deonte Hector MD  Date of admission: 3/6/2022           Subjective          Chief Complaint: Dyspnea     History of Present Illness: Gayathri Reddy is a 76 y.o. female with PMHx of HTN, HLD, Hypothyroid, GERD, cAD, AAA, CHF, COPD (3L NC chronically) who presents due to dyspnea.  Admits to dyspnea with exertion complicated by productive cough ongoing for the past three days and similar to previous experiences with pneumonia.  Denies chest pain, abdominal pain, numbness, tingling, hemoptysis.  Admits to increasing home oxygen from 3 to 4L latelty.  Due to multiple risk factors, went to [facility] for evaluation     In the ED, vitals 98.8F, HR 84, RR 18, 135/64, 97 4L.  Labs notable for troponin < 0.01, proBNP 76298, glucose 124, creatinine 1.02, white count 13.1, Hgb 8.5.  CXR suggestive of multi-focal pneumonia.  Respiratory panel negative for covid or other virus.  Patient started on abx, lasix, and admitted to medicine for evaluation.     3/7/2022; still complaining of shortness of breath with minimal exertion, T-max of 36.6, vital signs are stable currently on 3 L of nasal cannula.  Spoke with RN.  Serum creatinine bumped up to 1.27 prerenal NOE consult nephrology, proBNP 91956, will consult cardiology.  3/8/2022; patient walked about 200 feet without getting hypoxia on supplemental oxygen but still increased work of breathing, otherwise hemodynamically stable.  3/9/2022; past patient still endorses shortness of breath with minimal exertion, has been endorsing diarrhea for the last 2 or 3 days, will check stool for C. difficile, T-max of 100.7, vital stable currently on 1 L of nasal cannula.  3/10/2022; C. difficile came back negative, started on Imodium as needed, hypoalbuminemia, check prealbumin, had some overnight paroxysmal atrial tachycardia beta-blocker  dose increased, spironolactone has been added by nephrology.  Review of Systems   Constitutional: Positive for malaise/fatigue. Negative for chills and fever.   HENT: Negative for hearing loss, hoarse voice and nosebleeds.    Eyes: Negative for discharge, double vision and pain.   Cardiovascular: Positive for dyspnea on exertion. Negative for leg swelling.   Respiratory: Positive for cough and shortness of breath. Negative for hemoptysis.    Endocrine: Negative for polydipsia, polyphagia and polyuria.   Skin: Negative for dry skin, flushing and itching.   Musculoskeletal: Negative for arthritis, back pain and falls.   Gastrointestinal: Negative for abdominal pain and constipation.   Genitourinary: Negative for dysuria, flank pain and frequency.   Neurological: Negative for headaches, light-headedness and weakness.   Psychiatric/Behavioral: Negative for altered mental status, depression and hallucinations.         Personal History      Medical History        Past Medical History:   Diagnosis Date   • Aneurysm (HCC)       AAA   • Arthritis     • CHF (congestive heart failure) (HCC)     • COPD (chronic obstructive pulmonary disease) (HCC)     • Coronary artery disease     • Dyslipidemia     • GERD (gastroesophageal reflux disease)     • History of right heart catheterization       7/30/2017; 10/2018   • Hyperlipidemia     • Hypertension     • Hypothyroidism     • Myocardial infarct (HCC)     • Myocardial infarction (HCC)       x 3   • Pneumonia 11/2019     bilateral             Surgical History         Past Surgical History:   Procedure Laterality Date   • ABDOMINAL AORTIC ANEURYSM REPAIR N/A 2001   • CARDIAC CATHETERIZATION       • CATARACT EXTRACTION Bilateral     • CORONARY ANGIOPLASTY WITH STENT PLACEMENT N/A       x 5   • CORONARY ARTERY BYPASS GRAFT         x 4 1995   • HYSTERECTOMY N/A              Family History: family history includes Aneurysm in her mother; Cancer in her paternal grandfather; Colon cancer in  her sister; Heart disease in her father and mother. Otherwise pertinent FHx was reviewed and not pertinent to current issue.     Social History:  reports that she quit smoking about 28 years ago. Her smoking use included cigarettes. She smoked 1.50 packs per day. She has never used smokeless tobacco. She reports that she does not drink alcohol and does not use drugs.     Home Medications:           Prior to Admission Medications      Prescriptions Last Dose Informant Patient Reported? Taking?     aspirin 81 MG EC tablet     Yes No     Take 81 mg by mouth Daily.     Budeson-Glycopyrrol-Formoterol (BREZTRI AEROSPHERE IN)     Yes No     Inhale.     cholecalciferol (VITAMIN D3) 10 MCG (400 UNIT) tablet     Yes No     Take 400 Units by mouth Daily.     clopidogrel (PLAVIX) 75 MG tablet     Yes No     Take 75 mg by mouth Daily.     diphenhydrAMINE (BENADRYL) 25 mg capsule     Yes No     Take 25 mg by mouth Every 6 (Six) Hours As Needed.     estradiol (ESTRACE) 1 MG tablet     Yes No     Take 1 mg by mouth 3 (Three) Times a Day.     ferrous sulfate 324 (65 Fe) MG tablet delayed-release EC tablet   Self Yes No     Take 324 mg by mouth Daily With Breakfast.     furosemide (LASIX) 20 MG tablet     No No     Take 1 tablet by mouth Daily.     Patient taking differently:  Take  by mouth Daily. Once daily     gabapentin (NEURONTIN) 400 MG capsule     Yes No     Take 400 mg by mouth 3 (Three) Times a Day.     HYDROcodone-acetaminophen (NORCO)  MG per tablet     Yes No     Take 1 tablet by mouth Every 6 (Six) Hours As Needed.     isosorbide mononitrate (IMDUR) 30 MG 24 hr tablet     Yes No     Take 30 mg by mouth Daily.     levothyroxine (SYNTHROID, LEVOTHROID) 88 MCG tablet     Yes No     Take 88 mcg by mouth Daily.     lisinopril (PRINIVIL,ZESTRIL) 2.5 MG tablet     No No     Take 1 tablet by mouth Daily.     LORazepam (ATIVAN) 1 MG tablet     Yes No     Take 1 mg by mouth 4 (Four) Times a Day As Needed.     methocarbamol  (ROBAXIN) 500 MG tablet   Self Yes No     Take 500 mg by mouth 3 (Three) Times a Day As Needed for Muscle Spasms.     metoprolol succinate XL (TOPROL-XL) 25 MG 24 hr tablet     No No     Take 0.5 tablets by mouth Daily.     nitroglycerin (Nitrostat) 0.4 MG SL tablet     No No     Place 1 tablet under the tongue Every 5 (Five) Minutes As Needed for Chest Pain.     pantoprazole (PROTONIX) 40 MG EC tablet     No No     Take 1 tablet by mouth Daily.     Potassium 99 MG tablet     Yes No     Take  by mouth.     ranolazine (RANEXA) 1000 MG 12 hr tablet     No No     Take 1 tablet by mouth Every 12 (Twelve) Hours.     rosuvastatin (CRESTOR) 10 MG tablet     Yes No     Take 10 mg by mouth Daily.     sertraline (ZOLOFT) 100 MG tablet     Yes No     Take 100 mg by mouth Daily.     vitamin C (ASCORBIC ACID) 250 MG tablet     Yes No     Take 250 mg by mouth Daily.                Allergies:       Allergies   Allergen Reactions   • Naprosyn  [Naproxen] Rash   • Bactrim [Sulfamethoxazole-Trimethoprim] Rash               Objective          Vitals:   Temp:  [98.4 °F (36.9 °C)-99.4 °F (37.4 °C)] 99 °F (37.2 °C)  Heart Rate:  [] 74  Resp:  [16-25] 18  BP: ()/(62-74) 98/64  Flow (L/min):  [1-3] 2     Physical Exam  Constitutional:       General: She is not in acute distress.     Appearance: She is not toxic-appearing.   HENT:      Head: Normocephalic and atraumatic.      Nose: Nose normal. No congestion.      Mouth/Throat:      Pharynx: Oropharynx is clear. No oropharyngeal exudate.   Eyes:      General: No scleral icterus.  Cardiovascular:      Rate and Rhythm: Normal rate and regular rhythm.      Heart sounds: No murmur heard.    No friction rub. No gallop.   Pulmonary:      Effort: No respiratory distress.      Breath sounds: Rales present. No wheezing.       Abdominal:      General: There is no distension.      Tenderness: There is no abdominal tenderness. There is no guarding.   Musculoskeletal:         General: No  swelling or deformity.      Cervical back: Normal range of motion. No rigidity.      Right lower leg: No edema.      Left lower leg: No edema.   Skin:     Coloration: Skin is not jaundiced.      Findings: No bruising or lesion.   Neurological:      General: No focal deficit present.      Mental Status: She is alert and oriented to person, place, and time.      Motor: No weakness.   Psychiatric:         Mood and Affect: Mood normal.         Behavior: Behavior normal.         Thought Content: Thought content normal.         Judgment: Judgment normal.                  Result Review       Result Review:  I have personally reviewed the results from the time of this admission to 3/6/2022 13:01 EST and agree with these findings:  [x]?  Laboratory  []?  Microbiology  [x]?  Radiology  []?  EKG/Telemetry   []?  Cardiology/Vascular   []?  Pathology  []?  Old records  []?  Other:     Lab Results   Component Value Date    WBC 14.10 (H) 03/10/2022    HGB 8.5 (L) 03/10/2022    HCT 24.8 (L) 03/10/2022    MCV 97.4 (H) 03/10/2022     03/10/2022     Lab Results   Component Value Date    GLUCOSE 108 (H) 03/10/2022    CALCIUM 8.6 03/10/2022     03/10/2022    K 3.5 03/10/2022    CO2 25.0 03/10/2022     03/10/2022    BUN 20 03/10/2022    CREATININE 1.18 (H) 03/10/2022    EGFRIFAFRI >60 01/05/2018    EGFRIFNONA 27 (L) 02/18/2022    BCR 16.9 03/10/2022    ANIONGAP 13.0 03/10/2022      CT Chest Without Contrast Diagnostic    Result Date: 3/6/2022  1. Multifocal groundglass opacities throughout the lungs most pronounced in the lower lobes which may relate to atypical infection or COVID-19 pneumonia. 2. Focal nodular airspace disease in right upper lobe measuring 18 x 14 mm. There was previously a cluster of nodules in this region and this could relate to chronic inflammation secondary to prior infection or inflammation, however recommend short-term follow-up in 2-3 months or PET/CT to evaluate this nodule. 3. Additional  chronic findings above.  Electronically Signed By-Nahid Cooley MD On:3/6/2022 6:29 PM This report was finalized on [id number] by  Nahid Cooley MD.    XR Chest 1 View    Result Date: 3/6/2022  Patchy multifocal airspace disease involving the lung bases concerning for pneumonia.  Electronically Signed By-Nahid Cooley MD On:3/6/2022 12:03 PM This report was finalized on [id number] by  Nahid Cooley MD.             Assessment/Plan              Active Hospital Problems:  There are no active hospital problems to display for this patient.     Assessment/plan;     #Acute on chronic hypoxia with hypercapnia; wears baseline 3 L and 4 L  #Multi-focal pneumonia/focal nodular airspace disease in the right upper lobe measuring 18 x 14  mm;    - ABG 7.379/51.6/74.8/30.4    -CXR shows multi-focal pneumonai    - CT chest  as  Above.    - Unasyn 3g IV q6h patient had a urticarial reaction for Unasyn requiring 1 dose of Decadron and Benadryl, switched to Rocephin on 3/8/2022, end of treatment 3/13/2022 IV Flagyl finishing on 3/9/2022    - RVP negative, procalcitonin normal    - blood cultures NTD    - respiratory culture    - legionella and strep, both negative.    - white count 13.1 --> 14.50  -,  consult pulmonary appreciated.  # Diarrhea; likely antibiotic associated, checked stool for C. Difficile, came back negative, start on Imodium 2 mg p.o. every 6 as needed  #Paroxysmal atrial tachycardia; increased metoprolol XL 25 mg daily  #Hypoalbuminemia; check prealbumin  # Acute kidney injury; baseline creatinine seems to be around 0.8 mg/dL, but has fluctuated serum creatinine 1.27 likely prerenal, nephrology consult appreciated, restarting low-dose oral Lasix diuretic.  #COPD    -wears 3L chronically, currently on 4L NC    - suspect pneumonia    - Duonebs QID     #Acute on chronic diastolic and systolic HF    -Fluid restriction 2 L/day, patient thin built not appreciable edema    - proBNP 76256 --2775, consult cardiology  appreciated.    - CT chest as above, resumed low-dose Lasix on 3/8/2022, spironolactone added on by nephrology.      #CAD s/p CABG with attrition 3/4 grafts/ischemic cardiomyopathy most recent ejection 45% April 2021      - resume home aspirin, statin, Toprol, imdur and Ranexa .     #AAA    - CT a/p shows infra-renal abdominal aorta at 3.0x3.7cm on 11/4/2020    - will need to be re-evaluated as otpt     #HTN    - hold lisinopril     - resume home toprol      #HLD    - resume home statin     #GERD    - PPI     #Hypothyroid    - resume home synthroid      #Macrocytic hyperchromic anemia    -hgb 8.5 on admission, base ~ 10.0    - checked iron panel and ferritin, not suggestive of iron deficiency anemia, and vitamin B12 normal.  - folate deficiency replace.     #Anxiety     - resume home Zoloft once verified     #DVT prophylaxis    -heparin        DVT prophylaxis:  No DVT prophylaxis order currently exists.     CODE STATUS:    Admission Status:  I believe this patient meets inpatient status.     I discussed the patient's findings and my recommendations with patient.   Disposition; PT OT recommended inpatient rehab due to decrease in activity tolerance with moderate dyspnea with exertion however patient is not agreeable but willing to go home with home health and PT  This patient has been examined wearing appropriate Personal Protective Equipment and discussed with Patient      Electronically signed by Sky Reeder MD, 03/09/22, 1:01 PM EST.

## 2022-03-10 NOTE — PROGRESS NOTES
"Nutrition Services    Patient Name: Gayathri Reddy  YOB: 1945  MRN: [id number]  Admission date: 3/6/2022    Comment:  Magic Cup q daily (290 kcals, 9 g protein if consumed)     Moderate chronic disease related malnutrition related to inadequate calorie intake PTA in the context of multiple chronic disease states (COPD, HF) as evidenced by report of po intake PTA providing less than 75% of nutrition needs for greater than 1 month PTA and physical exam revealing moderate muscle loss.    See MSA.    PPE Documentation        PPE Worn By Provider mask, gloves, eye protection and gown   PPE Worn By Patient  none     CLINICAL NUTRITION ASSESSMENT      Reason for Assessment 3/10: MST 2     H&P      Past Medical History:   Diagnosis Date   • Aneurysm (HCC)     AAA   • Arthritis    • CHF (congestive heart failure) (HCC)    • COPD (chronic obstructive pulmonary disease) (HCC)    • Coronary artery disease    • Dyslipidemia    • GERD (gastroesophageal reflux disease)    • History of right heart catheterization     7/30/2017; 10/2018   • Hyperlipidemia    • Hypertension    • Hypothyroidism    • Myocardial infarct (HCC)    • Myocardial infarction (HCC)     x 3   • Pneumonia 11/2019    bilateral        Past Surgical History:   Procedure Laterality Date   • ABDOMINAL AORTIC ANEURYSM REPAIR N/A 2001   • CARDIAC CATHETERIZATION     • CATARACT EXTRACTION Bilateral    • CORONARY ANGIOPLASTY WITH STENT PLACEMENT N/A     x 5   • CORONARY ARTERY BYPASS GRAFT      x 4 1995   • HYSTERECTOMY N/A         Current Problems   Acute on chronic hypoxia    PNA    Diarrhea  -Cdiff r/o    NOE    COPD    HF    CAD s/p CABG    AAA    HTN    HTN, HLD    GERD       Encounter Information        Trending Narrative     3/9: Visited patient in room. Reports a very good appetite this morning, ate 100% of her breakfast- observed tray in room. States she typically eats 2 meals per day at home.  B- toast/eggs  Late lunch/early dinner- \"big meal\" " "with meat and sides.  Patient does not take nutrition supplements at home, reports a stable weight. Lives alone, but states she has assistance with grocery shopping due to not driving.     Anthropometrics        Current Height, Weight Height: 165.1 cm (65\")  Weight: 57.6 kg (126 lb 14.4 oz) (03/10/22 0556)       Ideal Body Weight (IBW) 125lb   Usual Body Weight (UBW) 130lb       Trending Weight Hx     This admission: Current weight down 6lb since admit             PTA: Current weight down 7% in 1 year    Wt Readings from Last 30 Encounters:   03/10/22 0556 57.6 kg (126 lb 14.4 oz)   03/09/22 0430 57.5 kg (126 lb 12.2 oz)   03/06/22 1049 59.9 kg (132 lb)   02/18/22 0943 61.2 kg (135 lb)   07/21/21 1517 59.9 kg (132 lb)   04/26/21 1110 61.7 kg (136 lb)   04/16/21 1355 61.7 kg (136 lb)   03/18/21 1415 61.7 kg (136 lb)   02/01/21 1509 62.6 kg (138 lb)   08/18/20 1541 61.2 kg (135 lb)   02/18/20 1110 65.1 kg (143 lb 9.6 oz)   11/24/19 0307 63.1 kg (139 lb 1.8 oz)   11/22/19 0306 66.1 kg (145 lb 11.6 oz)   11/21/19 0315 63.3 kg (139 lb 8.8 oz)   11/20/19 0345 66.1 kg (145 lb 11.6 oz)   11/19/19 0420 66 kg (145 lb 8.1 oz)   11/18/19 1406 66.6 kg (146 lb 14.4 oz)   11/18/19 0629 66.2 kg (145 lb 15.1 oz)   11/14/19 2021 66.2 kg (146 lb)   08/01/19 1130 66.4 kg (146 lb 6.4 oz)      BMI kg/m2 Body mass index is 21.12 kg/m².       Labs        Pertinent Labs    Results from last 7 days   Lab Units 03/10/22  0137 03/09/22  0009 03/08/22  0255 03/06/22  2311 03/06/22  1129   SODIUM mmol/L 139 136 144   < > 140   POTASSIUM mmol/L 3.5 2.9* 4.1   < > 4.4   CHLORIDE mmol/L 101 95* 103   < > 103   CO2 mmol/L 25.0 28.0 31.0*   < > 27.0   BUN mg/dL 20 20 25*   < > 24*   CREATININE mg/dL 1.18* 1.33* 1.09*   < > 1.02*   CALCIUM mg/dL 8.6 8.8 9.2   < > 8.5*   BILIRUBIN mg/dL  --   --   --   --  0.2   ALK PHOS U/L  --   --   --   --  56   ALT (SGPT) U/L  --   --   --   --  <5   AST (SGOT) U/L  --   --   --   --  5   GLUCOSE mg/dL 108* 141* " 108*   < > 124*    < > = values in this interval not displayed.     Results from last 7 days   Lab Units 03/10/22  0137   MAGNESIUM mg/dL 1.4*   PHOSPHORUS mg/dL 3.5   HEMOGLOBIN g/dL 8.5*   HEMATOCRIT % 24.8*     COVID19   Date Value Ref Range Status   03/06/2022 Not Detected Not Detected - Ref. Range Final     Lab Results   Component Value Date    HGBA1C 5.4 01/07/2021        Medications    Scheduled Medications acetylcysteine, 3 mL, Nebulization, TID - RT  vitamin C, 250 mg, Oral, Daily  aspirin, 81 mg, Oral, Daily  atorvastatin, 80 mg, Oral, Nightly  cholecalciferol, 500 Units, Oral, Daily  clopidogrel, 75 mg, Oral, Daily  estradiol, 3 mg, Oral, Nightly  ferrous sulfate, 324 mg, Oral, Daily With Breakfast  folic acid-pyridoxine-cyanocobalamin, 1 tablet, Oral, Daily  furosemide, 20 mg, Oral, Daily  gabapentin, 400 mg, Oral, Q8H  guaiFENesin, 600 mg, Oral, Q12H  heparin (porcine), 5,000 Units, Subcutaneous, Q8H  ipratropium-albuterol, 3 mL, Nebulization, 4x Daily - RT  isosorbide mononitrate, 30 mg, Oral, Q24H  levothyroxine, 88 mcg, Oral, Q AM  metoprolol succinate XL, 25 mg, Oral, Daily  metroNIDAZOLE, 500 mg, Intravenous, Q8H  pantoprazole, 40 mg, Oral, Q AM  ranolazine, 1,000 mg, Oral, Q12H  sertraline, 100 mg, Oral, Daily  sodium chloride, 10 mL, Intravenous, Q12H        Infusions      PRN Medications diphenhydrAMINE  •  docusate sodium  •  HYDROcodone-acetaminophen  •  loperamide  •  LORazepam  •  magnesium sulfate **OR** magnesium sulfate **OR** magnesium sulfate  •  methocarbamol  •  nitroglycerin  •  ondansetron **OR** ondansetron  •  phenylephrine-mineral oil-petrolatum  •  sodium chloride  •  sodium chloride     Physical Findings        Trending Physical   Appearance, NFPE 3/9: NFPE completed, pt with several areas of moderate muscle loss.   --  Edema  none     Bowel Function BM 3/9   Tubes none   Chewing/Swallowing No issues reported   Skin No breakdown   --  Current Nutrition Orders & Evaluation of  Intake       Oral Nutrition     Food Allergies NKFA   Current PO Diet Diet Cardiac, Diabetic/Consistent Carbs; Healthy Heart; Diabetic - Consistent Carb; Fluid Restriction; 2000cc/day   Supplement    PO Evaluation     Trending % PO Intake 75% x1 documented meal, observed 100% of breakfast consumed   --  Nutritional Risk Screening        NRS-2002 Score          Nutrition Diagnosis         Nutrition Dx Problem 1 Moderate chronic disease related malnutrition related to inadequate calorie intake PTA in the context of multiple chronic disease states (COPD, HF) as evidenced by report of po intake PTA providing less than 75% of nutrition needs for greater than 1 month PTA and physical exam revealing moderate muscle loss.      Nutrition Dx Problem 2        Intervention Goal         Intervention Goal(s) Meal intake maintained at 75% or greater.       Nutrition Intervention        RD Action Add Magic Cup daily  D/c CCHO diet     Nutrition Prescription          Diet Prescription HH, fluid restriction   Supplement Prescription Magic Cup daily   --  Monitor/Evaluation        Monitor PO intake, Supplement intake, Pertinent labs, Weight, Skin status, GI status     Malnutrition Severity Assessment      Patient meets criteria for : Moderate (non-severe) Malnutrition  Malnutrition Type (last 8 hours)     Malnutrition Severity Assessment     Row Name 03/10/22 1047       Malnutrition Severity Assessment    Malnutrition Type Chronic Disease - Related Malnutrition    Row Name 03/10/22 1047       Insufficient Energy Intake     Insufficient Energy Intake Findings Moderate    Insufficient Energy Intake  <75% of est. energy requirement for > or equal to 1 month    Row Name 03/10/22 1047       Muscle Loss    Loss of Muscle Mass Findings Moderate    Yarsanism Region Moderate - slight depression    Clavicle Bone Region None    Acromion Bone Region Moderate - acromion may slightly protrude    Scapular Bone Region None    Dorsal Hand Region None     Patellar Region Moderate - patella more prominent, less muscle definition around patella    Anterior Thigh Region Moderate - mild depression on inner thigh    Posterior Calf Region Moderate - some roundness, slight firmness    Row Name 03/10/22 1047       Fat Loss    Subcutaneous Fat Loss Findings Moderate    Orbital Region  Moderate -  somewhat hollowness, slightly dark circles    Upper Arm Region Moderate - some fat tissue, not ample    Thoracic & Lumbar Region None    Row Name 03/10/22 1047       Criteria Met (Must meet criteria for severity in at least 2 of these categories: M Wasting, Fat Loss, Fluid, Secondary Signs, Wt. Status, Intake)    Patient meets criteria for  Moderate (non-severe) Malnutrition                     Electronically signed by:  Radha Jimenez RD  03/10/22 10:31 EST

## 2022-03-10 NOTE — CASE MANAGEMENT/SOCIAL WORK
Continued Stay Note   Heath     Patient Name: Gayathri Reddy  MRN: [id number]  Today's Date: 3/10/2022    Admit Date: 3/6/2022     Discharge Plan     Row Name 03/10/22 0921       Plan    Plan Home with [facility], accepted, will need  order prior to discharge, watch for IV antibiotics    Plan Comments Per Berna at [facility] patient accepted, requested home health order form Dr Reeder via epic secure chat.              Phone communication or documentation only - no physical contact with patient or family.      Vanessa Fraser RN

## 2022-03-10 NOTE — PROGRESS NOTES
Daily Progress Note        Pneumonia of both lungs due to infectious organism, unspecified part of lung    Abdominal aortic aneurysm (AAA) (HCC)    Chronic obstructive pulmonary disease (HCC)    Congestive heart failure (HCC)    Coronary artery disease    Dyslipidemia    Hypertension    Anemia of chronic disease    Mixed hyperlipidemia    Bilateral carotid artery stenosis      Assessment  Acute on chronic hypoxia with hypercapnia- wears 3-4 L at home  Multifocal pneumonia  Allergic reaction to Unasyn in the form of urticaria  Right upper lobe nodule  NOE  COPD  Diastolic and systolic CHF-  BNP> 15,000  CAD s/p CABG April 2021  AAA  HTN  HLD  GERD  Hypothyroidism  Macrocytic hyperchromic anemia  Anxiety     CT with high resolution on 12/9/2019-mentions small cluster of 5 mm nodules and mild interstitial lung disease     Legionella and strep pneumonia antigens negative  Respiratory panel negative     ABG 3/6/2022 on nasal cannula-pH 7.37, CO2 51.6, PO2 74.8, bicarb 30.4    Plan  Adding Mucomyst nebulizers    Continue to titrate oxygen- Currently on 2L NC  Rocephin for PNA-finishes 3/13/2022  Mucinex  Inhaled corticosteroids  Bronchodilators  Electrolyte/Glycemic control  DVT/GI Prophylaxis-Heparin SQ and Protonix  Levothyroxine 88mcg     3/6/2022       LOS: 4 days     Subjective   Feeling better, but still having difficulty coughing up secretions      Objective     Vital signs for last 24 hours:  Vitals:    03/10/22 0556 03/10/22 0722 03/10/22 0726 03/10/22 0815   BP:    105/62   BP Location:       Patient Position:       Pulse:  74 74 77   Resp:  16 18 18   Temp:    98.9 °F (37.2 °C)   TempSrc:       SpO2:  96% 97% 97%   Weight: 57.6 kg (126 lb 14.4 oz)      Height:           Intake/Output last 3 shifts:  I/O last 3 completed shifts:  In: 660 [P.O.:360; IV Piggyback:300]  Out: -   Intake/Output this shift:  No intake/output data recorded.      Radiology  Imaging Results (Last 24 Hours)     ** No results found for the  last 24 hours. **          Labs:  Results from last 7 days   Lab Units 03/10/22  0137   WBC 10*3/mm3 14.10*   HEMOGLOBIN g/dL 8.5*   HEMATOCRIT % 24.8*   PLATELETS 10*3/mm3 225     Results from last 7 days   Lab Units 03/10/22  0137 03/06/22  2311 03/06/22  1129   SODIUM mmol/L 139   < > 140   POTASSIUM mmol/L 3.5   < > 4.4   CHLORIDE mmol/L 101   < > 103   CO2 mmol/L 25.0   < > 27.0   BUN mg/dL 20   < > 24*   CREATININE mg/dL 1.18*   < > 1.02*   CALCIUM mg/dL 8.6   < > 8.5*   BILIRUBIN mg/dL  --   --  0.2   ALK PHOS U/L  --   --  56   ALT (SGPT) U/L  --   --  <5   AST (SGOT) U/L  --   --  5   GLUCOSE mg/dL 108*   < > 124*    < > = values in this interval not displayed.     Results from last 7 days   Lab Units 03/06/22  1147   PH, ARTERIAL pH units 7.379   PO2 ART mm Hg 74.8*   PCO2, ARTERIAL mm Hg 51.6*   HCO3 ART mmol/L 30.4*     Results from last 7 days   Lab Units 03/10/22  0137 03/09/22  0009 03/08/22  0255   ALBUMIN g/dL 2.60* 3.10* 3.10*     Results from last 7 days   Lab Units 03/06/22  1129   TROPONIN T ng/mL <0.010         Results from last 7 days   Lab Units 03/10/22  0137   MAGNESIUM mg/dL 1.4*                   Meds:   SCHEDULE  vitamin C, 250 mg, Oral, Daily  aspirin, 81 mg, Oral, Daily  atorvastatin, 80 mg, Oral, Nightly  cholecalciferol, 500 Units, Oral, Daily  clopidogrel, 75 mg, Oral, Daily  estradiol, 3 mg, Oral, Nightly  ferrous sulfate, 324 mg, Oral, Daily With Breakfast  folic acid-pyridoxine-cyanocobalamin, 1 tablet, Oral, Daily  furosemide, 20 mg, Oral, Daily  gabapentin, 400 mg, Oral, Q8H  guaiFENesin, 600 mg, Oral, Q12H  heparin (porcine), 5,000 Units, Subcutaneous, Q8H  ipratropium-albuterol, 3 mL, Nebulization, 4x Daily - RT  isosorbide mononitrate, 30 mg, Oral, Q24H  levothyroxine, 88 mcg, Oral, Q AM  metoprolol succinate XL, 25 mg, Oral, Daily  metroNIDAZOLE, 500 mg, Intravenous, Q8H  pantoprazole, 40 mg, Oral, Q AM  ranolazine, 1,000 mg, Oral, Q12H  sertraline, 100 mg, Oral,  Daily  sodium chloride, 10 mL, Intravenous, Q12H      Infusions     PRNs  diphenhydrAMINE  •  docusate sodium  •  HYDROcodone-acetaminophen  •  loperamide  •  LORazepam  •  methocarbamol  •  nitroglycerin  •  ondansetron **OR** ondansetron  •  phenylephrine-mineral oil-petrolatum  •  sodium chloride  •  sodium chloride    Physical Exam:  Physical Exam  General Appearance:  Alert.  No distress noted.  HEENT:  Normocephalic, without obvious abnormality, Conjunctiva/corneas clear,.  Normal external ear canals, Nares normal, no drainage     Neck:  Supple, symmetrical, trachea midline. No JVD.  Lungs /Chest wall:   Bilateral basal rhonchi, respirations unlabored symmetrical wall movement.     Heart:  Regular rate and rhythm, systolic murmur PMI left sternal border  Abdomen: Soft, non-tender, no masses, no organomegaly.    Extremities: No edema, no clubbing or cyanosis    ROS  Review of Systems  Constitutional: Negative for chills, fever and malaise/fatigue.   HENT: Negative.    Eyes: Negative.    Cardiovascular: Negative.    Respiratory: Positive for cough and shortness of breath.    Skin: Negative.    Musculoskeletal: Negative.    Gastrointestinal: Negative.    Genitourinary: Negative.    Neurological: Negative.    Psychiatric/Behavioral: Negative.        I have reviewed current clinicals.     Electronically signed by RADHA Rodriguez, 03/10/22, 8:52 AM EST.     The NP scribed the note for me, I have examined the patient and reviewed and verified the above findings and and I formulated the assessment and plan as documented

## 2022-03-11 ENCOUNTER — APPOINTMENT (OUTPATIENT)
Dept: GENERAL RADIOLOGY | Facility: HOSPITAL | Age: 77
End: 2022-03-11

## 2022-03-11 PROBLEM — E44.0 MODERATE MALNUTRITION (HCC): Status: ACTIVE | Noted: 2022-03-11

## 2022-03-11 LAB
ALBUMIN SERPL-MCNC: 2.7 G/DL (ref 3.5–5.2)
ANION GAP SERPL CALCULATED.3IONS-SCNC: 10 MMOL/L (ref 5–15)
BACTERIA SPEC AEROBE CULT: NORMAL
BACTERIA SPEC AEROBE CULT: NORMAL
BASOPHILS # BLD AUTO: 0.1 10*3/MM3 (ref 0–0.2)
BASOPHILS NFR BLD AUTO: 0.5 % (ref 0–1.5)
BUN SERPL-MCNC: 18 MG/DL (ref 8–23)
BUN/CREAT SERPL: 14.8 (ref 7–25)
CALCIUM SPEC-SCNC: 7.9 MG/DL (ref 8.6–10.5)
CHLORIDE SERPL-SCNC: 98 MMOL/L (ref 98–107)
CO2 SERPL-SCNC: 28 MMOL/L (ref 22–29)
CREAT SERPL-MCNC: 1.22 MG/DL (ref 0.57–1)
DEPRECATED RDW RBC AUTO: 44.2 FL (ref 37–54)
EGFRCR SERPLBLD CKD-EPI 2021: 46.1 ML/MIN/1.73
EOSINOPHIL # BLD AUTO: 0.7 10*3/MM3 (ref 0–0.4)
EOSINOPHIL NFR BLD AUTO: 4.8 % (ref 0.3–6.2)
ERYTHROCYTE [DISTWIDTH] IN BLOOD BY AUTOMATED COUNT: 12.9 % (ref 12.3–15.4)
GLUCOSE SERPL-MCNC: 166 MG/DL (ref 65–99)
HCT VFR BLD AUTO: 24.2 % (ref 34–46.6)
HGB BLD-MCNC: 8.2 G/DL (ref 12–15.9)
LYMPHOCYTES # BLD AUTO: 1.6 10*3/MM3 (ref 0.7–3.1)
LYMPHOCYTES NFR BLD AUTO: 12 % (ref 19.6–45.3)
MAGNESIUM SERPL-MCNC: 2.9 MG/DL (ref 1.6–2.4)
MCH RBC QN AUTO: 33.5 PG (ref 26.6–33)
MCHC RBC AUTO-ENTMCNC: 34 G/DL (ref 31.5–35.7)
MCV RBC AUTO: 98.4 FL (ref 79–97)
MONOCYTES # BLD AUTO: 0.9 10*3/MM3 (ref 0.1–0.9)
MONOCYTES NFR BLD AUTO: 6.3 % (ref 5–12)
NEUTROPHILS NFR BLD AUTO: 10.5 10*3/MM3 (ref 1.7–7)
NEUTROPHILS NFR BLD AUTO: 76.4 % (ref 42.7–76)
NRBC BLD AUTO-RTO: 0.1 /100 WBC (ref 0–0.2)
PHOSPHATE SERPL-MCNC: 3.1 MG/DL (ref 2.5–4.5)
PLATELET # BLD AUTO: 228 10*3/MM3 (ref 140–450)
PMV BLD AUTO: 8.1 FL (ref 6–12)
POTASSIUM SERPL-SCNC: 3.3 MMOL/L (ref 3.5–5.2)
RBC # BLD AUTO: 2.46 10*6/MM3 (ref 3.77–5.28)
SODIUM SERPL-SCNC: 136 MMOL/L (ref 136–145)
WBC NRBC COR # BLD: 13.7 10*3/MM3 (ref 3.4–10.8)

## 2022-03-11 PROCEDURE — 97116 GAIT TRAINING THERAPY: CPT

## 2022-03-11 PROCEDURE — 94761 N-INVAS EAR/PLS OXIMETRY MLT: CPT

## 2022-03-11 PROCEDURE — 63710000001 DIPHENHYDRAMINE PER 50 MG: Performed by: NURSE PRACTITIONER

## 2022-03-11 PROCEDURE — 25010000002 HEPARIN (PORCINE) PER 1000 UNITS: Performed by: NURSE PRACTITIONER

## 2022-03-11 PROCEDURE — 25010000002 CEFTRIAXONE PER 250 MG: Performed by: NURSE PRACTITIONER

## 2022-03-11 PROCEDURE — 71046 X-RAY EXAM CHEST 2 VIEWS: CPT

## 2022-03-11 PROCEDURE — 25010000002 FUROSEMIDE PER 20 MG: Performed by: INTERNAL MEDICINE

## 2022-03-11 PROCEDURE — 94799 UNLISTED PULMONARY SVC/PX: CPT

## 2022-03-11 PROCEDURE — 63710000001 ONDANSETRON PER 8 MG: Performed by: NURSE PRACTITIONER

## 2022-03-11 PROCEDURE — 99233 SBSQ HOSP IP/OBS HIGH 50: CPT | Performed by: HOSPITALIST

## 2022-03-11 RX ORDER — FUROSEMIDE 40 MG/1
40 TABLET ORAL DAILY
Status: DISCONTINUED | OUTPATIENT
Start: 2022-03-12 | End: 2022-03-13

## 2022-03-11 RX ORDER — FUROSEMIDE 10 MG/ML
40 INJECTION INTRAMUSCULAR; INTRAVENOUS ONCE
Status: COMPLETED | OUTPATIENT
Start: 2022-03-11 | End: 2022-03-11

## 2022-03-11 RX ORDER — POTASSIUM CHLORIDE 20 MEQ/1
20 TABLET, EXTENDED RELEASE ORAL 2 TIMES DAILY WITH MEALS
Status: DISCONTINUED | OUTPATIENT
Start: 2022-03-11 | End: 2022-03-14

## 2022-03-11 RX ORDER — SPIRONOLACTONE 25 MG/1
25 TABLET ORAL DAILY
Status: DISCONTINUED | OUTPATIENT
Start: 2022-03-11 | End: 2022-03-14

## 2022-03-11 RX ORDER — POTASSIUM CHLORIDE 20 MEQ/1
40 TABLET, EXTENDED RELEASE ORAL ONCE
Status: COMPLETED | OUTPATIENT
Start: 2022-03-11 | End: 2022-03-11

## 2022-03-11 RX ADMIN — GABAPENTIN 400 MG: 400 CAPSULE ORAL at 14:36

## 2022-03-11 RX ADMIN — IPRATROPIUM BROMIDE AND ALBUTEROL SULFATE 3 ML: .5; 3 SOLUTION RESPIRATORY (INHALATION) at 18:50

## 2022-03-11 RX ADMIN — Medication 500 UNITS: at 09:58

## 2022-03-11 RX ADMIN — FUROSEMIDE 40 MG: 10 INJECTION, SOLUTION INTRAVENOUS at 12:56

## 2022-03-11 RX ADMIN — GUAIFENESIN 600 MG: 600 TABLET, EXTENDED RELEASE ORAL at 09:58

## 2022-03-11 RX ADMIN — CEFTRIAXONE 1 G: 1 INJECTION, POWDER, FOR SOLUTION INTRAMUSCULAR; INTRAVENOUS at 21:00

## 2022-03-11 RX ADMIN — DOCUSATE SODIUM 100 MG: 100 CAPSULE, LIQUID FILLED ORAL at 06:39

## 2022-03-11 RX ADMIN — HEPARIN SODIUM 5000 UNITS: 5000 INJECTION INTRAVENOUS; SUBCUTANEOUS at 14:36

## 2022-03-11 RX ADMIN — METRONIDAZOLE 500 MG: 500 INJECTION, SOLUTION INTRAVENOUS at 14:36

## 2022-03-11 RX ADMIN — OXYCODONE HYDROCHLORIDE AND ACETAMINOPHEN 250 MG: 500 TABLET ORAL at 09:57

## 2022-03-11 RX ADMIN — DIPHENHYDRAMINE HYDROCHLORIDE 50 MG: 25 CAPSULE ORAL at 22:09

## 2022-03-11 RX ADMIN — RANOLAZINE 1000 MG: 500 TABLET, FILM COATED, EXTENDED RELEASE ORAL at 22:09

## 2022-03-11 RX ADMIN — GABAPENTIN 400 MG: 400 CAPSULE ORAL at 06:35

## 2022-03-11 RX ADMIN — ESTRADIOL 3 MG: 1 TABLET ORAL at 21:00

## 2022-03-11 RX ADMIN — HYDROCODONE BITARTRATE AND ACETAMINOPHEN 1 TABLET: 10; 325 TABLET ORAL at 14:36

## 2022-03-11 RX ADMIN — Medication 10 ML: at 09:57

## 2022-03-11 RX ADMIN — LORAZEPAM 1 MG: 1 TABLET ORAL at 22:09

## 2022-03-11 RX ADMIN — FOLIC ACID-PYRIDOXINE-CYANOCOBALAMIN TAB 2.5-25-2 MG 1 TABLET: 2.5-25-2 TAB at 09:58

## 2022-03-11 RX ADMIN — ATORVASTATIN CALCIUM 80 MG: 40 TABLET, FILM COATED ORAL at 21:00

## 2022-03-11 RX ADMIN — LEVOTHYROXINE SODIUM 88 MCG: 0.09 TABLET ORAL at 06:35

## 2022-03-11 RX ADMIN — Medication 10 ML: at 21:00

## 2022-03-11 RX ADMIN — RANOLAZINE 1000 MG: 500 TABLET, FILM COATED, EXTENDED RELEASE ORAL at 10:47

## 2022-03-11 RX ADMIN — ISOSORBIDE MONONITRATE 30 MG: 30 TABLET, EXTENDED RELEASE ORAL at 12:56

## 2022-03-11 RX ADMIN — SERTRALINE 100 MG: 100 TABLET, FILM COATED ORAL at 09:57

## 2022-03-11 RX ADMIN — ONDANSETRON HYDROCHLORIDE 4 MG: 4 TABLET, FILM COATED ORAL at 02:59

## 2022-03-11 RX ADMIN — FUROSEMIDE 20 MG: 20 TABLET ORAL at 09:58

## 2022-03-11 RX ADMIN — HYDROCODONE BITARTRATE AND ACETAMINOPHEN 1 TABLET: 10; 325 TABLET ORAL at 08:16

## 2022-03-11 RX ADMIN — IPRATROPIUM BROMIDE AND ALBUTEROL SULFATE 3 ML: .5; 3 SOLUTION RESPIRATORY (INHALATION) at 07:48

## 2022-03-11 RX ADMIN — METRONIDAZOLE 500 MG: 500 INJECTION, SOLUTION INTRAVENOUS at 06:35

## 2022-03-11 RX ADMIN — METOPROLOL SUCCINATE 25 MG: 25 TABLET, EXTENDED RELEASE ORAL at 09:58

## 2022-03-11 RX ADMIN — IPRATROPIUM BROMIDE AND ALBUTEROL SULFATE 3 ML: .5; 3 SOLUTION RESPIRATORY (INHALATION) at 15:02

## 2022-03-11 RX ADMIN — ASPIRIN 81 MG: 81 TABLET, COATED ORAL at 09:57

## 2022-03-11 RX ADMIN — LORAZEPAM 1 MG: 1 TABLET ORAL at 14:36

## 2022-03-11 RX ADMIN — IPRATROPIUM BROMIDE AND ALBUTEROL SULFATE 3 ML: .5; 3 SOLUTION RESPIRATORY (INHALATION) at 11:39

## 2022-03-11 RX ADMIN — ACETYLCYSTEINE 3 ML: 200 SOLUTION ORAL; RESPIRATORY (INHALATION) at 18:55

## 2022-03-11 RX ADMIN — METHOCARBAMOL 500 MG: 500 TABLET ORAL at 02:58

## 2022-03-11 RX ADMIN — PANTOPRAZOLE SODIUM 40 MG: 40 TABLET, DELAYED RELEASE ORAL at 06:35

## 2022-03-11 RX ADMIN — POTASSIUM CHLORIDE 40 MEQ: 1500 TABLET, EXTENDED RELEASE ORAL at 08:16

## 2022-03-11 RX ADMIN — GABAPENTIN 400 MG: 400 CAPSULE ORAL at 21:00

## 2022-03-11 RX ADMIN — HEPARIN SODIUM 5000 UNITS: 5000 INJECTION INTRAVENOUS; SUBCUTANEOUS at 21:00

## 2022-03-11 RX ADMIN — FERROUS SULFATE TAB EC 324 MG (65 MG FE EQUIVALENT) 324 MG: 324 (65 FE) TABLET DELAYED RESPONSE at 08:16

## 2022-03-11 RX ADMIN — CLOPIDOGREL BISULFATE 75 MG: 75 TABLET, FILM COATED ORAL at 09:57

## 2022-03-11 RX ADMIN — LORAZEPAM 1 MG: 1 TABLET ORAL at 06:39

## 2022-03-11 RX ADMIN — ACETYLCYSTEINE 3 ML: 200 SOLUTION ORAL; RESPIRATORY (INHALATION) at 07:54

## 2022-03-11 RX ADMIN — POTASSIUM CHLORIDE 20 MEQ: 1500 TABLET, EXTENDED RELEASE ORAL at 17:55

## 2022-03-11 RX ADMIN — GUAIFENESIN 600 MG: 600 TABLET, EXTENDED RELEASE ORAL at 21:00

## 2022-03-11 RX ADMIN — HEPARIN SODIUM 5000 UNITS: 5000 INJECTION INTRAVENOUS; SUBCUTANEOUS at 06:35

## 2022-03-11 RX ADMIN — SPIRONOLACTONE 25 MG: 25 TABLET ORAL at 21:00

## 2022-03-11 NOTE — PLAN OF CARE
"  Problem: Adult Inpatient Plan of Care  Goal: Plan of Care Review  Flowsheets  Taken 3/11/2022 1531 by Justine Perry, PT  Progress: improving  Outcome Evaluation: Pt agreeable to gait training only this date, with improved gait distance of 250 ft requiring supervision (pushes IV pole) and 3L 02 with Sp02 >92%, however, pt reports, \"I feel like I just ran a marathon\" after gait training bout and with decline in activity tolerance/endurance from baseline. Pt plans to go home with  PT to follow and recommending pt use RW at d/c for improved safety/endurance. Continue seeing 3x/week at [facility] for progression of mobility. PPE: gloves, mask, safety glasses  Plan of Care Reviewed With: patient     "

## 2022-03-11 NOTE — CONSULTS
Nutrition Services    Patient Name:  Gayathri Reddy  YOB: 1945  MRN: [id number]  Admit Date:  3/6/2022    Consult for LELA received.   Nutrition assessment completed on 3/9 with malnutrition criteria met, see below.    Moderate chronic disease related malnutrition related to inadequate calorie intake PTA in the context of multiple chronic disease states (COPD, HF) as evidenced by report of po intake PTA providing less than 75% of nutrition needs for greater than 1 month PTA and physical exam revealing moderate muscle loss.    RD to continue to follow.    Electronically signed by:  Radha Jimenez RD  03/11/22 13:36 EST

## 2022-03-11 NOTE — CASE MANAGEMENT/SOCIAL WORK
Continued Stay Note  [facility]     Patient Name: Gayathri Reddy  MRN: [id number]  Today's Date: 3/11/2022    Admit Date: 3/6/2022     Discharge Plan     Row Name 03/11/22 0834       Plan    Plan Home with [facility], Accepted, order in place, watch for IV antibiotics    Plan Comments Patient accepted at [facility], Dr Reeder placed Home health order. Updated patient via phone and remains agreeable.  d/c barrier: IV antibiotics              Phone communication or documentation only - no physical contact with patient or family.      Vanessa Fraser RN

## 2022-03-11 NOTE — PROGRESS NOTES
"[facility] PROGRESS NOTE     LOS: 5 days    Patient Care Team:  Deonte Hector MD as PCP - General      Subjective     Patient was seen and examined this morning.  Still has exertional dyspnea with minor exertion.  Denies any chest pain, nausea or vomiting    Objective     Vital Sign Min/Max for last 24 hours  Temp:  [97.6 °F (36.4 °C)-100.9 °F (38.3 °C)] 98.1 °F (36.7 °C)  Heart Rate:  [72-82] 77  Resp:  [14-22] 20  BP: (107-133)/(48-87) 107/69                       Flowsheet Rows    Flowsheet Row First Filed Value   Admission Height 165.1 cm (65\") Documented at 03/06/2022 1049   Admission Weight 59.9 kg (132 lb) Documented at 03/06/2022 1049          No intake/output data recorded.  I/O last 3 completed shifts:  In: 840 [P.O.:240; IV Piggyback:600]  Out: -     Physical Exam:  Physical Exam    General Appearance: Chronically ill-appearing, NAD  Skin: warm and dry  HEENT: oral mucosa normal, nonicteric sclera  Neck: supple, no JVD  Lungs: Bilateral wheezing.  Bibasilar crackles  Heart: RRR, normal S1 and S2  Abdomen: soft, nontender, nondistended  : no palpable bladder  Extremities: no edema, cyanosis or clubbing  Neuro: normal speech and mental status       LABS:  Lab Results   Component Value Date    CALCIUM 7.9 (L) 03/10/2022    PHOS 3.1 03/10/2022     Results from last 7 days   Lab Units 03/10/22  2338 03/10/22  0137 03/09/22  0009 03/06/22  2311 03/06/22  1129   MAGNESIUM mg/dL 2.9* 1.4*  --   --   --    SODIUM mmol/L 136 139 136   < > 140   POTASSIUM mmol/L 3.3* 3.5 2.9*   < > 4.4   CHLORIDE mmol/L 98 101 95*   < > 103   CO2 mmol/L 28.0 25.0 28.0   < > 27.0   BUN mg/dL 18 20 20   < > 24*   CREATININE mg/dL 1.22* 1.18* 1.33*   < > 1.02*   GLUCOSE mg/dL 166* 108* 141*   < > 124*   CALCIUM mg/dL 7.9* 8.6 8.8   < > 8.5*   WBC 10*3/mm3 13.70* 14.10* 14.50*   < > 13.10*   HEMOGLOBIN g/dL 8.2* 8.5* 8.5*   < > 8.5*   PLATELETS 10*3/mm3 228 225 251   < > 207   ALT (SGPT) U/L  --   --   --   --  <5   AST " (SGOT) U/L  --   --   --   --  5    < > = values in this interval not displayed.     Lab Results   Component Value Date    TROPONINI 8.820 (C) 01/07/2021    TROPONINT <0.010 03/06/2022     Estimated Creatinine Clearance: 35.7 mL/min (A) (by C-G formula based on SCr of 1.22 mg/dL (H)).  Results from last 7 days   Lab Units 03/06/22  1147   PH, ARTERIAL pH units 7.379   PO2 ART mm Hg 74.8*   PCO2, ARTERIAL mm Hg 51.6*   HCO3 ART mmol/L 30.4*     Brief Urine Lab Results     None        WEIGHTS:     Wt Readings from Last 1 Encounters:   03/10/22 0556 57.6 kg (126 lb 14.4 oz)   03/09/22 0430 57.5 kg (126 lb 12.2 oz)   03/06/22 1049 59.9 kg (132 lb)       acetylcysteine, 3 mL, Nebulization, BID - RT  vitamin C, 250 mg, Oral, Daily  aspirin, 81 mg, Oral, Daily  atorvastatin, 80 mg, Oral, Nightly  cefTRIAXone, 1 g, Intravenous, Q24H  cholecalciferol, 500 Units, Oral, Daily  clopidogrel, 75 mg, Oral, Daily  estradiol, 3 mg, Oral, Nightly  ferrous sulfate, 324 mg, Oral, Daily With Breakfast  folic acid-pyridoxine-cyanocobalamin, 1 tablet, Oral, Daily  furosemide, 20 mg, Oral, Daily  gabapentin, 400 mg, Oral, Q8H  guaiFENesin, 600 mg, Oral, Q12H  heparin (porcine), 5,000 Units, Subcutaneous, Q8H  ipratropium-albuterol, 3 mL, Nebulization, 4x Daily - RT  isosorbide mononitrate, 30 mg, Oral, Q24H  levothyroxine, 88 mcg, Oral, Q AM  metoprolol succinate XL, 25 mg, Oral, Daily  metroNIDAZOLE, 500 mg, Intravenous, Q8H  pantoprazole, 40 mg, Oral, Q AM  ranolazine, 1,000 mg, Oral, Q12H  sertraline, 100 mg, Oral, Daily  sodium chloride, 10 mL, Intravenous, Q12H           Assessment/Plan       1.Acute kidney injury.  Mild  Baseline creatinine seems to be around 0.8 MG/DL but has fluctuated in past due to diuretics.    Creatinine relatively stable, 1.2 Mg/DL this morning.  Volume status still generous  2.  Hypertension  Blood Pressure well controlled  3.    Acute on chronic respiratory failure.  Pneumonia with underlying COPD  4.   Congestive heart failure.  Systolic.  Chronic  5.  Hypokalemia. Improved  5.Hypomagnesemia      Recs:  I will give additional IV Lasix today and increase the dose of oral Lasix.  Aldactone was added yesterday  We will have to accept some degree of azotemia related to diuretics to maintain volume status.  Discussed with patient in detail.  Also discussed with pulmonary and primary service  Oral potassium supplement      Irving Yi MD  03/11/22  11:55 EST

## 2022-03-11 NOTE — PLAN OF CARE
Goal Outcome Evaluation:      Patient did well overnight. She has had no complaints at this time other than her chronic back pain which was managed with PRN meds. No overt dysrhythmias noted last night. She has spiked a low grade fever which quickly came down to normal range.

## 2022-03-11 NOTE — PROGRESS NOTES
[facility]        Patient Name: Gayathri Reddy  : 1945  MRN: [id number]  Primary Care Physician:  Deonte Hector MD  Date of admission: 3/6/2022           Subjective          Chief Complaint: Dyspnea     History of Present Illness: Gayathri Reddy is a 76 y.o. female with PMHx of HTN, HLD, Hypothyroid, GERD, cAD, AAA, CHF, COPD (3L NC chronically) who presents due to dyspnea.  Admits to dyspnea with exertion complicated by productive cough ongoing for the past three days and similar to previous experiences with pneumonia.  Denies chest pain, abdominal pain, numbness, tingling, hemoptysis.  Admits to increasing home oxygen from 3 to 4L latelty.  Due to multiple risk factors, went to [facility] for evaluation     In the ED, vitals 98.8F, HR 84, RR 18, 135/64, 97 4L.  Labs notable for troponin < 0.01, proBNP 50850, glucose 124, creatinine 1.02, white count 13.1, Hgb 8.5.  CXR suggestive of multi-focal pneumonia.  Respiratory panel negative for covid or other virus.  Patient started on abx, lasix, and admitted to medicine for evaluation.     3/7/2022; still complaining of shortness of breath with minimal exertion, T-max of 36.6, vital signs are stable currently on 3 L of nasal cannula.  Spoke with RN.  Serum creatinine bumped up to 1.27 prerenal NOE consult nephrology, proBNP 47005, will consult cardiology.  3/8/2022; patient walked about 200 feet without getting hypoxia on supplemental oxygen but still increased work of breathing, otherwise hemodynamically stable.  3/9/2022; past patient still endorses shortness of breath with minimal exertion, has been endorsing diarrhea for the last 2 or 3 days, will check stool for C. difficile, T-max of 100.7, vital stable currently on 1 L of nasal cannula.  3/10/2022; C. difficile came back negative, started on Imodium as needed, hypoalbuminemia, check prealbumin, had some overnight paroxysmal atrial tachycardia beta-blocker  dose increased, spironolactone has been added by nephrology.  3/11/2022; diarrhea has resolved apparently patient did not get spironolactone yesterday, as order was not placed, chest x-ray PA and lateral still with features of multifocal pneumonia in the lungs with improved aeration compared to 3/6/2022 pulmonology cardiology and nephrology following.  Review of Systems   Constitutional: Positive for malaise/fatigue. Negative for chills and fever.   HENT: Negative for hearing loss, hoarse voice and nosebleeds.    Eyes: Negative for discharge, double vision and pain.   Cardiovascular: Positive for dyspnea on exertion. Negative for leg swelling.   Respiratory: Positive for cough and shortness of breath. Negative for hemoptysis.    Endocrine: Negative for polydipsia, polyphagia and polyuria.   Skin: Negative for dry skin, flushing and itching.   Musculoskeletal: Negative for arthritis, back pain and falls.   Gastrointestinal: Negative for abdominal pain and constipation.   Genitourinary: Negative for dysuria, flank pain and frequency.   Neurological: Negative for headaches, light-headedness and weakness.   Psychiatric/Behavioral: Negative for altered mental status, depression and hallucinations.         Personal History      Medical History        Past Medical History:   Diagnosis Date   • Aneurysm (HCC)       AAA   • Arthritis     • CHF (congestive heart failure) (HCC)     • COPD (chronic obstructive pulmonary disease) (HCC)     • Coronary artery disease     • Dyslipidemia     • GERD (gastroesophageal reflux disease)     • History of right heart catheterization       7/30/2017; 10/2018   • Hyperlipidemia     • Hypertension     • Hypothyroidism     • Myocardial infarct (HCC)     • Myocardial infarction (HCC)       x 3   • Pneumonia 11/2019     bilateral             Surgical History         Past Surgical History:   Procedure Laterality Date   • ABDOMINAL AORTIC ANEURYSM REPAIR N/A 2001   • CARDIAC CATHETERIZATION        • CATARACT EXTRACTION Bilateral     • CORONARY ANGIOPLASTY WITH STENT PLACEMENT N/A       x 5   • CORONARY ARTERY BYPASS GRAFT         x 4 1995   • HYSTERECTOMY N/A              Family History: family history includes Aneurysm in her mother; Cancer in her paternal grandfather; Colon cancer in her sister; Heart disease in her father and mother. Otherwise pertinent FHx was reviewed and not pertinent to current issue.     Social History:  reports that she quit smoking about 28 years ago. Her smoking use included cigarettes. She smoked 1.50 packs per day. She has never used smokeless tobacco. She reports that she does not drink alcohol and does not use drugs.     Home Medications:           Prior to Admission Medications      Prescriptions Last Dose Informant Patient Reported? Taking?     aspirin 81 MG EC tablet     Yes No     Take 81 mg by mouth Daily.     Budeson-Glycopyrrol-Formoterol (BREZTRI AEROSPHERE IN)     Yes No     Inhale.     cholecalciferol (VITAMIN D3) 10 MCG (400 UNIT) tablet     Yes No     Take 400 Units by mouth Daily.     clopidogrel (PLAVIX) 75 MG tablet     Yes No     Take 75 mg by mouth Daily.     diphenhydrAMINE (BENADRYL) 25 mg capsule     Yes No     Take 25 mg by mouth Every 6 (Six) Hours As Needed.     estradiol (ESTRACE) 1 MG tablet     Yes No     Take 1 mg by mouth 3 (Three) Times a Day.     ferrous sulfate 324 (65 Fe) MG tablet delayed-release EC tablet   Self Yes No     Take 324 mg by mouth Daily With Breakfast.     furosemide (LASIX) 20 MG tablet     No No     Take 1 tablet by mouth Daily.     Patient taking differently:  Take  by mouth Daily. Once daily     gabapentin (NEURONTIN) 400 MG capsule     Yes No     Take 400 mg by mouth 3 (Three) Times a Day.     HYDROcodone-acetaminophen (NORCO)  MG per tablet     Yes No     Take 1 tablet by mouth Every 6 (Six) Hours As Needed.     isosorbide mononitrate (IMDUR) 30 MG 24 hr tablet     Yes No     Take 30 mg by mouth Daily.      levothyroxine (SYNTHROID, LEVOTHROID) 88 MCG tablet     Yes No     Take 88 mcg by mouth Daily.     lisinopril (PRINIVIL,ZESTRIL) 2.5 MG tablet     No No     Take 1 tablet by mouth Daily.     LORazepam (ATIVAN) 1 MG tablet     Yes No     Take 1 mg by mouth 4 (Four) Times a Day As Needed.     methocarbamol (ROBAXIN) 500 MG tablet   Self Yes No     Take 500 mg by mouth 3 (Three) Times a Day As Needed for Muscle Spasms.     metoprolol succinate XL (TOPROL-XL) 25 MG 24 hr tablet     No No     Take 0.5 tablets by mouth Daily.     nitroglycerin (Nitrostat) 0.4 MG SL tablet     No No     Place 1 tablet under the tongue Every 5 (Five) Minutes As Needed for Chest Pain.     pantoprazole (PROTONIX) 40 MG EC tablet     No No     Take 1 tablet by mouth Daily.     Potassium 99 MG tablet     Yes No     Take  by mouth.     ranolazine (RANEXA) 1000 MG 12 hr tablet     No No     Take 1 tablet by mouth Every 12 (Twelve) Hours.     rosuvastatin (CRESTOR) 10 MG tablet     Yes No     Take 10 mg by mouth Daily.     sertraline (ZOLOFT) 100 MG tablet     Yes No     Take 100 mg by mouth Daily.     vitamin C (ASCORBIC ACID) 250 MG tablet     Yes No     Take 250 mg by mouth Daily.                Allergies:       Allergies   Allergen Reactions   • Naprosyn  [Naproxen] Rash   • Bactrim [Sulfamethoxazole-Trimethoprim] Rash               Objective          Vitals:   Temp:  [97.6 °F (36.4 °C)-100.9 °F (38.3 °C)] 98.7 °F (37.1 °C)  Heart Rate:  [72-82] 81  Resp:  [14-22] 18  BP: (105-133)/(55-87) 116/56  Flow (L/min):  [2] 2     Physical Exam  Constitutional:       General: She is not in acute distress.     Appearance: She is not toxic-appearing.   HENT:      Head: Normocephalic and atraumatic.      Nose: Nose normal. No congestion.      Mouth/Throat:      Pharynx: Oropharynx is clear. No oropharyngeal exudate.   Eyes:      General: No scleral icterus.  Cardiovascular:      Rate and Rhythm: Normal rate and regular rhythm.      Heart sounds: No murmur  heard.    No friction rub. No gallop.   Pulmonary:      Effort: No respiratory distress.      Breath sounds: Bibasilar Rales present. No wheezing.       Abdominal:      General: There is no distension.      Tenderness: There is no abdominal tenderness. There is no guarding.   Musculoskeletal:         General: No swelling or deformity.      Cervical back: Normal range of motion. No rigidity.      Right lower leg: No edema.      Left lower leg: No edema.   Skin:     Coloration: Skin is not jaundiced.      Findings: No bruising or lesion.   Neurological:      General: No focal deficit present.      Mental Status: She is alert and oriented to person, place, and time.      Motor: No weakness.   Psychiatric:         Mood and Affect: Mood normal.         Behavior: Behavior normal.         Thought Content: Thought content normal.         Judgment: Judgment normal.                  Result Review       Result Review:  I have personally reviewed the results from the time of this admission to 3/6/2022 13:01 EST and agree with these findings:  [x]?  Laboratory  []?  Microbiology  [x]?  Radiology  []?  EKG/Telemetry   []?  Cardiology/Vascular   []?  Pathology  []?  Old records  []?  Other:     Lab Results   Component Value Date    WBC 13.70 (H) 03/10/2022    HGB 8.2 (L) 03/10/2022    HCT 24.2 (L) 03/10/2022    MCV 98.4 (H) 03/10/2022     03/10/2022     Lab Results   Component Value Date    GLUCOSE 166 (H) 03/10/2022    CALCIUM 7.9 (L) 03/10/2022     03/10/2022    K 3.3 (L) 03/10/2022    CO2 28.0 03/10/2022    CL 98 03/10/2022    BUN 18 03/10/2022    CREATININE 1.22 (H) 03/10/2022    EGFRIFAFRI >60 01/05/2018    EGFRIFNONA 27 (L) 02/18/2022    BCR 14.8 03/10/2022    ANIONGAP 10.0 03/10/2022      CT Chest Without Contrast Diagnostic    Result Date: 3/6/2022  1. Multifocal groundglass opacities throughout the lungs most pronounced in the lower lobes which may relate to atypical infection or COVID-19 pneumonia. 2. Focal  nodular airspace disease in right upper lobe measuring 18 x 14 mm. There was previously a cluster of nodules in this region and this could relate to chronic inflammation secondary to prior infection or inflammation, however recommend short-term follow-up in 2-3 months or PET/CT to evaluate this nodule. 3. Additional chronic findings above.  Electronically Signed By-Nahid Cooley MD On:3/6/2022 6:29 PM This report was finalized on [id number] by  Nahid Cooley MD.    XR Chest 1 View    Result Date: 3/6/2022  Patchy multifocal airspace disease involving the lung bases concerning for pneumonia.  Electronically Signed By-Nahid Cooley MD On:3/6/2022 12:03 PM This report was finalized on [id number] by  Nahid Cooley MD.             Assessment/Plan              Active Hospital Problems:  There are no active hospital problems to display for this patient.     Assessment/plan;     #Acute on chronic hypoxia with hypercapnia; wears baseline 3 L and 4 L  #Multi-focal pneumonia/focal nodular airspace disease in the right upper lobe measuring 18 x 14  mm;    - ABG 7.379/51.6/74.8/30.4    -CXR shows multi-focal pneumonai    - CT chest  as  Above.    - Unasyn 3g IV q6h patient had a urticarial reaction for Unasyn requiring 1 dose of Decadron and Benadryl, switched to Rocephin on 3/8/2022, end of treatment 3/13/2022 IV Flagyl finishing on 3/9/2022    - RVP negative, procalcitonin normal    - blood cultures NTD    - respiratory culture    - legionella and strep, both negative.    - white count 13.1 --> 14.50  -,  consult pulmonary appreciated.  # Diarrhea; likely antibiotic associated, checked stool for C. Difficile, came back negative, start on Imodium 2 mg p.o. every 6 as needed  #Paroxysmal atrial tachycardia; increased metoprolol XL 25 mg daily  #Hypoalbuminemia; checked prealbumin low, supplements  #moderate malnutrition; dietician consult.  # Acute kidney injury; baseline creatinine seems to be around 0.8 mg/dL, but has  fluctuated serum creatinine 1.27 likely prerenal, nephrology consult appreciated on low-dose oral Lasix diuretic.  Further diuresis as per nephrology  #COPD    -wears 3L chronically, currently on 4L NC    - suspect pneumonia    - Duonebs QID     #Acute on chronic diastolic and systolic HF    -Fluid restriction 2 L/day, patient thin built not appreciable edema    - proBNP 49248 --2775, consult cardiology appreciated.    - CT chest as above, resumed low-dose Lasix on 3/8/2022, spironolactone added on by nephrology.      #CAD s/p CABG with attrition 3/4 grafts/ischemic cardiomyopathy most recent ejection 45% April 2021      - resume home aspirin, statin, Toprol, imdur and Ranexa .     #AAA    - CT a/p shows infra-renal abdominal aorta at 3.0x3.7cm on 11/4/2020    - will need to be re-evaluated as otpt     #HTN    - hold lisinopril     - resume home toprol      #HLD    - resume home statin     #GERD    - PPI     #Hypothyroid    - resume home synthroid      #Macrocytic hyperchromic anemia    -hgb 8.5 on admission, base ~ 10.0    - checked iron panel and ferritin, not suggestive of iron deficiency anemia, and vitamin B12 normal.  - folate deficiency replace.     #Anxiety     - resume home Zoloft once verified     #DVT prophylaxis    -heparin     CODE STATUS:    Admission Status:  I believe this patient meets inpatient status.     I discussed the patient's findings and my recommendations with patient.   Disposition; PT OT recommended inpatient rehab due to decrease in activity tolerance with moderate dyspnea with exertion however patient is not agreeable but willing to go home with home health and PT  This patient has been examined wearing appropriate Personal Protective Equipment and discussed with Patient      Electronically signed by Sky Reeder MD, 03/11/22, 8:06 AM EST.

## 2022-03-11 NOTE — THERAPY TREATMENT NOTE
"Subjective: Pt initially refuses therapy, however, agreeable with education on importance of mobility for maintaining level of function and strength/endurance. Agreeable to gait training only. Reports 9/10 pain (RN just administered Ativan and pain medication).    Objective:     Bed mobility - Modified-Independent  Transfers - Modified-Independent  Ambulation - 250 feet Supervision (pushes IV pole)    Pain: 9 VAS  Education: Provided education on importance of mobility and skilled verbal / tactile cueing throughout intervention.     Assessment: Pt agreeable to gait training only this date, with improved gait distance of 250 ft requiring supervision (pushes IV pole) and 3L 02 with Sp02 >92%, however, pt reports, \"I feel like I just ran a marathon\" after gait training bout and with decline in activity tolerance/endurance from baseline. Pt plans to go home with  PT to follow and recommending pt use RW at d/c for improved safety/endurance. Continue seeing 3x/week at [facility] for progression of mobility. PPE: gloves, mask, safety glasses    Plan/Recommendations:   Pt would benefit from Home with Home Health at discharge from facility and requires no DME at discharge.   Pt desires Home with Home Health at discharge. Pt cooperative; agreeable to therapeutic recommendations and plan of care.     Basic Mobility 6-click:  Rollin = Total, A lot = 2, A little = 3; 4 = None  Supine>Sit:   1 = Total, A lot = 2, A little = 3; 4 = None   Sit>Stand with arms:  1 = Total, A lot = 2, A little = 3; 4 = None  Bed>Chair:   1 = Total, A lot = 2, A little = 3; 4 = None  Ambulate in room:  1 = Total, A lot = 2, A little = 3; 4 = None  3-5 Steps with railin = Total, A lot = 2, A little = 3; 4 = None  Score: 21    Post-Tx Position: Sitting EOB with family member and RN present  PPE: gloves, surgical mask, eyewear protection     "

## 2022-03-11 NOTE — PROGRESS NOTES
Daily Progress Note        Pneumonia of both lungs due to infectious organism, unspecified part of lung    Abdominal aortic aneurysm (AAA) (HCC)    Chronic obstructive pulmonary disease (HCC)    Congestive heart failure (HCC)    Coronary artery disease    Dyslipidemia    Hypertension    Anemia of chronic disease    Mixed hyperlipidemia    Bilateral carotid artery stenosis      Assessment  Acute on chronic hypoxia with hypercapnia- wears 3-4 L at home  Multifocal pneumonia  Pulmonary edema  Allergic reaction to Unasyn in the form of urticaria  Right upper lobe nodule  NOE  COPD  Diastolic and systolic CHF-  BNP> 15,000  CAD s/p CABG April 2021  AAA  HTN  HLD  GERD  Hypothyroidism  Macrocytic hyperchromic anemia  Anxiety     Legionella and strep pneumonia antigens negative  Respiratory panel negative     ABG 3/6/2022 on nasal cannula-pH 7.37, CO2 51.6, PO2 74.8, bicarb 30.4    Plan  Diuretics discussed with nephrology    Continue to monitor respiratory status- Currently on  room air  Rocephin for PNA-finishes 3/13/2022  Mucinex  Mucomyst nebulizers  Inhaled corticosteroids  Bronchodilators  Electrolyte/Glycemic control  DVT/GI Prophylaxis-Heparin SQ and Protonix  Levothyroxine 88mcg    3/11/2022                                                    3/6/2022       3/6/2022       LOS: 5 days     Subjective   Feeling better and finally coughed up secretions this morning      Objective     Vital signs for last 24 hours:  Vitals:    03/10/22 2130 03/10/22 2347 03/11/22 0259 03/11/22 0402   BP:  127/87  116/56   BP Location:  Left arm  Left arm   Patient Position:  Lying  Lying   Pulse:  80  72   Resp:  21  18   Temp: 99.8 °F (37.7 °C) (!) 100.9 °F (38.3 °C) 98.6 °F (37 °C) 98.7 °F (37.1 °C)   TempSrc: Oral Oral  Oral   SpO2:  96%  97%   Weight:       Height:           Intake/Output last 3 shifts:  I/O last 3 completed shifts:  In: 840 [P.O.:240; IV Piggyback:600]  Out: -   Intake/Output this shift:  No intake/output data  recorded.      Radiology  Imaging Results (Last 24 Hours)     ** No results found for the last 24 hours. **          Labs:  Results from last 7 days   Lab Units 03/10/22  2338   WBC 10*3/mm3 13.70*   HEMOGLOBIN g/dL 8.2*   HEMATOCRIT % 24.2*   PLATELETS 10*3/mm3 228     Results from last 7 days   Lab Units 03/10/22  2338 03/06/22  2311 03/06/22  1129   SODIUM mmol/L 136   < > 140   POTASSIUM mmol/L 3.3*   < > 4.4   CHLORIDE mmol/L 98   < > 103   CO2 mmol/L 28.0   < > 27.0   BUN mg/dL 18   < > 24*   CREATININE mg/dL 1.22*   < > 1.02*   CALCIUM mg/dL 7.9*   < > 8.5*   BILIRUBIN mg/dL  --   --  0.2   ALK PHOS U/L  --   --  56   ALT (SGPT) U/L  --   --  <5   AST (SGOT) U/L  --   --  5   GLUCOSE mg/dL 166*   < > 124*    < > = values in this interval not displayed.     Results from last 7 days   Lab Units 03/06/22  1147   PH, ARTERIAL pH units 7.379   PO2 ART mm Hg 74.8*   PCO2, ARTERIAL mm Hg 51.6*   HCO3 ART mmol/L 30.4*     Results from last 7 days   Lab Units 03/10/22  2338 03/10/22  0137 03/09/22  0009   ALBUMIN g/dL 2.70* 2.60* 3.10*     Results from last 7 days   Lab Units 03/06/22  1129   TROPONIN T ng/mL <0.010         Results from last 7 days   Lab Units 03/10/22  2338   MAGNESIUM mg/dL 2.9*                   Meds:   SCHEDULE  acetylcysteine, 3 mL, Nebulization, BID - RT  vitamin C, 250 mg, Oral, Daily  aspirin, 81 mg, Oral, Daily  atorvastatin, 80 mg, Oral, Nightly  cefTRIAXone, 1 g, Intravenous, Q24H  cholecalciferol, 500 Units, Oral, Daily  clopidogrel, 75 mg, Oral, Daily  estradiol, 3 mg, Oral, Nightly  ferrous sulfate, 324 mg, Oral, Daily With Breakfast  folic acid-pyridoxine-cyanocobalamin, 1 tablet, Oral, Daily  furosemide, 20 mg, Oral, Daily  gabapentin, 400 mg, Oral, Q8H  guaiFENesin, 600 mg, Oral, Q12H  heparin (porcine), 5,000 Units, Subcutaneous, Q8H  ipratropium-albuterol, 3 mL, Nebulization, 4x Daily - RT  isosorbide mononitrate, 30 mg, Oral, Q24H  levothyroxine, 88 mcg, Oral, Q AM  metoprolol  succinate XL, 25 mg, Oral, Daily  metroNIDAZOLE, 500 mg, Intravenous, Q8H  pantoprazole, 40 mg, Oral, Q AM  ranolazine, 1,000 mg, Oral, Q12H  sertraline, 100 mg, Oral, Daily  sodium chloride, 10 mL, Intravenous, Q12H      Infusions     PRNs  diphenhydrAMINE  •  docusate sodium  •  HYDROcodone-acetaminophen  •  loperamide  •  LORazepam  •  magnesium sulfate **OR** magnesium sulfate **OR** magnesium sulfate  •  methocarbamol  •  nitroglycerin  •  ondansetron **OR** ondansetron  •  phenylephrine-mineral oil-petrolatum  •  sodium chloride  •  sodium chloride    Physical Exam:  Physical Exam  General Appearance:  Alert.  No distress noted.  HEENT:  Normocephalic, without obvious abnormality, Conjunctiva/corneas clear,.  Normal external ear canals, Nares normal, no drainage     Neck:  Supple, symmetrical, trachea midline. No JVD.  Lungs /Chest wall:   Bilateral basal rhonchi, respirations unlabored symmetrical wall movement.     Heart:  Regular rate and rhythm, systolic murmur PMI left sternal border  Abdomen: Soft, non-tender, no masses, no organomegaly.    Extremities: No edema, no clubbing or cyanosis    ROS  Review of Systems  Constitutional: Negative for chills, fever and malaise/fatigue.   HENT: Negative.    Eyes: Negative.    Cardiovascular: Negative.    Respiratory: Positive for cough and shortness of breath.    Skin: Negative.    Musculoskeletal: Negative.    Gastrointestinal: Negative.    Genitourinary: Negative.    Neurological: Negative.    Psychiatric/Behavioral: Negative.        I have reviewed current clinicals.     Electronically signed by RADHA Rodriguez, 03/11/22, 7:42 AM EST.     The NP scribed the note for me, I have examined the patient and reviewed and verified the above findings and and I formulated the assessment and plan as documented

## 2022-03-11 NOTE — PROGRESS NOTES
Spoke with patient to verify demographics and explain services. Pt is agreeable and has had the service in the past.     SA for PNA teaching    Spoke with Celestina. Dr. Hector is agreeable to sign the plan of care and follow for home health orders    [facility] [place]  DME: [facility] for her home o2

## 2022-03-12 ENCOUNTER — TRANSCRIBE ORDERS (OUTPATIENT)
Dept: ADMINISTRATIVE | Facility: HOSPITAL | Age: 77
End: 2022-03-12

## 2022-03-12 DIAGNOSIS — J84.9 INTERSTITIAL LUNG DISEASE: Primary | ICD-10-CM

## 2022-03-12 LAB
ALBUMIN SERPL-MCNC: 2.5 G/DL (ref 3.5–5.2)
ANION GAP SERPL CALCULATED.3IONS-SCNC: 11 MMOL/L (ref 5–15)
BASOPHILS # BLD AUTO: 0.1 10*3/MM3 (ref 0–0.2)
BASOPHILS # BLD AUTO: 0.1 10*3/MM3 (ref 0–0.2)
BASOPHILS NFR BLD AUTO: 0.4 % (ref 0–1.5)
BASOPHILS NFR BLD AUTO: 0.9 % (ref 0–1.5)
BUN SERPL-MCNC: 20 MG/DL (ref 8–23)
BUN/CREAT SERPL: 14.8 (ref 7–25)
CALCIUM SPEC-SCNC: 8.4 MG/DL (ref 8.6–10.5)
CHLORIDE SERPL-SCNC: 102 MMOL/L (ref 98–107)
CO2 SERPL-SCNC: 25 MMOL/L (ref 22–29)
CREAT SERPL-MCNC: 1.35 MG/DL (ref 0.57–1)
DEPRECATED RDW RBC AUTO: 44.6 FL (ref 37–54)
DEPRECATED RDW RBC AUTO: 45.9 FL (ref 37–54)
EGFRCR SERPLBLD CKD-EPI 2021: 40.8 ML/MIN/1.73
EOSINOPHIL # BLD AUTO: 0.4 10*3/MM3 (ref 0–0.4)
EOSINOPHIL # BLD AUTO: 0.7 10*3/MM3 (ref 0–0.4)
EOSINOPHIL NFR BLD AUTO: 3.7 % (ref 0.3–6.2)
EOSINOPHIL NFR BLD AUTO: 5.5 % (ref 0.3–6.2)
ERYTHROCYTE [DISTWIDTH] IN BLOOD BY AUTOMATED COUNT: 12.8 % (ref 12.3–15.4)
ERYTHROCYTE [DISTWIDTH] IN BLOOD BY AUTOMATED COUNT: 13 % (ref 12.3–15.4)
GLUCOSE SERPL-MCNC: 112 MG/DL (ref 65–99)
HCT VFR BLD AUTO: 21.9 % (ref 34–46.6)
HCT VFR BLD AUTO: 23.9 % (ref 34–46.6)
HGB BLD-MCNC: 7.4 G/DL (ref 12–15.9)
HGB BLD-MCNC: 7.9 G/DL (ref 12–15.9)
LYMPHOCYTES # BLD AUTO: 0.9 10*3/MM3 (ref 0.7–3.1)
LYMPHOCYTES # BLD AUTO: 1.9 10*3/MM3 (ref 0.7–3.1)
LYMPHOCYTES NFR BLD AUTO: 15.7 % (ref 19.6–45.3)
LYMPHOCYTES NFR BLD AUTO: 7.4 % (ref 19.6–45.3)
MAGNESIUM SERPL-MCNC: 1.9 MG/DL (ref 1.6–2.4)
MCH RBC QN AUTO: 32.7 PG (ref 26.6–33)
MCH RBC QN AUTO: 33.1 PG (ref 26.6–33)
MCHC RBC AUTO-ENTMCNC: 33.1 G/DL (ref 31.5–35.7)
MCHC RBC AUTO-ENTMCNC: 33.7 G/DL (ref 31.5–35.7)
MCV RBC AUTO: 98.1 FL (ref 79–97)
MCV RBC AUTO: 98.6 FL (ref 79–97)
MONOCYTES # BLD AUTO: 0.5 10*3/MM3 (ref 0.1–0.9)
MONOCYTES # BLD AUTO: 0.8 10*3/MM3 (ref 0.1–0.9)
MONOCYTES NFR BLD AUTO: 4.6 % (ref 5–12)
MONOCYTES NFR BLD AUTO: 6.6 % (ref 5–12)
NEUTROPHILS NFR BLD AUTO: 71.8 % (ref 42.7–76)
NEUTROPHILS NFR BLD AUTO: 8.8 10*3/MM3 (ref 1.7–7)
NEUTROPHILS NFR BLD AUTO: 83.4 % (ref 42.7–76)
NEUTROPHILS NFR BLD AUTO: 9.9 10*3/MM3 (ref 1.7–7)
NRBC BLD AUTO-RTO: 0 /100 WBC (ref 0–0.2)
NRBC BLD AUTO-RTO: 0 /100 WBC (ref 0–0.2)
PHOSPHATE SERPL-MCNC: 2.9 MG/DL (ref 2.5–4.5)
PLATELET # BLD AUTO: 204 10*3/MM3 (ref 140–450)
PLATELET # BLD AUTO: 225 10*3/MM3 (ref 140–450)
PMV BLD AUTO: 7.6 FL (ref 6–12)
PMV BLD AUTO: 7.8 FL (ref 6–12)
POTASSIUM SERPL-SCNC: 3.7 MMOL/L (ref 3.5–5.2)
RBC # BLD AUTO: 2.23 10*6/MM3 (ref 3.77–5.28)
RBC # BLD AUTO: 2.42 10*6/MM3 (ref 3.77–5.28)
SODIUM SERPL-SCNC: 138 MMOL/L (ref 136–145)
WBC NRBC COR # BLD: 11.9 10*3/MM3 (ref 3.4–10.8)
WBC NRBC COR # BLD: 12.3 10*3/MM3 (ref 3.4–10.8)

## 2022-03-12 PROCEDURE — 83735 ASSAY OF MAGNESIUM: CPT | Performed by: INTERNAL MEDICINE

## 2022-03-12 PROCEDURE — 94761 N-INVAS EAR/PLS OXIMETRY MLT: CPT

## 2022-03-12 PROCEDURE — 63710000001 PREDNISONE PER 1 MG: Performed by: INTERNAL MEDICINE

## 2022-03-12 PROCEDURE — 85025 COMPLETE CBC W/AUTO DIFF WBC: CPT | Performed by: HOSPITALIST

## 2022-03-12 PROCEDURE — 85025 COMPLETE CBC W/AUTO DIFF WBC: CPT | Performed by: INTERNAL MEDICINE

## 2022-03-12 PROCEDURE — 99232 SBSQ HOSP IP/OBS MODERATE 35: CPT | Performed by: INTERNAL MEDICINE

## 2022-03-12 PROCEDURE — 94799 UNLISTED PULMONARY SVC/PX: CPT

## 2022-03-12 PROCEDURE — 63710000001 DIPHENHYDRAMINE PER 50 MG: Performed by: NURSE PRACTITIONER

## 2022-03-12 PROCEDURE — 25010000002 CEFTRIAXONE PER 250 MG: Performed by: NURSE PRACTITIONER

## 2022-03-12 PROCEDURE — 25010000002 HEPARIN (PORCINE) PER 1000 UNITS: Performed by: NURSE PRACTITIONER

## 2022-03-12 PROCEDURE — 80069 RENAL FUNCTION PANEL: CPT | Performed by: INTERNAL MEDICINE

## 2022-03-12 RX ORDER — PREDNISONE 20 MG/1
20 TABLET ORAL DAILY
Status: DISCONTINUED | OUTPATIENT
Start: 2022-03-12 | End: 2022-03-18 | Stop reason: HOSPADM

## 2022-03-12 RX ORDER — LANOLIN ALCOHOL/MO/W.PET/CERES
1000 CREAM (GRAM) TOPICAL DAILY
Status: DISCONTINUED | OUTPATIENT
Start: 2022-03-12 | End: 2022-03-18 | Stop reason: HOSPADM

## 2022-03-12 RX ORDER — FOLIC ACID 1 MG/1
1 TABLET ORAL DAILY
Status: DISCONTINUED | OUTPATIENT
Start: 2022-03-12 | End: 2022-03-18 | Stop reason: HOSPADM

## 2022-03-12 RX ADMIN — GABAPENTIN 400 MG: 400 CAPSULE ORAL at 05:10

## 2022-03-12 RX ADMIN — HEPARIN SODIUM 5000 UNITS: 5000 INJECTION INTRAVENOUS; SUBCUTANEOUS at 05:10

## 2022-03-12 RX ADMIN — Medication 500 UNITS: at 10:52

## 2022-03-12 RX ADMIN — PANTOPRAZOLE SODIUM 40 MG: 40 TABLET, DELAYED RELEASE ORAL at 05:10

## 2022-03-12 RX ADMIN — FUROSEMIDE 40 MG: 40 TABLET ORAL at 10:52

## 2022-03-12 RX ADMIN — HEPARIN SODIUM 5000 UNITS: 5000 INJECTION INTRAVENOUS; SUBCUTANEOUS at 13:37

## 2022-03-12 RX ADMIN — ISOSORBIDE MONONITRATE 30 MG: 30 TABLET, EXTENDED RELEASE ORAL at 13:37

## 2022-03-12 RX ADMIN — CEFTRIAXONE 1 G: 1 INJECTION, POWDER, FOR SOLUTION INTRAMUSCULAR; INTRAVENOUS at 21:55

## 2022-03-12 RX ADMIN — METOPROLOL SUCCINATE 25 MG: 25 TABLET, EXTENDED RELEASE ORAL at 10:52

## 2022-03-12 RX ADMIN — LORAZEPAM 1 MG: 1 TABLET ORAL at 11:39

## 2022-03-12 RX ADMIN — GABAPENTIN 400 MG: 400 CAPSULE ORAL at 21:58

## 2022-03-12 RX ADMIN — HEPARIN SODIUM 5000 UNITS: 5000 INJECTION INTRAVENOUS; SUBCUTANEOUS at 21:56

## 2022-03-12 RX ADMIN — POTASSIUM CHLORIDE 20 MEQ: 1500 TABLET, EXTENDED RELEASE ORAL at 17:26

## 2022-03-12 RX ADMIN — IPRATROPIUM BROMIDE AND ALBUTEROL SULFATE 3 ML: .5; 3 SOLUTION RESPIRATORY (INHALATION) at 06:42

## 2022-03-12 RX ADMIN — ESTRADIOL 3 MG: 1 TABLET ORAL at 21:59

## 2022-03-12 RX ADMIN — Medication 10 ML: at 10:52

## 2022-03-12 RX ADMIN — ASPIRIN 81 MG: 81 TABLET, COATED ORAL at 10:52

## 2022-03-12 RX ADMIN — GUAIFENESIN 600 MG: 600 TABLET, EXTENDED RELEASE ORAL at 10:52

## 2022-03-12 RX ADMIN — RANOLAZINE 1000 MG: 500 TABLET, FILM COATED, EXTENDED RELEASE ORAL at 10:52

## 2022-03-12 RX ADMIN — SERTRALINE 100 MG: 100 TABLET, FILM COATED ORAL at 10:52

## 2022-03-12 RX ADMIN — SPIRONOLACTONE 25 MG: 25 TABLET ORAL at 10:52

## 2022-03-12 RX ADMIN — ACETYLCYSTEINE 3 ML: 200 SOLUTION ORAL; RESPIRATORY (INHALATION) at 06:42

## 2022-03-12 RX ADMIN — GABAPENTIN 400 MG: 400 CAPSULE ORAL at 13:37

## 2022-03-12 RX ADMIN — FOLIC ACID 1 MG: 1 TABLET ORAL at 10:52

## 2022-03-12 RX ADMIN — IPRATROPIUM BROMIDE AND ALBUTEROL SULFATE 3 ML: .5; 3 SOLUTION RESPIRATORY (INHALATION) at 11:15

## 2022-03-12 RX ADMIN — LORAZEPAM 1 MG: 1 TABLET ORAL at 21:59

## 2022-03-12 RX ADMIN — CLOPIDOGREL BISULFATE 75 MG: 75 TABLET, FILM COATED ORAL at 10:52

## 2022-03-12 RX ADMIN — FERROUS SULFATE TAB EC 324 MG (65 MG FE EQUIVALENT) 324 MG: 324 (65 FE) TABLET DELAYED RESPONSE at 10:52

## 2022-03-12 RX ADMIN — ATORVASTATIN CALCIUM 80 MG: 40 TABLET, FILM COATED ORAL at 21:58

## 2022-03-12 RX ADMIN — CYANOCOBALAMIN TAB 1000 MCG 1000 MCG: 1000 TAB at 10:52

## 2022-03-12 RX ADMIN — PREDNISONE 20 MG: 20 TABLET ORAL at 10:52

## 2022-03-12 RX ADMIN — RANOLAZINE 1000 MG: 500 TABLET, FILM COATED, EXTENDED RELEASE ORAL at 21:59

## 2022-03-12 RX ADMIN — IPRATROPIUM BROMIDE AND ALBUTEROL SULFATE 3 ML: .5; 3 SOLUTION RESPIRATORY (INHALATION) at 18:56

## 2022-03-12 RX ADMIN — DIPHENHYDRAMINE HYDROCHLORIDE 50 MG: 25 CAPSULE ORAL at 21:58

## 2022-03-12 RX ADMIN — GUAIFENESIN 600 MG: 600 TABLET, EXTENDED RELEASE ORAL at 21:58

## 2022-03-12 RX ADMIN — OXYCODONE HYDROCHLORIDE AND ACETAMINOPHEN 250 MG: 500 TABLET ORAL at 10:52

## 2022-03-12 RX ADMIN — LEVOTHYROXINE SODIUM 88 MCG: 0.09 TABLET ORAL at 05:10

## 2022-03-12 RX ADMIN — Medication 10 ML: at 21:59

## 2022-03-12 RX ADMIN — IPRATROPIUM BROMIDE AND ALBUTEROL SULFATE 3 ML: .5; 3 SOLUTION RESPIRATORY (INHALATION) at 15:00

## 2022-03-12 RX ADMIN — POTASSIUM CHLORIDE 20 MEQ: 1500 TABLET, EXTENDED RELEASE ORAL at 10:52

## 2022-03-12 NOTE — PROGRESS NOTES
"[facility] PROGRESS NOTE     LOS: 6 days    Patient Care Team:  Deonte Hector MD as PCP - General      Subjective     Patient was seen and examined this morning.  Less short of breath.  Complaining of sore tongue on a statin.  Denies any nausea no vomiting.  No fever no chills.  Creatinine slightly up.  Urine output has not been recorded    Objective     Vital Sign Min/Max for last 24 hours  Temp:  [98.2 °F (36.8 °C)-99 °F (37.2 °C)] 98.4 °F (36.9 °C)  Heart Rate:  [77-93] 93  Resp:  [15-24] 17  BP: (111-145)/(46-95) 139/71                       Flowsheet Rows    Flowsheet Row First Filed Value   Admission Height 165.1 cm (65\") Documented at 03/06/2022 1049   Admission Weight 59.9 kg (132 lb) Documented at 03/06/2022 1049          I/O this shift:  In: 250 [P.O.:250]  Out: -   I/O last 3 completed shifts:  In: 540 [P.O.:240; IV Piggyback:300]  Out: -     Physical Exam:  Physical Exam    General Appearance: Chronically ill-appearing, NAD  Skin: warm and dry  HEENT: oral mucosa normal, nonicteric sclera  Neck: supple, no JVD  Lungs: Bilateral wheezing.  Decreased air entry bilateral  Heart: RRR, normal S1 and S2  Abdomen: soft, nontender, nondistended  : no palpable bladder  Extremities: no edema, cyanosis or clubbing  Neuro: normal speech and mental status       LABS:  Lab Results   Component Value Date    CALCIUM 8.4 (L) 03/12/2022    PHOS 2.9 03/12/2022     Results from last 7 days   Lab Units 03/12/22  1301 03/12/22  0159 03/10/22  2338 03/10/22  0137 03/06/22  2311 03/06/22  1129   MAGNESIUM mg/dL  --  1.9 2.9* 1.4*  --   --    SODIUM mmol/L  --  138 136 139   < > 140   POTASSIUM mmol/L  --  3.7 3.3* 3.5   < > 4.4   CHLORIDE mmol/L  --  102 98 101   < > 103   CO2 mmol/L  --  25.0 28.0 25.0   < > 27.0   BUN mg/dL  --  20 18 20   < > 24*   CREATININE mg/dL  --  1.35* 1.22* 1.18*   < > 1.02*   GLUCOSE mg/dL  --  112* 166* 108*   < > 124*   CALCIUM mg/dL  --  8.4* 7.9* 8.6   < > 8.5*   WBC 10*3/mm3 " 11.90* 12.30* 13.70* 14.10*   < > 13.10*   HEMOGLOBIN g/dL 7.9* 7.4* 8.2* 8.5*   < > 8.5*   PLATELETS 10*3/mm3 225 204 228 225   < > 207   ALT (SGPT) U/L  --   --   --   --   --  <5   AST (SGOT) U/L  --   --   --   --   --  5    < > = values in this interval not displayed.     Lab Results   Component Value Date    TROPONINI 8.820 (C) 01/07/2021    TROPONINT <0.010 03/06/2022     Estimated Creatinine Clearance: 32.2 mL/min (A) (by C-G formula based on SCr of 1.35 mg/dL (H)).  Results from last 7 days   Lab Units 03/06/22  1147   PH, ARTERIAL pH units 7.379   PO2 ART mm Hg 74.8*   PCO2, ARTERIAL mm Hg 51.6*   HCO3 ART mmol/L 30.4*     Brief Urine Lab Results     None        WEIGHTS:     Wt Readings from Last 1 Encounters:   03/10/22 0556 57.6 kg (126 lb 14.4 oz)   03/09/22 0430 57.5 kg (126 lb 12.2 oz)   03/06/22 1049 59.9 kg (132 lb)       vitamin C, 250 mg, Oral, Daily  aspirin, 81 mg, Oral, Daily  atorvastatin, 80 mg, Oral, Nightly  cefTRIAXone, 1 g, Intravenous, Q24H  cholecalciferol, 500 Units, Oral, Daily  clopidogrel, 75 mg, Oral, Daily  estradiol, 3 mg, Oral, Nightly  ferrous sulfate, 324 mg, Oral, Daily With Breakfast  folic acid, 1 mg, Oral, Daily  furosemide, 40 mg, Oral, Daily  gabapentin, 400 mg, Oral, Q8H  guaiFENesin, 600 mg, Oral, Q12H  heparin (porcine), 5,000 Units, Subcutaneous, Q8H  ipratropium-albuterol, 3 mL, Nebulization, 4x Daily - RT  isosorbide mononitrate, 30 mg, Oral, Q24H  levothyroxine, 88 mcg, Oral, Q AM  metoprolol succinate XL, 25 mg, Oral, Daily  pantoprazole, 40 mg, Oral, Q AM  potassium chloride, 20 mEq, Oral, BID With Meals  predniSONE, 20 mg, Oral, Daily  ranolazine, 1,000 mg, Oral, Q12H  sertraline, 100 mg, Oral, Daily  sodium chloride, 10 mL, Intravenous, Q12H  spironolactone, 25 mg, Oral, Daily  vitamin B-12, 1,000 mcg, Oral, Daily           Assessment/Plan       1.Acute kidney injury.  Mild  Baseline creatinine seems to be around 0.8 MG/DL but has fluctuated in past due to  diuretics.    Creatinine up to 1.35 today.    2.  Hypertension  Blood Pressure well controlled  3.    Acute on chronic respiratory failure.  Pneumonia with underlying COPD  4.  Congestive heart failure.  Systolic.  Chronic  5.  Hypokalemia. Improved  5.Hypomagnesemia      Recs:  We will continue current diuretic dose will need to accept some degree of azotemia in order to keep her euvolemic  Continue surveillance labs          Leah Sharma MD  03/12/22  14:35 EST

## 2022-03-12 NOTE — PLAN OF CARE
Goal Outcome Evaluation:  Plan of Care Reviewed With: patient        Progress: no change  Alert and oriented x 4. Able to verbalize needs and wants. Takes medication whole and tolerates well. Continues on IV ABTs, no s/s of adverse/allergic reaction noted. No c/o pain/discomfort/SOB noted. Currently receiving O2 therapy at 3 LPM via NC which is baseline. Continent of bowel and bladder. Independent for transfers. Currently in bed, eyes closed. Rise and fall of chest observed. Call bell in reach.

## 2022-03-12 NOTE — PROGRESS NOTES
[facility]        Patient Name: Gayathri Reddy  : 1945  MRN: [id number]  Primary Care Physician:  Deonte Hector MD  Date of admission: 3/6/2022           Subjective          Chief Complaint: Dyspnea     History of Present Illness: Gayathri Reddy is a 76 y.o. female with PMHx of HTN, HLD, Hypothyroid, GERD, cAD, AAA, CHF, COPD (3L NC chronically) who presents due to dyspnea.  Admits to dyspnea with exertion complicated by productive cough ongoing for the past three days and similar to previous experiences with pneumonia.  Denies chest pain, abdominal pain, numbness, tingling, hemoptysis.  Admits to increasing home oxygen from 3 to 4L latelty.  Due to multiple risk factors, went to [facility] for evaluation     In the ED, vitals 98.8F, HR 84, RR 18, 135/64, 97 4L.  Labs notable for troponin < 0.01, proBNP 19107, glucose 124, creatinine 1.02, white count 13.1, Hgb 8.5.  CXR suggestive of multi-focal pneumonia.  Respiratory panel negative for covid or other virus.  Patient started on abx, lasix, and admitted to medicine for evaluation.     3/7/2022; still complaining of shortness of breath with minimal exertion, T-max of 36.6, vital signs are stable currently on 3 L of nasal cannula.  Spoke with RN.  Serum creatinine bumped up to 1.27 prerenal NOE consult nephrology, proBNP 03596, will consult cardiology.  3/8/2022; patient walked about 200 feet without getting hypoxia on supplemental oxygen but still increased work of breathing, otherwise hemodynamically stable.  3/9/2022; past patient still endorses shortness of breath with minimal exertion, has been endorsing diarrhea for the last 2 or 3 days, will check stool for C. difficile, T-max of 100.7, vital stable currently on 1 L of nasal cannula.  3/10/2022; C. difficile came back negative, started on Imodium as needed, hypoalbuminemia, check prealbumin, had some overnight paroxysmal atrial tachycardia beta-blocker  dose increased, spironolactone has been added by nephrology.  3/11/2022; diarrhea has resolved apparently patient did not get spironolactone yesterday, as order was not placed, chest x-ray PA and lateral still with features of multifocal pneumonia in the lungs with improved aeration compared to 3/6/2022 pulmonology cardiology and nephrology following.      3/12  hgb 7.4 wo bleeding   Slept well  Sat hgood  Notes reviewed  Labs reviewed  Defer reacritto nephrology  Separate folic and b12 replacement  May need iv fe  Sore tongue.. empiriic nystatinS/S      Review of Systems   Constitutional: Positive for malaise/fatigue. Negative for chills and fever.   HENT: Negative for hearing loss, hoarse voice and nosebleeds.    Eyes: Negative for discharge, double vision and pain.   Cardiovascular: Positive for dyspnea on exertion. Negative for leg swelling.   Respiratory: Positive for cough and shortness of breath. Negative for hemoptysis.    Endocrine: Negative for polydipsia, polyphagia and polyuria.   Skin: Negative for dry skin, flushing and itching.   Musculoskeletal: Negative for arthritis, back pain and falls.   Gastrointestinal: Negative for abdominal pain and constipation.   Genitourinary: Negative for dysuria, flank pain and frequency.   Neurological: Negative for headaches, light-headedness and weakness.   Psychiatric/Behavioral: Negative for altered mental status, depression and hallucinations.         Personal History      Medical History        Past Medical History:   Diagnosis Date   • Aneurysm (HCC)       AAA   • Arthritis     • CHF (congestive heart failure) (HCC)     • COPD (chronic obstructive pulmonary disease) (HCC)     • Coronary artery disease     • Dyslipidemia     • GERD (gastroesophageal reflux disease)     • History of right heart catheterization       7/30/2017; 10/2018   • Hyperlipidemia     • Hypertension     • Hypothyroidism     • Myocardial infarct (HCC)     • Myocardial infarction (HCC)       x  3   • Pneumonia 11/2019     bilateral             Surgical History         Past Surgical History:   Procedure Laterality Date   • ABDOMINAL AORTIC ANEURYSM REPAIR N/A 2001   • CARDIAC CATHETERIZATION       • CATARACT EXTRACTION Bilateral     • CORONARY ANGIOPLASTY WITH STENT PLACEMENT N/A       x 5   • CORONARY ARTERY BYPASS GRAFT         x 4 1995   • HYSTERECTOMY N/A              Family History: family history includes Aneurysm in her mother; Cancer in her paternal grandfather; Colon cancer in her sister; Heart disease in her father and mother. Otherwise pertinent FHx was reviewed and not pertinent to current issue.     Social History:  reports that she quit smoking about 28 years ago. Her smoking use included cigarettes. She smoked 1.50 packs per day. She has never used smokeless tobacco. She reports that she does not drink alcohol and does not use drugs.     Home Medications:           Prior to Admission Medications      Prescriptions Last Dose Informant Patient Reported? Taking?     aspirin 81 MG EC tablet     Yes No     Take 81 mg by mouth Daily.     Budeson-Glycopyrrol-Formoterol (BREZTRI AEROSPHERE IN)     Yes No     Inhale.     cholecalciferol (VITAMIN D3) 10 MCG (400 UNIT) tablet     Yes No     Take 400 Units by mouth Daily.     clopidogrel (PLAVIX) 75 MG tablet     Yes No     Take 75 mg by mouth Daily.     diphenhydrAMINE (BENADRYL) 25 mg capsule     Yes No     Take 25 mg by mouth Every 6 (Six) Hours As Needed.     estradiol (ESTRACE) 1 MG tablet     Yes No     Take 1 mg by mouth 3 (Three) Times a Day.     ferrous sulfate 324 (65 Fe) MG tablet delayed-release EC tablet   Self Yes No     Take 324 mg by mouth Daily With Breakfast.     furosemide (LASIX) 20 MG tablet     No No     Take 1 tablet by mouth Daily.     Patient taking differently:  Take  by mouth Daily. Once daily     gabapentin (NEURONTIN) 400 MG capsule     Yes No     Take 400 mg by mouth 3 (Three) Times a Day.     HYDROcodone-acetaminophen  (NORCO)  MG per tablet     Yes No     Take 1 tablet by mouth Every 6 (Six) Hours As Needed.     isosorbide mononitrate (IMDUR) 30 MG 24 hr tablet     Yes No     Take 30 mg by mouth Daily.     levothyroxine (SYNTHROID, LEVOTHROID) 88 MCG tablet     Yes No     Take 88 mcg by mouth Daily.     lisinopril (PRINIVIL,ZESTRIL) 2.5 MG tablet     No No     Take 1 tablet by mouth Daily.     LORazepam (ATIVAN) 1 MG tablet     Yes No     Take 1 mg by mouth 4 (Four) Times a Day As Needed.     methocarbamol (ROBAXIN) 500 MG tablet   Self Yes No     Take 500 mg by mouth 3 (Three) Times a Day As Needed for Muscle Spasms.     metoprolol succinate XL (TOPROL-XL) 25 MG 24 hr tablet     No No     Take 0.5 tablets by mouth Daily.     nitroglycerin (Nitrostat) 0.4 MG SL tablet     No No     Place 1 tablet under the tongue Every 5 (Five) Minutes As Needed for Chest Pain.     pantoprazole (PROTONIX) 40 MG EC tablet     No No     Take 1 tablet by mouth Daily.     Potassium 99 MG tablet     Yes No     Take  by mouth.     ranolazine (RANEXA) 1000 MG 12 hr tablet     No No     Take 1 tablet by mouth Every 12 (Twelve) Hours.     rosuvastatin (CRESTOR) 10 MG tablet     Yes No     Take 10 mg by mouth Daily.     sertraline (ZOLOFT) 100 MG tablet     Yes No     Take 100 mg by mouth Daily.     vitamin C (ASCORBIC ACID) 250 MG tablet     Yes No     Take 250 mg by mouth Daily.                Allergies:       Allergies   Allergen Reactions   • Naprosyn  [Naproxen] Rash   • Bactrim [Sulfamethoxazole-Trimethoprim] Rash               Objective          Vitals:   Temp:  [98.1 °F (36.7 °C)-99 °F (37.2 °C)] 99 °F (37.2 °C)  Heart Rate:  [74-87] 83  Resp:  [15-20] 16  BP: (107-136)/(49-95) 136/64  Flow (L/min):  [3] 3     Physical Exam  Constitutional:       General: She is not in acute distress.     Appearance: She is not toxic-appearing.   HENT:      Head: Normocephalic and atraumatic.      Nose: Nose normal. No congestion.      Mouth/Throat:       Pharynx: Oropharynx is clear. No oropharyngeal exudate.   Eyes:      General: No scleral icterus.  Cardiovascular:      Rate and Rhythm: Normal rate and regular rhythm.      Heart sounds: No murmur heard.    No friction rub. No gallop.   Pulmonary:      Effort: No respiratory distress.      Breath sounds: Bibasilar Rales present. No wheezing.       Abdominal:      General: There is no distension.      Tenderness: There is no abdominal tenderness. There is no guarding.   Musculoskeletal:         General: No swelling or deformity.      Cervical back: Normal range of motion. No rigidity.      Right lower leg: No edema.      Left lower leg: No edema.   Skin:     Coloration: Skin is not jaundiced.      Findings: No bruising or lesion.   Neurological:      General: No focal deficit present.      Mental Status: She is alert and oriented to person, place, and time.      Motor: No weakness.   Psychiatric:         Mood and Affect: Mood normal.         Behavior: Behavior normal.         Thought Content: Thought content normal.         Judgment: Judgment normal.                  Result Review       Result Review:  I have personally reviewed the results from the time of this admission to 3/6/2022 13:01 EST and agree with these findings:  [x]?  Laboratory  []?  Microbiology  [x]?  Radiology  []?  EKG/Telemetry   []?  Cardiology/Vascular   []?  Pathology  []?  Old records  []?  Other:     Lab Results   Component Value Date    WBC 12.30 (H) 03/12/2022    HGB 7.4 (L) 03/12/2022    HCT 21.9 (L) 03/12/2022    MCV 98.1 (H) 03/12/2022     03/12/2022     Lab Results   Component Value Date    GLUCOSE 112 (H) 03/12/2022    CALCIUM 8.4 (L) 03/12/2022     03/12/2022    K 3.7 03/12/2022    CO2 25.0 03/12/2022     03/12/2022    BUN 20 03/12/2022    CREATININE 1.35 (H) 03/12/2022    EGFRIFAFRI >60 01/05/2018    EGFRIFNONA 27 (L) 02/18/2022    BCR 14.8 03/12/2022    ANIONGAP 11.0 03/12/2022      CT Chest Without Contrast  Diagnostic    Result Date: 3/6/2022  1. Multifocal groundglass opacities throughout the lungs most pronounced in the lower lobes which may relate to atypical infection or COVID-19 pneumonia. 2. Focal nodular airspace disease in right upper lobe measuring 18 x 14 mm. There was previously a cluster of nodules in this region and this could relate to chronic inflammation secondary to prior infection or inflammation, however recommend short-term follow-up in 2-3 months or PET/CT to evaluate this nodule. 3. Additional chronic findings above.  Electronically Signed By-Nahid Cooley MD On:3/6/2022 6:29 PM This report was finalized on [id number] by  Nahid Cooley MD.    XR Chest 1 View    Result Date: 3/6/2022  Patchy multifocal airspace disease involving the lung bases concerning for pneumonia.  Electronically Signed By-Nahid Cooley MD On:3/6/2022 12:03 PM This report was finalized on [id number] by  Nahid Cooley MD.    XR Chest PA & Lateral    Result Date: 3/11/2022  Features of multifocal pneumonia in the lungs are again noted, with improved aeration compared to 3/6/2022.  Electronically Signed By-Miriam Templeton MD On:3/11/2022 9:56 AM This report was finalized on [id number] by  Miriam Templeton MD.             Assessment/Plan              Active Hospital Problems:  There are no active hospital problems to display for this patient.     Assessment/plan;     #Acute on chronic hypoxia with hypercapnia; wears baseline 3 L and 4 L  #Multi-focal pneumonia/focal nodular airspace disease in the right upper lobe measuring 18 x 14  mm;    - ABG 7.379/51.6/74.8/30.4    -CXR shows multi-focal pneumonai    - CT chest  as  Above.    - Unasyn 3g IV q6h patient had a urticarial reaction for Unasyn requiring 1 dose of Decadron and Benadryl, switched to Rocephin on 3/8/2022, end of treatment 3/13/2022 IV Flagyl finishing on 3/9/2022    - RVP negative, procalcitonin normal    - blood cultures NTD    - respiratory culture    -  legionella and strep, both negative.    - white count 13.1 --> 14.50  -,  consult pulmonary appreciated.  # Diarrhea; likely antibiotic associated, checked stool for C. Difficile, came back negative, start on Imodium 2 mg p.o. every 6 as needed  #Paroxysmal atrial tachycardia; increased metoprolol XL 25 mg daily  #Hypoalbuminemia; checked prealbumin low, supplements  #moderate malnutrition; dietician consult.  # Acute kidney injury; baseline creatinine seems to be around 0.8 mg/dL, but has fluctuated serum creatinine 1.27 likely prerenal, nephrology consult appreciated on low-dose oral Lasix diuretic.  Further diuresis as per nephrology  #COPD    -wears 3L chronically, currently on 4L NC    - suspect pneumonia    - Duonebs QID     #Acute on chronic diastolic and systolic HF    -Fluid restriction 2 L/day, patient thin built not appreciable edema    - proBNP 12552 --2775, consult cardiology appreciated.    - CT chest as above, resumed low-dose Lasix on 3/8/2022, spironolactone added on by nephrology.      #CAD s/p CABG with attrition 3/4 grafts/ischemic cardiomyopathy most recent ejection 45% April 2021      - resume home aspirin, statin, Toprol, imdur and Ranexa .     #AAA    - CT a/p shows infra-renal abdominal aorta at 3.0x3.7cm on 11/4/2020    - will need to be re-evaluated as otpt     #HTN    - hold lisinopril     - resume home toprol      #HLD    - resume home statin     #GERD    - PPI     #Hypothyroid    - resume home synthroid      #Macrocytic hyperchromic anemia    -hgb 8.5 on admission, base ~ 10.0    - B12, FOLATE DEF: ON REAPLACEMNENT  - folate deficiency replace.     #Anxiety     - resume home Zoloft once verified     #DVT prophylaxis    -heparin     CODE STATUS:    Admission Status:  I believe this patient meets inpatient status.  Electronically signed by Deonte Hartley MD, 03/12/22, 9:11 AM EST.

## 2022-03-12 NOTE — PROGRESS NOTES
Daily Progress Note        Pneumonia of both lungs due to infectious organism, unspecified part of lung    Abdominal aortic aneurysm (AAA) (HCC)    Chronic obstructive pulmonary disease (HCC)    Congestive heart failure (HCC)    Coronary artery disease    Dyslipidemia    Hypertension    Anemia of chronic disease    Mixed hyperlipidemia    Bilateral carotid artery stenosis    Moderate malnutrition (CMS/HCC)      Assessment  Acute on chronic hypoxia with hypercapnia- wears 3-4 L at home  Multifocal pneumonia  Pulmonary edema  Allergic reaction to Unasyn in the form of urticaria  Right upper lobe nodule  NOE  COPD  Diastolic and systolic CHF-  BNP> 15,000  CAD s/p CABG April 2021  AAA  HTN  HLD  GERD  Hypothyroidism  Macrocytic hyperchromic anemia  Anxiety    CT chest 3/8: 1. Multifocal groundglass opacities throughout the lungs most pronounced  in the lower lobes which may relate to atypical infection or COVID-19  pneumonia.  2. Focal nodular airspace disease in right upper lobe measuring 18 x 14  mm. There was previously a cluster of nodules in this region and this  could relate to chronic inflammation secondary to prior infection or  inflammation   Legionella and strep pneumonia antigens negative  Respiratory panel negative     ABG 3/6/2022 on nasal cannula-pH 7.37, CO2 51.6, PO2 74.8, bicarb 30.4    Plan  Diuretics discussed with nephrology  At the prednisone 20 mg daily  Oxygen supplement and titration: Currently on 3 L per nasal cannula  Rocephin for PNA-finishes 3/13/2022  Mucinex  Mucomyst nebulizers  Inhaled corticosteroids  Bronchodilators  Electrolyte/Glycemic control  DVT/GI Prophylaxis-Heparin SQ and Protonix  Levothyroxine 88mcg    3/11/2022                                                    3/6/2022       3/6/2022       LOS: 6 days     Subjective   Dry cough and shortness of breath      Objective     Vital signs for last 24 hours:  Vitals:    03/11/22 2209 03/12/22 0500 03/12/22 0642 03/12/22 0645   BP:  111/95 136/64     BP Location:  Left arm     Patient Position:  Lying     Pulse: 87 79 83 83   Resp:  16 16 16   Temp:  99 °F (37.2 °C)     TempSrc:  Oral     SpO2:  98% 98% 98%   Weight:       Height:           Intake/Output last 3 shifts:  I/O last 3 completed shifts:  In: 540 [P.O.:240; IV Piggyback:300]  Out: -   Intake/Output this shift:  No intake/output data recorded.      Radiology  Imaging Results (Last 24 Hours)     Procedure Component Value Units Date/Time    XR Chest PA & Lateral [[id number]] Collected: 03/11/22 0954     Updated: 03/11/22 0958    Narrative:      DATE OF EXAM:  3/11/2022 9:25 AM     PROCEDURE:  XR CHEST PA AND LATERAL-     INDICATIONS:  SOA; J18.9-Pneumonia, unspecified organism; R06.02-Shortness of breath;  I50.9-Heart failure, unspecified; D63.8-Anemia in other chronic diseases  classified elsewhere; I42.0-Dilated cardiomyopathy       COMPARISON:  AP portable chest 3/6/2022     TECHNIQUE:   Two radiologic views of the chest.     FINDINGS:  Interstitial and alveolar disease in both lungs, greatest in the lower  lobes, appears slightly improved. Benign calcified granulomatous changes  are seen in the right upper lobe. Heart size is normal with CABG. No  pleural effusion is seen. There is moderate gas distention of the  splenic flexure the colon. No acute osseous abnormalities identified.       Impression:      Features of multifocal pneumonia in the lungs are again noted, with  improved aeration compared to 3/6/2022.     Electronically Signed By-Miriam Templeton MD On:3/11/2022 9:56 AM  This report was finalized on [id number] by  Miriam Templeton MD.          Labs:  Results from last 7 days   Lab Units 03/12/22  0159   WBC 10*3/mm3 12.30*   HEMOGLOBIN g/dL 7.4*   HEMATOCRIT % 21.9*   PLATELETS 10*3/mm3 204     Results from last 7 days   Lab Units 03/12/22  0159 03/06/22  2311 03/06/22  1129   SODIUM mmol/L 138   < > 140   POTASSIUM mmol/L 3.7   < > 4.4   CHLORIDE mmol/L 102   < > 103   CO2  mmol/L 25.0   < > 27.0   BUN mg/dL 20   < > 24*   CREATININE mg/dL 1.35*   < > 1.02*   CALCIUM mg/dL 8.4*   < > 8.5*   BILIRUBIN mg/dL  --   --  0.2   ALK PHOS U/L  --   --  56   ALT (SGPT) U/L  --   --  <5   AST (SGOT) U/L  --   --  5   GLUCOSE mg/dL 112*   < > 124*    < > = values in this interval not displayed.     Results from last 7 days   Lab Units 03/06/22  1147   PH, ARTERIAL pH units 7.379   PO2 ART mm Hg 74.8*   PCO2, ARTERIAL mm Hg 51.6*   HCO3 ART mmol/L 30.4*     Results from last 7 days   Lab Units 03/12/22  0159 03/10/22  2338 03/10/22  0137   ALBUMIN g/dL 2.50* 2.70* 2.60*     Results from last 7 days   Lab Units 03/06/22  1129   TROPONIN T ng/mL <0.010         Results from last 7 days   Lab Units 03/12/22  0159   MAGNESIUM mg/dL 1.9                   Meds:   SCHEDULE  vitamin C, 250 mg, Oral, Daily  aspirin, 81 mg, Oral, Daily  atorvastatin, 80 mg, Oral, Nightly  cefTRIAXone, 1 g, Intravenous, Q24H  cholecalciferol, 500 Units, Oral, Daily  clopidogrel, 75 mg, Oral, Daily  estradiol, 3 mg, Oral, Nightly  ferrous sulfate, 324 mg, Oral, Daily With Breakfast  folic acid-pyridoxine-cyanocobalamin, 1 tablet, Oral, Daily  furosemide, 40 mg, Oral, Daily  gabapentin, 400 mg, Oral, Q8H  guaiFENesin, 600 mg, Oral, Q12H  heparin (porcine), 5,000 Units, Subcutaneous, Q8H  ipratropium-albuterol, 3 mL, Nebulization, 4x Daily - RT  isosorbide mononitrate, 30 mg, Oral, Q24H  levothyroxine, 88 mcg, Oral, Q AM  metoprolol succinate XL, 25 mg, Oral, Daily  pantoprazole, 40 mg, Oral, Q AM  potassium chloride, 20 mEq, Oral, BID With Meals  ranolazine, 1,000 mg, Oral, Q12H  sertraline, 100 mg, Oral, Daily  sodium chloride, 10 mL, Intravenous, Q12H  spironolactone, 25 mg, Oral, Daily      Infusions     PRNs  diphenhydrAMINE  •  docusate sodium  •  HYDROcodone-acetaminophen  •  loperamide  •  LORazepam  •  magnesium sulfate **OR** magnesium sulfate **OR** magnesium sulfate  •  methocarbamol  •  nitroglycerin  •  ondansetron  **OR** ondansetron  •  phenylephrine-mineral oil-petrolatum  •  sodium chloride  •  sodium chloride    Physical Exam:  Physical Exam  General Appearance:  Alert.  No distress noted.  HEENT:  Normocephalic, without obvious abnormality, Conjunctiva/corneas clear,.  Normal external ear canals, Nares normal, no drainage     Neck:  Supple, symmetrical, trachea midline. No JVD.  Lungs /Chest wall:   Bilateral basal rhonchi, respirations unlabored symmetrical wall movement.     Heart:  Regular rate and rhythm, systolic murmur PMI left sternal border  Abdomen: Soft, non-tender, no masses, no organomegaly.    Extremities: No edema, no clubbing or cyanosis    ROS  Review of Systems  Constitutional: Negative for chills, fever and malaise/fatigue.   HENT: Negative.    Eyes: Negative.    Cardiovascular: Negative.    Respiratory: Positive for cough and shortness of breath.    Skin: Negative.    Musculoskeletal: Negative.    Gastrointestinal: Negative.    Genitourinary: Negative.    Neurological: Negative.    Psychiatric/Behavioral: Negative.        I have reviewed current clinicals.

## 2022-03-13 ENCOUNTER — APPOINTMENT (OUTPATIENT)
Dept: GENERAL RADIOLOGY | Facility: HOSPITAL | Age: 77
End: 2022-03-13

## 2022-03-13 LAB
ALBUMIN SERPL-MCNC: 2.9 G/DL (ref 3.5–5.2)
ANION GAP SERPL CALCULATED.3IONS-SCNC: 11 MMOL/L (ref 5–15)
ANION GAP SERPL CALCULATED.3IONS-SCNC: 12 MMOL/L (ref 5–15)
BASOPHILS # BLD AUTO: 0.1 10*3/MM3 (ref 0–0.2)
BASOPHILS NFR BLD AUTO: 1.1 % (ref 0–1.5)
BUN SERPL-MCNC: 22 MG/DL (ref 8–23)
BUN SERPL-MCNC: 23 MG/DL (ref 8–23)
BUN/CREAT SERPL: 17.3 (ref 7–25)
BUN/CREAT SERPL: 17.8 (ref 7–25)
CALCIUM SPEC-SCNC: 8.7 MG/DL (ref 8.6–10.5)
CALCIUM SPEC-SCNC: 9.2 MG/DL (ref 8.6–10.5)
CHLORIDE SERPL-SCNC: 103 MMOL/L (ref 98–107)
CHLORIDE SERPL-SCNC: 104 MMOL/L (ref 98–107)
CO2 SERPL-SCNC: 24 MMOL/L (ref 22–29)
CO2 SERPL-SCNC: 26 MMOL/L (ref 22–29)
CREAT SERPL-MCNC: 1.27 MG/DL (ref 0.57–1)
CREAT SERPL-MCNC: 1.29 MG/DL (ref 0.57–1)
DEPRECATED RDW RBC AUTO: 44.6 FL (ref 37–54)
EGFRCR SERPLBLD CKD-EPI 2021: 43.1 ML/MIN/1.73
EGFRCR SERPLBLD CKD-EPI 2021: 43.9 ML/MIN/1.73
EOSINOPHIL # BLD AUTO: 0 10*3/MM3 (ref 0–0.4)
EOSINOPHIL NFR BLD AUTO: 0 % (ref 0.3–6.2)
ERYTHROCYTE [DISTWIDTH] IN BLOOD BY AUTOMATED COUNT: 12.9 % (ref 12.3–15.4)
GLUCOSE SERPL-MCNC: 154 MG/DL (ref 65–99)
GLUCOSE SERPL-MCNC: 98 MG/DL (ref 65–99)
HCT VFR BLD AUTO: 23.7 % (ref 34–46.6)
HGB BLD-MCNC: 8 G/DL (ref 12–15.9)
LYMPHOCYTES # BLD AUTO: 1 10*3/MM3 (ref 0.7–3.1)
LYMPHOCYTES NFR BLD AUTO: 8.7 % (ref 19.6–45.3)
MCH RBC QN AUTO: 32.9 PG (ref 26.6–33)
MCHC RBC AUTO-ENTMCNC: 33.5 G/DL (ref 31.5–35.7)
MCV RBC AUTO: 98.2 FL (ref 79–97)
MONOCYTES # BLD AUTO: 0.6 10*3/MM3 (ref 0.1–0.9)
MONOCYTES NFR BLD AUTO: 5.1 % (ref 5–12)
NEUTROPHILS NFR BLD AUTO: 85.1 % (ref 42.7–76)
NEUTROPHILS NFR BLD AUTO: 9.8 10*3/MM3 (ref 1.7–7)
NRBC BLD AUTO-RTO: 0.1 /100 WBC (ref 0–0.2)
PHOSPHATE SERPL-MCNC: 2.6 MG/DL (ref 2.5–4.5)
PLATELET # BLD AUTO: 245 10*3/MM3 (ref 140–450)
PMV BLD AUTO: 8.2 FL (ref 6–12)
POTASSIUM SERPL-SCNC: 4.8 MMOL/L (ref 3.5–5.2)
POTASSIUM SERPL-SCNC: 5.3 MMOL/L (ref 3.5–5.2)
RBC # BLD AUTO: 2.42 10*6/MM3 (ref 3.77–5.28)
SODIUM SERPL-SCNC: 140 MMOL/L (ref 136–145)
SODIUM SERPL-SCNC: 140 MMOL/L (ref 136–145)
WBC NRBC COR # BLD: 11.5 10*3/MM3 (ref 3.4–10.8)

## 2022-03-13 PROCEDURE — 85025 COMPLETE CBC W/AUTO DIFF WBC: CPT | Performed by: HOSPITALIST

## 2022-03-13 PROCEDURE — 63710000001 PREDNISONE PER 1 MG: Performed by: INTERNAL MEDICINE

## 2022-03-13 PROCEDURE — 94799 UNLISTED PULMONARY SVC/PX: CPT

## 2022-03-13 PROCEDURE — 80048 BASIC METABOLIC PNL TOTAL CA: CPT | Performed by: INTERNAL MEDICINE

## 2022-03-13 PROCEDURE — 25010000002 HEPARIN (PORCINE) PER 1000 UNITS: Performed by: NURSE PRACTITIONER

## 2022-03-13 PROCEDURE — 71045 X-RAY EXAM CHEST 1 VIEW: CPT

## 2022-03-13 PROCEDURE — 94761 N-INVAS EAR/PLS OXIMETRY MLT: CPT

## 2022-03-13 PROCEDURE — 80069 RENAL FUNCTION PANEL: CPT | Performed by: HOSPITALIST

## 2022-03-13 PROCEDURE — 99232 SBSQ HOSP IP/OBS MODERATE 35: CPT | Performed by: INTERNAL MEDICINE

## 2022-03-13 PROCEDURE — 63710000001 DIPHENHYDRAMINE PER 50 MG: Performed by: NURSE PRACTITIONER

## 2022-03-13 RX ORDER — FUROSEMIDE 40 MG/1
40 TABLET ORAL
Status: DISCONTINUED | OUTPATIENT
Start: 2022-03-13 | End: 2022-03-16

## 2022-03-13 RX ADMIN — GABAPENTIN 400 MG: 400 CAPSULE ORAL at 14:38

## 2022-03-13 RX ADMIN — ISOSORBIDE MONONITRATE 30 MG: 30 TABLET, EXTENDED RELEASE ORAL at 12:29

## 2022-03-13 RX ADMIN — CLOPIDOGREL BISULFATE 75 MG: 75 TABLET, FILM COATED ORAL at 09:29

## 2022-03-13 RX ADMIN — SERTRALINE 100 MG: 100 TABLET, FILM COATED ORAL at 09:28

## 2022-03-13 RX ADMIN — ATORVASTATIN CALCIUM 80 MG: 40 TABLET, FILM COATED ORAL at 20:39

## 2022-03-13 RX ADMIN — POTASSIUM CHLORIDE 20 MEQ: 1500 TABLET, EXTENDED RELEASE ORAL at 09:29

## 2022-03-13 RX ADMIN — NYSTATIN 500000 UNITS: 100000 SUSPENSION ORAL at 20:40

## 2022-03-13 RX ADMIN — GUAIFENESIN 600 MG: 600 TABLET, EXTENDED RELEASE ORAL at 20:39

## 2022-03-13 RX ADMIN — ESTRADIOL 3 MG: 1 TABLET ORAL at 20:39

## 2022-03-13 RX ADMIN — GUAIFENESIN 600 MG: 600 TABLET, EXTENDED RELEASE ORAL at 09:29

## 2022-03-13 RX ADMIN — NYSTATIN 500000 UNITS: 100000 SUSPENSION ORAL at 12:29

## 2022-03-13 RX ADMIN — PREDNISONE 20 MG: 20 TABLET ORAL at 09:29

## 2022-03-13 RX ADMIN — RANOLAZINE 1000 MG: 500 TABLET, FILM COATED, EXTENDED RELEASE ORAL at 20:39

## 2022-03-13 RX ADMIN — FERROUS SULFATE TAB EC 324 MG (65 MG FE EQUIVALENT) 324 MG: 324 (65 FE) TABLET DELAYED RESPONSE at 09:29

## 2022-03-13 RX ADMIN — GABAPENTIN 400 MG: 400 CAPSULE ORAL at 06:20

## 2022-03-13 RX ADMIN — IPRATROPIUM BROMIDE AND ALBUTEROL SULFATE 3 ML: .5; 3 SOLUTION RESPIRATORY (INHALATION) at 10:40

## 2022-03-13 RX ADMIN — IPRATROPIUM BROMIDE AND ALBUTEROL SULFATE 3 ML: .5; 3 SOLUTION RESPIRATORY (INHALATION) at 15:09

## 2022-03-13 RX ADMIN — CYANOCOBALAMIN TAB 1000 MCG 1000 MCG: 1000 TAB at 09:28

## 2022-03-13 RX ADMIN — POTASSIUM CHLORIDE 20 MEQ: 1500 TABLET, EXTENDED RELEASE ORAL at 17:51

## 2022-03-13 RX ADMIN — LEVOTHYROXINE SODIUM 88 MCG: 0.09 TABLET ORAL at 06:20

## 2022-03-13 RX ADMIN — OXYCODONE HYDROCHLORIDE AND ACETAMINOPHEN 250 MG: 500 TABLET ORAL at 09:28

## 2022-03-13 RX ADMIN — FUROSEMIDE 40 MG: 40 TABLET ORAL at 17:51

## 2022-03-13 RX ADMIN — NYSTATIN 500000 UNITS: 100000 SUSPENSION ORAL at 17:51

## 2022-03-13 RX ADMIN — DIPHENHYDRAMINE HYDROCHLORIDE 50 MG: 25 CAPSULE ORAL at 20:40

## 2022-03-13 RX ADMIN — LORAZEPAM 1 MG: 1 TABLET ORAL at 17:56

## 2022-03-13 RX ADMIN — PANTOPRAZOLE SODIUM 40 MG: 40 TABLET, DELAYED RELEASE ORAL at 06:20

## 2022-03-13 RX ADMIN — IPRATROPIUM BROMIDE AND ALBUTEROL SULFATE 3 ML: .5; 3 SOLUTION RESPIRATORY (INHALATION) at 19:40

## 2022-03-13 RX ADMIN — SPIRONOLACTONE 25 MG: 25 TABLET ORAL at 09:28

## 2022-03-13 RX ADMIN — GABAPENTIN 400 MG: 400 CAPSULE ORAL at 20:39

## 2022-03-13 RX ADMIN — Medication 500 UNITS: at 09:29

## 2022-03-13 RX ADMIN — Medication 10 ML: at 20:40

## 2022-03-13 RX ADMIN — Medication 10 ML: at 09:28

## 2022-03-13 RX ADMIN — HEPARIN SODIUM 5000 UNITS: 5000 INJECTION INTRAVENOUS; SUBCUTANEOUS at 06:20

## 2022-03-13 RX ADMIN — HYDROCODONE BITARTRATE AND ACETAMINOPHEN 1 TABLET: 10; 325 TABLET ORAL at 20:40

## 2022-03-13 RX ADMIN — FOLIC ACID 1 MG: 1 TABLET ORAL at 09:28

## 2022-03-13 RX ADMIN — ASPIRIN 81 MG: 81 TABLET, COATED ORAL at 09:28

## 2022-03-13 RX ADMIN — IPRATROPIUM BROMIDE AND ALBUTEROL SULFATE 3 ML: .5; 3 SOLUTION RESPIRATORY (INHALATION) at 07:28

## 2022-03-13 RX ADMIN — LORAZEPAM 1 MG: 1 TABLET ORAL at 06:27

## 2022-03-13 RX ADMIN — FUROSEMIDE 40 MG: 40 TABLET ORAL at 09:29

## 2022-03-13 RX ADMIN — METOPROLOL SUCCINATE 25 MG: 25 TABLET, EXTENDED RELEASE ORAL at 09:29

## 2022-03-13 RX ADMIN — HYDROCODONE BITARTRATE AND ACETAMINOPHEN 1 TABLET: 10; 325 TABLET ORAL at 06:27

## 2022-03-13 RX ADMIN — RANOLAZINE 1000 MG: 500 TABLET, FILM COATED, EXTENDED RELEASE ORAL at 09:41

## 2022-03-13 NOTE — PROGRESS NOTES
[facility] Medicine Services        Patient Name: Gayathri Reddy  : 1945  MRN: [id number]  Primary Care Physician:  Deonte Hector MD  Date of admission: 3/6/2022           Subjective          Chief Complaint: Dyspnea     History of Present Illness: Gayathri Reddy is a 76 y.o. female with PMHx of HTN, HLD, Hypothyroid, GERD, cAD, AAA, CHF, COPD (3L NC chronically) who presents due to dyspnea.  Admits to dyspnea with exertion complicated by productive cough ongoing for the past three days and similar to previous experiences with pneumonia.  Denies chest pain, abdominal pain, numbness, tingling, hemoptysis.  Admits to increasing home oxygen from 3 to 4L latelty.  Due to multiple risk factors, went to [facility] for evaluation     In the ED, vitals 98.8F, HR 84, RR 18, 135/64, 97 4L.  Labs notable for troponin < 0.01, proBNP 94922, glucose 124, creatinine 1.02, white count 13.1, Hgb 8.5.  CXR suggestive of multi-focal pneumonia.  Respiratory panel negative for covid or other virus.  Patient started on abx, lasix, and admitted to medicine for evaluation.     3/7/2022; still complaining of shortness of breath with minimal exertion, T-max of 36.6, vital signs are stable currently on 3 L of nasal cannula.  Spoke with RN.  Serum creatinine bumped up to 1.27 prerenal NOE consult nephrology, proBNP 13753, will consult cardiology.  3/8/2022; patient walked about 200 feet without getting hypoxia on supplemental oxygen but still increased work of breathing, otherwise hemodynamically stable.  3/9/2022; past patient still endorses shortness of breath with minimal exertion, has been endorsing diarrhea for the last 2 or 3 days, will check stool for C. difficile, T-max of 100.7, vital stable currently on 1 L of nasal cannula.  3/10/2022; C. difficile came back negative, started on Imodium as needed, hypoalbuminemia, check prealbumin, had some overnight paroxysmal atrial tachycardia beta-blocker  dose increased, spironolactone has been added by nephrology.  3/11/2022; diarrhea has resolved apparently patient did not get spironolactone yesterday, as order was not placed, chest x-ray PA and lateral still with features of multifocal pneumonia in the lungs with improved aeration compared to 3/6/2022 pulmonology cardiology and nephrology following.      3/12  hgb 7.4 wo bleeding   Slept well  Sat hgood  Notes reviewed  Labs reviewed  Defer reacritto nephrology  Separate folic and b12 replacement  May need iv fe  Sore tongue.. empiriic nystatinS/S      3/13  She has been feeling short of breath more than usual today  We will check x-ray chest  Her hemoglobin actually better    Review of Systems  all other systems reviewed and negative except as above h      Personal History      Medical History        Past Medical History:   Diagnosis Date   • Aneurysm (HCC)       AAA   • Arthritis     • CHF (congestive heart failure) (HCC)     • COPD (chronic obstructive pulmonary disease) (HCC)     • Coronary artery disease     • Dyslipidemia     • GERD (gastroesophageal reflux disease)     • History of right heart catheterization       7/30/2017; 10/2018   • Hyperlipidemia     • Hypertension     • Hypothyroidism     • Myocardial infarct (HCC)     • Myocardial infarction (HCC)       x 3   • Pneumonia 11/2019     bilateral             Surgical History         Past Surgical History:   Procedure Laterality Date   • ABDOMINAL AORTIC ANEURYSM REPAIR N/A 2001   • CARDIAC CATHETERIZATION       • CATARACT EXTRACTION Bilateral     • CORONARY ANGIOPLASTY WITH STENT PLACEMENT N/A       x 5   • CORONARY ARTERY BYPASS GRAFT         x 4 1995   • HYSTERECTOMY N/A              Family History: family history includes Aneurysm in her mother; Cancer in her paternal grandfather; Colon cancer in her sister; Heart disease in her father and mother. Otherwise pertinent FHx was reviewed and not pertinent to current issue.     Social History:  reports  that she quit smoking about 28 years ago. Her smoking use included cigarettes. She smoked 1.50 packs per day. She has never used smokeless tobacco. She reports that she does not drink alcohol and does not use drugs.     Home Medications:           Prior to Admission Medications      Prescriptions Last Dose Informant Patient Reported? Taking?     aspirin 81 MG EC tablet     Yes No     Take 81 mg by mouth Daily.     Budeson-Glycopyrrol-Formoterol (BREZTRI AEROSPHERE IN)     Yes No     Inhale.     cholecalciferol (VITAMIN D3) 10 MCG (400 UNIT) tablet     Yes No     Take 400 Units by mouth Daily.     clopidogrel (PLAVIX) 75 MG tablet     Yes No     Take 75 mg by mouth Daily.     diphenhydrAMINE (BENADRYL) 25 mg capsule     Yes No     Take 25 mg by mouth Every 6 (Six) Hours As Needed.     estradiol (ESTRACE) 1 MG tablet     Yes No     Take 1 mg by mouth 3 (Three) Times a Day.     ferrous sulfate 324 (65 Fe) MG tablet delayed-release EC tablet   Self Yes No     Take 324 mg by mouth Daily With Breakfast.     furosemide (LASIX) 20 MG tablet     No No     Take 1 tablet by mouth Daily.     Patient taking differently:  Take  by mouth Daily. Once daily     gabapentin (NEURONTIN) 400 MG capsule     Yes No     Take 400 mg by mouth 3 (Three) Times a Day.     HYDROcodone-acetaminophen (NORCO)  MG per tablet     Yes No     Take 1 tablet by mouth Every 6 (Six) Hours As Needed.     isosorbide mononitrate (IMDUR) 30 MG 24 hr tablet     Yes No     Take 30 mg by mouth Daily.     levothyroxine (SYNTHROID, LEVOTHROID) 88 MCG tablet     Yes No     Take 88 mcg by mouth Daily.     lisinopril (PRINIVIL,ZESTRIL) 2.5 MG tablet     No No     Take 1 tablet by mouth Daily.     LORazepam (ATIVAN) 1 MG tablet     Yes No     Take 1 mg by mouth 4 (Four) Times a Day As Needed.     methocarbamol (ROBAXIN) 500 MG tablet   Self Yes No     Take 500 mg by mouth 3 (Three) Times a Day As Needed for Muscle Spasms.     metoprolol succinate XL (TOPROL-XL)  25 MG 24 hr tablet     No No     Take 0.5 tablets by mouth Daily.     nitroglycerin (Nitrostat) 0.4 MG SL tablet     No No     Place 1 tablet under the tongue Every 5 (Five) Minutes As Needed for Chest Pain.     pantoprazole (PROTONIX) 40 MG EC tablet     No No     Take 1 tablet by mouth Daily.     Potassium 99 MG tablet     Yes No     Take  by mouth.     ranolazine (RANEXA) 1000 MG 12 hr tablet     No No     Take 1 tablet by mouth Every 12 (Twelve) Hours.     rosuvastatin (CRESTOR) 10 MG tablet     Yes No     Take 10 mg by mouth Daily.     sertraline (ZOLOFT) 100 MG tablet     Yes No     Take 100 mg by mouth Daily.     vitamin C (ASCORBIC ACID) 250 MG tablet     Yes No     Take 250 mg by mouth Daily.                Allergies:       Allergies   Allergen Reactions   • Naprosyn  [Naproxen] Rash   • Bactrim [Sulfamethoxazole-Trimethoprim] Rash               Objective          Vitals:   Temp:  [98.2 °F (36.8 °C)-98.6 °F (37 °C)] 98.2 °F (36.8 °C)  Heart Rate:  [73-93] 75  Resp:  [17-24] 18  BP: (117-148)/(46-81) 147/61  Flow (L/min):  [3] 3     Physical Exam  Constitutional:       General: She is not in acute distress.     Appearance: She is not toxic-appearing.   HENT:      Head: Normocephalic and atraumatic.      Nose: Nose normal. No congestion.      Mouth/Throat:      Pharynx: Oropharynx is clear. No oropharyngeal exudate.   Eyes:      General: No scleral icterus.  Cardiovascular:      Rate and Rhythm: Normal rate and regular rhythm.      Heart sounds: No murmur heard.    No friction rub. No gallop.   Pulmonary:      Effort: No respiratory distress.      Breath sounds: Bibasilar Rales present. No wheezing.       Abdominal:      General: There is no distension.      Tenderness: There is no abdominal tenderness. There is no guarding.   Musculoskeletal:         General: No swelling or deformity.      Cervical back: Normal range of motion. No rigidity.      Right lower leg: No edema.      Left lower leg: No edema.    Skin:     Coloration: Skin is not jaundiced.      Findings: No bruising or lesion.   Neurological:      General: No focal deficit present.      Mental Status: She is alert and oriented to person, place, and time.      Motor: No weakness.   Psychiatric:         Mood and Affect: Mood normal.         Behavior: Behavior normal.         Thought Content: Thought content normal.         Judgment: Judgment normal.                  Result Review       Result Review:  I have personally reviewed the results from the time of this admission to 3/6/2022 13:01 EST and agree with these findings:  [x]?  Laboratory  []?  Microbiology  [x]?  Radiology  []?  EKG/Telemetry   []?  Cardiology/Vascular   []?  Pathology  []?  Old records  []?  Other:     Lab Results   Component Value Date    WBC 11.50 (H) 03/13/2022    HGB 8.0 (L) 03/13/2022    HCT 23.7 (L) 03/13/2022    MCV 98.2 (H) 03/13/2022     03/13/2022     Lab Results   Component Value Date    GLUCOSE 154 (H) 03/13/2022    CALCIUM 8.7 03/13/2022     03/13/2022    K 5.3 (H) 03/13/2022    CO2 26.0 03/13/2022     03/13/2022    BUN 23 03/13/2022    CREATININE 1.29 (H) 03/13/2022    EGFRIFAFRI >60 01/05/2018    EGFRIFNONA 27 (L) 02/18/2022    BCR 17.8 03/13/2022    ANIONGAP 11.0 03/13/2022      CT Chest Without Contrast Diagnostic    Result Date: 3/6/2022  1. Multifocal groundglass opacities throughout the lungs most pronounced in the lower lobes which may relate to atypical infection or COVID-19 pneumonia. 2. Focal nodular airspace disease in right upper lobe measuring 18 x 14 mm. There was previously a cluster of nodules in this region and this could relate to chronic inflammation secondary to prior infection or inflammation, however recommend short-term follow-up in 2-3 months or PET/CT to evaluate this nodule. 3. Additional chronic findings above.  Electronically Signed By-Nahid Cooley MD On:3/6/2022 6:29 PM This report was finalized on [id number] by  Nahid  MD Lenora.    XR Chest 1 View    Result Date: 3/6/2022  Patchy multifocal airspace disease involving the lung bases concerning for pneumonia.  Electronically Signed By-Nahid Cooley MD On:3/6/2022 12:03 PM This report was finalized on [id number] by  Nahid Cooley MD.    XR Chest PA & Lateral    Result Date: 3/11/2022  Features of multifocal pneumonia in the lungs are again noted, with improved aeration compared to 3/6/2022.  Electronically Signed By-Miriam Templeton MD On:3/11/2022 9:56 AM This report was finalized on [id number] by  Miriam Templeton MD.             Assessment/Plan              Active Hospital Problems:  There are no active hospital problems to display for this patient.     Assessment/plan;     #Acute on chronic hypoxia with hypercapnia; wears baseline 3 L and 4 L  #Multi-focal pneumonia/focal nodular airspace disease in the right upper lobe measuring 18 x 14  mm;    - ABG 7.379/51.6/74.8/30.4    -CXR shows multi-focal pneumonai    - CT chest  as  Above.    - Unasyn 3g IV q6h patient had a urticarial reaction for Unasyn requiring 1 dose of Decadron and Benadryl, switched to Rocephin on 3/8/2022, end of treatment 3/13/2022 IV Flagyl finishing on 3/9/2022    - RVP negative, procalcitonin normal    - blood cultures NTD    - respiratory culture    - legionella and strep, both negative.    - white count 13.1 --> 14.50  -,  consult pulmonary appreciated.  # Diarrhea; likely antibiotic associated, checked stool for C. Difficile, came back negative, start on Imodium 2 mg p.o. every 6 as needed  #Paroxysmal atrial tachycardia; increased metoprolol XL 25 mg daily  #Hypoalbuminemia; checked prealbumin low, supplements  #moderate malnutrition; dietician consult.  # Acute kidney injury; baseline creatinine seems to be around 0.8 mg/dL, but has fluctuated serum creatinine 1.27 likely prerenal, nephrology consult appreciated on low-dose oral Lasix diuretic.  Further diuresis as per nephrology  #COPD    -wears 3L  chronically, currently on 4L NC    - suspect pneumonia    - Duonebs QID     #Acute on chronic diastolic and systolic HF    -Fluid restriction 2 L/day, patient thin built not appreciable edema    - proBNP 45899 --2775, consult cardiology appreciated.    - CT chest as above, resumed low-dose Lasix on 3/8/2022, spironolactone added on by nephrology.      #CAD s/p CABG with attrition 3/4 grafts/ischemic cardiomyopathy most recent ejection 45% April 2021      - resume home aspirin, statin, Toprol, imdur and Ranexa .     #AAA    - CT a/p shows infra-renal abdominal aorta at 3.0x3.7cm on 11/4/2020    - will need to be re-evaluated as otpt     #HTN    - hold lisinopril     - resume home toprol      #HLD    - resume home statin     #GERD    - PPI     #Hypothyroid    - resume home synthroid      #Macrocytic hyperchromic anemia    -hgb 8.5 on admission, base ~ 10.0    - B12, FOLATE DEF: ON REAPLACEMNENT  - folate deficiency replace.     #Anxiety     - resume home Zoloft once verified     #DVT prophylaxis    -heparin     CODE STATUS:    Admission Status:  I believe this patient meets inpatient status.  Electronically signed by Deonte Hartley MD, 03/12/22, 9:11 AM EST.

## 2022-03-13 NOTE — PROGRESS NOTES
"[facility] PROGRESS NOTE     LOS: 7 days    Patient Care Team:  Deonte Hector MD as PCP - General      Subjective     Patient continues to be short of breath at minimal exertion.  Denies any nausea no vomiting.  No fever no chills.  Objective     Vital Sign Min/Max for last 24 hours  Temp:  [98.2 °F (36.8 °C)-98.6 °F (37 °C)] 98.2 °F (36.8 °C)  Heart Rate:  [73-96] 85  Resp:  [18-24] 20  BP: (117-154)/(61-92) 154/64                       Flowsheet Rows    Flowsheet Row First Filed Value   Admission Height 165.1 cm (65\") Documented at 03/06/2022 1049   Admission Weight 59.9 kg (132 lb) Documented at 03/06/2022 1049          I/O this shift:  In: 120 [P.O.:120]  Out: -   I/O last 3 completed shifts:  In: 730 [P.O.:730]  Out: -     Physical Exam:  Physical Exam    General Appearance: Chronically ill-appearing, NAD  Skin: warm and dry  HEENT: oral mucosa normal, nonicteric sclera  Neck: supple, no JVD  Lungs: Bilateral wheezing.  Bilateral crackles to midlung fields  Heart: RRR, normal S1 and S2  Abdomen: soft, nontender, nondistended  : no palpable bladder  Extremities: no edema, cyanosis or clubbing  Neuro: normal speech and mental status       LABS:  Lab Results   Component Value Date    CALCIUM 9.2 03/13/2022    PHOS 2.6 03/13/2022     Results from last 7 days   Lab Units 03/13/22  0815 03/13/22  0159 03/12/22  1301 03/12/22  0159 03/10/22  2338 03/10/22  0137   MAGNESIUM mg/dL  --   --   --  1.9 2.9* 1.4*   SODIUM mmol/L 140 140  --  138 136 139   POTASSIUM mmol/L 4.8 5.3*  --  3.7 3.3* 3.5   CHLORIDE mmol/L 104 103  --  102 98 101   CO2 mmol/L 24.0 26.0  --  25.0 28.0 25.0   BUN mg/dL 22 23  --  20 18 20   CREATININE mg/dL 1.27* 1.29*  --  1.35* 1.22* 1.18*   GLUCOSE mg/dL 98 154*  --  112* 166* 108*   CALCIUM mg/dL 9.2 8.7  --  8.4* 7.9* 8.6   WBC 10*3/mm3  --  11.50* 11.90* 12.30* 13.70* 14.10*   HEMOGLOBIN g/dL  --  8.0* 7.9* 7.4* 8.2* 8.5*   PLATELETS 10*3/mm3  --  245 225 204 228 225     Lab " Results   Component Value Date    TROPONINI 8.820 (C) 01/07/2021    TROPONINT <0.010 03/06/2022     Estimated Creatinine Clearance: 35.2 mL/min (A) (by C-G formula based on SCr of 1.27 mg/dL (H)).      Brief Urine Lab Results     None        WEIGHTS:     Wt Readings from Last 1 Encounters:   03/13/22 0600 59.1 kg (130 lb 2.9 oz)   03/13/22 0324 59.1 kg (130 lb 3.2 oz)   03/10/22 0556 57.6 kg (126 lb 14.4 oz)   03/09/22 0430 57.5 kg (126 lb 12.2 oz)   03/06/22 1049 59.9 kg (132 lb)       vitamin C, 250 mg, Oral, Daily  aspirin, 81 mg, Oral, Daily  atorvastatin, 80 mg, Oral, Nightly  cholecalciferol, 500 Units, Oral, Daily  clopidogrel, 75 mg, Oral, Daily  estradiol, 3 mg, Oral, Nightly  ferrous sulfate, 324 mg, Oral, Daily With Breakfast  folic acid, 1 mg, Oral, Daily  furosemide, 40 mg, Oral, Daily  gabapentin, 400 mg, Oral, Q8H  guaiFENesin, 600 mg, Oral, Q12H  ipratropium-albuterol, 3 mL, Nebulization, 4x Daily - RT  isosorbide mononitrate, 30 mg, Oral, Q24H  levothyroxine, 88 mcg, Oral, Q AM  metoprolol succinate XL, 25 mg, Oral, Daily  nystatin, 5 mL, Swish & Swallow, 4x Daily  pantoprazole, 40 mg, Oral, Q AM  potassium chloride, 20 mEq, Oral, BID With Meals  predniSONE, 20 mg, Oral, Daily  ranolazine, 1,000 mg, Oral, Q12H  sertraline, 100 mg, Oral, Daily  sodium chloride, 10 mL, Intravenous, Q12H  spironolactone, 25 mg, Oral, Daily  vitamin B-12, 1,000 mcg, Oral, Daily           Assessment/Plan       1.Acute kidney injury.  Mild  Baseline creatinine seems to be around 0.8 MG/DL but has fluctuated in past due to diuretics.    Creatinine stable around 1.3 but the patient has worsening dyspnea today.  Volume status seems to be up  2.  Hypertension  Blood Pressure well controlled  3.    Acute on chronic respiratory failure.  Pneumonia with underlying COPD  4.  Congestive heart failure.  Systolic.  Chronic  5.  Hypokalemia. Improved  5.Hypomagnesemia      Recs:  We will increase Lasix to 40 mg twice daily.    Continue surveillance labs  Appreciate pulmonary involvement          Leah Sharma MD  03/13/22  16:01 EDT

## 2022-03-13 NOTE — PROGRESS NOTES
Daily Progress Note        Pneumonia of both lungs due to infectious organism, unspecified part of lung    Abdominal aortic aneurysm (AAA) (HCC)    Chronic obstructive pulmonary disease (HCC)    Congestive heart failure (HCC)    Coronary artery disease    Dyslipidemia    Hypertension    Anemia of chronic disease    Mixed hyperlipidemia    Bilateral carotid artery stenosis    Moderate malnutrition (CMS/HCC)      Assessment  Acute on chronic hypoxia with hypercapnia- wears 3-4 L at home  Multifocal pneumonia  Pulmonary edema  Allergic reaction to Unasyn in the form of urticaria  Right upper lobe nodule  NOE  COPD  Diastolic and systolic CHF-  BNP> 15,000  CAD s/p CABG April 2021  AAA  HTN  HLD  GERD  Hypothyroidism  Macrocytic hyperchromic anemia  Anxiety    CT chest 3/8: 1. Multifocal groundglass opacities throughout the lungs most pronounced  in the lower lobes which may relate to atypical infection or COVID-19  pneumonia.  2. Focal nodular airspace disease in right upper lobe measuring 18 x 14  mm. There was previously a cluster of nodules in this region and this  could relate to chronic inflammation secondary to prior infection or  inflammation   Legionella and strep pneumonia antigens negative  Respiratory panel negative     ABG 3/6/2022 on nasal cannula-pH 7.37, CO2 51.6, PO2 74.8, bicarb 30.4    Plan  Diuretics as per nephrology  prednisone 20 mg daily  Oxygen supplement and titration: Currently on 3 L per nasal cannula  Rocephin for PNA-finishes 3/13/2022  Mucinex  Mucomyst nebulizers  Inhaled corticosteroids  Bronchodilators  Electrolyte/Glycemic control  DVT/GI Prophylaxis-Heparin SQ and Protonix  Levothyroxine 88mcg    3/11/2022                                                    3/6/2022       3/6/2022       LOS: 7 days     Subjective   Dry cough and shortness of breath      Objective     Vital signs for last 24 hours:  Vitals:    03/13/22 0731 03/13/22 0900 03/13/22 1040 03/13/22 1043   BP:  132/92     BP  Location:  Left arm     Patient Position:  Sitting     Pulse: 75 96 83 81   Resp: 18 18 18 18   Temp:  98.2 °F (36.8 °C)     TempSrc:  Oral     SpO2: 96% 97% 98% 98%   Weight:       Height:           Intake/Output last 3 shifts:  I/O last 3 completed shifts:  In: 730 [P.O.:730]  Out: -   Intake/Output this shift:  I/O this shift:  In: 120 [P.O.:120]  Out: -       Radiology  Imaging Results (Last 24 Hours)     ** No results found for the last 24 hours. **          Labs:  Results from last 7 days   Lab Units 03/13/22  0159   WBC 10*3/mm3 11.50*   HEMOGLOBIN g/dL 8.0*   HEMATOCRIT % 23.7*   PLATELETS 10*3/mm3 245     Results from last 7 days   Lab Units 03/13/22  0815 03/06/22  2311 03/06/22  1129   SODIUM mmol/L 140   < > 140   POTASSIUM mmol/L 4.8   < > 4.4   CHLORIDE mmol/L 104   < > 103   CO2 mmol/L 24.0   < > 27.0   BUN mg/dL 22   < > 24*   CREATININE mg/dL 1.27*   < > 1.02*   CALCIUM mg/dL 9.2   < > 8.5*   BILIRUBIN mg/dL  --   --  0.2   ALK PHOS U/L  --   --  56   ALT (SGPT) U/L  --   --  <5   AST (SGOT) U/L  --   --  5   GLUCOSE mg/dL 98   < > 124*    < > = values in this interval not displayed.     Results from last 7 days   Lab Units 03/06/22  1147   PH, ARTERIAL pH units 7.379   PO2 ART mm Hg 74.8*   PCO2, ARTERIAL mm Hg 51.6*   HCO3 ART mmol/L 30.4*     Results from last 7 days   Lab Units 03/13/22  0159 03/12/22  0159 03/10/22  2338   ALBUMIN g/dL 2.90* 2.50* 2.70*     Results from last 7 days   Lab Units 03/06/22  1129   TROPONIN T ng/mL <0.010         Results from last 7 days   Lab Units 03/12/22  0159   MAGNESIUM mg/dL 1.9                   Meds:   SCHEDULE  vitamin C, 250 mg, Oral, Daily  aspirin, 81 mg, Oral, Daily  atorvastatin, 80 mg, Oral, Nightly  cholecalciferol, 500 Units, Oral, Daily  clopidogrel, 75 mg, Oral, Daily  estradiol, 3 mg, Oral, Nightly  ferrous sulfate, 324 mg, Oral, Daily With Breakfast  folic acid, 1 mg, Oral, Daily  furosemide, 40 mg, Oral, Daily  gabapentin, 400 mg, Oral,  Q8H  guaiFENesin, 600 mg, Oral, Q12H  ipratropium-albuterol, 3 mL, Nebulization, 4x Daily - RT  isosorbide mononitrate, 30 mg, Oral, Q24H  levothyroxine, 88 mcg, Oral, Q AM  metoprolol succinate XL, 25 mg, Oral, Daily  nystatin, 5 mL, Swish & Swallow, 4x Daily  pantoprazole, 40 mg, Oral, Q AM  potassium chloride, 20 mEq, Oral, BID With Meals  predniSONE, 20 mg, Oral, Daily  ranolazine, 1,000 mg, Oral, Q12H  sertraline, 100 mg, Oral, Daily  sodium chloride, 10 mL, Intravenous, Q12H  spironolactone, 25 mg, Oral, Daily  vitamin B-12, 1,000 mcg, Oral, Daily      Infusions     PRNs  diphenhydrAMINE  •  docusate sodium  •  HYDROcodone-acetaminophen  •  loperamide  •  LORazepam  •  magnesium sulfate **OR** magnesium sulfate **OR** magnesium sulfate  •  methocarbamol  •  nitroglycerin  •  ondansetron **OR** ondansetron  •  phenylephrine-mineral oil-petrolatum  •  sodium chloride  •  sodium chloride    Physical Exam:  Physical Exam  General Appearance:  Alert.  No distress noted.  HEENT:  Normocephalic, without obvious abnormality, Conjunctiva/corneas clear,.  Normal external ear canals, Nares normal, no drainage     Neck:  Supple, symmetrical, trachea midline. No JVD.  Lungs /Chest wall:   Bilateral basal rhonchi, respirations unlabored symmetrical wall movement.     Heart:  Regular rate and rhythm, systolic murmur PMI left sternal border  Abdomen: Soft, non-tender, no masses, no organomegaly.    Extremities: No edema, no clubbing or cyanosis    ROS  Review of Systems  Constitutional: Negative for chills, fever and malaise/fatigue.   HENT: Negative.    Eyes: Negative.    Cardiovascular: Negative.    Respiratory: Positive for cough and shortness of breath.    Skin: Negative.    Musculoskeletal: Negative.    Gastrointestinal: Negative.    Genitourinary: Negative.    Neurological: Negative.    Psychiatric/Behavioral: Negative.        I have reviewed current clinicals.

## 2022-03-14 LAB
ALBUMIN SERPL-MCNC: 2.8 G/DL (ref 3.5–5.2)
ANION GAP SERPL CALCULATED.3IONS-SCNC: 11 MMOL/L (ref 5–15)
ANION GAP SERPL CALCULATED.3IONS-SCNC: 13 MMOL/L (ref 5–15)
BASOPHILS # BLD AUTO: 0.1 10*3/MM3 (ref 0–0.2)
BASOPHILS # BLD AUTO: 0.1 10*3/MM3 (ref 0–0.2)
BASOPHILS NFR BLD AUTO: 0.5 % (ref 0–1.5)
BASOPHILS NFR BLD AUTO: 0.6 % (ref 0–1.5)
BUN SERPL-MCNC: 28 MG/DL (ref 8–23)
BUN SERPL-MCNC: 31 MG/DL (ref 8–23)
BUN/CREAT SERPL: 22.2 (ref 7–25)
BUN/CREAT SERPL: 23 (ref 7–25)
CALCIUM SPEC-SCNC: 9.2 MG/DL (ref 8.6–10.5)
CALCIUM SPEC-SCNC: 9.4 MG/DL (ref 8.6–10.5)
CHLORIDE SERPL-SCNC: 100 MMOL/L (ref 98–107)
CHLORIDE SERPL-SCNC: 101 MMOL/L (ref 98–107)
CO2 SERPL-SCNC: 23 MMOL/L (ref 22–29)
CO2 SERPL-SCNC: 26 MMOL/L (ref 22–29)
CREAT SERPL-MCNC: 1.26 MG/DL (ref 0.57–1)
CREAT SERPL-MCNC: 1.35 MG/DL (ref 0.57–1)
DEPRECATED RDW RBC AUTO: 43.8 FL (ref 37–54)
DEPRECATED RDW RBC AUTO: 45.1 FL (ref 37–54)
EGFRCR SERPLBLD CKD-EPI 2021: 40.8 ML/MIN/1.73
EGFRCR SERPLBLD CKD-EPI 2021: 44.3 ML/MIN/1.73
EOSINOPHIL # BLD AUTO: 0 10*3/MM3 (ref 0–0.4)
EOSINOPHIL # BLD AUTO: 0 10*3/MM3 (ref 0–0.4)
EOSINOPHIL NFR BLD AUTO: 0 % (ref 0.3–6.2)
EOSINOPHIL NFR BLD AUTO: 0.1 % (ref 0.3–6.2)
ERYTHROCYTE [DISTWIDTH] IN BLOOD BY AUTOMATED COUNT: 12.8 % (ref 12.3–15.4)
ERYTHROCYTE [DISTWIDTH] IN BLOOD BY AUTOMATED COUNT: 12.9 % (ref 12.3–15.4)
GLUCOSE SERPL-MCNC: 137 MG/DL (ref 65–99)
GLUCOSE SERPL-MCNC: 154 MG/DL (ref 65–99)
HCT VFR BLD AUTO: 25.2 % (ref 34–46.6)
HCT VFR BLD AUTO: 27.6 % (ref 34–46.6)
HGB BLD-MCNC: 8.3 G/DL (ref 12–15.9)
HGB BLD-MCNC: 9.3 G/DL (ref 12–15.9)
LYMPHOCYTES # BLD AUTO: 1.1 10*3/MM3 (ref 0.7–3.1)
LYMPHOCYTES # BLD AUTO: 1.6 10*3/MM3 (ref 0.7–3.1)
LYMPHOCYTES NFR BLD AUTO: 13.1 % (ref 19.6–45.3)
LYMPHOCYTES NFR BLD AUTO: 8.2 % (ref 19.6–45.3)
MAGNESIUM SERPL-MCNC: 1.7 MG/DL (ref 1.6–2.4)
MCH RBC QN AUTO: 32.6 PG (ref 26.6–33)
MCH RBC QN AUTO: 33.3 PG (ref 26.6–33)
MCHC RBC AUTO-ENTMCNC: 33.2 G/DL (ref 31.5–35.7)
MCHC RBC AUTO-ENTMCNC: 33.8 G/DL (ref 31.5–35.7)
MCV RBC AUTO: 98.4 FL (ref 79–97)
MCV RBC AUTO: 98.4 FL (ref 79–97)
MONOCYTES # BLD AUTO: 0.6 10*3/MM3 (ref 0.1–0.9)
MONOCYTES # BLD AUTO: 0.8 10*3/MM3 (ref 0.1–0.9)
MONOCYTES NFR BLD AUTO: 4.7 % (ref 5–12)
MONOCYTES NFR BLD AUTO: 6.6 % (ref 5–12)
NEUTROPHILS NFR BLD AUTO: 11.5 10*3/MM3 (ref 1.7–7)
NEUTROPHILS NFR BLD AUTO: 79.7 % (ref 42.7–76)
NEUTROPHILS NFR BLD AUTO: 86.5 % (ref 42.7–76)
NEUTROPHILS NFR BLD AUTO: 9.9 10*3/MM3 (ref 1.7–7)
NRBC BLD AUTO-RTO: 0.1 /100 WBC (ref 0–0.2)
NRBC BLD AUTO-RTO: 0.1 /100 WBC (ref 0–0.2)
PHOSPHATE SERPL-MCNC: 3.6 MG/DL (ref 2.5–4.5)
PLATELET # BLD AUTO: 290 10*3/MM3 (ref 140–450)
PLATELET # BLD AUTO: 329 10*3/MM3 (ref 140–450)
PMV BLD AUTO: 8.2 FL (ref 6–12)
PMV BLD AUTO: 8.2 FL (ref 6–12)
POTASSIUM SERPL-SCNC: 5.1 MMOL/L (ref 3.5–5.2)
POTASSIUM SERPL-SCNC: 5.8 MMOL/L (ref 3.5–5.2)
RBC # BLD AUTO: 2.56 10*6/MM3 (ref 3.77–5.28)
RBC # BLD AUTO: 2.8 10*6/MM3 (ref 3.77–5.28)
SODIUM SERPL-SCNC: 137 MMOL/L (ref 136–145)
SODIUM SERPL-SCNC: 137 MMOL/L (ref 136–145)
WBC NRBC COR # BLD: 12.5 10*3/MM3 (ref 3.4–10.8)
WBC NRBC COR # BLD: 13.3 10*3/MM3 (ref 3.4–10.8)

## 2022-03-14 PROCEDURE — 85025 COMPLETE CBC W/AUTO DIFF WBC: CPT | Performed by: HOSPITALIST

## 2022-03-14 PROCEDURE — 94799 UNLISTED PULMONARY SVC/PX: CPT

## 2022-03-14 PROCEDURE — 80069 RENAL FUNCTION PANEL: CPT | Performed by: HOSPITALIST

## 2022-03-14 PROCEDURE — 99232 SBSQ HOSP IP/OBS MODERATE 35: CPT | Performed by: HOSPITALIST

## 2022-03-14 PROCEDURE — 63710000001 PREDNISONE PER 1 MG: Performed by: INTERNAL MEDICINE

## 2022-03-14 PROCEDURE — 63710000001 DIPHENHYDRAMINE PER 50 MG: Performed by: NURSE PRACTITIONER

## 2022-03-14 PROCEDURE — 80048 BASIC METABOLIC PNL TOTAL CA: CPT | Performed by: INTERNAL MEDICINE

## 2022-03-14 PROCEDURE — 83735 ASSAY OF MAGNESIUM: CPT | Performed by: HOSPITALIST

## 2022-03-14 PROCEDURE — 97116 GAIT TRAINING THERAPY: CPT

## 2022-03-14 RX ORDER — ALBUTEROL SULFATE 90 UG/1
2 AEROSOL, METERED RESPIRATORY (INHALATION)
Status: DISCONTINUED | OUTPATIENT
Start: 2022-03-14 | End: 2022-03-14

## 2022-03-14 RX ORDER — ALBUTEROL SULFATE 90 UG/1
2 AEROSOL, METERED RESPIRATORY (INHALATION)
Status: DISCONTINUED | OUTPATIENT
Start: 2022-03-14 | End: 2022-03-18 | Stop reason: HOSPADM

## 2022-03-14 RX ORDER — IPRATROPIUM BROMIDE AND ALBUTEROL SULFATE 2.5; .5 MG/3ML; MG/3ML
3 SOLUTION RESPIRATORY (INHALATION)
Status: DISCONTINUED | OUTPATIENT
Start: 2022-03-14 | End: 2022-03-14

## 2022-03-14 RX ORDER — OXYMETAZOLINE HYDROCHLORIDE 0.05 G/100ML
2 SPRAY NASAL 2 TIMES DAILY
Status: DISPENSED | OUTPATIENT
Start: 2022-03-14 | End: 2022-03-17

## 2022-03-14 RX ORDER — IPRATROPIUM BROMIDE AND ALBUTEROL SULFATE 2.5; .5 MG/3ML; MG/3ML
3 SOLUTION RESPIRATORY (INHALATION) EVERY 4 HOURS PRN
Status: DISCONTINUED | OUTPATIENT
Start: 2022-03-14 | End: 2022-03-18 | Stop reason: HOSPADM

## 2022-03-14 RX ADMIN — GUAIFENESIN 600 MG: 600 TABLET, EXTENDED RELEASE ORAL at 20:43

## 2022-03-14 RX ADMIN — LEVOTHYROXINE SODIUM 88 MCG: 0.09 TABLET ORAL at 05:43

## 2022-03-14 RX ADMIN — CYANOCOBALAMIN TAB 1000 MCG 1000 MCG: 1000 TAB at 09:19

## 2022-03-14 RX ADMIN — LORAZEPAM 1 MG: 1 TABLET ORAL at 00:48

## 2022-03-14 RX ADMIN — ESTRADIOL 3 MG: 1 TABLET ORAL at 20:42

## 2022-03-14 RX ADMIN — Medication 10 ML: at 20:43

## 2022-03-14 RX ADMIN — GABAPENTIN 400 MG: 400 CAPSULE ORAL at 17:46

## 2022-03-14 RX ADMIN — PREDNISONE 20 MG: 20 TABLET ORAL at 09:19

## 2022-03-14 RX ADMIN — RANOLAZINE 1000 MG: 500 TABLET, FILM COATED, EXTENDED RELEASE ORAL at 21:06

## 2022-03-14 RX ADMIN — RANOLAZINE 1000 MG: 500 TABLET, FILM COATED, EXTENDED RELEASE ORAL at 09:18

## 2022-03-14 RX ADMIN — GABAPENTIN 400 MG: 400 CAPSULE ORAL at 21:05

## 2022-03-14 RX ADMIN — ISOSORBIDE MONONITRATE 30 MG: 30 TABLET, EXTENDED RELEASE ORAL at 13:00

## 2022-03-14 RX ADMIN — ALBUTEROL SULFATE 2 PUFF: 108 INHALANT RESPIRATORY (INHALATION) at 18:28

## 2022-03-14 RX ADMIN — FUROSEMIDE 40 MG: 40 TABLET ORAL at 09:19

## 2022-03-14 RX ADMIN — METOPROLOL SUCCINATE 25 MG: 25 TABLET, EXTENDED RELEASE ORAL at 09:19

## 2022-03-14 RX ADMIN — NYSTATIN 500000 UNITS: 100000 SUSPENSION ORAL at 09:18

## 2022-03-14 RX ADMIN — LORAZEPAM 1 MG: 1 TABLET ORAL at 17:46

## 2022-03-14 RX ADMIN — GABAPENTIN 400 MG: 400 CAPSULE ORAL at 05:43

## 2022-03-14 RX ADMIN — Medication 10 ML: at 09:20

## 2022-03-14 RX ADMIN — OXYCODONE HYDROCHLORIDE AND ACETAMINOPHEN 250 MG: 500 TABLET ORAL at 09:19

## 2022-03-14 RX ADMIN — ATORVASTATIN CALCIUM 80 MG: 40 TABLET, FILM COATED ORAL at 20:43

## 2022-03-14 RX ADMIN — METHOCARBAMOL 500 MG: 500 TABLET ORAL at 00:48

## 2022-03-14 RX ADMIN — FUROSEMIDE 40 MG: 40 TABLET ORAL at 17:46

## 2022-03-14 RX ADMIN — LORAZEPAM 1 MG: 1 TABLET ORAL at 09:19

## 2022-03-14 RX ADMIN — NYSTATIN 500000 UNITS: 100000 SUSPENSION ORAL at 20:42

## 2022-03-14 RX ADMIN — DIPHENHYDRAMINE HYDROCHLORIDE 50 MG: 25 CAPSULE ORAL at 21:05

## 2022-03-14 RX ADMIN — HYDROCODONE BITARTRATE AND ACETAMINOPHEN 1 TABLET: 10; 325 TABLET ORAL at 05:43

## 2022-03-14 RX ADMIN — Medication 500 UNITS: at 09:21

## 2022-03-14 RX ADMIN — GUAIFENESIN 600 MG: 600 TABLET, EXTENDED RELEASE ORAL at 09:19

## 2022-03-14 RX ADMIN — FOLIC ACID 1 MG: 1 TABLET ORAL at 09:19

## 2022-03-14 RX ADMIN — CLOPIDOGREL BISULFATE 75 MG: 75 TABLET, FILM COATED ORAL at 09:19

## 2022-03-14 RX ADMIN — PANTOPRAZOLE SODIUM 40 MG: 40 TABLET, DELAYED RELEASE ORAL at 05:43

## 2022-03-14 RX ADMIN — ASPIRIN 81 MG: 81 TABLET, COATED ORAL at 09:19

## 2022-03-14 RX ADMIN — NYSTATIN 500000 UNITS: 100000 SUSPENSION ORAL at 17:45

## 2022-03-14 RX ADMIN — FERROUS SULFATE TAB EC 324 MG (65 MG FE EQUIVALENT) 324 MG: 324 (65 FE) TABLET DELAYED RESPONSE at 09:19

## 2022-03-14 RX ADMIN — NASAL DECONGESTANT 2 SPRAY: 0.05 SPRAY NASAL at 20:42

## 2022-03-14 RX ADMIN — SERTRALINE 100 MG: 100 TABLET, FILM COATED ORAL at 09:19

## 2022-03-14 NOTE — PLAN OF CARE
Goal Outcome Evaluation:  Plan of Care Reviewed With: patient        Progress: no change  Outcome Evaluation: Pt on home amount of O2, 3L NC. Pt sob up to bathroom. Non to scant productive cough. Pt started with incentive spirometer today.

## 2022-03-14 NOTE — THERAPY TREATMENT NOTE
Subjective: Pt agreeable to therapeutic plan of care.    Objective:     Bed mobility - Modified-Independent  Transfers - Modified-Independent including ambulatory transfer in room to commode; pt with good self mgmt of O2 line.   Ambulation - 250 feet and 70 feet SBA - cues for PLB and to limit distance based on dyspnea <=5/10 - SaO2 post 90% with 3L NC.     Pain: 0 VAS  Education: Provided education on importance of mobility and skilled verbal / tactile cueing throughout intervention.     Assessment: Gayathri Reddy presents with functional mobility impairments which indicate the need for skilled intervention. Tolerating session today without incident. Will continue to follow and progress as tolerated. Pt demos good safety and (I) within room, but benefits from SBA with longer gait distances to monitor dyspnea.     Plan/Recommendations:   Pt would benefit from Home with family assist and and Home Health at discharge from facility and requires no DME at discharge.   Pt desires Home with family assist and and Home Health at discharge. Pt cooperative; agreeable to therapeutic recommendations and plan of care.       Post-Tx Position: Up in Chair and Call light and personal items within reach  PPE: gloves, surgical mask, eyewear protection

## 2022-03-14 NOTE — PROGRESS NOTES
"[facility] PROGRESS NOTE     LOS: 8 days    Patient Care Team:  Deonte Hector MD as PCP - General      Subjective     Patient still has dyspnea with minimal exertion but improving slowly.  Denies any chest pain, cough, expectoration, nausea or vomiting    Objective     Vital Sign Min/Max for last 24 hours  Temp:  [97.4 °F (36.3 °C)-99.1 °F (37.3 °C)] 97.6 °F (36.4 °C)  Heart Rate:  [73-93] 73  Resp:  [15-21] 15  BP: (105-154)/(49-88) 122/49                       Flowsheet Rows    Flowsheet Row First Filed Value   Admission Height 165.1 cm (65\") Documented at 03/06/2022 1049   Admission Weight 59.9 kg (132 lb) Documented at 03/06/2022 1049          No intake/output data recorded.  I/O last 3 completed shifts:  In: 1080 [P.O.:1080]  Out: -     Physical Exam:  Physical Exam    General Appearance: Chronically ill-appearing, NAD  Skin: warm and dry  HEENT: oral mucosa normal, nonicteric sclera  Neck: supple, no JVD  Lungs: Decreased breath sounds at bases.  Few basilar crackles  Heart: RRR, normal S1 and S2  Abdomen: soft, nontender, nondistended  : no palpable bladder  Extremities: no edema, cyanosis or clubbing  Neuro: normal speech and mental status       LABS:  Lab Results   Component Value Date    CALCIUM 9.2 03/14/2022    PHOS 3.6 03/14/2022     Results from last 7 days   Lab Units 03/14/22  0100 03/13/22  0815 03/13/22  0159 03/12/22  1301 03/12/22  0159 03/10/22  2338   MAGNESIUM mg/dL 1.7  --   --   --  1.9 2.9*   SODIUM mmol/L 137 140 140  --  138 136   POTASSIUM mmol/L 5.8* 4.8 5.3*  --  3.7 3.3*   CHLORIDE mmol/L 101 104 103  --  102 98   CO2 mmol/L 23.0 24.0 26.0  --  25.0 28.0   BUN mg/dL 28* 22 23  --  20 18   CREATININE mg/dL 1.26* 1.27* 1.29*  --  1.35* 1.22*   GLUCOSE mg/dL 154* 98 154*  --  112* 166*   CALCIUM mg/dL 9.2 9.2 8.7  --  8.4* 7.9*   WBC 10*3/mm3 13.30*  --  11.50* 11.90* 12.30* 13.70*   HEMOGLOBIN g/dL 8.3*  --  8.0* 7.9* 7.4* 8.2*   PLATELETS 10*3/mm3 290  --  245 225 204 " 228     Lab Results   Component Value Date    TROPONINI 8.820 (C) 01/07/2021    TROPONINT <0.010 03/06/2022     Estimated Creatinine Clearance: 34.3 mL/min (A) (by C-G formula based on SCr of 1.26 mg/dL (H)).      Brief Urine Lab Results     None        WEIGHTS:     Wt Readings from Last 1 Encounters:   03/14/22 0457 57.2 kg (126 lb 1.7 oz)   03/13/22 0600 59.1 kg (130 lb 2.9 oz)   03/13/22 0324 59.1 kg (130 lb 3.2 oz)   03/10/22 0556 57.6 kg (126 lb 14.4 oz)   03/09/22 0430 57.5 kg (126 lb 12.2 oz)   03/06/22 1049 59.9 kg (132 lb)       vitamin C, 250 mg, Oral, Daily  aspirin, 81 mg, Oral, Daily  atorvastatin, 80 mg, Oral, Nightly  cholecalciferol, 500 Units, Oral, Daily  clopidogrel, 75 mg, Oral, Daily  estradiol, 3 mg, Oral, Nightly  ferrous sulfate, 324 mg, Oral, Daily With Breakfast  folic acid, 1 mg, Oral, Daily  furosemide, 40 mg, Oral, BID  gabapentin, 400 mg, Oral, Q8H  guaiFENesin, 600 mg, Oral, Q12H  ipratropium-albuterol, 3 mL, Nebulization, TID - RT  isosorbide mononitrate, 30 mg, Oral, Q24H  levothyroxine, 88 mcg, Oral, Q AM  metoprolol succinate XL, 25 mg, Oral, Daily  nystatin, 5 mL, Swish & Swallow, 4x Daily  pantoprazole, 40 mg, Oral, Q AM  potassium chloride, 20 mEq, Oral, BID With Meals  predniSONE, 20 mg, Oral, Daily  ranolazine, 1,000 mg, Oral, Q12H  sertraline, 100 mg, Oral, Daily  sodium chloride, 10 mL, Intravenous, Q12H  spironolactone, 25 mg, Oral, Daily  vitamin B-12, 1,000 mcg, Oral, Daily           Assessment/Plan       1.Acute kidney injury.  Mild  Baseline creatinine seems to be around 0.8 MG/DL but has fluctuated in past due to diuretics.    Creatinine stable around 1.2-1.3  Volume status improving slowly  2.  Hypertension  Blood Pressure well controlled  3.    Acute on chronic respiratory failure.  Pneumonia with underlying COPD  4.  Congestive heart failure.  Systolic.  Chronic  5.  Hyperkalemia. Improved     Recs:  Continue Lasix at 40 mg twice a day  Discontinue potassium  supplement  Repeat potassium level  Hold spironolactone      Irving Yi MD  03/14/22  09:35 EDT

## 2022-03-14 NOTE — PROGRESS NOTES
"PULMONARY CRITICAL CARE Progress  NOTE      PATIENT IDENTIFICATION:  Name: Gayathri Reddy  MRN: [id number]  :  1945     Age: 76 y.o.  Sex: female    DATE OF Note:  3/14/2022   Referring Physician: Zeina Lehman MD                  Subjective:   Feeling better, on 2 L nasal cannula no SOB no chest pain, no nausea or vomiting, no change in bowel habit, no dysuria,  no new  skin rash or itching.      Objective:  tMax 24 hrs: Temp (24hrs), Av.1 °F (36.7 °C), Min:97.4 °F (36.3 °C), Max:99.1 °F (37.3 °C)      Vitals Ranges:   Temp:  [97.4 °F (36.3 °C)-99.1 °F (37.3 °C)] 97.6 °F (36.4 °C)  Heart Rate:  [73-93] 73  Resp:  [15-21] 15  BP: (105-154)/(49-88) 122/49    Intake and Output Last 3 Shifts:   I/O last 3 completed shifts:  In: 1080 [P.O.:1080]  Out: -     Exam:  /49 (BP Location: Left arm, Patient Position: Lying)   Pulse 73   Temp 97.6 °F (36.4 °C) (Oral)   Resp 15   Ht 165.1 cm (65\")   Wt 57.2 kg (126 lb 1.7 oz)   SpO2 98%   BMI 20.98 kg/m²     General Appearance: Alert awake not in distress  HEENT:  Normocephalic, without obvious abnormality, Conjunctiva/corneas clear,.  Normal external ear canals, Nares normal, no drainage     Neck:  Supple, symmetrical, trachea midline. No JVD.  Lungs /Chest wall:   Bilateral basal rhonchi, respirations unlabored symmetrical wall movement.     Heart:  Regular rate and rhythm, systolic murmur PMI left sternal border  Abdomen: Soft, non-tender, no masses, no organomegaly.    Extremities: Trace edema no clubbing or Cyanosis        Medications:    Current Facility-Administered Medications:   •  ascorbic acid (VITAMIN C) tablet 250 mg, 250 mg, Oral, Daily, Laure Mei APRN, 250 mg at 22 0919  •  aspirin EC tablet 81 mg, 81 mg, Oral, Daily, Laure Mei APRN, 81 mg at 22 09  •  atorvastatin (LIPITOR) tablet 80 mg, 80 mg, Oral, Nightly, Laure Mei APRN, 80 mg at 22  •  cholecalciferol (VITAMIN D3) " tablet 500 Units, 500 Units, Oral, Daily, Laure Mei APRN, 500 Units at 03/14/22 0921  •  clopidogrel (PLAVIX) tablet 75 mg, 75 mg, Oral, Daily, Laure Mei APRN, 75 mg at 03/14/22 0919  •  diphenhydrAMINE (BENADRYL) capsule 50 mg, 50 mg, Oral, Nightly PRN, Alsop, Rosibel L, APRN, 50 mg at 03/13/22 2040  •  docusate sodium (COLACE) capsule 100 mg, 100 mg, Oral, BID PRN, Alsop, Rosibel L, APRN, 100 mg at 03/11/22 0639  •  estradiol (ESTRACE) tablet 3 mg, 3 mg, Oral, Nightly, Laure Mei APRN, 3 mg at 03/13/22 2039  •  ferrous sulfate EC tablet 324 mg, 324 mg, Oral, Daily With Breakfast, Laure Mei APRN, 324 mg at 03/14/22 0919  •  folic acid (FOLVITE) tablet 1 mg, 1 mg, Oral, Daily, Deonte Hartley MD, 1 mg at 03/14/22 0919  •  furosemide (LASIX) tablet 40 mg, 40 mg, Oral, BID, Leah Sharma MD, 40 mg at 03/14/22 0919  •  gabapentin (NEURONTIN) capsule 400 mg, 400 mg, Oral, Q8H, Laure Mei APRN, 400 mg at 03/14/22 0543  •  guaiFENesin (MUCINEX) 12 hr tablet 600 mg, 600 mg, Oral, Q12H, Lilliana Del Rosario APRN, 600 mg at 03/14/22 0919  •  HYDROcodone-acetaminophen (NORCO)  MG per tablet 1 tablet, 1 tablet, Oral, Q6H PRN, Laure Mei APRN, 1 tablet at 03/14/22 0543  •  ipratropium-albuterol (DUO-NEB) nebulizer solution 3 mL, 3 mL, Nebulization, TID - RT, Deonte Hartley MD  •  isosorbide mononitrate (IMDUR) 24 hr tablet 30 mg, 30 mg, Oral, Q24H, Laure Mei APRN, 30 mg at 03/13/22 1229  •  levothyroxine (SYNTHROID, LEVOTHROID) tablet 88 mcg, 88 mcg, Oral, Q AM, Laure Mei APRN, 88 mcg at 03/14/22 0543  •  loperamide (IMODIUM) capsule 2 mg, 2 mg, Oral, 4x Daily PRN, Sky Reeder MD, 2 mg at 03/10/22 1141  •  LORazepam (ATIVAN) tablet 1 mg, 1 mg, Oral, Q6H PRN, Laure Mei APRN, 1 mg at 03/14/22 0919  •  Magnesium Sulfate 2 gram Bolus, followed by 8 gram infusion  (total Mg dose 10 grams)- Mg less than or equal to 1mg/dL, 2 g, Intravenous, PRN **OR** Magnesium Sulfate 2 gram / 50mL Infusion (GIVE X 3 BAGS TO EQUAL 6GM TOTAL DOSE) - Mg 1.1 - 1.5 mg/dl, 2 g, Intravenous, PRN, Last Rate: 25 mL/hr at 03/10/22 1815, 2 g at 03/10/22 1815 **OR** Magnesium Sulfate 4 gram infusion- Mg 1.6-1.9 mg/dL, 4 g, Intravenous, PRN, Alix Bernardo APRN  •  methocarbamol (ROBAXIN) tablet 500 mg, 500 mg, Oral, TID PRN, Laure Mei APRN, 500 mg at 03/14/22 0048  •  metoprolol succinate XL (TOPROL-XL) 24 hr tablet 25 mg, 25 mg, Oral, Daily, Alix Bernardo APRN, 25 mg at 03/14/22 0919  •  nitroglycerin (NITROSTAT) SL tablet 0.4 mg, 0.4 mg, Sublingual, Q5 Min PRN, Laure Mei APRN  •  nystatin (MYCOSTATIN) 100,000 unit/mL suspension 500,000 Units, 5 mL, Swish & Swallow, 4x Daily, Andres Wheat MD, 500,000 Units at 03/14/22 0918  •  ondansetron (ZOFRAN) tablet 4 mg, 4 mg, Oral, Q6H PRN, 4 mg at 03/11/22 0259 **OR** ondansetron (ZOFRAN) injection 4 mg, 4 mg, Intravenous, Q6H PRN, Laure Mei APRN  •  pantoprazole (PROTONIX) EC tablet 40 mg, 40 mg, Oral, Q AM, Laure Mei APRN, 40 mg at 03/14/22 0543  •  phenylephrine-mineral oil-petrolatum (PREPARATION H) 0.25-14-74.9 % hemorhoidal ointment, , Rectal, TID PRN, AlsoRosibel simpson APRN, Given at 03/09/22 0408  •  predniSONE (DELTASONE) tablet 20 mg, 20 mg, Oral, Daily, Andres Wheat MD, 20 mg at 03/14/22 0919  •  ranolazine (RANEXA) 12 hr tablet 1,000 mg, 1,000 mg, Oral, Q12H, Laure Mei APRN, 1,000 mg at 03/14/22 0918  •  sertraline (ZOLOFT) tablet 100 mg, 100 mg, Oral, Daily, aLure Mei APRN, 100 mg at 03/14/22 0919  •  sodium chloride 0.9 % flush 10 mL, 10 mL, Intravenous, PRN, Laure Mei APRN  •  sodium chloride 0.9 % flush 10 mL, 10 mL, Intravenous, Q12H, Laure Mei APRN, 10 mL at 03/14/22 0920  •  sodium chloride 0.9 % flush 10 mL, 10  mL, Intravenous, PRN, Laure Mei APRN  •  vitamin B-12 (CYANOCOBALAMIN) tablet 1,000 mcg, 1,000 mcg, Oral, Daily, Deonte Hartley MD, 1,000 mcg at 03/14/22 0919    Data Review:  All labs (24hrs):   Recent Results (from the past 24 hour(s))   Renal Function Panel    Collection Time: 03/14/22  1:00 AM    Specimen: Blood   Result Value Ref Range    Glucose 154 (H) 65 - 99 mg/dL    BUN 28 (H) 8 - 23 mg/dL    Creatinine 1.26 (H) 0.57 - 1.00 mg/dL    Sodium 137 136 - 145 mmol/L    Potassium 5.8 (H) 3.5 - 5.2 mmol/L    Chloride 101 98 - 107 mmol/L    CO2 23.0 22.0 - 29.0 mmol/L    Calcium 9.2 8.6 - 10.5 mg/dL    Albumin 2.80 (L) 3.50 - 5.20 g/dL    Phosphorus 3.6 2.5 - 4.5 mg/dL    Anion Gap 13.0 5.0 - 15.0 mmol/L    BUN/Creatinine Ratio 22.2 7.0 - 25.0    eGFR 44.3 (L) >60.0 mL/min/1.73   Magnesium    Collection Time: 03/14/22  1:00 AM    Specimen: Blood   Result Value Ref Range    Magnesium 1.7 1.6 - 2.4 mg/dL   CBC Auto Differential    Collection Time: 03/14/22  1:00 AM    Specimen: Blood   Result Value Ref Range    WBC 13.30 (H) 3.40 - 10.80 10*3/mm3    RBC 2.56 (L) 3.77 - 5.28 10*6/mm3    Hemoglobin 8.3 (L) 12.0 - 15.9 g/dL    Hematocrit 25.2 (L) 34.0 - 46.6 %    MCV 98.4 (H) 79.0 - 97.0 fL    MCH 32.6 26.6 - 33.0 pg    MCHC 33.2 31.5 - 35.7 g/dL    RDW 12.9 12.3 - 15.4 %    RDW-SD 45.1 37.0 - 54.0 fl    MPV 8.2 6.0 - 12.0 fL    Platelets 290 140 - 450 10*3/mm3    Neutrophil % 86.5 (H) 42.7 - 76.0 %    Lymphocyte % 8.2 (L) 19.6 - 45.3 %    Monocyte % 4.7 (L) 5.0 - 12.0 %    Eosinophil % 0.0 (L) 0.3 - 6.2 %    Basophil % 0.6 0.0 - 1.5 %    Neutrophils, Absolute 11.50 (H) 1.70 - 7.00 10*3/mm3    Lymphocytes, Absolute 1.10 0.70 - 3.10 10*3/mm3    Monocytes, Absolute 0.60 0.10 - 0.90 10*3/mm3    Eosinophils, Absolute 0.00 0.00 - 0.40 10*3/mm3    Basophils, Absolute 0.10 0.00 - 0.20 10*3/mm3    nRBC 0.1 0.0 - 0.2 /100 WBC        Imaging:  XR Chest 1 View  Narrative:    DATE OF EXAM:    3/13/2022 11:54 AM     PROCEDURE:   XR CHEST 1 VW-     INDICATIONS:   Shortness of breath; J18.9-Pneumonia, unspecified organism;  R06.02-Shortness of breath; I50.9-Heart failure, unspecified;  D63.8-Anemia in other chronic diseases classified elsewhere;  I42.0-Dilated cardiomyopathy     COMPARISON:  03/11/2022 and prior     TECHNIQUE:   Portable Chest     FINDINGS:      Study is limited secondary to overlying support and monitoring  apparatus. Status post median sternotomy. Patchy bibasilar opacities  left greater than right demonstrate no significant change when  accounting for differences in technique to the comparison. Osseous  structures are stable.      Impression: IMPRESSION :   Bibasilar airspace disease or pronounced on the left than the right.  Findings compatible with pneumonia.[     Electronically Signed By-Zander Yang On:3/13/2022 12:53 PM  This report was finalized on [id number] by  Zander Yang, .       ASSESSMENT:    Acute on chronic hypoxia with hypercapnia- wears 3-4 L at home  Pneumonia of both lungs due to infectious organism, unspecified part of lung    Abdominal aortic aneurysm (AAA) (HCC)    Chronic obstructive pulmonary disease (HCC)    Congestive heart failure (HCC)    Coronary artery disease    Dyslipidemia    Hypertension    Anemia of chronic disease    Mixed hyperlipidemia    Bilateral carotid artery stenosis    Moderate malnutrition (CMS/HCC)       PLAN:  Antibiotics finished yesterday  Chest x-ray in the morning  Continue to wean down prednisone slowly  Bronchodilator  Inhaled corticosteroids  Electrolytes/ glycemic control  DVT and GI prophylaxis.    Total Critical care time in direct medical management (   ) minutes, This time specifically excludes time spent performing procedures.  Joanie Liu MD. D, ABSM.     3/14/2022  10:01 EDT

## 2022-03-14 NOTE — CASE MANAGEMENT/SOCIAL WORK
Continued Stay Note  MISA Joe     Patient Name: Gayatrhi Reddy  MRN: [id number]  Today's Date: 3/14/2022    Admit Date: 3/6/2022     Discharge Plan     Row Name 03/14/22 1302       [facility] Current home oxygen with [employer]    Plan Comments confirmed discharge plan with patient at bedside reamins agreeable, patient does have portable oxygen tanks also and family will provide transportation at discharge. d/c barrier: pulm and renal following              Met with patient at bedside wearing mask and goggles, Spent less than 15 minutes in room at greater than 6 feet distance.         Vanessa Fraser RN

## 2022-03-14 NOTE — PLAN OF CARE
Gayathri KATHY Reddy presents with functional mobility impairments which indicate the need for skilled intervention. Tolerating session today without incident. Will continue to follow and progress as tolerated. Pt demos good safety and (I) within room, but benefits from SBA with longer gait distances to monitor dyspnea.

## 2022-03-14 NOTE — PROGRESS NOTES
[facility] Medicine Services Daily Progress Note    Patient Name: Gayathri Reddy  : 1945  MRN: [id number]  Primary Care Physician:  Deonte Hector MD  Date of admission: 3/6/2022      Subjective      Chief Complaint: Shortness of breath      Patient Reports   3/14/2022: Patient reports continuing to feel poorly with shortness of air and cough productive of clear and sometimes yellow phlegm.    ROS   12 point review of systems was reviewed and was negative except shortness of air with cough.      Objective      Vitals:   Temp:  [97.4 °F (36.3 °C)-98.3 °F (36.8 °C)] 98.1 °F (36.7 °C)  Heart Rate:  [73-93] 77  Resp:  [15-20] 20  BP: (104-134)/(49-88) 111/75  Flow (L/min):  [3] 3    Physical Exam   Vital signs and nurses notes reviewed.  Thin elderly female sitting up in bed comfortable on nasal cannul in no acute distress; sclera anicteric, mucous membranes moist;  lungs with diffuse crackles but no wheezes with decreased air entry anteriorly and posteriorly bilaterally; CV regular rate and rhythm; abdomen soft nontender nondistended; extremities with no edema or calf tenderness; no cyanosis; no Hull catheter.  The patient's skin is loose with protuberant bony prominences of the skull trunk and extremities suggestive of subcutaneous muscle and fat loss.       Result Review    Result Review:  I have personally reviewed the results from the time of this admission to 3/14/2022 18:07 EDT and agree with these findings:  [x]  Laboratory  [x]  Microbiology  [x]  Radiology  [x]  EKG/Telemetry   [x]  Cardiology/Vascular   []  Pathology  [x]  Old records  []  Other:  Most notable findings discussed in the assessment and plan.          Assessment/Plan      Brief Patient Summary:  Gayathri Reddy is a 76 y.o. female with PMHx of HTN, HLD, hypothyroidism, GERD, CAD, AAA, CHF, COPD with chronic hypoxic respiratory failure (3L NC chronically) who presented to the emergency department  complaining of a 3-day history of increasing shortness of breath especially with exertion and productive cough.  In the emergency department troponin was normal and proBNP 26050.  Hemoglobin was 8.5.  CXR suggestive of multi-focal pneumonia.  Respiratory panel negative for covid or other virus. Patient started on abx, lasix, and admitted.     3/7/2022; still complaining of shortness of breath with minimal exertion, T-max of 36.6, vital signs are stable currently on 3 L of nasal cannula.  Spoke with RN.  Serum creatinine bumped up to 1.27 prerenal NOE consult nephrology, proBNP 51925, will consult cardiology.  3/8/2022; patient walked about 200 feet without getting hypoxia on supplemental oxygen but still increased work of breathing, otherwise hemodynamically stable.  3/9/2022; past patient still endorses shortness of breath with minimal exertion, has been endorsing diarrhea for the last 2 or 3 days, will check stool for C. difficile, T-max of 100.7, vital stable currently on 1 L of nasal cannula.  3/10/2022; C. difficile came back negative, started on Imodium as needed, hypoalbuminemia, check prealbumin, had some overnight paroxysmal atrial tachycardia beta-blocker dose increased, spironolactone has been added by nephrology.  3/11/2022; diarrhea has resolved apparently patient did not get spironolactone yesterday, as order was not placed, chest x-ray PA and lateral still with features of multifocal pneumonia in the lungs with improved aeration compared to 3/6/2022 pulmonology cardiology and nephrology following.  3/12  hgb 7.4 wo bleeding   Slept well  Sat hgood  Notes reviewed  Labs reviewed  Defer reacritto nephrology  Separate folic and b12 replacement  May need iv fe  Sore tongue.. empiriic nystatinS/S  3/13  She has been feeling short of breath more than usual today  We will check x-ray chest  Her hemoglobin actually better.      Current inpatient medications include:  albuterol sulfate HFA, 2 puff, Inhalation,  BID - RT  vitamin C, 250 mg, Oral, Daily  aspirin, 81 mg, Oral, Daily  atorvastatin, 80 mg, Oral, Nightly  cholecalciferol, 500 Units, Oral, Daily  clopidogrel, 75 mg, Oral, Daily  estradiol, 3 mg, Oral, Nightly  ferrous sulfate, 324 mg, Oral, Daily With Breakfast  folic acid, 1 mg, Oral, Daily  furosemide, 40 mg, Oral, BID  gabapentin, 400 mg, Oral, Q8H  guaiFENesin, 600 mg, Oral, Q12H  isosorbide mononitrate, 30 mg, Oral, Q24H  levothyroxine, 88 mcg, Oral, Q AM  metoprolol succinate XL, 25 mg, Oral, Daily  nystatin, 5 mL, Swish & Swallow, 4x Daily  oxymetazoline, 2 spray, Each Nare, BID  pantoprazole, 40 mg, Oral, Q AM  predniSONE, 20 mg, Oral, Daily  ranolazine, 1,000 mg, Oral, Q12H  sertraline, 100 mg, Oral, Daily  sodium chloride, 10 mL, Intravenous, Q12H  vitamin B-12, 1,000 mcg, Oral, Daily             Active Hospital Problems:  Active Hospital Problems    Diagnosis    • **Pneumonia of both lungs due to infectious organism, unspecified part of lung    • Hypothyroidism    • Vitamin B12 deficiency    • Folic acid deficiency    • Moderate malnutrition (CMS/HCC)    • Cardiomyopathy, dilated (HCC)    • Ischemic cardiomyopathy    • Mixed hyperlipidemia    • Chronic stable angina ([facility])    • Anemia of chronic disease    • Diarrhea      Likely secondary to antibiotics     • Chronic obstructive pulmonary disease (HCC)    • Congestive heart failure (HCC)    • Coronary artery disease    • Dyslipidemia    • Essential hypertension    • Gastroesophageal reflux disease    • Iron deficiency anemia    • Abdominal aortic aneurysm (AAA) ([facility])    • Bilateral carotid artery stenosis      Plan:   Acute on chronic hypoxic and hypercapnic respiratory failure  Chronic obstructive pulmonary disease  Multifocal pneumonia  -Patient initially treated with Unasyn but had an allergic reaction which resolved with Decadron and Benadryl  -Procalcitonin normal  -Urine antigens negative  -Respiratory panel negative  -Pulmonary is  consulting  -Patient completed a course of Rocephin and Flagyl  -Continue short acting bronchodilators and prednisone    Acute on chronic systolic and diastolic congestive heart failure due to ischemic cardiomyopathy and hypertensive heart disease  -Cardiology consulting  -Nephrology managing diuresis    Coronary artery disease status post CABG  -with attrition 3/4 grafts  -ischemic cardiomyopathy most recent ejection 45% April 2021  -Continue home aspirin, statin, Toprol, imdur and Ranexa     Paroxysmal atrial tachycardia  -Resolved with increase in metoprolol    Essential hypertension, chronic and controlled  -Lisinopril discontinued due to acute renal failure  -Continue metoprolol    Hyperlipidemia  -Continue atorvastatin    Acute renal failure secondary to prerenal azotemia due to diuretics  -Baseline creatinine 0.8  -Creatinine has increased to 1.35 with diuresis  -Nephrology following and managing    Hyperkalemia  -Improved with holding oral potassium supplement and spironolactone    Moderate malnutrition  -Nutritional supplements being given    GERD  -Continue PPI    Hypothyroidism  -Continue levothyroxine    Iron, B12 and folic acid deficiency anemia  -Continue replacement  -Hemoglobin 8.5 on admission and is now 9.3    Nutritional supplements  -Continue vitamin C and vitamin D    DVT prophylaxis:  No DVT prophylaxis order currently exists.    CODE STATUS:    Code Status (Patient has no pulse and is not breathing): CPR (Attempt to Resuscitate)  Medical Interventions (Patient has pulse or is breathing): Full Support      Disposition:  I expect patient to be discharged in 1 to 2 days.  I spoke with Dr. Yi who felt the patient could be discharged today but Dr. Liu wanted the patient to stay in the hospital for now.    This patient has been examined wearing appropriate Personal Protective Equipment and discussed with hospital infection control department. 03/14/22      Electronically signed by Zeina Lehman  MD, 03/14/22, 18:07 EDT.  [facility] Hospitalist Team

## 2022-03-15 ENCOUNTER — APPOINTMENT (OUTPATIENT)
Dept: GENERAL RADIOLOGY | Facility: HOSPITAL | Age: 77
End: 2022-03-15

## 2022-03-15 LAB
ALBUMIN SERPL-MCNC: 3.3 G/DL (ref 3.5–5.2)
ANION GAP SERPL CALCULATED.3IONS-SCNC: 14 MMOL/L (ref 5–15)
BUN SERPL-MCNC: 36 MG/DL (ref 8–23)
BUN/CREAT SERPL: 21.6 (ref 7–25)
CALCIUM SPEC-SCNC: 9.4 MG/DL (ref 8.6–10.5)
CHLORIDE SERPL-SCNC: 96 MMOL/L (ref 98–107)
CO2 SERPL-SCNC: 25 MMOL/L (ref 22–29)
CREAT SERPL-MCNC: 1.67 MG/DL (ref 0.57–1)
EGFRCR SERPLBLD CKD-EPI 2021: 31.6 ML/MIN/1.73
GLUCOSE SERPL-MCNC: 212 MG/DL (ref 65–99)
PHOSPHATE SERPL-MCNC: 4.7 MG/DL (ref 2.5–4.5)
POTASSIUM SERPL-SCNC: 4.4 MMOL/L (ref 3.5–5.2)
SODIUM SERPL-SCNC: 135 MMOL/L (ref 136–145)

## 2022-03-15 PROCEDURE — 71045 X-RAY EXAM CHEST 1 VIEW: CPT

## 2022-03-15 PROCEDURE — 63710000001 PREDNISONE PER 1 MG: Performed by: INTERNAL MEDICINE

## 2022-03-15 PROCEDURE — 94761 N-INVAS EAR/PLS OXIMETRY MLT: CPT

## 2022-03-15 PROCEDURE — 63710000001 DIPHENHYDRAMINE PER 50 MG: Performed by: NURSE PRACTITIONER

## 2022-03-15 PROCEDURE — 80069 RENAL FUNCTION PANEL: CPT | Performed by: INTERNAL MEDICINE

## 2022-03-15 PROCEDURE — 94799 UNLISTED PULMONARY SVC/PX: CPT

## 2022-03-15 PROCEDURE — 99232 SBSQ HOSP IP/OBS MODERATE 35: CPT | Performed by: HOSPITALIST

## 2022-03-15 RX ORDER — LABETALOL HYDROCHLORIDE 5 MG/ML
10 INJECTION, SOLUTION INTRAVENOUS EVERY 6 HOURS PRN
Status: DISCONTINUED | OUTPATIENT
Start: 2022-03-15 | End: 2022-03-17

## 2022-03-15 RX ADMIN — FERROUS SULFATE TAB EC 324 MG (65 MG FE EQUIVALENT) 324 MG: 324 (65 FE) TABLET DELAYED RESPONSE at 09:16

## 2022-03-15 RX ADMIN — RANOLAZINE 1000 MG: 500 TABLET, FILM COATED, EXTENDED RELEASE ORAL at 21:55

## 2022-03-15 RX ADMIN — PANTOPRAZOLE SODIUM 40 MG: 40 TABLET, DELAYED RELEASE ORAL at 06:08

## 2022-03-15 RX ADMIN — Medication 10 ML: at 21:58

## 2022-03-15 RX ADMIN — ALBUTEROL SULFATE 2 PUFF: 108 INHALANT RESPIRATORY (INHALATION) at 18:10

## 2022-03-15 RX ADMIN — ALBUTEROL SULFATE 2 PUFF: 108 INHALANT RESPIRATORY (INHALATION) at 07:17

## 2022-03-15 RX ADMIN — OXYCODONE HYDROCHLORIDE AND ACETAMINOPHEN 250 MG: 500 TABLET ORAL at 09:17

## 2022-03-15 RX ADMIN — GUAIFENESIN 600 MG: 600 TABLET, EXTENDED RELEASE ORAL at 09:17

## 2022-03-15 RX ADMIN — PREDNISONE 20 MG: 20 TABLET ORAL at 09:17

## 2022-03-15 RX ADMIN — FOLIC ACID 1 MG: 1 TABLET ORAL at 09:16

## 2022-03-15 RX ADMIN — Medication 500 UNITS: at 09:17

## 2022-03-15 RX ADMIN — LORAZEPAM 1 MG: 1 TABLET ORAL at 01:41

## 2022-03-15 RX ADMIN — NYSTATIN 500000 UNITS: 100000 SUSPENSION ORAL at 09:17

## 2022-03-15 RX ADMIN — LORAZEPAM 1 MG: 1 TABLET ORAL at 10:40

## 2022-03-15 RX ADMIN — HYDROCODONE BITARTRATE AND ACETAMINOPHEN 1 TABLET: 10; 325 TABLET ORAL at 21:56

## 2022-03-15 RX ADMIN — ISOSORBIDE MONONITRATE 30 MG: 30 TABLET, EXTENDED RELEASE ORAL at 13:47

## 2022-03-15 RX ADMIN — ATORVASTATIN CALCIUM 80 MG: 40 TABLET, FILM COATED ORAL at 21:56

## 2022-03-15 RX ADMIN — ASPIRIN 81 MG: 81 TABLET, COATED ORAL at 09:17

## 2022-03-15 RX ADMIN — Medication 10 ML: at 09:20

## 2022-03-15 RX ADMIN — LORAZEPAM 1 MG: 1 TABLET ORAL at 21:56

## 2022-03-15 RX ADMIN — GABAPENTIN 400 MG: 400 CAPSULE ORAL at 21:56

## 2022-03-15 RX ADMIN — GUAIFENESIN 600 MG: 600 TABLET, EXTENDED RELEASE ORAL at 21:56

## 2022-03-15 RX ADMIN — FUROSEMIDE 40 MG: 40 TABLET ORAL at 17:45

## 2022-03-15 RX ADMIN — LABETALOL 20 MG/4 ML (5 MG/ML) INTRAVENOUS SYRINGE 10 MG: at 02:53

## 2022-03-15 RX ADMIN — SERTRALINE 100 MG: 100 TABLET, FILM COATED ORAL at 09:17

## 2022-03-15 RX ADMIN — LEVOTHYROXINE SODIUM 88 MCG: 0.09 TABLET ORAL at 06:09

## 2022-03-15 RX ADMIN — NYSTATIN 500000 UNITS: 100000 SUSPENSION ORAL at 13:47

## 2022-03-15 RX ADMIN — CLOPIDOGREL BISULFATE 75 MG: 75 TABLET, FILM COATED ORAL at 09:17

## 2022-03-15 RX ADMIN — GABAPENTIN 400 MG: 400 CAPSULE ORAL at 06:09

## 2022-03-15 RX ADMIN — NASAL DECONGESTANT 2 SPRAY: 0.05 SPRAY NASAL at 09:20

## 2022-03-15 RX ADMIN — METOPROLOL SUCCINATE 25 MG: 25 TABLET, EXTENDED RELEASE ORAL at 09:16

## 2022-03-15 RX ADMIN — ESTRADIOL 3 MG: 1 TABLET ORAL at 21:56

## 2022-03-15 RX ADMIN — GABAPENTIN 400 MG: 400 CAPSULE ORAL at 14:15

## 2022-03-15 RX ADMIN — FUROSEMIDE 40 MG: 40 TABLET ORAL at 09:17

## 2022-03-15 RX ADMIN — RANOLAZINE 1000 MG: 500 TABLET, FILM COATED, EXTENDED RELEASE ORAL at 09:16

## 2022-03-15 RX ADMIN — CYANOCOBALAMIN TAB 1000 MCG 1000 MCG: 1000 TAB at 09:17

## 2022-03-15 RX ADMIN — DIPHENHYDRAMINE HYDROCHLORIDE 50 MG: 25 CAPSULE ORAL at 21:56

## 2022-03-15 NOTE — PROGRESS NOTES
"[facility] PROGRESS NOTE     LOS: 9 days    Patient Care Team:  Deonte Hector MD as PCP - General      Subjective     Patient was seen and examined this morning she is having some diarrhea but dyspnea improving slowly.  Denies any chest pain, nausea or vomiting    Objective     Vital Sign Min/Max for last 24 hours  Temp:  [97.5 °F (36.4 °C)-98.3 °F (36.8 °C)] 97.5 °F (36.4 °C)  Heart Rate:  [77-89] 80  Resp:  [18-24] 18  BP: (104-170)/(68-75) 131/73                       Flowsheet Rows    Flowsheet Row First Filed Value   Admission Height 165.1 cm (65\") Documented at 03/06/2022 1049   Admission Weight 59.9 kg (132 lb) Documented at 03/06/2022 1049          No intake/output data recorded.  I/O last 3 completed shifts:  In: 720 [P.O.:720]  Out: -     Physical Exam:  Physical Exam    General Appearance: Chronically ill-appearing, NAD  Skin: warm and dry  HEENT: oral mucosa normal, nonicteric sclera  Neck: supple, no JVD  Lungs: Decreased breath sounds at bases.  Few basilar crackles  Heart: RRR, normal S1 and S2  Abdomen: soft, nontender, nondistended  : no palpable bladder  Extremities: no edema, cyanosis or clubbing  Neuro: normal speech and mental status       LABS:  Lab Results   Component Value Date    CALCIUM 9.4 03/15/2022    PHOS 4.7 (H) 03/15/2022     Results from last 7 days   Lab Units 03/15/22  0858 03/14/22  2243 03/14/22  0940 03/14/22  0100 03/13/22  0815 03/13/22  0159 03/12/22  1301 03/12/22  0159 03/10/22  2338   MAGNESIUM mg/dL  --   --   --  1.7  --   --   --  1.9 2.9*   SODIUM mmol/L 135*  --  137 137   < > 140  --  138 136   POTASSIUM mmol/L 4.4  --  5.1 5.8*   < > 5.3*  --  3.7 3.3*   CHLORIDE mmol/L 96*  --  100 101   < > 103  --  102 98   CO2 mmol/L 25.0  --  26.0 23.0   < > 26.0  --  25.0 28.0   BUN mg/dL 36*  --  31* 28*   < > 23  --  20 18   CREATININE mg/dL 1.67*  --  1.35* 1.26*   < > 1.29*  --  1.35* 1.22*   GLUCOSE mg/dL 212*  --  137* 154*   < > 154*  --  112* 166* "   CALCIUM mg/dL 9.4  --  9.4 9.2   < > 8.7  --  8.4* 7.9*   WBC 10*3/mm3  --  12.50*  --  13.30*  --  11.50*   < > 12.30* 13.70*   HEMOGLOBIN g/dL  --  9.3*  --  8.3*  --  8.0*   < > 7.4* 8.2*   PLATELETS 10*3/mm3  --  329  --  290  --  245   < > 204 228    < > = values in this interval not displayed.     Lab Results   Component Value Date    TROPONINI 8.820 (C) 01/07/2021    TROPONINT <0.010 03/06/2022     Estimated Creatinine Clearance: 25.5 mL/min (A) (by C-G formula based on SCr of 1.67 mg/dL (H)).      Brief Urine Lab Results     None        WEIGHTS:     Wt Readings from Last 1 Encounters:   03/15/22 0600 56.3 kg (124 lb 3.2 oz)   03/14/22 0457 57.2 kg (126 lb 1.7 oz)   03/13/22 0600 59.1 kg (130 lb 2.9 oz)   03/13/22 0324 59.1 kg (130 lb 3.2 oz)   03/10/22 0556 57.6 kg (126 lb 14.4 oz)   03/09/22 0430 57.5 kg (126 lb 12.2 oz)   03/06/22 1049 59.9 kg (132 lb)       albuterol sulfate HFA, 2 puff, Inhalation, BID - RT  vitamin C, 250 mg, Oral, Daily  aspirin, 81 mg, Oral, Daily  atorvastatin, 80 mg, Oral, Nightly  cholecalciferol, 500 Units, Oral, Daily  clopidogrel, 75 mg, Oral, Daily  estradiol, 3 mg, Oral, Nightly  ferrous sulfate, 324 mg, Oral, Daily With Breakfast  folic acid, 1 mg, Oral, Daily  furosemide, 40 mg, Oral, BID  gabapentin, 400 mg, Oral, Q8H  guaiFENesin, 600 mg, Oral, Q12H  isosorbide mononitrate, 30 mg, Oral, Q24H  levothyroxine, 88 mcg, Oral, Q AM  metoprolol succinate XL, 25 mg, Oral, Daily  nystatin, 5 mL, Swish & Swallow, 4x Daily  oxymetazoline, 2 spray, Each Nare, BID  pantoprazole, 40 mg, Oral, Q AM  predniSONE, 20 mg, Oral, Daily  ranolazine, 1,000 mg, Oral, Q12H  sertraline, 100 mg, Oral, Daily  sodium chloride, 10 mL, Intravenous, Q12H  vitamin B-12, 1,000 mcg, Oral, Daily           Assessment/Plan       1.Acute kidney injury.  Mild  Creatinine creased to 1.6 Mg/DL with diuresis but volume status improving.  Electrolytes acceptable.  2.  Hypertension  Blood Pressure well  controlled  3.    Acute on chronic respiratory failure.  Pneumonia with underlying COPD  4.  Congestive heart failure.  Systolic.  Chronic  5.  Hyperkalemia. Improved     Recs:  Continue Lasix at 40 mg twice a day.  We will have to accept some degree of azotemia related to diuretics to maintain volume status  Keep off Aldactone due to hyperkalemia      Irving Yi MD  03/15/22  12:01 EDT

## 2022-03-15 NOTE — PLAN OF CARE
Goal Outcome Evaluation:  Plan of Care Reviewed With: patient        Progress: no change   Alert and oriented x 4. Able to verbalize needs and wants. Takes medication whole and tolerates well. Independent for transfers/ambulation. Continent of bowel and bladder. No c/o pain/discomfort/SOB noted. Continues to require O2 therapy at 3 LPM via NC. Currently in bed, awake. Call bell in reach.

## 2022-03-15 NOTE — PLAN OF CARE
Goal Outcome Evaluation:      Patient did well overnight. She did have high blood pressures for me. MD called and labetalol was ordered and given. Possible discharge today,

## 2022-03-15 NOTE — PROGRESS NOTES
"PULMONARY CRITICAL CARE Progress  NOTE      PATIENT IDENTIFICATION:  Name: Gayathri Reddy  MRN: [id number]  :  1945     Age: 76 y.o.  Sex: female    DATE OF Note:  3/15/2022   Referring Physician: Zeina Lehman MD                  Subjective:   Feeling better still on oxygen supplement much less SOB no chest pain, no nausea or vomiting, no change in bowel habit, no dysuria,  no new  skin rash or itching.      Objective:  tMax 24 hrs: Temp (24hrs), Av.8 °F (36.6 °C), Min:97.5 °F (36.4 °C), Max:98.3 °F (36.8 °C)      Vitals Ranges:   Temp:  [97.5 °F (36.4 °C)-98.3 °F (36.8 °C)] 97.5 °F (36.4 °C)  Heart Rate:  [77-89] 80  Resp:  [18-24] 18  BP: (104-170)/(68-75) 131/73    Intake and Output Last 3 Shifts:   I/O last 3 completed shifts:  In: 720 [P.O.:720]  Out: -     Exam:  /73 (BP Location: Left arm, Patient Position: Lying)   Pulse 80   Temp 97.5 °F (36.4 °C) (Oral)   Resp 18   Ht 165.1 cm (65\")   Wt 56.3 kg (124 lb 3.2 oz)   SpO2 100%   BMI 20.67 kg/m²     General Appearance: Alert awake not in distress  HEENT:  Normocephalic, without obvious abnormality, Conjunctiva/corneas clear,.  Normal external ear canals, Nares normal, no drainage     Neck:  Supple, symmetrical, trachea midline. No JVD.  Lungs /Chest wall:   Bilateral basal rhonchi, respirations unlabored symmetrical wall movement.     Heart:  Regular rate and rhythm, systolic murmur PMI left sternal border  Abdomen: Soft, non-tender, no masses, no organomegaly.    Extremities: Trace edema no clubbing or Cyanosis        Medications:    Current Facility-Administered Medications:   •  albuterol sulfate HFA (PROVENTIL HFA;VENTOLIN HFA;PROAIR HFA) inhaler 2 puff, 2 puff, Inhalation, BID - RT, Zeina Lehman MD, 2 puff at 03/15/22 07  •  ascorbic acid (VITAMIN C) tablet 250 mg, 250 mg, Oral, Daily, Laure Mei APRN, 250 mg at 03/15/22 09  •  aspirin EC tablet 81 mg, 81 mg, Oral, Daily, Laure Mei APRN, 81 mg " at 03/15/22 0917  •  atorvastatin (LIPITOR) tablet 80 mg, 80 mg, Oral, Nightly, Laure Mei, APRN, 80 mg at 03/14/22 2043  •  cholecalciferol (VITAMIN D3) tablet 500 Units, 500 Units, Oral, Daily, Laure Mei, RADHA, 500 Units at 03/15/22 0917  •  clopidogrel (PLAVIX) tablet 75 mg, 75 mg, Oral, Daily, Laure Mei, APRN, 75 mg at 03/15/22 0917  •  diphenhydrAMINE (BENADRYL) capsule 50 mg, 50 mg, Oral, Nightly PRN, Alsop, Rosibel L, APRN, 50 mg at 03/14/22 2105  •  docusate sodium (COLACE) capsule 100 mg, 100 mg, Oral, BID PRN, Alsop, Rosibel L, APRN, 100 mg at 03/11/22 0639  •  estradiol (ESTRACE) tablet 3 mg, 3 mg, Oral, Nightly, Laure Mei, RADHA, 3 mg at 03/14/22 2042  •  ferrous sulfate EC tablet 324 mg, 324 mg, Oral, Daily With Breakfast, Laure Mei APRN, 324 mg at 03/15/22 0916  •  folic acid (FOLVITE) tablet 1 mg, 1 mg, Oral, Daily, NaeemDeonte MD, 1 mg at 03/15/22 0916  •  furosemide (LASIX) tablet 40 mg, 40 mg, Oral, BID, Leah Sharma MD, 40 mg at 03/15/22 0917  •  gabapentin (NEURONTIN) capsule 400 mg, 400 mg, Oral, Q8H, Laure Mei, APRN, 400 mg at 03/15/22 0609  •  guaiFENesin (MUCINEX) 12 hr tablet 600 mg, 600 mg, Oral, Q12H, Lilliana Del Rosario APRN, 600 mg at 03/15/22 0917  •  HYDROcodone-acetaminophen (NORCO)  MG per tablet 1 tablet, 1 tablet, Oral, Q6H PRN, Laure Mei APRN, 1 tablet at 03/14/22 0543  •  ipratropium-albuterol (DUO-NEB) nebulizer solution 3 mL, 3 mL, Nebulization, Q4H PRN, Zeina Lehman MD  •  isosorbide mononitrate (IMDUR) 24 hr tablet 30 mg, 30 mg, Oral, Q24H, Laure Mei APRN, 30 mg at 03/14/22 1300  •  labetalol (NORMODYNE,TRANDATE) injection 10 mg, 10 mg, Intravenous, Q6H PRN, Laure Mei APRN, 10 mg at 03/15/22 0253  •  levothyroxine (SYNTHROID, LEVOTHROID) tablet 88 mcg, 88 mcg, Oral, Q AM, Laure Mei APRN, 88 mcg at 03/15/22  0609  •  loperamide (IMODIUM) capsule 2 mg, 2 mg, Oral, 4x Daily PRN, Sky Reeder MD, 2 mg at 03/10/22 1141  •  LORazepam (ATIVAN) tablet 1 mg, 1 mg, Oral, Q6H PRN, Laure Mei APRN, 1 mg at 03/15/22 1040  •  Magnesium Sulfate 2 gram Bolus, followed by 8 gram infusion (total Mg dose 10 grams)- Mg less than or equal to 1mg/dL, 2 g, Intravenous, PRN **OR** Magnesium Sulfate 2 gram / 50mL Infusion (GIVE X 3 BAGS TO EQUAL 6GM TOTAL DOSE) - Mg 1.1 - 1.5 mg/dl, 2 g, Intravenous, PRN, Last Rate: 25 mL/hr at 03/10/22 1815, 2 g at 03/10/22 1815 **OR** Magnesium Sulfate 4 gram infusion- Mg 1.6-1.9 mg/dL, 4 g, Intravenous, PRN, Alix Bernardo APRN  •  methocarbamol (ROBAXIN) tablet 500 mg, 500 mg, Oral, TID PRN, Laure Mei APRN, 500 mg at 03/14/22 0048  •  metoprolol succinate XL (TOPROL-XL) 24 hr tablet 25 mg, 25 mg, Oral, Daily, Alix Bernardo APRN, 25 mg at 03/15/22 0916  •  nitroglycerin (NITROSTAT) SL tablet 0.4 mg, 0.4 mg, Sublingual, Q5 Min PRN, Laure Mei APRN  •  nystatin (MYCOSTATIN) 100,000 unit/mL suspension 500,000 Units, 5 mL, Swish & Swallow, 4x Daily, Andres Wheat MD, 500,000 Units at 03/15/22 0917  •  ondansetron (ZOFRAN) tablet 4 mg, 4 mg, Oral, Q6H PRN, 4 mg at 03/11/22 0259 **OR** ondansetron (ZOFRAN) injection 4 mg, 4 mg, Intravenous, Q6H PRN, Laure Mei APRN  •  oxymetazoline (AFRIN) nasal spray 2 spray, 2 spray, Each Nare, BID, Zeina Lehman MD, 2 spray at 03/15/22 0920  •  pantoprazole (PROTONIX) EC tablet 40 mg, 40 mg, Oral, Q AM, Laure Mei APRN, 40 mg at 03/15/22 0608  •  phenylephrine-mineral oil-petrolatum (PREPARATION H) 0.25-14-74.9 % hemorhoidal ointment, , Rectal, TID PRN, AlsopRosibel APRN, Given at 03/09/22 0408  •  predniSONE (DELTASONE) tablet 20 mg, 20 mg, Oral, Daily, Andres Wheat MD, 20 mg at 03/15/22 0917  •  ranolazine (RANEXA) 12 hr tablet 1,000 mg, 1,000 mg, Oral, Q12H, Laure Mei  RADHA Dunaway, 1,000 mg at 03/15/22 0916  •  sertraline (ZOLOFT) tablet 100 mg, 100 mg, Oral, Daily, Laure Mei APRN, 100 mg at 03/15/22 0917  •  sodium chloride 0.9 % flush 10 mL, 10 mL, Intravenous, PRN, Laure Mei APRN  •  sodium chloride 0.9 % flush 10 mL, 10 mL, Intravenous, Q12H, Laure Mei APRN, 10 mL at 03/15/22 0920  •  sodium chloride 0.9 % flush 10 mL, 10 mL, Intravenous, PRN, Laure Mei APRN  •  vitamin B-12 (CYANOCOBALAMIN) tablet 1,000 mcg, 1,000 mcg, Oral, Daily, Deonte Hartley MD, 1,000 mcg at 03/15/22 0917    Data Review:  All labs (24hrs):   Recent Results (from the past 24 hour(s))   CBC Auto Differential    Collection Time: 03/14/22 10:43 PM    Specimen: Blood   Result Value Ref Range    WBC 12.50 (H) 3.40 - 10.80 10*3/mm3    RBC 2.80 (L) 3.77 - 5.28 10*6/mm3    Hemoglobin 9.3 (L) 12.0 - 15.9 g/dL    Hematocrit 27.6 (L) 34.0 - 46.6 %    MCV 98.4 (H) 79.0 - 97.0 fL    MCH 33.3 (H) 26.6 - 33.0 pg    MCHC 33.8 31.5 - 35.7 g/dL    RDW 12.8 12.3 - 15.4 %    RDW-SD 43.8 37.0 - 54.0 fl    MPV 8.2 6.0 - 12.0 fL    Platelets 329 140 - 450 10*3/mm3    Neutrophil % 79.7 (H) 42.7 - 76.0 %    Lymphocyte % 13.1 (L) 19.6 - 45.3 %    Monocyte % 6.6 5.0 - 12.0 %    Eosinophil % 0.1 (L) 0.3 - 6.2 %    Basophil % 0.5 0.0 - 1.5 %    Neutrophils, Absolute 9.90 (H) 1.70 - 7.00 10*3/mm3    Lymphocytes, Absolute 1.60 0.70 - 3.10 10*3/mm3    Monocytes, Absolute 0.80 0.10 - 0.90 10*3/mm3    Eosinophils, Absolute 0.00 0.00 - 0.40 10*3/mm3    Basophils, Absolute 0.10 0.00 - 0.20 10*3/mm3    nRBC 0.1 0.0 - 0.2 /100 WBC   Renal Function Panel    Collection Time: 03/15/22  8:58 AM    Specimen: Blood   Result Value Ref Range    Glucose 212 (H) 65 - 99 mg/dL    BUN 36 (H) 8 - 23 mg/dL    Creatinine 1.67 (H) 0.57 - 1.00 mg/dL    Sodium 135 (L) 136 - 145 mmol/L    Potassium 4.4 3.5 - 5.2 mmol/L    Chloride 96 (L) 98 - 107 mmol/L    CO2 25.0 22.0 - 29.0 mmol/L     Calcium 9.4 8.6 - 10.5 mg/dL    Albumin 3.30 (L) 3.50 - 5.20 g/dL    Phosphorus 4.7 (H) 2.5 - 4.5 mg/dL    Anion Gap 14.0 5.0 - 15.0 mmol/L    BUN/Creatinine Ratio 21.6 7.0 - 25.0    eGFR 31.6 (L) >60.0 mL/min/1.73        Imaging:  XR Chest 1 View  Narrative: EXAM: XR CHEST 1 VW-     DATE OF EXAM: 3/15/2022 6:00 AM     INDICATION: Shortness of breath; J18.9-Pneumonia, unspecified organism;  R06.02-Shortness of breath; I50.9-Heart failure, unspecified;  D63.8-Anemia in other chronic diseases classified elsewhere;  I42.0-Dilated cardiomyopathy.       COMPARISONS: 03/13/2022      FINDINGS:     Bibasilar airspace disease remains present, unchanged. No pneumothorax  or pleural effusion. Mediastinal shadows are unchanged.     Impression:    Stable chest or the past 2 days with bibasilar pneumonia     Electronically Signed By-Joaquín Hernandes On:3/15/2022 8:13 AM  This report was finalized on [id number] by  Joaquín Hernandes, .       ASSESSMENT:    Acute on chronic hypoxia with hypercapnia- wears 3-4 L at home  Pneumonia of both lungs due to infectious organism, unspecified part of lung    Abdominal aortic aneurysm (AAA) (HCC)    Chronic obstructive pulmonary disease (HCC)    Congestive heart failure (HCC)    Coronary artery disease    Dyslipidemia    Hypertension    Anemia of chronic disease    Mixed hyperlipidemia    Bilateral carotid artery stenosis    Moderate malnutrition (CMS/HCC)       PLAN:  Chest x-ray was reviewed showing a significant improvement  Continue to wean down prednisone slowly  Bronchodilator  Inhaled corticosteroids  Electrolytes/ glycemic control  DVT and GI prophylaxis.    Total Critical care time in direct medical management (   ) minutes, This time specifically excludes time spent performing procedures.  Joanie Liu MD. D, ABSM.     3/15/2022  10:59 EDT

## 2022-03-16 ENCOUNTER — APPOINTMENT (OUTPATIENT)
Dept: GENERAL RADIOLOGY | Facility: HOSPITAL | Age: 77
End: 2022-03-16

## 2022-03-16 LAB
ALBUMIN SERPL-MCNC: 3.5 G/DL (ref 3.5–5.2)
ANION GAP SERPL CALCULATED.3IONS-SCNC: 13 MMOL/L (ref 5–15)
BASOPHILS # BLD AUTO: 0.1 10*3/MM3 (ref 0–0.2)
BASOPHILS NFR BLD AUTO: 1 % (ref 0–1.5)
BUN SERPL-MCNC: 43 MG/DL (ref 8–23)
BUN/CREAT SERPL: 20.4 (ref 7–25)
CALCIUM SPEC-SCNC: 9.8 MG/DL (ref 8.6–10.5)
CHLORIDE SERPL-SCNC: 94 MMOL/L (ref 98–107)
CO2 SERPL-SCNC: 28 MMOL/L (ref 22–29)
CREAT SERPL-MCNC: 2.11 MG/DL (ref 0.57–1)
DEPRECATED RDW RBC AUTO: 44.6 FL (ref 37–54)
EGFRCR SERPLBLD CKD-EPI 2021: 23.9 ML/MIN/1.73
EOSINOPHIL # BLD AUTO: 0.1 10*3/MM3 (ref 0–0.4)
EOSINOPHIL NFR BLD AUTO: 0.6 % (ref 0.3–6.2)
ERYTHROCYTE [DISTWIDTH] IN BLOOD BY AUTOMATED COUNT: 13 % (ref 12.3–15.4)
GLUCOSE BLDC GLUCOMTR-MCNC: 130 MG/DL (ref 70–105)
GLUCOSE SERPL-MCNC: 109 MG/DL (ref 65–99)
HCT VFR BLD AUTO: 29.7 % (ref 34–46.6)
HGB BLD-MCNC: 10.3 G/DL (ref 12–15.9)
LYMPHOCYTES # BLD AUTO: 2.9 10*3/MM3 (ref 0.7–3.1)
LYMPHOCYTES NFR BLD AUTO: 22.3 % (ref 19.6–45.3)
MCH RBC QN AUTO: 34.1 PG (ref 26.6–33)
MCHC RBC AUTO-ENTMCNC: 34.6 G/DL (ref 31.5–35.7)
MCV RBC AUTO: 98.5 FL (ref 79–97)
MONOCYTES # BLD AUTO: 1.2 10*3/MM3 (ref 0.1–0.9)
MONOCYTES NFR BLD AUTO: 9 % (ref 5–12)
NEUTROPHILS NFR BLD AUTO: 67.1 % (ref 42.7–76)
NEUTROPHILS NFR BLD AUTO: 8.8 10*3/MM3 (ref 1.7–7)
NRBC BLD AUTO-RTO: 0.1 /100 WBC (ref 0–0.2)
PHOSPHATE SERPL-MCNC: 5.7 MG/DL (ref 2.5–4.5)
PLATELET # BLD AUTO: 343 10*3/MM3 (ref 140–450)
PMV BLD AUTO: 8.4 FL (ref 6–12)
POTASSIUM SERPL-SCNC: 4.9 MMOL/L (ref 3.5–5.2)
RBC # BLD AUTO: 3.02 10*6/MM3 (ref 3.77–5.28)
SODIUM SERPL-SCNC: 135 MMOL/L (ref 136–145)
WBC NRBC COR # BLD: 13.1 10*3/MM3 (ref 3.4–10.8)

## 2022-03-16 PROCEDURE — 82962 GLUCOSE BLOOD TEST: CPT

## 2022-03-16 PROCEDURE — 94799 UNLISTED PULMONARY SVC/PX: CPT

## 2022-03-16 PROCEDURE — 25010000002 ONDANSETRON PER 1 MG: Performed by: NURSE PRACTITIONER

## 2022-03-16 PROCEDURE — 85025 COMPLETE CBC W/AUTO DIFF WBC: CPT | Performed by: HOSPITALIST

## 2022-03-16 PROCEDURE — 71045 X-RAY EXAM CHEST 1 VIEW: CPT

## 2022-03-16 PROCEDURE — 99232 SBSQ HOSP IP/OBS MODERATE 35: CPT | Performed by: HOSPITALIST

## 2022-03-16 PROCEDURE — 63710000001 DIPHENHYDRAMINE PER 50 MG: Performed by: NURSE PRACTITIONER

## 2022-03-16 PROCEDURE — 80069 RENAL FUNCTION PANEL: CPT | Performed by: INTERNAL MEDICINE

## 2022-03-16 PROCEDURE — 97116 GAIT TRAINING THERAPY: CPT

## 2022-03-16 PROCEDURE — 63710000001 PREDNISONE PER 1 MG: Performed by: INTERNAL MEDICINE

## 2022-03-16 PROCEDURE — 94761 N-INVAS EAR/PLS OXIMETRY MLT: CPT

## 2022-03-16 RX ORDER — CHOLESTYRAMINE LIGHT 4 G/5.7G
1 POWDER, FOR SUSPENSION ORAL EVERY 12 HOURS SCHEDULED
Status: DISCONTINUED | OUTPATIENT
Start: 2022-03-16 | End: 2022-03-17

## 2022-03-16 RX ORDER — ACETAMINOPHEN 325 MG/1
650 TABLET ORAL EVERY 6 HOURS PRN
Status: DISCONTINUED | OUTPATIENT
Start: 2022-03-16 | End: 2022-03-18 | Stop reason: HOSPADM

## 2022-03-16 RX ADMIN — PREDNISONE 20 MG: 20 TABLET ORAL at 08:08

## 2022-03-16 RX ADMIN — ISOSORBIDE MONONITRATE 30 MG: 30 TABLET, EXTENDED RELEASE ORAL at 13:13

## 2022-03-16 RX ADMIN — METOPROLOL SUCCINATE 25 MG: 25 TABLET, EXTENDED RELEASE ORAL at 08:09

## 2022-03-16 RX ADMIN — ONDANSETRON 4 MG: 2 INJECTION INTRAMUSCULAR; INTRAVENOUS at 00:27

## 2022-03-16 RX ADMIN — DIPHENHYDRAMINE HYDROCHLORIDE 50 MG: 25 CAPSULE ORAL at 21:06

## 2022-03-16 RX ADMIN — Medication 10 ML: at 22:43

## 2022-03-16 RX ADMIN — ESTRADIOL 3 MG: 1 TABLET ORAL at 21:06

## 2022-03-16 RX ADMIN — GABAPENTIN 400 MG: 400 CAPSULE ORAL at 13:13

## 2022-03-16 RX ADMIN — PANTOPRAZOLE SODIUM 40 MG: 40 TABLET, DELAYED RELEASE ORAL at 05:00

## 2022-03-16 RX ADMIN — GABAPENTIN 400 MG: 400 CAPSULE ORAL at 05:00

## 2022-03-16 RX ADMIN — SODIUM CHLORIDE 250 ML: 9 INJECTION, SOLUTION INTRAVENOUS at 09:28

## 2022-03-16 RX ADMIN — ALBUTEROL SULFATE 2 PUFF: 108 INHALANT RESPIRATORY (INHALATION) at 18:52

## 2022-03-16 RX ADMIN — FOLIC ACID 1 MG: 1 TABLET ORAL at 08:09

## 2022-03-16 RX ADMIN — LORAZEPAM 1 MG: 1 TABLET ORAL at 21:09

## 2022-03-16 RX ADMIN — FUROSEMIDE 40 MG: 40 TABLET ORAL at 08:08

## 2022-03-16 RX ADMIN — NASAL DECONGESTANT 2 SPRAY: 0.05 SPRAY NASAL at 08:10

## 2022-03-16 RX ADMIN — METHOCARBAMOL 500 MG: 500 TABLET ORAL at 21:09

## 2022-03-16 RX ADMIN — CHOLESTYRAMINE 4 G: 4 POWDER, FOR SUSPENSION ORAL at 22:43

## 2022-03-16 RX ADMIN — HYDROCODONE BITARTRATE AND ACETAMINOPHEN 1 TABLET: 10; 325 TABLET ORAL at 13:19

## 2022-03-16 RX ADMIN — SERTRALINE 100 MG: 100 TABLET, FILM COATED ORAL at 08:08

## 2022-03-16 RX ADMIN — ASPIRIN 81 MG: 81 TABLET, COATED ORAL at 08:08

## 2022-03-16 RX ADMIN — FERROUS SULFATE TAB EC 324 MG (65 MG FE EQUIVALENT) 324 MG: 324 (65 FE) TABLET DELAYED RESPONSE at 08:09

## 2022-03-16 RX ADMIN — CLOPIDOGREL BISULFATE 75 MG: 75 TABLET, FILM COATED ORAL at 08:09

## 2022-03-16 RX ADMIN — GABAPENTIN 400 MG: 400 CAPSULE ORAL at 21:06

## 2022-03-16 RX ADMIN — ACETAMINOPHEN 650 MG: 325 TABLET ORAL at 00:50

## 2022-03-16 RX ADMIN — ATORVASTATIN CALCIUM 80 MG: 40 TABLET, FILM COATED ORAL at 21:06

## 2022-03-16 RX ADMIN — ALBUTEROL SULFATE 2 PUFF: 108 INHALANT RESPIRATORY (INHALATION) at 07:07

## 2022-03-16 RX ADMIN — LORAZEPAM 1 MG: 1 TABLET ORAL at 05:00

## 2022-03-16 RX ADMIN — HYDROCODONE BITARTRATE AND ACETAMINOPHEN 1 TABLET: 10; 325 TABLET ORAL at 05:00

## 2022-03-16 RX ADMIN — CYANOCOBALAMIN TAB 1000 MCG 1000 MCG: 1000 TAB at 08:09

## 2022-03-16 RX ADMIN — LEVOTHYROXINE SODIUM 88 MCG: 0.09 TABLET ORAL at 05:00

## 2022-03-16 RX ADMIN — RANOLAZINE 1000 MG: 500 TABLET, FILM COATED, EXTENDED RELEASE ORAL at 09:28

## 2022-03-16 RX ADMIN — Medication 10 ML: at 08:09

## 2022-03-16 RX ADMIN — GUAIFENESIN 600 MG: 600 TABLET, EXTENDED RELEASE ORAL at 08:08

## 2022-03-16 RX ADMIN — OXYCODONE HYDROCHLORIDE AND ACETAMINOPHEN 250 MG: 500 TABLET ORAL at 08:08

## 2022-03-16 RX ADMIN — LORAZEPAM 1 MG: 1 TABLET ORAL at 13:19

## 2022-03-16 RX ADMIN — Medication 500 UNITS: at 08:08

## 2022-03-16 NOTE — PLAN OF CARE
Problem: Adult Inpatient Plan of Care  Goal: Plan of Care Review  Outcome: Ongoing, Progressing  Goal: Patient-Specific Goal (Individualized)  Outcome: Ongoing, Progressing  Goal: Absence of Hospital-Acquired Illness or Injury  Outcome: Ongoing, Progressing  Intervention: Identify and Manage Fall Risk  Recent Flowsheet Documentation  Taken 3/15/2022 2300 by Dorene Ferguson RN  Safety Promotion/Fall Prevention:   safety round/check completed   room organization consistent   activity supervised   clutter free environment maintained   fall prevention program maintained   lighting adjusted  Taken 3/15/2022 2101 by Dorene Ferguson RN  Safety Promotion/Fall Prevention:   safety round/check completed   room organization consistent   activity supervised   clutter free environment maintained   fall prevention program maintained   lighting adjusted  Taken 3/15/2022 1940 by Dorene Ferguson RN  Safety Promotion/Fall Prevention:   safety round/check completed   room organization consistent   activity supervised   clutter free environment maintained   fall prevention program maintained   lighting adjusted  Intervention: Prevent and Manage VTE (Venous Thromboembolism) Risk  Recent Flowsheet Documentation  Taken 3/15/2022 1940 by Dorene Ferguson RN  Activity Management: activity adjusted per tolerance  Intervention: Prevent Infection  Recent Flowsheet Documentation  Taken 3/15/2022 2300 by Dorene Ferguson RN  Infection Prevention:   visitors restricted/screened   single patient room provided   rest/sleep promoted  Taken 3/15/2022 2101 by Dorene Ferguson RN  Infection Prevention:   visitors restricted/screened   single patient room provided   rest/sleep promoted  Taken 3/15/2022 1940 by Dorene Ferguson RN  Infection Prevention:   visitors restricted/screened   single patient room provided   rest/sleep promoted  Goal: Optimal Comfort and Wellbeing  Outcome: Ongoing, Progressing  Intervention: Provide Person-Centered Care  Recent Flowsheet Documentation  Taken  3/15/2022 1940 by Dorene Ferguson RN  Trust Relationship/Rapport:   care explained   choices provided  Goal: Readiness for Transition of Care  Outcome: Ongoing, Progressing     Problem: Behavioral Health Comorbidity  Goal: Maintenance of Behavioral Health Symptom Control  Outcome: Ongoing, Progressing  Intervention: Maintain Behavioral Health Symptom Control  Recent Flowsheet Documentation  Taken 3/15/2022 2300 by Dorene Ferguson RN  Medication Review/Management: medications reviewed  Taken 3/15/2022 2101 by Dorene Ferguson RN  Medication Review/Management: medications reviewed  Taken 3/15/2022 1940 by Dorene Ferguson RN  Medication Review/Management: medications reviewed     Problem: COPD (Chronic Obstructive Pulmonary Disease) Comorbidity  Goal: Maintenance of COPD Symptom Control  Outcome: Ongoing, Progressing  Intervention: Maintain COPD-Symptom Control  Recent Flowsheet Documentation  Taken 3/15/2022 2300 by Dorene Ferguson RN  Medication Review/Management: medications reviewed  Taken 3/15/2022 2101 by Dorene Ferguson RN  Medication Review/Management: medications reviewed  Taken 3/15/2022 1940 by Dorene Ferguson RN  Medication Review/Management: medications reviewed     Problem: Diabetes Comorbidity  Goal: Blood Glucose Level Within Targeted Range  Outcome: Ongoing, Progressing     Problem: Heart Failure Comorbidity  Goal: Maintenance of Heart Failure Symptom Control  Outcome: Ongoing, Progressing  Intervention: Maintain Heart Failure-Management  Recent Flowsheet Documentation  Taken 3/15/2022 2300 by Dorene Ferguson RN  Medication Review/Management: medications reviewed  Taken 3/15/2022 2101 by Dorene Ferguson RN  Medication Review/Management: medications reviewed  Taken 3/15/2022 1940 by Dorene Ferguson RN  Medication Review/Management: medications reviewed     Problem: Hypertension Comorbidity  Goal: Blood Pressure in Desired Range  Outcome: Ongoing, Progressing  Intervention: Maintain Blood Pressure Management  Recent Flowsheet  Documentation  Taken 3/15/2022 2300 by Dorene Ferguson RN  Medication Review/Management: medications reviewed  Taken 3/15/2022 2101 by Dorene Ferguson RN  Medication Review/Management: medications reviewed  Taken 3/15/2022 1940 by Dorene Ferguson RN  Medication Review/Management: medications reviewed     Problem: Fall Injury Risk  Goal: Absence of Fall and Fall-Related Injury  Outcome: Ongoing, Progressing  Intervention: Identify and Manage Contributors  Recent Flowsheet Documentation  Taken 3/15/2022 2300 by Dorene Ferguson RN  Medication Review/Management: medications reviewed  Taken 3/15/2022 2101 by Dorene Ferguson RN  Medication Review/Management: medications reviewed  Taken 3/15/2022 1940 by Dorene Ferguson RN  Medication Review/Management: medications reviewed  Intervention: Promote Injury-Free Environment  Recent Flowsheet Documentation  Taken 3/15/2022 2300 by Dorene Ferguson RN  Safety Promotion/Fall Prevention:   safety round/check completed   room organization consistent   activity supervised   clutter free environment maintained   fall prevention program maintained   lighting adjusted  Taken 3/15/2022 2101 by Dorene Ferguson RN  Safety Promotion/Fall Prevention:   safety round/check completed   room organization consistent   activity supervised   clutter free environment maintained   fall prevention program maintained   lighting adjusted  Taken 3/15/2022 1940 by Dorene Ferguson RN  Safety Promotion/Fall Prevention:   safety round/check completed   room organization consistent   activity supervised   clutter free environment maintained   fall prevention program maintained   lighting adjusted     Problem: Skin Injury Risk Increased  Goal: Skin Health and Integrity  Outcome: Ongoing, Progressing     Problem: Malnutrition  Goal: Improved Nutritional Intake  Outcome: Ongoing, Progressing   Goal Outcome Evaluation:

## 2022-03-16 NOTE — THERAPY TREATMENT NOTE
Subjective: Pt agreeable to therapeutic plan of care.    Objective:     Bed mobility - Modified-Independent  Transfers - CGA  Ambulation - 100 feet CGA and Min-A    Pain: 0 VAS  Education: Provided education on importance of mobility and skilled verbal / tactile cueing throughout intervention.     Assessment: Gayathri Reddy presents with functional mobility impairments which indicate the need for skilled intervention. Pt reports dizziness/lightheadedness with supine to sit and sit to stand mobility.  Pt requiring increased assist with gait training this date with FWW due to instability. Requiring assist for AD navigation, specifically when completing turns. Pt BP assessed in sitting 82/52 and standing 65/39. Reported to nursing that pt is experiencing symptomatic orthostatic hypotension. Due to this, pt not safe to return home alone at this time.  Tolerating session today without incident. Will continue to follow and progress as tolerated.     Plan/Recommendations:   Pt would benefit from Home with Home Health at discharge from facility as long as BP is controlled and requires no DME at discharge.   Pt desires Home with Home Health at discharge. Pt cooperative; agreeable to therapeutic recommendations and plan of care.     Basic Mobility 6-click:  Rollin = Total, A lot = 2, A little = 3; 4 = None  Supine>Sit:   1 = Total, A lot = 2, A little = 3; 4 = None   Sit>Stand with arms:  1 = Total, A lot = 2, A little = 3; 4 = None  Bed>Chair:   1 = Total, A lot = 2, A little = 3; 4 = None  Ambulate in room:  1 = Total, A lot = 2, A little = 3; 4 = None  3-5 Steps with railin = Total, A lot = 2, A little = 3; 4 = None  Score: 21    Modified Dola: N/A = No pre-op stroke/TIA    Post-Tx Position: Supine with HOB Elevated, Alarms activated and Call light and personal items within reach  PPE: gloves, surgical mask, eyewear protection

## 2022-03-16 NOTE — PROGRESS NOTES
[facility] Medicine Services Daily Progress Note    Patient Name: Gayathri Reddy  : 1945  MRN: [id number]  Primary Care Physician:  Deonte Hector MD  Date of admission: 3/6/2022      Subjective      Chief Complaint: Shortness of breath      Patient Reports     3/15/2022: The patient continues to report a cough and shortness of air but her oxygen saturations are adequate on her usual home O2 of 3 to 4 L/min.  She was counseled on her chest x-ray results.    ROS   12 point review of systems was reviewed and was negative except shortness of air with cough.      Objective      Vitals:   Temp:  [97.5 °F (36.4 °C)-98.5 °F (36.9 °C)] 98.5 °F (36.9 °C)  Heart Rate:  [78-89] 78  Resp:  [14-24] 14  BP: (131-170)/(68-76) 133/70  Flow (L/min):  [3] 3    Physical Exam   Vital signs and nurses notes reviewed.  Thin elderly female sitting up in bed comfortable on nasal cannul in no acute distress; sclera anicteric, mucous membranes moist;  lungs with diffuse crackles but no wheezes with decreased air entry anteriorly and posteriorly bilaterally; CV regular rate and rhythm; abdomen soft nontender nondistended; extremities with no edema or calf tenderness; no cyanosis; no Hull catheter.  The patient's skin is loose with protuberant bony prominences of the skull trunk and extremities suggestive of subcutaneous muscle and fat loss.       Result Review    Result Review:  I have personally reviewed the results from the time of this admission to 3/15/2022 21:13 EDT and agree with these findings:  [x]  Laboratory  [x]  Microbiology  [x]  Radiology  [x]  EKG/Telemetry   [x]  Cardiology/Vascular   []  Pathology  [x]  Old records  []  Other:  Most notable findings discussed in the assessment and plan.          Assessment/Plan      Brief Patient Summary:  Gayathri Reddy is a 76 y.o. female with PMHx of HTN, HLD, hypothyroidism, GERD, CAD, AAA, CHF, COPD with chronic hypoxic respiratory failure (3L  NC chronically) who presented to the emergency department complaining of a 3-day history of increasing shortness of breath especially with exertion and productive cough.  In the emergency department troponin was normal and proBNP 64008.  Hemoglobin was 8.5.  CXR suggestive of multi-focal pneumonia.  Respiratory panel negative for covid or other virus. Patient started on abx, lasix, and admitted.     3/7/2022; still complaining of shortness of breath with minimal exertion, T-max of 36.6, vital signs are stable currently on 3 L of nasal cannula.  Spoke with RN.  Serum creatinine bumped up to 1.27 prerenal NOE consult nephrology, proBNP 44622, will consult cardiology.  3/8/2022; patient walked about 200 feet without getting hypoxia on supplemental oxygen but still increased work of breathing, otherwise hemodynamically stable.  3/9/2022; past patient still endorses shortness of breath with minimal exertion, has been endorsing diarrhea for the last 2 or 3 days, will check stool for C. difficile, T-max of 100.7, vital stable currently on 1 L of nasal cannula.  3/10/2022; C. difficile came back negative, started on Imodium as needed, hypoalbuminemia, check prealbumin, had some overnight paroxysmal atrial tachycardia beta-blocker dose increased, spironolactone has been added by nephrology.  3/11/2022; diarrhea has resolved apparently patient did not get spironolactone yesterday, as order was not placed, chest x-ray PA and lateral still with features of multifocal pneumonia in the lungs with improved aeration compared to 3/6/2022 pulmonology cardiology and nephrology following.  3/12  hgb 7.4 wo bleeding   Slept well  Sat hgood  Notes reviewed  Labs reviewed  Defer reacritto nephrology  Separate folic and b12 replacement  May need iv fe  Sore tongue.. empiriic nystatinS/S  3/13  She has been feeling short of breath more than usual today  We will check x-ray chest  Her hemoglobin actually better.  3/14/2022: Patient reports  continuing to feel poorly with shortness of air and cough productive of clear and sometimes yellow phlegm.    Current inpatient medications include:  albuterol sulfate HFA, 2 puff, Inhalation, BID - RT  vitamin C, 250 mg, Oral, Daily  aspirin, 81 mg, Oral, Daily  atorvastatin, 80 mg, Oral, Nightly  cholecalciferol, 500 Units, Oral, Daily  clopidogrel, 75 mg, Oral, Daily  estradiol, 3 mg, Oral, Nightly  ferrous sulfate, 324 mg, Oral, Daily With Breakfast  folic acid, 1 mg, Oral, Daily  furosemide, 40 mg, Oral, BID  gabapentin, 400 mg, Oral, Q8H  guaiFENesin, 600 mg, Oral, Q12H  isosorbide mononitrate, 30 mg, Oral, Q24H  levothyroxine, 88 mcg, Oral, Q AM  metoprolol succinate XL, 25 mg, Oral, Daily  nystatin, 5 mL, Swish & Swallow, 4x Daily  oxymetazoline, 2 spray, Each Nare, BID  pantoprazole, 40 mg, Oral, Q AM  predniSONE, 20 mg, Oral, Daily  ranolazine, 1,000 mg, Oral, Q12H  sertraline, 100 mg, Oral, Daily  sodium chloride, 10 mL, Intravenous, Q12H  vitamin B-12, 1,000 mcg, Oral, Daily             Active Hospital Problems:  Active Hospital Problems    Diagnosis    • **Pneumonia of both lungs due to infectious organism, unspecified part of lung    • Hypothyroidism    • Vitamin B12 deficiency    • Folic acid deficiency    • Moderate malnutrition (CMS/HCC)    • Cardiomyopathy, dilated (HCC)    • Ischemic cardiomyopathy    • Mixed hyperlipidemia    • Chronic stable angina ([facility])    • Anemia of chronic disease    • Diarrhea      Likely secondary to antibiotics     • Chronic obstructive pulmonary disease (HCC)    • Congestive heart failure (HCC)    • Coronary artery disease    • Dyslipidemia    • Essential hypertension    • Gastroesophageal reflux disease    • Iron deficiency anemia    • Abdominal aortic aneurysm (AAA) ([facility])    • Bilateral carotid artery stenosis      Plan:   Acute on chronic hypoxic and hypercapnic respiratory failure  Chronic obstructive pulmonary disease  Multifocal pneumonia  -Patient initially  treated with Unasyn but had an allergic reaction which resolved with Decadron and Benadryl  -Procalcitonin normal  -Urine antigens negative  -Respiratory panel negative  -Pulmonary is consulting  -Patient completed a course of Rocephin and Flagyl  -Continue short acting bronchodilators and prednisone    Acute on chronic systolic and diastolic congestive heart failure due to ischemic cardiomyopathy and hypertensive heart disease  -Cardiology consulting  -Nephrology managing diuresis    Coronary artery disease status post CABG  -with attrition 3/4 grafts  -ischemic cardiomyopathy most recent ejection 45% April 2021  -Continue home aspirin, statin, Toprol, imdur and Ranexa     Paroxysmal atrial tachycardia  -Resolved with increase in metoprolol    Essential hypertension, chronic and controlled  -Lisinopril discontinued due to acute renal failure  -Continue metoprolol    Hyperlipidemia  -Continue atorvastatin    Acute renal failure secondary to prerenal azotemia due to diuretics  -Baseline creatinine 0.8  -Creatinine has increased to 1.35 with diuresis  -Nephrology following and managing    Hyperkalemia  -Improved with holding oral potassium supplement and spironolactone    Moderate malnutrition  -Nutritional supplements being given    GERD  -Continue PPI    Hypothyroidism  -Continue levothyroxine    Iron, B12 and folic acid deficiency anemia  -Continue replacement  -Hemoglobin 8.5 on admission and is now 9.3    Nutritional supplements  -Continue vitamin C and vitamin D    DVT prophylaxis:  No DVT prophylaxis order currently exists.    CODE STATUS:    Code Status (Patient has no pulse and is not breathing): CPR (Attempt to Resuscitate)  Medical Interventions (Patient has pulse or is breathing): Full Support      Disposition:  I expect patient to be discharged tomorrow.    This patient has been examined wearing appropriate Personal Protective Equipment and discussed with hospital infection control department.  03/15/22      Electronically signed by Zeina Lehman MD, 03/15/22, 21:13 EDT.  [facility] [place]

## 2022-03-16 NOTE — PROGRESS NOTES
Nutrition Services    Patient Name: Gayathri Reddy  YOB: 1945  MRN: [id number]  Admission date: 3/6/2022     Liberalize to Healthy Heart diet to help optimize PO intakes.     Continue oral nutrition supplementation:   -Magic Cup q daily (290 kcals, 9 g protein if consumed)   -Start secondary supplement Boost Glucose Control q daily (Provides 190 kcals, 16 g protein if consumed)     PPE Documentation        PPE Worn By Provider mask, gloves, eye protection and gown   PPE Worn By Patient  none     CLINICAL NUTRITION ASSESSMENT      Reason for Assessment Follow up protocol   3/10: [facility] 2     H&P      Past Medical History:   Diagnosis Date   • Aneurysm (HCC)     AAA   • Arthritis    • CHF (congestive heart failure) (HCC)    • COPD (chronic obstructive pulmonary disease) (HCC)    • Coronary artery disease    • Dyslipidemia    • GERD (gastroesophageal reflux disease)    • History of right heart catheterization     7/30/2017; 10/2018   • Hyperlipidemia    • Hypertension    • Hypothyroidism    • Myocardial infarct (HCC)    • Myocardial infarction (HCC)     x 3   • Pneumonia 11/2019    bilateral        Past Surgical History:   Procedure Laterality Date   • ABDOMINAL AORTIC ANEURYSM REPAIR N/A 2001   • CARDIAC CATHETERIZATION     • CATARACT EXTRACTION Bilateral    • CORONARY ANGIOPLASTY WITH STENT PLACEMENT N/A     x 5   • CORONARY ARTERY BYPASS GRAFT      x 4 1995   • HYSTERECTOMY N/A         Current Problems   Acute on chronic hypoxia  --multifocal pneumonia  -antibiotics given this admission    Acute on chronic CHF  --cardiology following    Diarrhea  -Cdiff r/o    Moderate malnutrition   - per NFPE this admission        Encounter Information        Trending Narrative     3/16: Pt assessed for follow up. Appetite remains intact, and intakes have continued to meet needs since prior assessment, with intake averaging 75% at meals. Selections reviewed in addition to pt report, and selections generally are  "adequate -- occasionally chooses a \"lighter\" meal, but overall eating fairly well. Since weight has trended downward, will try an additional ONS to help stabilize weight.    3/9: Visited patient in room. Reports a very good appetite this morning, ate 100% of her breakfast- observed tray in room. States she typically eats 2 meals per day at home.  B- toast/eggs  Late lunch/early dinner- \"big meal\" with meat and sides.  Patient does not take nutrition supplements at home, reports a stable weight. Lives alone, but states she has assistance with grocery shopping due to not driving.     Anthropometrics        Current Height, Weight Height: 165.1 cm (65\")  Weight: 56.3 kg (124 lb 3.2 oz) (03/15/22 0600)       Ideal Body Weight (IBW) 125lb   Usual Body Weight (UBW) 130lb       Trending Weight Hx     This admission: 3/16: 2.1% weight loss in 1 week - significant; monitor (also having fluid fluctuations, which may be playing a role)   Current weight down 6lb since admit             PTA: Current weight down 7% in 1 year    Wt Readings from Last 30 Encounters:   03/15/22 0600 56.3 kg (124 lb 3.2 oz)   03/14/22 0457 57.2 kg (126 lb 1.7 oz)   03/13/22 0600 59.1 kg (130 lb 2.9 oz)   03/13/22 0324 59.1 kg (130 lb 3.2 oz)   03/10/22 0556 57.6 kg (126 lb 14.4 oz)   03/09/22 0430 57.5 kg (126 lb 12.2 oz)   03/06/22 1049 59.9 kg (132 lb)   02/18/22 0943 61.2 kg (135 lb)   07/21/21 1517 59.9 kg (132 lb)   04/26/21 1110 61.7 kg (136 lb)   04/16/21 1355 61.7 kg (136 lb)   03/18/21 1415 61.7 kg (136 lb)   02/01/21 1509 62.6 kg (138 lb)   08/18/20 1541 61.2 kg (135 lb)   02/18/20 1110 65.1 kg (143 lb 9.6 oz)   11/24/19 0307 63.1 kg (139 lb 1.8 oz)   11/22/19 0306 66.1 kg (145 lb 11.6 oz)   11/21/19 0315 63.3 kg (139 lb 8.8 oz)   11/20/19 0345 66.1 kg (145 lb 11.6 oz)   11/19/19 0420 66 kg (145 lb 8.1 oz)   11/18/19 1406 66.6 kg (146 lb 14.4 oz)   11/18/19 0629 66.2 kg (145 lb 15.1 oz)   11/14/19 2021 66.2 kg (146 lb)   08/01/19 1130 66.4 " kg (146 lb 6.4 oz)      BMI kg/m2 Body mass index is 20.67 kg/m².       Labs        Pertinent Labs    Results from last 7 days   Lab Units 03/16/22  0337 03/15/22  0858 03/14/22  0940   SODIUM mmol/L 135* 135* 137   POTASSIUM mmol/L 4.9 4.4 5.1   CHLORIDE mmol/L 94* 96* 100   CO2 mmol/L 28.0 25.0 26.0   BUN mg/dL 43* 36* 31*   CREATININE mg/dL 2.11* 1.67* 1.35*   CALCIUM mg/dL 9.8 9.4 9.4   GLUCOSE mg/dL 109* 212* 137*     Results from last 7 days   Lab Units 03/16/22  0337 03/14/22  2243 03/14/22  0100 03/12/22  1301 03/12/22  0159 03/10/22  2338   MAGNESIUM mg/dL  --   --  1.7  --  1.9 2.9*   PHOSPHORUS mg/dL 5.7*   < > 3.6   < > 2.9 3.1   HEMOGLOBIN g/dL 10.3*   < > 8.3*   < > 7.4* 8.2*   HEMATOCRIT % 29.7*   < > 25.2*   < > 21.9* 24.2*    < > = values in this interval not displayed.     COVID19   Date Value Ref Range Status   03/06/2022 Not Detected Not Detected - Ref. Range Final     Lab Results   Component Value Date    HGBA1C 5.4 01/07/2021        Medications    Scheduled Medications albuterol sulfate HFA, 2 puff, Inhalation, BID - RT  vitamin C, 250 mg, Oral, Daily  aspirin, 81 mg, Oral, Daily  atorvastatin, 80 mg, Oral, Nightly  cholecalciferol, 500 Units, Oral, Daily  clopidogrel, 75 mg, Oral, Daily  estradiol, 3 mg, Oral, Nightly  ferrous sulfate, 324 mg, Oral, Daily With Breakfast  folic acid, 1 mg, Oral, Daily  gabapentin, 400 mg, Oral, Q8H  guaiFENesin, 600 mg, Oral, Q12H  isosorbide mononitrate, 30 mg, Oral, Q24H  levothyroxine, 88 mcg, Oral, Q AM  metoprolol succinate XL, 25 mg, Oral, Daily  nystatin, 5 mL, Swish & Swallow, 4x Daily  oxymetazoline, 2 spray, Each Nare, BID  pantoprazole, 40 mg, Oral, Q AM  predniSONE, 20 mg, Oral, Daily  ranolazine, 1,000 mg, Oral, Q12H  sertraline, 100 mg, Oral, Daily  sodium chloride, 10 mL, Intravenous, Q12H  vitamin B-12, 1,000 mcg, Oral, Daily        Infusions      PRN Medications •  acetaminophen  •  diphenhydrAMINE  •  docusate sodium  •   "HYDROcodone-acetaminophen  •  ipratropium-albuterol  •  labetalol  •  loperamide  •  LORazepam  •  magnesium sulfate **OR** magnesium sulfate **OR** magnesium sulfate  •  methocarbamol  •  nitroglycerin  •  ondansetron **OR** ondansetron  •  phenylephrine-mineral oil-petrolatum  •  sodium chloride  •  sodium chloride     Physical Findings        Trending Physical   Appearance, NFPE 3/16: NFPE repeated and still C/W moderate malnutrition   3/9: NFPE completed, pt with several areas of moderate muscle loss.   --  Edema  none     Bowel Function BM 3/16     Tubes None     Chewing/Swallowing No issues reported     Skin No breakdown   --  Current Nutrition Orders & Evaluation of Intake       Oral Nutrition     Food Allergies NKFA   Current PO Diet Diet Cardiac, Diabetic/Consistent Carbs; Healthy Heart; Diabetic - Consistent Carb; Fluid Restriction; 2000cc/day   Supplement Magic Cup daily with dinner    PO Evaluation     Trending % PO Intake 75% x1 documented meal, observed 100% of breakfast consumed  3/16: Ongoing intake averaging 75% at meals; occasionally with \"light\" meal selections   --  Nutritional Risk Screening        NRS-2002 Score          Nutrition Diagnosis         Nutrition Dx Problem 1 Moderate chronic disease related malnutrition related to inadequate calorie intake PTA in the context of multiple chronic disease states (COPD, HF) as evidenced by report of po intake PTA providing less than 75% of nutrition needs for greater than 1 month PTA and physical exam revealing moderate muscle loss.      Nutrition Dx Problem 2        Intervention Goal         Intervention Goal(s) Meal intake maintained at 75% or greater.       Nutrition Intervention        RD Action Continue ONS  Will liberalize diet to help optimize intake      Nutrition Prescription          Diet Prescription HH, 2000mL fluids   Supplement Prescription Magic Cup daily   --  Monitor/Evaluation        Monitor PO intake, Supplement intake, Pertinent " labs, Weight, Skin status, GI status     Electronically signed by:  Violet Alvarado RD  03/16/22 12:12 EDT

## 2022-03-16 NOTE — PLAN OF CARE
Assessment: Gayathri Reddy presents with functional mobility impairments which indicate the need for skilled intervention. Pt reports dizziness/lightheadedness with supine to sit and sit to stand mobility.  Pt requiring increased assist with gait training this date with FWW due to instability. Requiring assist for AD navigation, specifically when completing turns. Pt BP assessed in sitting 82/52 and standing 65/39. Reported to nursing that pt is experiencing symptomatic orthostatic hypotension. Due to this, pt not safe to return home alone at this time.  Tolerating session today without incident. Will continue to follow and progress as tolerated.     Plan/Recommendations:   Pt would benefit from Home with Home Health at discharge from facility as long as BP is controlled and requires no DME at discharge.   Pt desires Home with Home Health at discharge. Pt cooperative; agreeable to therapeutic recommendations and plan of care.

## 2022-03-16 NOTE — PROGRESS NOTES
"[facility] PROGRESS NOTE     LOS: 10 days    Patient Care Team:  Deonte Hector MD as PCP - General      Subjective     Patient was seen and examined this morning  She had episode of orthostatic dizziness this morning.  Diarrhea improving.  Dyspnea also improving    Objective     Vital Sign Min/Max for last 24 hours  Temp:  [97 °F (36.1 °C)-98.5 °F (36.9 °C)] 97.8 °F (36.6 °C)  Heart Rate:  [78-91] 91  Resp:  [14-23] 20  BP: (121-135)/(65-83) 134/82                       Flowsheet Rows    Flowsheet Row First Filed Value   Admission Height 165.1 cm (65\") Documented at 03/06/2022 1049   Admission Weight 59.9 kg (132 lb) Documented at 03/06/2022 1049          No intake/output data recorded.  I/O last 3 completed shifts:  In: 480 [P.O.:480]  Out: -     Physical Exam:  Physical Exam    General Appearance: Chronically ill-appearing, NAD  Skin: warm and dry  HEENT: oral mucosa normal, nonicteric sclera  Neck: supple, no JVD  Lungs: Decreased breath sounds at bases.  Few basilar crackles  Heart: RRR, normal S1 and S2  Abdomen: soft, nontender, nondistended  : no palpable bladder  Extremities: no edema, cyanosis or clubbing  Neuro: normal speech and mental status       LABS:  Lab Results   Component Value Date    CALCIUM 9.8 03/16/2022    PHOS 5.7 (H) 03/16/2022     Results from last 7 days   Lab Units 03/16/22  0337 03/15/22  0858 03/14/22  2243 03/14/22  0940 03/14/22  0100 03/12/22  1301 03/12/22  0159 03/10/22  2338   MAGNESIUM mg/dL  --   --   --   --  1.7  --  1.9 2.9*   SODIUM mmol/L 135* 135*  --  137 137   < > 138 136   POTASSIUM mmol/L 4.9 4.4  --  5.1 5.8*   < > 3.7 3.3*   CHLORIDE mmol/L 94* 96*  --  100 101   < > 102 98   CO2 mmol/L 28.0 25.0  --  26.0 23.0   < > 25.0 28.0   BUN mg/dL 43* 36*  --  31* 28*   < > 20 18   CREATININE mg/dL 2.11* 1.67*  --  1.35* 1.26*   < > 1.35* 1.22*   GLUCOSE mg/dL 109* 212*  --  137* 154*   < > 112* 166*   CALCIUM mg/dL 9.8 9.4  --  9.4 9.2   < > 8.4* 7.9*   WBC " 10*3/mm3 13.10*  --  12.50*  --  13.30*   < > 12.30* 13.70*   HEMOGLOBIN g/dL 10.3*  --  9.3*  --  8.3*   < > 7.4* 8.2*   PLATELETS 10*3/mm3 343  --  329  --  290   < > 204 228    < > = values in this interval not displayed.     Lab Results   Component Value Date    TROPONINI 8.820 (C) 01/07/2021    TROPONINT <0.010 03/06/2022     Estimated Creatinine Clearance: 20.2 mL/min (A) (by C-G formula based on SCr of 2.11 mg/dL (H)).      Brief Urine Lab Results     None        WEIGHTS:     Wt Readings from Last 1 Encounters:   03/15/22 0600 56.3 kg (124 lb 3.2 oz)   03/14/22 0457 57.2 kg (126 lb 1.7 oz)   03/13/22 0600 59.1 kg (130 lb 2.9 oz)   03/13/22 0324 59.1 kg (130 lb 3.2 oz)   03/10/22 0556 57.6 kg (126 lb 14.4 oz)   03/09/22 0430 57.5 kg (126 lb 12.2 oz)   03/06/22 1049 59.9 kg (132 lb)       albuterol sulfate HFA, 2 puff, Inhalation, BID - RT  vitamin C, 250 mg, Oral, Daily  aspirin, 81 mg, Oral, Daily  atorvastatin, 80 mg, Oral, Nightly  cholecalciferol, 500 Units, Oral, Daily  clopidogrel, 75 mg, Oral, Daily  estradiol, 3 mg, Oral, Nightly  ferrous sulfate, 324 mg, Oral, Daily With Breakfast  folic acid, 1 mg, Oral, Daily  gabapentin, 400 mg, Oral, Q8H  guaiFENesin, 600 mg, Oral, Q12H  isosorbide mononitrate, 30 mg, Oral, Q24H  levothyroxine, 88 mcg, Oral, Q AM  metoprolol succinate XL, 25 mg, Oral, Daily  nystatin, 5 mL, Swish & Swallow, 4x Daily  oxymetazoline, 2 spray, Each Nare, BID  pantoprazole, 40 mg, Oral, Q AM  predniSONE, 20 mg, Oral, Daily  ranolazine, 1,000 mg, Oral, Q12H  sertraline, 100 mg, Oral, Daily  sodium chloride, 250 mL, Intravenous, Once  sodium chloride, 10 mL, Intravenous, Q12H  vitamin B-12, 1,000 mcg, Oral, Daily           Assessment/Plan       1.Acute kidney injury.  Mild  Creatinine increased to 2.1 Mg/DL with diuresis  Electrolytes okay.  Volume status improving  2.  Hypertension  Blood Pressure well controlled  3.    Acute on chronic respiratory failure.  Pneumonia with underlying  COPD  4.  Congestive heart failure.  Systolic.  Chronic  5.  Hyperkalemia. Improved     Recs:  Discontinued Lasix.  Will restart tomorrow at 40 mg daily if creatinine improves  Keep off Aldactone      Irving Yi MD  03/16/22  09:13 EDT

## 2022-03-16 NOTE — PLAN OF CARE
Goal Outcome Evaluation:           Progress: no change  Outcome Evaluation: Patient resting in bed. Complaints of pain in back. Treated per MAR. Patient has complained of dizziness when getting up and her BP does drop. MD is aware. Nephrology orderd a 250cc bolus this morning and discontinued lasix. Patient has also complaind of diarrhea. MD aware and ordered medication. Patient remains on 3L of oxygen which she wears at home. Will continue to monitor

## 2022-03-16 NOTE — CASE MANAGEMENT/SOCIAL WORK
Continued Stay Note  [facility]     Patient Name: Gayathri Reddy  MRN: [id number]  Today's Date: 3/16/2022    Admit Date: 3/6/2022     Discharge Plan     Row Name 03/16/22 1416       Plan    Plan Home with [facility], will need HH order prior to d/c. Current home oxygen with [employer].                    Expected Discharge Date and Time     Expected Discharge Date Expected Discharge Time    Mar 16, 2022             Yuli Barrios RN

## 2022-03-16 NOTE — PROGRESS NOTES
[facility] Medicine Services Daily Progress Note    Patient Name: Gayathri Reddy  : 1945  MRN: [id number]  Primary Care Physician:  Deonte Hector MD  Date of admission: 3/6/2022      Subjective      Chief Complaint: Shortness of breath      Patient Reports     3/15/2022: The patient continues to report a cough and shortness of air but her oxygen saturations are adequate on her usual home O2 of 3 to 4 L/min.  She was counseled on her chest x-ray results.  3/16/2022: Patient reports feeling better today.  Unfortunately her renal function worsened so Dr. Yi wants her to stay.  Dr. Liu pulmonary said the patient was doing better and could discharge today from his standpoint.    ROS   12 point review of systems was reviewed and patient reports continued improvement of her shortness of air with cough.  She denies other complaints.      Objective      Vitals:   Temp:  [97 °F (36.1 °C)-98.5 °F (36.9 °C)] 97.9 °F (36.6 °C)  Heart Rate:  [78-92] 78  Resp:  [17-23] 23  BP: ()/(38-83) 90/51  Flow (L/min):  [3] 3    Physical Exam   Vital signs and nurses notes reviewed.  Thin elderly female sitting up in bed comfortable on nasal cannul in no acute distress; sclera anicteric, mucous membranes moist;  lungs with diffuse crackles but no wheezes with decreased air entry anteriorly and posteriorly bilaterally; CV regular rate and rhythm; abdomen soft nontender nondistended; extremities with no edema or calf tenderness; no cyanosis; no Hull catheter.  The patient's skin is loose with protuberant bony prominences of the skull trunk and extremities suggestive of subcutaneous muscle and fat loss.  Exam unchanged from 3/15/2022.       Result Review    Result Review:  I have personally reviewed the results from the time of this admission to 3/16/2022 18:49 EDT and agree with these findings:  [x]  Laboratory  [x]  Microbiology  [x]  Radiology  [x]  EKG/Telemetry   [x]  Cardiology/Vascular    []  Pathology  [x]  Old records  []  Other:  Most notable findings discussed in the assessment and plan.          Assessment/Plan      Brief Patient Summary:  Gayathri Reddy is a 76 y.o. female with PMHx of HTN, HLD, hypothyroidism, GERD, CAD, AAA, CHF, COPD with chronic hypoxic respiratory failure (3L NC chronically) who presented to the emergency department complaining of a 3-day history of increasing shortness of breath especially with exertion and productive cough.  In the emergency department troponin was normal and proBNP 17538.  Hemoglobin was 8.5.  CXR suggestive of multi-focal pneumonia.  Respiratory panel negative for covid or other virus. Patient started on abx, lasix, and admitted.     3/7/2022; still complaining of shortness of breath with minimal exertion, T-max of 36.6, vital signs are stable currently on 3 L of nasal cannula.  Spoke with RN.  Serum creatinine bumped up to 1.27 prerenal NOE consult nephrology, proBNP 70142, will consult cardiology.  3/8/2022; patient walked about 200 feet without getting hypoxia on supplemental oxygen but still increased work of breathing, otherwise hemodynamically stable.  3/9/2022; past patient still endorses shortness of breath with minimal exertion, has been endorsing diarrhea for the last 2 or 3 days, will check stool for C. difficile, T-max of 100.7, vital stable currently on 1 L of nasal cannula.  3/10/2022; C. difficile came back negative, started on Imodium as needed, hypoalbuminemia, check prealbumin, had some overnight paroxysmal atrial tachycardia beta-blocker dose increased, spironolactone has been added by nephrology.  3/11/2022; diarrhea has resolved apparently patient did not get spironolactone yesterday, as order was not placed, chest x-ray PA and lateral still with features of multifocal pneumonia in the lungs with improved aeration compared to 3/6/2022 pulmonology cardiology and nephrology following.  3/12  hgb 7.4 wo bleeding   Slept well  Sat  hgood  Notes reviewed  Labs reviewed  Defer reacritto nephrology  Separate folic and b12 replacement  May need iv fe  Sore tongue.. empiriic nystatinS/S  3/13  She has been feeling short of breath more than usual today  We will check x-ray chest  Her hemoglobin actually better.  3/14/2022: Patient reports continuing to feel poorly with shortness of air and cough productive of clear and sometimes yellow phlegm.    Current inpatient medications include:  albuterol sulfate HFA, 2 puff, Inhalation, BID - RT  vitamin C, 250 mg, Oral, Daily  aspirin, 81 mg, Oral, Daily  atorvastatin, 80 mg, Oral, Nightly  cholecalciferol, 500 Units, Oral, Daily  cholestyramine light, 1 packet, Oral, Q12H  clopidogrel, 75 mg, Oral, Daily  estradiol, 3 mg, Oral, Nightly  ferrous sulfate, 324 mg, Oral, Daily With Breakfast  folic acid, 1 mg, Oral, Daily  gabapentin, 400 mg, Oral, Q8H  guaiFENesin, 600 mg, Oral, Q12H  isosorbide mononitrate, 30 mg, Oral, Q24H  levothyroxine, 88 mcg, Oral, Q AM  metoprolol succinate XL, 25 mg, Oral, Daily  nystatin, 5 mL, Swish & Swallow, 4x Daily  oxymetazoline, 2 spray, Each Nare, BID  pantoprazole, 40 mg, Oral, Q AM  predniSONE, 20 mg, Oral, Daily  ranolazine, 1,000 mg, Oral, Q12H  sertraline, 100 mg, Oral, Daily  sodium chloride, 10 mL, Intravenous, Q12H  vitamin B-12, 1,000 mcg, Oral, Daily             Active Hospital Problems:  Active Hospital Problems    Diagnosis    • **Pneumonia of both lungs due to infectious organism, unspecified part of lung    • Hypothyroidism    • Vitamin B12 deficiency    • Folic acid deficiency    • Moderate malnutrition (CMS/HCC)    • Cardiomyopathy, dilated (HCC)    • Ischemic cardiomyopathy    • Mixed hyperlipidemia    • Chronic stable angina (HCC)    • Anemia of chronic disease    • Diarrhea      Likely secondary to antibiotics     • Chronic obstructive pulmonary disease (HCC)    • Congestive heart failure (HCC)    • Coronary artery disease    • Dyslipidemia    • Essential  hypertension    • Gastroesophageal reflux disease    • Iron deficiency anemia    • Abdominal aortic aneurysm (AAA) (HCC)    • Bilateral carotid artery stenosis      Plan:   Acute on chronic hypoxic and hypercapnic respiratory failure  Chronic obstructive pulmonary disease  Multifocal pneumonia  -Patient initially treated with Unasyn but had an allergic reaction which resolved with Decadron and Benadryl  -Procalcitonin normal  -Urine antigens negative  -Respiratory panel negative  -Pulmonary is consulting  -Patient completed a course of Rocephin and Flagyl  -Continue short acting bronchodilators and prednisone    Acute on chronic systolic and diastolic congestive heart failure due to ischemic cardiomyopathy and hypertensive heart disease  -Cardiology consulting  -Nephrology managing diuresis    Coronary artery disease status post CABG  -with attrition 3/4 grafts  -ischemic cardiomyopathy most recent ejection 45% April 2021  -Continue home aspirin, statin, Toprol, imdur and Ranexa     Paroxysmal atrial tachycardia  -Resolved with increase in metoprolol    Essential hypertension, chronic and controlled  -Lisinopril discontinued due to acute renal failure  -Continue metoprolol    Hyperlipidemia  -Continue atorvastatin    Acute renal failure secondary to prerenal azotemia due to diuretics  -Baseline creatinine 0.8  -Creatinine increased to 1.35 with diuresis  -creatinine significantly worsened to 1.67 on 3/15/2022  -Creatinine 2.11 on 3/16/2022  -Nephrology following and managing and has stopped Lasix and given a small fluid bolus on 3/16/2022    Hyperkalemia, resolved  -Improved with holding oral potassium supplement and spironolactone    Moderate malnutrition  -Nutritional supplements being given    GERD  -Continue PPI    Hypothyroidism  -Continue levothyroxine    Iron, B12 and folic acid deficiency anemia  -Continue replacement  -Hemoglobin 8.5 on admission and is now 9.3    Nutritional supplements  -Continue vitamin  C and vitamin D    DVT prophylaxis:  No DVT prophylaxis order currently exists.    CODE STATUS:    Code Status (Patient has no pulse and is not breathing): CPR (Attempt to Resuscitate)  Medical Interventions (Patient has pulse or is breathing): Full Support      Disposition:  I expect patient to be discharged tomorrow.    This patient has been examined wearing appropriate Personal Protective Equipment and discussed with hospital infection control department. 03/16/22      Electronically signed by Zeina Lehman MD, 03/16/22, 18:49 EDT.  [facility] Hospitalist Team

## 2022-03-17 LAB
ALBUMIN SERPL-MCNC: 3.5 G/DL (ref 3.5–5.2)
ANION GAP SERPL CALCULATED.3IONS-SCNC: 16 MMOL/L (ref 5–15)
BASOPHILS # BLD AUTO: 0.1 10*3/MM3 (ref 0–0.2)
BASOPHILS NFR BLD AUTO: 1.1 % (ref 0–1.5)
BUN SERPL-MCNC: 54 MG/DL (ref 8–23)
BUN/CREAT SERPL: 24.1 (ref 7–25)
CALCIUM SPEC-SCNC: 9.5 MG/DL (ref 8.6–10.5)
CHLORIDE SERPL-SCNC: 96 MMOL/L (ref 98–107)
CO2 SERPL-SCNC: 24 MMOL/L (ref 22–29)
CREAT SERPL-MCNC: 2.24 MG/DL (ref 0.57–1)
DEPRECATED RDW RBC AUTO: 43.8 FL (ref 37–54)
EGFRCR SERPLBLD CKD-EPI 2021: 22.2 ML/MIN/1.73
EOSINOPHIL # BLD AUTO: 0.1 10*3/MM3 (ref 0–0.4)
EOSINOPHIL NFR BLD AUTO: 0.9 % (ref 0.3–6.2)
ERYTHROCYTE [DISTWIDTH] IN BLOOD BY AUTOMATED COUNT: 12.7 % (ref 12.3–15.4)
GLUCOSE SERPL-MCNC: 129 MG/DL (ref 65–99)
HCT VFR BLD AUTO: 30 % (ref 34–46.6)
HGB BLD-MCNC: 9.9 G/DL (ref 12–15.9)
LYMPHOCYTES # BLD AUTO: 2.7 10*3/MM3 (ref 0.7–3.1)
LYMPHOCYTES NFR BLD AUTO: 20.2 % (ref 19.6–45.3)
MCH RBC QN AUTO: 32.5 PG (ref 26.6–33)
MCHC RBC AUTO-ENTMCNC: 33.2 G/DL (ref 31.5–35.7)
MCV RBC AUTO: 98 FL (ref 79–97)
MONOCYTES # BLD AUTO: 1 10*3/MM3 (ref 0.1–0.9)
MONOCYTES NFR BLD AUTO: 7.5 % (ref 5–12)
NEUTROPHILS NFR BLD AUTO: 70.3 % (ref 42.7–76)
NEUTROPHILS NFR BLD AUTO: 9.4 10*3/MM3 (ref 1.7–7)
NRBC BLD AUTO-RTO: 0 /100 WBC (ref 0–0.2)
PHOSPHATE SERPL-MCNC: 4.7 MG/DL (ref 2.5–4.5)
PLATELET # BLD AUTO: 340 10*3/MM3 (ref 140–450)
PMV BLD AUTO: 8.2 FL (ref 6–12)
POTASSIUM SERPL-SCNC: 4 MMOL/L (ref 3.5–5.2)
QT INTERVAL: 288 MS
RBC # BLD AUTO: 3.06 10*6/MM3 (ref 3.77–5.28)
SODIUM SERPL-SCNC: 136 MMOL/L (ref 136–145)
WBC NRBC COR # BLD: 13.4 10*3/MM3 (ref 3.4–10.8)

## 2022-03-17 PROCEDURE — 85025 COMPLETE CBC W/AUTO DIFF WBC: CPT | Performed by: HOSPITALIST

## 2022-03-17 PROCEDURE — 63710000001 PREDNISONE PER 1 MG: Performed by: INTERNAL MEDICINE

## 2022-03-17 PROCEDURE — 84443 ASSAY THYROID STIM HORMONE: CPT | Performed by: HOSPITALIST

## 2022-03-17 PROCEDURE — 94799 UNLISTED PULMONARY SVC/PX: CPT

## 2022-03-17 PROCEDURE — 99232 SBSQ HOSP IP/OBS MODERATE 35: CPT | Performed by: HOSPITALIST

## 2022-03-17 PROCEDURE — 80069 RENAL FUNCTION PANEL: CPT | Performed by: INTERNAL MEDICINE

## 2022-03-17 PROCEDURE — 25010000002 ONDANSETRON PER 1 MG: Performed by: NURSE PRACTITIONER

## 2022-03-17 PROCEDURE — 63710000001 DIPHENHYDRAMINE PER 50 MG: Performed by: NURSE PRACTITIONER

## 2022-03-17 RX ORDER — LOPERAMIDE HYDROCHLORIDE 2 MG/1
2 CAPSULE ORAL ONCE
Status: COMPLETED | OUTPATIENT
Start: 2022-03-17 | End: 2022-03-17

## 2022-03-17 RX ADMIN — SERTRALINE 100 MG: 100 TABLET, FILM COATED ORAL at 08:07

## 2022-03-17 RX ADMIN — ALBUTEROL SULFATE 2 PUFF: 108 INHALANT RESPIRATORY (INHALATION) at 06:43

## 2022-03-17 RX ADMIN — PREDNISONE 20 MG: 20 TABLET ORAL at 08:07

## 2022-03-17 RX ADMIN — LORAZEPAM 1 MG: 1 TABLET ORAL at 06:08

## 2022-03-17 RX ADMIN — HYDROCODONE BITARTRATE AND ACETAMINOPHEN 1 TABLET: 10; 325 TABLET ORAL at 14:49

## 2022-03-17 RX ADMIN — GABAPENTIN 400 MG: 400 CAPSULE ORAL at 19:47

## 2022-03-17 RX ADMIN — GABAPENTIN 400 MG: 400 CAPSULE ORAL at 06:08

## 2022-03-17 RX ADMIN — GUAIFENESIN 600 MG: 600 TABLET, EXTENDED RELEASE ORAL at 08:07

## 2022-03-17 RX ADMIN — CLOPIDOGREL BISULFATE 75 MG: 75 TABLET, FILM COATED ORAL at 08:07

## 2022-03-17 RX ADMIN — GABAPENTIN 400 MG: 400 CAPSULE ORAL at 14:46

## 2022-03-17 RX ADMIN — CHOLESTYRAMINE 4 G: 4 POWDER, FOR SUSPENSION ORAL at 08:07

## 2022-03-17 RX ADMIN — ONDANSETRON 4 MG: 2 INJECTION INTRAMUSCULAR; INTRAVENOUS at 06:10

## 2022-03-17 RX ADMIN — ASPIRIN 81 MG: 81 TABLET, COATED ORAL at 08:07

## 2022-03-17 RX ADMIN — Medication 500 UNITS: at 08:07

## 2022-03-17 RX ADMIN — Medication 10 ML: at 08:08

## 2022-03-17 RX ADMIN — ESTRADIOL 3 MG: 1 TABLET ORAL at 19:47

## 2022-03-17 RX ADMIN — RANOLAZINE 1000 MG: 500 TABLET, FILM COATED, EXTENDED RELEASE ORAL at 10:12

## 2022-03-17 RX ADMIN — FERROUS SULFATE TAB EC 324 MG (65 MG FE EQUIVALENT) 324 MG: 324 (65 FE) TABLET DELAYED RESPONSE at 08:07

## 2022-03-17 RX ADMIN — LORAZEPAM 1 MG: 1 TABLET ORAL at 21:39

## 2022-03-17 RX ADMIN — LEVOTHYROXINE SODIUM 88 MCG: 0.09 TABLET ORAL at 06:08

## 2022-03-17 RX ADMIN — LORAZEPAM 1 MG: 1 TABLET ORAL at 14:49

## 2022-03-17 RX ADMIN — OXYCODONE HYDROCHLORIDE AND ACETAMINOPHEN 250 MG: 500 TABLET ORAL at 08:09

## 2022-03-17 RX ADMIN — ATORVASTATIN CALCIUM 80 MG: 40 TABLET, FILM COATED ORAL at 19:47

## 2022-03-17 RX ADMIN — Medication 10 ML: at 19:48

## 2022-03-17 RX ADMIN — CYANOCOBALAMIN TAB 1000 MCG 1000 MCG: 1000 TAB at 08:07

## 2022-03-17 RX ADMIN — ISOSORBIDE MONONITRATE 30 MG: 30 TABLET, EXTENDED RELEASE ORAL at 12:25

## 2022-03-17 RX ADMIN — PANTOPRAZOLE SODIUM 40 MG: 40 TABLET, DELAYED RELEASE ORAL at 06:08

## 2022-03-17 RX ADMIN — FOLIC ACID 1 MG: 1 TABLET ORAL at 08:07

## 2022-03-17 RX ADMIN — HYDROCODONE BITARTRATE AND ACETAMINOPHEN 1 TABLET: 10; 325 TABLET ORAL at 06:08

## 2022-03-17 RX ADMIN — METOPROLOL SUCCINATE 25 MG: 25 TABLET, EXTENDED RELEASE ORAL at 08:08

## 2022-03-17 RX ADMIN — LOPERAMIDE HYDROCHLORIDE 2 MG: 2 CAPSULE ORAL at 19:48

## 2022-03-17 RX ADMIN — DIPHENHYDRAMINE HYDROCHLORIDE 50 MG: 25 CAPSULE ORAL at 21:39

## 2022-03-17 RX ADMIN — NASAL DECONGESTANT 2 SPRAY: 0.05 SPRAY NASAL at 08:08

## 2022-03-17 NOTE — PROGRESS NOTES
"PULMONARY CRITICAL CARE Progress  NOTE      PATIENT IDENTIFICATION:  Name: Gayathri Reddy  MRN: [id number]  :  1945     Age: 76 y.o.  Sex: female    DATE OF Note:  3/16/2022   Referring Physician: Zeina Lehman MD                  Subjective:   Feeling better but still weak  No cough  less SOB no chest pain, no nausea or vomiting, no change in bowel habit, no dysuria,  no new  skin rash or itching.      Objective:  tMax 24 hrs: Temp (24hrs), Av.9 °F (36.6 °C), Min:97 °F (36.1 °C), Max:98.5 °F (36.9 °C)      Vitals Ranges:   Temp:  [97 °F (36.1 °C)-98.5 °F (36.9 °C)] 98.3 °F (36.8 °C)  Heart Rate:  [78-97] 97  Resp:  [16-23] 23  BP: ()/(38-83) 112/67    Intake and Output Last 3 Shifts:   I/O last 3 completed shifts:  In: 1450 [P.O.:1200; IV Piggyback:250]  Out: -     Exam:  /67 (BP Location: Left arm, Patient Position: Lying)   Pulse 97   Temp 98.3 °F (36.8 °C) (Oral)   Resp 23   Ht 165.1 cm (65\")   Wt 54.6 kg (120 lb 6.4 oz)   SpO2 93%   BMI 20.04 kg/m²     General Appearance: Alert awake not in distress  HEENT:  Normocephalic, without obvious abnormality, Conjunctiva/corneas clear,.  Normal external ear canals, Nares normal, no drainage     Neck:  Supple, symmetrical, trachea midline. No JVD.  Lungs /Chest wall:   Bilateral basal rhonchi, respirations unlabored symmetrical wall movement.     Heart:  Regular rate and rhythm, systolic murmur PMI left sternal border  Abdomen: Soft, non-tender, no masses, no organomegaly.    Extremities: Trace edema no clubbing or Cyanosis        Medications:    Current Facility-Administered Medications:   •  acetaminophen (TYLENOL) tablet 650 mg, 650 mg, Oral, Q6H PRN, Laure Mei, APRN, 650 mg at 22 0050  •  albuterol sulfate HFA (PROVENTIL HFA;VENTOLIN HFA;PROAIR HFA) inhaler 2 puff, 2 puff, Inhalation, BID - RT, Zeina Lehman MD, 2 puff at 220  •  ascorbic acid (VITAMIN C) tablet 250 mg, 250 mg, Oral, Daily, Sigifredo, " RADHA Carter, 250 mg at 03/16/22 0808  •  aspirin EC tablet 81 mg, 81 mg, Oral, Daily, Laure Mei APRN, 81 mg at 03/16/22 0808  •  atorvastatin (LIPITOR) tablet 80 mg, 80 mg, Oral, Nightly, Laure Mei APRN, 80 mg at 03/15/22 2156  •  cholecalciferol (VITAMIN D3) tablet 500 Units, 500 Units, Oral, Daily, Laure Mei APRN, 500 Units at 03/16/22 0808  •  cholestyramine light packet 4 g, 1 packet, Oral, Q12H, Zeina Lehman MD  •  clopidogrel (PLAVIX) tablet 75 mg, 75 mg, Oral, Daily, Laure Mei APRN, 75 mg at 03/16/22 0809  •  diphenhydrAMINE (BENADRYL) capsule 50 mg, 50 mg, Oral, Nightly PRN, Alsop, Rosibel L, APRN, 50 mg at 03/15/22 2156  •  docusate sodium (COLACE) capsule 100 mg, 100 mg, Oral, BID PRN, Alsop, Rosibel L, APRN, 100 mg at 03/11/22 0639  •  estradiol (ESTRACE) tablet 3 mg, 3 mg, Oral, Nightly, Laure Mei, APRCHRISTINA, 3 mg at 03/15/22 2156  •  ferrous sulfate EC tablet 324 mg, 324 mg, Oral, Daily With Breakfast, Laure Mei APRN, 324 mg at 03/16/22 0809  •  folic acid (FOLVITE) tablet 1 mg, 1 mg, Oral, Daily, Deonte Hartley MD, 1 mg at 03/16/22 0809  •  gabapentin (NEURONTIN) capsule 400 mg, 400 mg, Oral, Q8H, Laure Mei APRN, 400 mg at 03/16/22 1313  •  guaiFENesin (MUCINEX) 12 hr tablet 600 mg, 600 mg, Oral, Q12H, Lilliana Del Rosario APRN, 600 mg at 03/16/22 0808  •  HYDROcodone-acetaminophen (NORCO)  MG per tablet 1 tablet, 1 tablet, Oral, Q6H PRN, Laure Mei APRN, 1 tablet at 03/16/22 1319  •  ipratropium-albuterol (DUO-NEB) nebulizer solution 3 mL, 3 mL, Nebulization, Q4H PRN, Zeina Lehman MD  •  isosorbide mononitrate (IMDUR) 24 hr tablet 30 mg, 30 mg, Oral, Q24H, Laure Mei APRN, 30 mg at 03/16/22 1313  •  labetalol (NORMODYNE,TRANDATE) injection 10 mg, 10 mg, Intravenous, Q6H PRN, Laure Mei APRN, 10 mg at 03/15/22 0253  •  levothyroxine  (SYNTHROID, LEVOTHROID) tablet 88 mcg, 88 mcg, Oral, Q AM, Laure Mei APRN, 88 mcg at 03/16/22 0500  •  loperamide (IMODIUM) capsule 2 mg, 2 mg, Oral, 4x Daily PRN, Sky Reeder MD, 2 mg at 03/10/22 1141  •  LORazepam (ATIVAN) tablet 1 mg, 1 mg, Oral, Q6H PRN, Laure Mei APRN, 1 mg at 03/16/22 1319  •  Magnesium Sulfate 2 gram Bolus, followed by 8 gram infusion (total Mg dose 10 grams)- Mg less than or equal to 1mg/dL, 2 g, Intravenous, PRN **OR** Magnesium Sulfate 2 gram / 50mL Infusion (GIVE X 3 BAGS TO EQUAL 6GM TOTAL DOSE) - Mg 1.1 - 1.5 mg/dl, 2 g, Intravenous, PRN, Last Rate: 25 mL/hr at 03/10/22 1815, 2 g at 03/10/22 1815 **OR** Magnesium Sulfate 4 gram infusion- Mg 1.6-1.9 mg/dL, 4 g, Intravenous, PRN, Alix Bernardo APRN  •  methocarbamol (ROBAXIN) tablet 500 mg, 500 mg, Oral, TID PRN, Laure Mei APRN, 500 mg at 03/14/22 0048  •  metoprolol succinate XL (TOPROL-XL) 24 hr tablet 25 mg, 25 mg, Oral, Daily, Alix Bernardo APRN, 25 mg at 03/16/22 0809  •  nitroglycerin (NITROSTAT) SL tablet 0.4 mg, 0.4 mg, Sublingual, Q5 Min PRN, Laure Mei APRN  •  nystatin (MYCOSTATIN) 100,000 unit/mL suspension 500,000 Units, 5 mL, Swish & Swallow, 4x Daily, Andres Wheat MD, 500,000 Units at 03/15/22 1347  •  ondansetron (ZOFRAN) tablet 4 mg, 4 mg, Oral, Q6H PRN, 4 mg at 03/11/22 0259 **OR** ondansetron (ZOFRAN) injection 4 mg, 4 mg, Intravenous, Q6H PRN, Laure Mei APRN, 4 mg at 03/16/22 0027  •  oxymetazoline (AFRIN) nasal spray 2 spray, 2 spray, Each Nare, BID, Zeina Lehman MD, 2 spray at 03/16/22 0810  •  pantoprazole (PROTONIX) EC tablet 40 mg, 40 mg, Oral, Q AM, Laure Mei APRN, 40 mg at 03/16/22 0500  •  phenylephrine-mineral oil-petrolatum (PREPARATION H) 0.25-14-74.9 % hemorhoidal ointment, , Rectal, TID PRN, AlsopRosibel APRN, Given at 03/09/22 0408  •  predniSONE (DELTASONE) tablet 20 mg, 20 mg, Oral, Daily,  Andres Wheat MD, 20 mg at 03/16/22 0808  •  ranolazine (RANEXA) 12 hr tablet 1,000 mg, 1,000 mg, Oral, Q12H, Laure Mei APRN, 1,000 mg at 03/16/22 0928  •  sertraline (ZOLOFT) tablet 100 mg, 100 mg, Oral, Daily, aLure Mei APRN, 100 mg at 03/16/22 0808  •  sodium chloride 0.9 % flush 10 mL, 10 mL, Intravenous, PRN, Laure Mei APRN  •  sodium chloride 0.9 % flush 10 mL, 10 mL, Intravenous, Q12H, Laure Mei APRN, 10 mL at 03/16/22 0809  •  sodium chloride 0.9 % flush 10 mL, 10 mL, Intravenous, PRN, Laure Mei APRN  •  vitamin B-12 (CYANOCOBALAMIN) tablet 1,000 mcg, 1,000 mcg, Oral, Daily, Deonte Hartley MD, 1,000 mcg at 03/16/22 0809    Data Review:  All labs (24hrs):   Recent Results (from the past 24 hour(s))   Renal Function Panel    Collection Time: 03/16/22  3:37 AM    Specimen: Blood   Result Value Ref Range    Glucose 109 (H) 65 - 99 mg/dL    BUN 43 (H) 8 - 23 mg/dL    Creatinine 2.11 (H) 0.57 - 1.00 mg/dL    Sodium 135 (L) 136 - 145 mmol/L    Potassium 4.9 3.5 - 5.2 mmol/L    Chloride 94 (L) 98 - 107 mmol/L    CO2 28.0 22.0 - 29.0 mmol/L    Calcium 9.8 8.6 - 10.5 mg/dL    Albumin 3.50 3.50 - 5.20 g/dL    Phosphorus 5.7 (H) 2.5 - 4.5 mg/dL    Anion Gap 13.0 5.0 - 15.0 mmol/L    BUN/Creatinine Ratio 20.4 7.0 - 25.0    eGFR 23.9 (L) >60.0 mL/min/1.73   CBC Auto Differential    Collection Time: 03/16/22  3:37 AM    Specimen: Blood   Result Value Ref Range    WBC 13.10 (H) 3.40 - 10.80 10*3/mm3    RBC 3.02 (L) 3.77 - 5.28 10*6/mm3    Hemoglobin 10.3 (L) 12.0 - 15.9 g/dL    Hematocrit 29.7 (L) 34.0 - 46.6 %    MCV 98.5 (H) 79.0 - 97.0 fL    MCH 34.1 (H) 26.6 - 33.0 pg    MCHC 34.6 31.5 - 35.7 g/dL    RDW 13.0 12.3 - 15.4 %    RDW-SD 44.6 37.0 - 54.0 fl    MPV 8.4 6.0 - 12.0 fL    Platelets 343 140 - 450 10*3/mm3    Neutrophil % 67.1 42.7 - 76.0 %    Lymphocyte % 22.3 19.6 - 45.3 %    Monocyte % 9.0 5.0 - 12.0 %    Eosinophil % 0.6  0.3 - 6.2 %    Basophil % 1.0 0.0 - 1.5 %    Neutrophils, Absolute 8.80 (H) 1.70 - 7.00 10*3/mm3    Lymphocytes, Absolute 2.90 0.70 - 3.10 10*3/mm3    Monocytes, Absolute 1.20 (H) 0.10 - 0.90 10*3/mm3    Eosinophils, Absolute 0.10 0.00 - 0.40 10*3/mm3    Basophils, Absolute 0.10 0.00 - 0.20 10*3/mm3    nRBC 0.1 0.0 - 0.2 /100 WBC   POC Glucose Once    Collection Time: 03/16/22  7:52 AM    Specimen: Blood   Result Value Ref Range    Glucose 130 (H) 70 - 105 mg/dL        Imaging:  XR Chest 1 View  Narrative: EXAM: XR CHEST 1 VW-     DATE OF EXAM: 3/16/2022 1:52 AM     INDICATION: Shortness of breath; J18.9-Pneumonia, unspecified organism;  R06.02-Shortness of breath; I50.9-Heart failure, unspecified;  D63.8-Anemia in other chronic diseases classified elsewhere;  I42.0-Dilated cardiomyopathy.       COMPARISONS: 03/15/2022      FINDINGS:     Ill-defined airspace opacities in the lower lobes are unchanged.     Overall stable appearance of the chest. No pneumothorax or pleural  effusion. Mediastinal shadows are unchanged.     Impression:    Stable chest over the past 24 hours, unchanged bilateral lower lobe  pneumonia.     Pulmonary nodule left upper lobe redemonstrated, unchanged measuring 1.4  cm. Follow-up to ensure resolution is recommended.     Electronically Signed By-Joaquín Hernandes On:3/16/2022 7:51 AM  This report was finalized on [id number] by  Joaquín Hernandes, .       ASSESSMENT:    Acute on chronic hypoxia with hypercapnia- wears 3-4 L at home  Pneumonia of both lungs due to infectious organism, unspecified part of lung    Abdominal aortic aneurysm (AAA) (HCC)    Chronic obstructive pulmonary disease (HCC)    Congestive heart failure (HCC)    Coronary artery disease    Dyslipidemia    Hypertension    Anemia of chronic disease    Mixed hyperlipidemia    Bilateral carotid artery stenosis    Moderate malnutrition (CMS/HCC)       PLAN:  Wean O2 down  Continue to wean down prednisone slowly  Bronchodilator  Inhaled  corticosteroids  Electrolytes/ glycemic control  DVT and GI prophylaxis.    Total Critical care time in direct medical management (   ) minutes, This time specifically excludes time spent performing procedures.  Joanie Liu MD. D, ABSM.     3/16/2022  20:42 EDT

## 2022-03-17 NOTE — PLAN OF CARE
Problem: Adult Inpatient Plan of Care  Goal: Plan of Care Review  Outcome: Ongoing, Progressing  Goal: Patient-Specific Goal (Individualized)  Outcome: Ongoing, Progressing  Goal: Absence of Hospital-Acquired Illness or Injury  Outcome: Ongoing, Progressing  Intervention: Identify and Manage Fall Risk  Recent Flowsheet Documentation  Taken 3/17/2022 0300 by Dorene Ferguson RN  Safety Promotion/Fall Prevention:   safety round/check completed   room organization consistent   activity supervised   clutter free environment maintained   fall prevention program maintained   lighting adjusted  Taken 3/17/2022 0100 by Dorene Ferguson RN  Safety Promotion/Fall Prevention:   safety round/check completed   room organization consistent   activity supervised   clutter free environment maintained   fall prevention program maintained   lighting adjusted  Taken 3/16/2022 2300 by Dorene Ferguson RN  Safety Promotion/Fall Prevention:   safety round/check completed   activity supervised   clutter free environment maintained   fall prevention program maintained   lighting adjusted  Taken 3/16/2022 2100 by Dorene Ferguson RN  Safety Promotion/Fall Prevention:   safety round/check completed   room organization consistent   activity supervised   clutter free environment maintained   fall prevention program maintained   lighting adjusted  Taken 3/16/2022 1940 by Dorene Ferguson RN  Safety Promotion/Fall Prevention:   safety round/check completed   room organization consistent   activity supervised   clutter free environment maintained   fall prevention program maintained   lighting adjusted  Intervention: Prevent and Manage VTE (Venous Thromboembolism) Risk  Recent Flowsheet Documentation  Taken 3/16/2022 1940 by Dorene Ferguson RN  Activity Management: activity adjusted per tolerance  Intervention: Prevent Infection  Recent Flowsheet Documentation  Taken 3/17/2022 0300 by Dorene Ferguson RN  Infection Prevention:   visitors restricted/screened   single patient  room provided   rest/sleep promoted  Taken 3/17/2022 0100 by Dorene Ferguson RN  Infection Prevention:   visitors restricted/screened   single patient room provided   rest/sleep promoted  Taken 3/16/2022 2300 by Dorene Ferguson RN  Infection Prevention:   visitors restricted/screened   single patient room provided   rest/sleep promoted  Taken 3/16/2022 2100 by Dorene Ferguson RN  Infection Prevention:   visitors restricted/screened   single patient room provided   rest/sleep promoted  Taken 3/16/2022 1940 by Dorene Ferguson RN  Infection Prevention:   visitors restricted/screened   single patient room provided   rest/sleep promoted  Goal: Optimal Comfort and Wellbeing  Outcome: Ongoing, Progressing  Goal: Readiness for Transition of Care  Outcome: Ongoing, Progressing     Problem: Behavioral Health Comorbidity  Goal: Maintenance of Behavioral Health Symptom Control  Outcome: Ongoing, Progressing  Intervention: Maintain Behavioral Health Symptom Control  Recent Flowsheet Documentation  Taken 3/17/2022 0300 by Dorene Ferguson RN  Medication Review/Management: medications reviewed  Taken 3/17/2022 0100 by Dorene Ferguson RN  Medication Review/Management: medications reviewed  Taken 3/16/2022 2300 by Dorene Ferguson RN  Medication Review/Management: medications reviewed  Taken 3/16/2022 2100 by Dorene Ferguson RN  Medication Review/Management: medications reviewed  Taken 3/16/2022 1940 by Dorene Ferguson RN  Medication Review/Management: medications reviewed     Problem: COPD (Chronic Obstructive Pulmonary Disease) Comorbidity  Goal: Maintenance of COPD Symptom Control  Outcome: Ongoing, Progressing  Intervention: Maintain COPD-Symptom Control  Recent Flowsheet Documentation  Taken 3/17/2022 0300 by Dorene Ferguson RN  Medication Review/Management: medications reviewed  Taken 3/17/2022 0100 by Dorene Ferguson RN  Medication Review/Management: medications reviewed  Taken 3/16/2022 2300 by Dorene Ferguson RN  Medication Review/Management: medications reviewed  Taken  3/16/2022 2100 by Dorene Ferguson RN  Medication Review/Management: medications reviewed  Taken 3/16/2022 1940 by Dorene Ferguson RN  Medication Review/Management: medications reviewed     Problem: Diabetes Comorbidity  Goal: Blood Glucose Level Within Targeted Range  Outcome: Ongoing, Progressing     Problem: Heart Failure Comorbidity  Goal: Maintenance of Heart Failure Symptom Control  Outcome: Ongoing, Progressing  Intervention: Maintain Heart Failure-Management  Recent Flowsheet Documentation  Taken 3/17/2022 0300 by Dorene Ferguson RN  Medication Review/Management: medications reviewed  Taken 3/17/2022 0100 by Dorene Ferguson RN  Medication Review/Management: medications reviewed  Taken 3/16/2022 2300 by Dorene Ferguson RN  Medication Review/Management: medications reviewed  Taken 3/16/2022 2100 by Dorene Ferguson RN  Medication Review/Management: medications reviewed  Taken 3/16/2022 1940 by Dorene Ferguson RN  Medication Review/Management: medications reviewed     Problem: Hypertension Comorbidity  Goal: Blood Pressure in Desired Range  Outcome: Ongoing, Progressing  Intervention: Maintain Blood Pressure Management  Recent Flowsheet Documentation  Taken 3/17/2022 0300 by Dorene Ferguson RN  Medication Review/Management: medications reviewed  Taken 3/17/2022 0100 by Dorene Ferguson RN  Medication Review/Management: medications reviewed  Taken 3/16/2022 2300 by Dorene Ferguson RN  Medication Review/Management: medications reviewed  Taken 3/16/2022 2100 by Dorene Ferguson RN  Medication Review/Management: medications reviewed  Taken 3/16/2022 1940 by Dorene Ferguson RN  Medication Review/Management: medications reviewed     Problem: Fall Injury Risk  Goal: Absence of Fall and Fall-Related Injury  Outcome: Ongoing, Progressing  Intervention: Identify and Manage Contributors  Recent Flowsheet Documentation  Taken 3/17/2022 0300 by Dorene Ferguson RN  Medication Review/Management: medications reviewed  Taken 3/17/2022 0100 by Dorene Ferguson RN  Medication  Review/Management: medications reviewed  Taken 3/16/2022 2300 by Dorene Ferguson RN  Medication Review/Management: medications reviewed  Taken 3/16/2022 2100 by Dorene Ferguson RN  Medication Review/Management: medications reviewed  Taken 3/16/2022 1940 by Dorene Ferguson RN  Medication Review/Management: medications reviewed  Intervention: Promote Injury-Free Environment  Recent Flowsheet Documentation  Taken 3/17/2022 0300 by Dorene Ferguson RN  Safety Promotion/Fall Prevention:   safety round/check completed   room organization consistent   activity supervised   clutter free environment maintained   fall prevention program maintained   lighting adjusted  Taken 3/17/2022 0100 by Dorene Ferguson RN  Safety Promotion/Fall Prevention:   safety round/check completed   room organization consistent   activity supervised   clutter free environment maintained   fall prevention program maintained   lighting adjusted  Taken 3/16/2022 2300 by Dorene Ferguson RN  Safety Promotion/Fall Prevention:   safety round/check completed   activity supervised   clutter free environment maintained   fall prevention program maintained   lighting adjusted  Taken 3/16/2022 2100 by Dorene Ferguson RN  Safety Promotion/Fall Prevention:   safety round/check completed   room organization consistent   activity supervised   clutter free environment maintained   fall prevention program maintained   lighting adjusted  Taken 3/16/2022 1940 by Dorene Ferguson RN  Safety Promotion/Fall Prevention:   safety round/check completed   room organization consistent   activity supervised   clutter free environment maintained   fall prevention program maintained   lighting adjusted     Problem: Skin Injury Risk Increased  Goal: Skin Health and Integrity  Outcome: Ongoing, Progressing     Problem: Malnutrition  Goal: Improved Nutritional Intake  Outcome: Ongoing, Progressing   Goal Outcome Evaluation:

## 2022-03-17 NOTE — PROGRESS NOTES
"PULMONARY CRITICAL CARE Progress  NOTE      PATIENT IDENTIFICATION:  Name: Gayathri Reddy  MRN: [id number]  :  1945     Age: 76 y.o.  Sex: female    DATE OF Note:  3/17/2022   Referring Physician: Zeina Lehman MD                  Subjective:   Still poor appetite  No cough  less SOB no chest pain, no nausea or vomiting, no change in bowel habit, no dysuria,  no new  skin rash or itching.      Objective:  tMax 24 hrs: Temp (24hrs), Av.8 °F (36.6 °C), Min:97.4 °F (36.3 °C), Max:98.3 °F (36.8 °C)      Vitals Ranges:   Temp:  [97.4 °F (36.3 °C)-98.3 °F (36.8 °C)] 97.9 °F (36.6 °C)  Heart Rate:  [78-97] 84  Resp:  [16-23] 18  BP: ()/(38-73) 118/62    Intake and Output Last 3 Shifts:   I/O last 3 completed shifts:  In: 1450 [P.O.:1200; IV Piggyback:250]  Out: -     Exam:  /62 (BP Location: Left arm, Patient Position: Lying)   Pulse 84   Temp 97.9 °F (36.6 °C) (Oral)   Resp 18   Ht 165.1 cm (65\")   Wt 54.6 kg (120 lb 6.4 oz)   SpO2 98%   BMI 20.04 kg/m²     General Appearance: Alert awake not in distress  HEENT:  Normocephalic, without obvious abnormality, Conjunctiva/corneas clear,.  Normal external ear canals, Nares normal, no drainage     Neck:  Supple, symmetrical, trachea midline. No JVD.  Lungs /Chest wall:   Bilateral basal rhonchi, respirations unlabored symmetrical wall movement.     Heart:  Regular rate and rhythm, systolic murmur PMI left sternal border  Abdomen: Soft, non-tender, no masses, no organomegaly.    Extremities: Trace edema no clubbing or Cyanosis        Medications:    Current Facility-Administered Medications:   •  acetaminophen (TYLENOL) tablet 650 mg, 650 mg, Oral, Q6H PRN, Laure Mei, APRN, 650 mg at 22 0050  •  albuterol sulfate HFA (PROVENTIL HFA;VENTOLIN HFA;PROAIR HFA) inhaler 2 puff, 2 puff, Inhalation, BID - RT, Zeina Lehman MD, 2 puff at 22 0643  •  ascorbic acid (VITAMIN C) tablet 250 mg, 250 mg, Oral, Daily, Laure Mei " RADHA Dunaway, 250 mg at 03/17/22 0809  •  aspirin EC tablet 81 mg, 81 mg, Oral, Daily, Laure Mei APRN, 81 mg at 03/17/22 0807  •  atorvastatin (LIPITOR) tablet 80 mg, 80 mg, Oral, Nightly, Laure Mei APRN, 80 mg at 03/16/22 2106  •  cholecalciferol (VITAMIN D3) tablet 500 Units, 500 Units, Oral, Daily, Laure Mei APRN, 500 Units at 03/17/22 0807  •  cholestyramine light packet 4 g, 1 packet, Oral, Q12H, Zeina Lehman MD, 4 g at 03/17/22 0807  •  clopidogrel (PLAVIX) tablet 75 mg, 75 mg, Oral, Daily, Laure Mei APRN, 75 mg at 03/17/22 0807  •  diphenhydrAMINE (BENADRYL) capsule 50 mg, 50 mg, Oral, Nightly PRN, AlsoRosibel simpson APRN, 50 mg at 03/16/22 2106  •  docusate sodium (COLACE) capsule 100 mg, 100 mg, Oral, BID PRN, Alsop Rosibel L, APRN, 100 mg at 03/11/22 0639  •  estradiol (ESTRACE) tablet 3 mg, 3 mg, Oral, Nightly, Laure Mei APRN, 3 mg at 03/16/22 2106  •  ferrous sulfate EC tablet 324 mg, 324 mg, Oral, Daily With Breakfast, Laure Mei APRN, 324 mg at 03/17/22 0807  •  folic acid (FOLVITE) tablet 1 mg, 1 mg, Oral, Daily, Deonte Hartley MD, 1 mg at 03/17/22 0807  •  gabapentin (NEURONTIN) capsule 400 mg, 400 mg, Oral, Q8H, Laure Mei APRN, 400 mg at 03/17/22 0608  •  guaiFENesin (MUCINEX) 12 hr tablet 600 mg, 600 mg, Oral, Q12H, Lilliana Del Rosario APRN, 600 mg at 03/17/22 0807  •  HYDROcodone-acetaminophen (NORCO)  MG per tablet 1 tablet, 1 tablet, Oral, Q6H PRN, Laure Mei APRN, 1 tablet at 03/17/22 0608  •  ipratropium-albuterol (DUO-NEB) nebulizer solution 3 mL, 3 mL, Nebulization, Q4H PRN, Zeina Lehman MD  •  isosorbide mononitrate (IMDUR) 24 hr tablet 30 mg, 30 mg, Oral, Q24H, Laure Mei APRN, 30 mg at 03/16/22 1313  •  labetalol (NORMODYNE,TRANDATE) injection 10 mg, 10 mg, Intravenous, Q6H PRN, Laure Mei APRN, 10 mg at 03/15/22 0253  •   levothyroxine (SYNTHROID, LEVOTHROID) tablet 88 mcg, 88 mcg, Oral, Q AM, Laure Mei APRN, 88 mcg at 03/17/22 0608  •  loperamide (IMODIUM) capsule 2 mg, 2 mg, Oral, 4x Daily PRN, Sky Reeder MD, 2 mg at 03/10/22 1141  •  LORazepam (ATIVAN) tablet 1 mg, 1 mg, Oral, Q6H PRN, Laure Mei APRN, 1 mg at 03/17/22 0608  •  Magnesium Sulfate 2 gram Bolus, followed by 8 gram infusion (total Mg dose 10 grams)- Mg less than or equal to 1mg/dL, 2 g, Intravenous, PRN **OR** Magnesium Sulfate 2 gram / 50mL Infusion (GIVE X 3 BAGS TO EQUAL 6GM TOTAL DOSE) - Mg 1.1 - 1.5 mg/dl, 2 g, Intravenous, PRN, Last Rate: 25 mL/hr at 03/10/22 1815, 2 g at 03/10/22 1815 **OR** Magnesium Sulfate 4 gram infusion- Mg 1.6-1.9 mg/dL, 4 g, Intravenous, PRN, Alix Brenardo APRN  •  methocarbamol (ROBAXIN) tablet 500 mg, 500 mg, Oral, TID PRN, Laure Mei APRN, 500 mg at 03/16/22 2109  •  metoprolol succinate XL (TOPROL-XL) 24 hr tablet 25 mg, 25 mg, Oral, Daily, Alix Bernardo APRN, 25 mg at 03/17/22 0808  •  nitroglycerin (NITROSTAT) SL tablet 0.4 mg, 0.4 mg, Sublingual, Q5 Min PRN, Laure Mei APRN  •  nystatin (MYCOSTATIN) 100,000 unit/mL suspension 500,000 Units, 5 mL, Swish & Swallow, 4x Daily, Andres Wheat MD, 500,000 Units at 03/15/22 1347  •  ondansetron (ZOFRAN) tablet 4 mg, 4 mg, Oral, Q6H PRN, 4 mg at 03/11/22 0259 **OR** ondansetron (ZOFRAN) injection 4 mg, 4 mg, Intravenous, Q6H PRN, Laure Mei APRN, 4 mg at 03/17/22 0610  •  oxymetazoline (AFRIN) nasal spray 2 spray, 2 spray, Each Nare, BID, Zeina Lehman MD, 2 spray at 03/17/22 0808  •  pantoprazole (PROTONIX) EC tablet 40 mg, 40 mg, Oral, Q AM, Laure Mei APRN, 40 mg at 03/17/22 0608  •  phenylephrine-mineral oil-petrolatum (PREPARATION H) 0.25-14-74.9 % hemorhoidal ointment, , Rectal, TID PRN, Alsop, RADHA Del Rosario, Given at 03/09/22 0408  •  predniSONE (DELTASONE) tablet 20 mg, 20 mg,  Oral, Daily, Andres Wheat MD, 20 mg at 03/17/22 0807  •  ranolazine (RANEXA) 12 hr tablet 1,000 mg, 1,000 mg, Oral, Q12H, Laure Mei APRN, 1,000 mg at 03/16/22 0928  •  sertraline (ZOLOFT) tablet 100 mg, 100 mg, Oral, Daily, Laure Mei APRN, 100 mg at 03/17/22 0807  •  sodium chloride 0.9 % flush 10 mL, 10 mL, Intravenous, PRN, Laure Mei APRN  •  sodium chloride 0.9 % flush 10 mL, 10 mL, Intravenous, Q12H, Laure Mei APRN, 10 mL at 03/17/22 0808  •  sodium chloride 0.9 % flush 10 mL, 10 mL, Intravenous, PRN, Laure Mei APRN  •  vitamin B-12 (CYANOCOBALAMIN) tablet 1,000 mcg, 1,000 mcg, Oral, Daily, Deonte Hartley MD, 1,000 mcg at 03/17/22 0807    Data Review:  All labs (24hrs):   Recent Results (from the past 24 hour(s))   CBC Auto Differential    Collection Time: 03/17/22  2:03 AM    Specimen: Blood   Result Value Ref Range    WBC 13.40 (H) 3.40 - 10.80 10*3/mm3    RBC 3.06 (L) 3.77 - 5.28 10*6/mm3    Hemoglobin 9.9 (L) 12.0 - 15.9 g/dL    Hematocrit 30.0 (L) 34.0 - 46.6 %    MCV 98.0 (H) 79.0 - 97.0 fL    MCH 32.5 26.6 - 33.0 pg    MCHC 33.2 31.5 - 35.7 g/dL    RDW 12.7 12.3 - 15.4 %    RDW-SD 43.8 37.0 - 54.0 fl    MPV 8.2 6.0 - 12.0 fL    Platelets 340 140 - 450 10*3/mm3    Neutrophil % 70.3 42.7 - 76.0 %    Lymphocyte % 20.2 19.6 - 45.3 %    Monocyte % 7.5 5.0 - 12.0 %    Eosinophil % 0.9 0.3 - 6.2 %    Basophil % 1.1 0.0 - 1.5 %    Neutrophils, Absolute 9.40 (H) 1.70 - 7.00 10*3/mm3    Lymphocytes, Absolute 2.70 0.70 - 3.10 10*3/mm3    Monocytes, Absolute 1.00 (H) 0.10 - 0.90 10*3/mm3    Eosinophils, Absolute 0.10 0.00 - 0.40 10*3/mm3    Basophils, Absolute 0.10 0.00 - 0.20 10*3/mm3    nRBC 0.0 0.0 - 0.2 /100 WBC        Imaging:  XR Chest 1 View  Narrative: EXAM: XR CHEST 1 VW-     DATE OF EXAM: 3/16/2022 1:52 AM     INDICATION: Shortness of breath; J18.9-Pneumonia, unspecified organism;  R06.02-Shortness of breath;  I50.9-Heart failure, unspecified;  D63.8-Anemia in other chronic diseases classified elsewhere;  I42.0-Dilated cardiomyopathy.       COMPARISONS: 03/15/2022      FINDINGS:     Ill-defined airspace opacities in the lower lobes are unchanged.     Overall stable appearance of the chest. No pneumothorax or pleural  effusion. Mediastinal shadows are unchanged.     Impression:    Stable chest over the past 24 hours, unchanged bilateral lower lobe  pneumonia.     Pulmonary nodule left upper lobe redemonstrated, unchanged measuring 1.4  cm. Follow-up to ensure resolution is recommended.     Electronically Signed By-Joaquín Hernandes On:3/16/2022 7:51 AM  This report was finalized on [id number] by  Joaquín Hernandes, .       ASSESSMENT:    Acute on chronic hypoxia with hypercapnia- wears 3-4 L at home  Pneumonia of both lungs due to infectious organism, unspecified part of lung    Abdominal aortic aneurysm (AAA) (HCC)    Chronic obstructive pulmonary disease (HCC)    Congestive heart failure (HCC)    Coronary artery disease    Dyslipidemia    Hypertension    Anemia of chronic disease    Mixed hyperlipidemia    Bilateral carotid artery stenosis    Moderate malnutrition (CMS/HCC)       PLAN:  Might need appetite stimulant  Wean O2 down  Continue to wean down prednisone slowly  Bronchodilator  Inhaled corticosteroids  Electrolytes/ glycemic control  DVT and GI prophylaxis.    Total Critical care time in direct medical management (   ) minutes, This time specifically excludes time spent performing procedures.  Joanie Liu MD. D, ABSM.     3/17/2022  08:20 EDT

## 2022-03-17 NOTE — PROGRESS NOTES
[facility] Medicine Services Daily Progress Note    Patient Name: Gayathri Reddy  : 1945  MRN: [id number]  Primary Care Physician:  Deonte Hector MD  Date of admission: 3/6/2022      Subjective      Chief Complaint: Shortness of breath      Patient Reports     3/17/2022: The patient reports continuing to improve with decrease in her productive cough and shortness of breath.  She reports diarrhea that is not improved with the cholestyramine so Imodium was encouraged (it had been available as needed and she did not realize it).  The patient was anxious to go home but her creatinine worsened to 2.24 so Dr. Yi wanted her to stay.      ROS   12 point review of systems was reviewed and patient reports continued improvement of her shortness of air with cough.  She denies other complaints.      Objective      Vitals:   Temp:  [97.4 °F (36.3 °C)-98.3 °F (36.8 °C)] 98.1 °F (36.7 °C)  Heart Rate:  [76-97] 78  Resp:  [16-23] 16  BP: ()/(51-74) 105/74  Flow (L/min):  [1.5-3] 3    Physical Exam   Vital signs and nurses notes reviewed.  Thin elderly female sitting up in bed comfortable on nasal cannul in no acute distress; sclera anicteric, mucous membranes moist;  lungs with diffuse crackles but no wheezes with decreased air entry anteriorly and posteriorly bilaterally; CV regular rate and rhythm; abdomen soft nontender nondistended; extremities with no edema or calf tenderness; no cyanosis; no Hull catheter.  The patient's skin is loose with protuberant bony prominences of the skull trunk and extremities suggestive of subcutaneous muscle and fat loss.  Exam unchanged from 3/16/2022.      Result Review    Result Review:  I have personally reviewed the results from the time of this admission to 3/17/2022 17:51 EDT and agree with these findings:  [x]  Laboratory  [x]  Microbiology  [x]  Radiology  [x]  EKG/Telemetry   [x]  Cardiology/Vascular   []  Pathology  [x]  Old records  []   Other:  Most notable findings discussed in the assessment and plan.          Assessment/Plan      Brief Patient Summary:  Gayathri Reddy is a 76 y.o. female with PMHx of HTN, HLD, hypothyroidism, GERD, CAD, AAA, CHF, COPD with chronic hypoxic respiratory failure (3L NC chronically) who presented to the emergency department complaining of a 3-day history of increasing shortness of breath especially with exertion and productive cough.  In the emergency department troponin was normal and proBNP 22890.  Hemoglobin was 8.5.  CXR suggestive of multi-focal pneumonia.  Respiratory panel negative for covid or other virus. Patient started on abx, lasix, and admitted.     3/7/2022; still complaining of shortness of breath with minimal exertion, T-max of 36.6, vital signs are stable currently on 3 L of nasal cannula.  Spoke with RN.  Serum creatinine bumped up to 1.27 prerenal NOE consult nephrology, proBNP 69678, will consult cardiology.  3/8/2022; patient walked about 200 feet without getting hypoxia on supplemental oxygen but still increased work of breathing, otherwise hemodynamically stable.  3/9/2022; past patient still endorses shortness of breath with minimal exertion, has been endorsing diarrhea for the last 2 or 3 days, will check stool for C. difficile, T-max of 100.7, vital stable currently on 1 L of nasal cannula.  3/10/2022; C. difficile came back negative, started on Imodium as needed, hypoalbuminemia, check prealbumin, had some overnight paroxysmal atrial tachycardia beta-blocker dose increased, spironolactone has been added by nephrology.  3/11/2022; diarrhea has resolved apparently patient did not get spironolactone yesterday, as order was not placed, chest x-ray PA and lateral still with features of multifocal pneumonia in the lungs with improved aeration compared to 3/6/2022 pulmonology cardiology and nephrology following.  3/12  hgb 7.4 wo bleeding   Slept well  Sat hgood  Notes reviewed  Labs  reviewed  [facility] nephrology  Separate folic and b12 replacement  May need iv fe  Sore tongue.. empiriic nystatinS/S  3/13  She has been feeling short of breath more than usual today  We will check x-ray chest  Her hemoglobin actually better.  3/14/2022: Patient reports continuing to feel poorly with shortness of air and cough productive of clear and sometimes yellow phlegm.  3/15/2022: The patient continues to report a cough and shortness of air but her oxygen saturations are adequate on her usual home O2 of 3 to 4 L/min.  She was counseled on her chest x-ray results.  3/16/2022: Patient reports feeling better today.  Unfortunately her renal function worsened so Dr. Yi wants her to stay.  Dr. Liu pulmonary said the patient was doing better and could discharge today from his standpoint.        Current inpatient medications include:  albuterol sulfate HFA, 2 puff, Inhalation, BID - RT  vitamin C, 250 mg, Oral, Daily  aspirin, 81 mg, Oral, Daily  atorvastatin, 80 mg, Oral, Nightly  cholecalciferol, 500 Units, Oral, Daily  cholestyramine light, 1 packet, Oral, Q12H  clopidogrel, 75 mg, Oral, Daily  estradiol, 3 mg, Oral, Nightly  ferrous sulfate, 324 mg, Oral, Daily With Breakfast  folic acid, 1 mg, Oral, Daily  gabapentin, 400 mg, Oral, Q8H  guaiFENesin, 600 mg, Oral, Q12H  isosorbide mononitrate, 30 mg, Oral, Q24H  levothyroxine, 88 mcg, Oral, Q AM  metoprolol succinate XL, 25 mg, Oral, Daily  nystatin, 5 mL, Swish & Swallow, 4x Daily  oxymetazoline, 2 spray, Each Nare, BID  pantoprazole, 40 mg, Oral, Q AM  predniSONE, 20 mg, Oral, Daily  ranolazine, 1,000 mg, Oral, Q12H  sertraline, 100 mg, Oral, Daily  sodium chloride, 10 mL, Intravenous, Q12H  vitamin B-12, 1,000 mcg, Oral, Daily             Active Hospital Problems:  Active Hospital Problems    Diagnosis    • **Pneumonia of both lungs due to infectious organism, unspecified part of lung    • Hypothyroidism    • Vitamin B12 deficiency    • Folic acid  deficiency    • Moderate malnutrition (CMS/HCC)    • Cardiomyopathy, dilated (HCC)    • Ischemic cardiomyopathy    • Mixed hyperlipidemia    • Chronic stable angina (HCC)    • Anemia of chronic disease    • Diarrhea      Likely secondary to antibiotics     • Chronic obstructive pulmonary disease (HCC)    • Congestive heart failure (HCC)    • Coronary artery disease    • Dyslipidemia    • Essential hypertension    • Gastroesophageal reflux disease    • Iron deficiency anemia    • Abdominal aortic aneurysm (AAA) (HCC)    • Bilateral carotid artery stenosis      Plan:   Acute on chronic hypoxic and hypercapnic respiratory failure  Chronic obstructive pulmonary disease  Multifocal pneumonia  -Patient initially treated with Unasyn but had an allergic reaction which resolved with Decadron and Benadryl  -Procalcitonin normal  -Urine antigens negative  -Respiratory panel negative  -Pulmonary is consulting  -Patient completed a course of Rocephin and Flagyl  -Continue short acting bronchodilators and prednisone    Acute on chronic systolic and diastolic congestive heart failure due to ischemic cardiomyopathy and hypertensive heart disease  -Cardiology consulting  -Nephrology managing diuresis  -No ACE or ARB due to renal disease    Coronary artery disease status post CABG  -with attrition 3/4 grafts  -ischemic cardiomyopathy most recent ejection 45% April 2021  -Continue home aspirin, statin, Toprol, imdur and Ranexa     Paroxysmal atrial tachycardia  -Resolved with increase in metoprolol    Essential hypertension, chronic and controlled  -Lisinopril discontinued due to acute renal failure  -Continue metoprolol    Hyperlipidemia  -Continue atorvastatin    Acute renal failure secondary to prerenal azotemia due to diuretics  -Baseline creatinine 0.8  -Creatinine increased to 1.35 with diuresis  -creatinine significantly worsened to 1.67 on 3/15/2022  -Creatinine 2.11 on 3/16/2022  -Nephrology following and managing and has  stopped Lasix and given a small fluid bolus on 3/16/2022    Hyperkalemia, resolved  -Improved with holding oral potassium supplement and spironolactone    Moderate malnutrition  -Nutritional supplements being given    GERD  -Continue PPI    Hypothyroidism  -Continue levothyroxine    Iron, B12 and folic acid deficiency anemia  -Continue replacement  -Hemoglobin 8.5 on admission and is now 9.3    Nutritional supplements  -Continue vitamin C and vitamin D    DVT prophylaxis:  No DVT prophylaxis order currently exists.    CODE STATUS:    Code Status (Patient has no pulse and is not breathing): CPR (Attempt to Resuscitate)  Medical Interventions (Patient has pulse or is breathing): Full Support      Disposition:  I expect patient to be discharged tomorrow if renal function improves.    This patient has been examined wearing appropriate Personal Protective Equipment and discussed with hospital infection control department. 03/17/22      Electronically signed by Zeina Lehman MD, 03/17/22, 17:51 EDT.  [facility] Hospitalist Team

## 2022-03-17 NOTE — PROGRESS NOTES
"[facility] PROGRESS NOTE     LOS: 11 days    Patient Care Team:  Deonte Hector MD as PCP - General      Subjective     Patient was seen and examined this morning  Patient had episode of dizziness and orthostatic hypotension yesterday so fluid bolus was given  Still has diarrhea    Objective     Vital Sign Min/Max for last 24 hours  Temp:  [97.4 °F (36.3 °C)-98.3 °F (36.8 °C)] 97.9 °F (36.6 °C)  Heart Rate:  [76-97] 76  Resp:  [16-23] 18  BP: ()/(38-73) 118/62                       Flowsheet Rows    Flowsheet Row First Filed Value   Admission Height 165.1 cm (65\") Documented at 03/06/2022 1049   Admission Weight 59.9 kg (132 lb) Documented at 03/06/2022 1049          No intake/output data recorded.  I/O last 3 completed shifts:  In: 1450 [P.O.:1200; IV Piggyback:250]  Out: -     Physical Exam:  Physical Exam    General Appearance: Chronically ill-appearing, NAD  Skin: warm and dry  HEENT: oral mucosa normal, nonicteric sclera  Neck: supple, no JVD  Lungs: Decreased breath sounds at bases.    Heart: RRR, normal S1 and S2  Abdomen: soft, nontender, nondistended  : no palpable bladder  Extremities: no edema, cyanosis or clubbing  Neuro: normal speech and mental status       LABS:  Lab Results   Component Value Date    CALCIUM 9.5 03/17/2022    PHOS 4.7 (H) 03/17/2022     Results from last 7 days   Lab Units 03/17/22  0730 03/17/22  0203 03/16/22  0337 03/15/22  0858 03/14/22  2243 03/14/22  0940 03/14/22  0100 03/12/22  1301 03/12/22  0159 03/10/22  2338   MAGNESIUM mg/dL  --   --   --   --   --   --  1.7  --  1.9 2.9*   SODIUM mmol/L 136  --  135* 135*  --    < > 137   < > 138 136   POTASSIUM mmol/L 4.0  --  4.9 4.4  --    < > 5.8*   < > 3.7 3.3*   CHLORIDE mmol/L 96*  --  94* 96*  --    < > 101   < > 102 98   CO2 mmol/L 24.0  --  28.0 25.0  --    < > 23.0   < > 25.0 28.0   BUN mg/dL 54*  --  43* 36*  --    < > 28*   < > 20 18   CREATININE mg/dL 2.24*  --  2.11* 1.67*  --    < > 1.26*   < > 1.35* " 1.22*   GLUCOSE mg/dL 129*  --  109* 212*  --    < > 154*   < > 112* 166*   CALCIUM mg/dL 9.5  --  9.8 9.4  --    < > 9.2   < > 8.4* 7.9*   WBC 10*3/mm3  --  13.40* 13.10*  --  12.50*  --  13.30*   < > 12.30* 13.70*   HEMOGLOBIN g/dL  --  9.9* 10.3*  --  9.3*  --  8.3*   < > 7.4* 8.2*   PLATELETS 10*3/mm3  --  340 343  --  329  --  290   < > 204 228    < > = values in this interval not displayed.     Lab Results   Component Value Date    TROPONINI 8.820 (C) 01/07/2021    TROPONINT <0.010 03/06/2022     Estimated Creatinine Clearance: 18.4 mL/min (A) (by C-G formula based on SCr of 2.24 mg/dL (H)).      Brief Urine Lab Results     None        WEIGHTS:     Wt Readings from Last 1 Encounters:   03/16/22 1916 54.6 kg (120 lb 6.4 oz)   03/15/22 0600 56.3 kg (124 lb 3.2 oz)   03/14/22 0457 57.2 kg (126 lb 1.7 oz)   03/13/22 0600 59.1 kg (130 lb 2.9 oz)   03/13/22 0324 59.1 kg (130 lb 3.2 oz)   03/10/22 0556 57.6 kg (126 lb 14.4 oz)   03/09/22 0430 57.5 kg (126 lb 12.2 oz)   03/06/22 1049 59.9 kg (132 lb)       albuterol sulfate HFA, 2 puff, Inhalation, BID - RT  vitamin C, 250 mg, Oral, Daily  aspirin, 81 mg, Oral, Daily  atorvastatin, 80 mg, Oral, Nightly  cholecalciferol, 500 Units, Oral, Daily  cholestyramine light, 1 packet, Oral, Q12H  clopidogrel, 75 mg, Oral, Daily  estradiol, 3 mg, Oral, Nightly  ferrous sulfate, 324 mg, Oral, Daily With Breakfast  folic acid, 1 mg, Oral, Daily  gabapentin, 400 mg, Oral, Q8H  guaiFENesin, 600 mg, Oral, Q12H  isosorbide mononitrate, 30 mg, Oral, Q24H  levothyroxine, 88 mcg, Oral, Q AM  metoprolol succinate XL, 25 mg, Oral, Daily  nystatin, 5 mL, Swish & Swallow, 4x Daily  oxymetazoline, 2 spray, Each Nare, BID  pantoprazole, 40 mg, Oral, Q AM  predniSONE, 20 mg, Oral, Daily  ranolazine, 1,000 mg, Oral, Q12H  sertraline, 100 mg, Oral, Daily  sodium chloride, 10 mL, Intravenous, Q12H  vitamin B-12, 1,000 mcg, Oral, Daily           Assessment/Plan       1.Acute kidney injury.   Mild  Creatinine increased to 2.24 Mg/DL due to diuresis and diarrhea  Electrolytes okay.  Volume status improved  2.  Hypertension  Blood Pressure well controlled  3.    Acute on chronic respiratory failure.  Pneumonia with underlying COPD  4.  Congestive heart failure.  Systolic.  Chronic  5.  Hyperkalemia. Improved     Recs:  Keep Lasix on hold for now  Will reevaluate in morning and start Lasix at lower dose if renal function starts improving      Irving Yi MD  03/17/22  10:46 EDT

## 2022-03-18 ENCOUNTER — HOME HEALTH ADMISSION (OUTPATIENT)
Dept: HOME HEALTH SERVICES | Facility: HOME HEALTHCARE | Age: 77
End: 2022-03-18

## 2022-03-18 VITALS
SYSTOLIC BLOOD PRESSURE: 130 MMHG | HEART RATE: 75 BPM | RESPIRATION RATE: 18 BRPM | BODY MASS INDEX: 20.47 KG/M2 | DIASTOLIC BLOOD PRESSURE: 66 MMHG | WEIGHT: 122.84 LBS | TEMPERATURE: 97.8 F | HEIGHT: 65 IN | OXYGEN SATURATION: 95 %

## 2022-03-18 PROBLEM — J96.21 ACUTE ON CHRONIC RESPIRATORY FAILURE WITH HYPOXIA AND HYPERCAPNIA (HCC): Status: ACTIVE | Noted: 2022-03-18

## 2022-03-18 PROBLEM — I50.43 ACUTE ON CHRONIC COMBINED SYSTOLIC AND DIASTOLIC CHF (CONGESTIVE HEART FAILURE) (HCC): Status: ACTIVE | Noted: 2022-03-18

## 2022-03-18 PROBLEM — F41.9 ANXIETY AND DEPRESSION: Status: ACTIVE | Noted: 2022-03-18

## 2022-03-18 PROBLEM — J96.22 ACUTE ON CHRONIC RESPIRATORY FAILURE WITH HYPOXIA AND HYPERCAPNIA (HCC): Status: ACTIVE | Noted: 2022-03-18

## 2022-03-18 PROBLEM — N95.1 HOT FLASHES DUE TO MENOPAUSE: Status: ACTIVE | Noted: 2022-03-18

## 2022-03-18 PROBLEM — F32.A ANXIETY AND DEPRESSION: Status: ACTIVE | Noted: 2022-03-18

## 2022-03-18 LAB
ALBUMIN SERPL-MCNC: 3.6 G/DL (ref 3.5–5.2)
ANION GAP SERPL CALCULATED.3IONS-SCNC: 15 MMOL/L (ref 5–15)
BASOPHILS # BLD AUTO: 0.1 10*3/MM3 (ref 0–0.2)
BASOPHILS NFR BLD AUTO: 1.2 % (ref 0–1.5)
BUN SERPL-MCNC: 53 MG/DL (ref 8–23)
BUN/CREAT SERPL: 23.2 (ref 7–25)
CALCIUM SPEC-SCNC: 9.5 MG/DL (ref 8.6–10.5)
CHLORIDE SERPL-SCNC: 97 MMOL/L (ref 98–107)
CO2 SERPL-SCNC: 23 MMOL/L (ref 22–29)
CREAT SERPL-MCNC: 2.28 MG/DL (ref 0.57–1)
DEPRECATED RDW RBC AUTO: 44.2 FL (ref 37–54)
EGFRCR SERPLBLD CKD-EPI 2021: 21.8 ML/MIN/1.73
EOSINOPHIL # BLD AUTO: 0.1 10*3/MM3 (ref 0–0.4)
EOSINOPHIL NFR BLD AUTO: 1.2 % (ref 0.3–6.2)
ERYTHROCYTE [DISTWIDTH] IN BLOOD BY AUTOMATED COUNT: 12.9 % (ref 12.3–15.4)
GLUCOSE SERPL-MCNC: 124 MG/DL (ref 65–99)
HCT VFR BLD AUTO: 29.1 % (ref 34–46.6)
HGB BLD-MCNC: 9.7 G/DL (ref 12–15.9)
LYMPHOCYTES # BLD AUTO: 2.3 10*3/MM3 (ref 0.7–3.1)
LYMPHOCYTES NFR BLD AUTO: 18.6 % (ref 19.6–45.3)
MCH RBC QN AUTO: 32.4 PG (ref 26.6–33)
MCHC RBC AUTO-ENTMCNC: 33.2 G/DL (ref 31.5–35.7)
MCV RBC AUTO: 97.6 FL (ref 79–97)
MONOCYTES # BLD AUTO: 0.9 10*3/MM3 (ref 0.1–0.9)
MONOCYTES NFR BLD AUTO: 7.6 % (ref 5–12)
NEUTROPHILS NFR BLD AUTO: 71.4 % (ref 42.7–76)
NEUTROPHILS NFR BLD AUTO: 8.6 10*3/MM3 (ref 1.7–7)
NRBC BLD AUTO-RTO: 0.1 /100 WBC (ref 0–0.2)
PHOSPHATE SERPL-MCNC: 4.3 MG/DL (ref 2.5–4.5)
PLATELET # BLD AUTO: 301 10*3/MM3 (ref 140–450)
PMV BLD AUTO: 8.1 FL (ref 6–12)
POTASSIUM SERPL-SCNC: 4.2 MMOL/L (ref 3.5–5.2)
RBC # BLD AUTO: 2.99 10*6/MM3 (ref 3.77–5.28)
SODIUM SERPL-SCNC: 135 MMOL/L (ref 136–145)
TSH SERPL DL<=0.05 MIU/L-ACNC: 0.9 UIU/ML (ref 0.27–4.2)
WBC NRBC COR # BLD: 12.1 10*3/MM3 (ref 3.4–10.8)

## 2022-03-18 PROCEDURE — 99239 HOSP IP/OBS DSCHRG MGMT >30: CPT | Performed by: HOSPITALIST

## 2022-03-18 PROCEDURE — 94640 AIRWAY INHALATION TREATMENT: CPT

## 2022-03-18 PROCEDURE — 85025 COMPLETE CBC W/AUTO DIFF WBC: CPT | Performed by: HOSPITALIST

## 2022-03-18 PROCEDURE — 80069 RENAL FUNCTION PANEL: CPT | Performed by: INTERNAL MEDICINE

## 2022-03-18 PROCEDURE — 63710000001 PREDNISONE PER 1 MG: Performed by: INTERNAL MEDICINE

## 2022-03-18 PROCEDURE — 94799 UNLISTED PULMONARY SVC/PX: CPT

## 2022-03-18 RX ORDER — SPIRONOLACTONE 25 MG/1
25 TABLET ORAL DAILY
Qty: 30 TABLET | Refills: 0 | Status: SHIPPED | OUTPATIENT
Start: 2022-03-18

## 2022-03-18 RX ORDER — ATORVASTATIN CALCIUM 80 MG/1
80 TABLET, FILM COATED ORAL NIGHTLY
Qty: 30 TABLET | Refills: 0 | Status: SHIPPED | OUTPATIENT
Start: 2022-03-18

## 2022-03-18 RX ORDER — PREDNISONE 1 MG/1
TABLET ORAL
Qty: 24 TABLET | Refills: 0 | Status: SHIPPED | OUTPATIENT
Start: 2022-03-18

## 2022-03-18 RX ORDER — ALBUTEROL SULFATE 90 UG/1
AEROSOL, METERED RESPIRATORY (INHALATION)
Qty: 18 G | Refills: 0 | Status: SHIPPED | OUTPATIENT
Start: 2022-03-18 | End: 2022-03-18 | Stop reason: HOSPADM

## 2022-03-18 RX ORDER — GUAIFENESIN 600 MG/1
600 TABLET, EXTENDED RELEASE ORAL EVERY 12 HOURS SCHEDULED
Qty: 20 TABLET | Refills: 0 | Status: SHIPPED | OUTPATIENT
Start: 2022-03-18 | End: 2022-03-28

## 2022-03-18 RX ORDER — FOLIC ACID 1 MG/1
1 TABLET ORAL DAILY
Qty: 30 TABLET | Refills: 0 | Status: SHIPPED | OUTPATIENT
Start: 2022-03-19

## 2022-03-18 RX ORDER — FUROSEMIDE 20 MG/1
40 TABLET ORAL DAILY
Qty: 30 TABLET | Refills: 0 | Status: SHIPPED | OUTPATIENT
Start: 2022-03-18

## 2022-03-18 RX ORDER — IPRATROPIUM BROMIDE AND ALBUTEROL SULFATE 2.5; .5 MG/3ML; MG/3ML
3 SOLUTION RESPIRATORY (INHALATION) EVERY 4 HOURS PRN
Qty: 360 ML
Start: 2022-03-18

## 2022-03-18 RX ORDER — LOPERAMIDE HYDROCHLORIDE 2 MG/1
2 CAPSULE ORAL 4 TIMES DAILY PRN
Start: 2022-03-18

## 2022-03-18 RX ORDER — ALBUTEROL SULFATE 90 UG/1
2 AEROSOL, METERED RESPIRATORY (INHALATION) EVERY 4 HOURS PRN
Qty: 18 G | Refills: 0 | Status: SHIPPED | OUTPATIENT
Start: 2022-03-18

## 2022-03-18 RX ADMIN — LEVOTHYROXINE SODIUM 88 MCG: 0.09 TABLET ORAL at 06:14

## 2022-03-18 RX ADMIN — GABAPENTIN 400 MG: 400 CAPSULE ORAL at 06:14

## 2022-03-18 RX ADMIN — PANTOPRAZOLE SODIUM 40 MG: 40 TABLET, DELAYED RELEASE ORAL at 06:14

## 2022-03-18 RX ADMIN — LOPERAMIDE HYDROCHLORIDE 2 MG: 2 CAPSULE ORAL at 03:38

## 2022-03-18 RX ADMIN — CLOPIDOGREL BISULFATE 75 MG: 75 TABLET, FILM COATED ORAL at 08:41

## 2022-03-18 RX ADMIN — CYANOCOBALAMIN TAB 1000 MCG 1000 MCG: 1000 TAB at 08:41

## 2022-03-18 RX ADMIN — RANOLAZINE 1000 MG: 500 TABLET, FILM COATED, EXTENDED RELEASE ORAL at 08:42

## 2022-03-18 RX ADMIN — GUAIFENESIN 600 MG: 600 TABLET, EXTENDED RELEASE ORAL at 08:40

## 2022-03-18 RX ADMIN — FERROUS SULFATE TAB EC 324 MG (65 MG FE EQUIVALENT) 324 MG: 324 (65 FE) TABLET DELAYED RESPONSE at 08:41

## 2022-03-18 RX ADMIN — Medication 10 ML: at 08:42

## 2022-03-18 RX ADMIN — Medication 500 UNITS: at 08:40

## 2022-03-18 RX ADMIN — PREDNISONE 20 MG: 20 TABLET ORAL at 08:40

## 2022-03-18 RX ADMIN — FOLIC ACID 1 MG: 1 TABLET ORAL at 08:40

## 2022-03-18 RX ADMIN — LORAZEPAM 1 MG: 1 TABLET ORAL at 03:40

## 2022-03-18 RX ADMIN — ASPIRIN 81 MG: 81 TABLET, COATED ORAL at 08:40

## 2022-03-18 RX ADMIN — LORAZEPAM 1 MG: 1 TABLET ORAL at 12:36

## 2022-03-18 RX ADMIN — ISOSORBIDE MONONITRATE 30 MG: 30 TABLET, EXTENDED RELEASE ORAL at 12:36

## 2022-03-18 RX ADMIN — HYDROCODONE BITARTRATE AND ACETAMINOPHEN 1 TABLET: 10; 325 TABLET ORAL at 08:48

## 2022-03-18 RX ADMIN — METOPROLOL SUCCINATE 25 MG: 25 TABLET, EXTENDED RELEASE ORAL at 08:40

## 2022-03-18 RX ADMIN — ALBUTEROL SULFATE 2 PUFF: 108 INHALANT RESPIRATORY (INHALATION) at 08:04

## 2022-03-18 RX ADMIN — OXYCODONE HYDROCHLORIDE AND ACETAMINOPHEN 250 MG: 500 TABLET ORAL at 08:40

## 2022-03-18 RX ADMIN — SERTRALINE 100 MG: 100 TABLET, FILM COATED ORAL at 08:41

## 2022-03-18 RX ADMIN — GABAPENTIN 400 MG: 400 CAPSULE ORAL at 14:56

## 2022-03-18 NOTE — DISCHARGE SUMMARY
[facility] Medicine Services  DISCHARGE SUMMARY    Patient Name: Gayathri Reddy  : 1945  MRN: [id number]    Date of Admission: 3/6/2022  Discharge Diagnosis: Pneumonia of both lungs due to probable gram-negative bacterial infection  Date of Discharge: 3/18/2022  Primary Care Physician: Deonte Hector MD      Presenting Problem:   Shortness of breath [R06.02]  Pneumonia of both lungs due to infectious organism, unspecified part of lung [J18.9]  Acute on chronic congestive heart failure, unspecified heart failure type (HCC) [I50.9]    Active and Resolved Hospital Problems:  Active Hospital Problems    Diagnosis POA   • **Pneumonia of both lungs due to infectious organism, unspecified part of lung [J18.9] Yes     Priority: High   • Acute on chronic combined systolic and diastolic CHF (congestive heart failure) (HCC) [I50.43] Yes     Priority: High   • Acute on chronic respiratory failure with hypoxia and hypercapnia (HCC) [J96.21, J96.22] Yes     Priority: High   • Hot flashes due to menopause [N95.1] Yes   • Anxiety and depression [F41.9, F32.A] Yes   • Hypothyroidism [E03.9] Yes   • Vitamin B12 deficiency [E53.8] Yes   • Folic acid deficiency [E53.8] Yes   • Moderate malnutrition (CMS/HCC) [E44.0] Yes   • Cardiomyopathy, dilated (HCC) [I42.0] Yes   • Ischemic cardiomyopathy [I25.5] Yes   • Mixed hyperlipidemia [E78.2] Yes   • Chronic stable angina (HCC) [I20.8] Yes   • Anemia of chronic disease [D63.8] Yes   • Diarrhea [R19.7] Yes   • Chronic obstructive pulmonary disease (HCC) [J44.9] Yes   • Coronary artery disease [I25.10] Yes   • Essential hypertension [I10] Yes   • Gastroesophageal reflux disease [K21.9] Yes   • Iron deficiency anemia [D50.9] Yes   • Abdominal aortic aneurysm (AAA) (HCC) [I71.4] Yes   • Bilateral carotid artery stenosis [I65.23] Yes      Resolved Hospital Problems   No resolved problems to display.     Acute on chronic hypoxic and hypercapnic  respiratory failure  Chronic obstructive pulmonary disease  Multifocal pneumonia likely secondary to gram-negative bacterial infection  Concern for aspiration pneumonia  -Patient initially treated with Unasyn but had an allergic reaction which resolved with Decadron and Benadryl  -Procalcitonin normal  -Urine antigens negative  -Respiratory panel negative  -Pulmonary is consulting  -Patient completed a course of Rocephin and Flagyl  -Continue short acting bronchodilators and slow prednisone taper  -Patient is weaned down to 3 L/min nasal cannula O2 which is her baseline     Acute on chronic systolic and diastolic congestive heart failure due to ischemic cardiomyopathy and hypertensive heart disease  -Cardiology consulting  -Nephrology managed diuresis and recommends 40 mg of Lasix p.o. once daily, and 25 mg of Aldactone p.o. once daily at discharge  -No ACE or ARB due to renal disease  -Needs follow-up BMP in 1 week     Coronary artery disease status post CABG  -with attrition 3/4 grafts  -ischemic cardiomyopathy most recent ejection 45% April 2021  -Continue home aspirin, statin, Toprol, imdur, Plavix and Ranexa      Paroxysmal atrial tachycardia  -Resolved with increase in metoprolol     Essential hypertension, chronic and controlled  -Lisinopril discontinued due to acute renal failure  -Continue metoprolol, Lasix and Aldactone     Hyperlipidemia  -Continue atorvastatin     Acute renal failure secondary to prerenal azotemia due to diuretics  -Baseline creatinine 0.8  -Creatinine increased to 1.35 with diuresis  -creatinine significantly worsened to 1.67 on 3/15/2022  -Creatinine 2.11 on 3/16/2022, 2.24 on 3/17/2022, and 2.28 on 3/18/2022  -Nephrology following and managing diuresis (stopped Lasix and gave a small fluid bolus on 3/16/2022)  -Nephrology okay with restarting Lasix 40 mg daily and Aldactone 25 mg daily at discharge and having patient follow-up with a BMP in 1 week     Hyperkalemia, resolved  -Improved  with holding oral potassium supplement and spironolactone     Moderate malnutrition  -Nutritional supplements being given     GERD  -Continue PPI    Antibiotic induced diarrhea and hemorrhoids  -C. difficile was negative  -Continue Imodium and Preparation H as needed     Hypothyroidism  -Continue levothyroxine     Iron, B12 and folic acid deficiency anemia  -Continue replacement  -Hemoglobin 8.5 on admission and is now 9.3     Nutritional supplements  -Continue vitamin C and vitamin D    Anxiety and depression, chronic  -Continue sertraline and lorazepam as needed    Hot flashes due to menopause  -Continue home estrogen    Oral candidiasis  -Nystatin oral suspension swish and swallow    Chronic pain  -Continue methocarbamol, gabapentin and hydrocodone as at home    Chronic insomnia  -Continue diphenhydramine at bedtime    Hospital Course     Hospital Course:  Gayathri Reddy is a 76 y.o. female with PMHx of HTN, HLD, hypothyroidism, GERD, CAD, AAA, CHF, COPD with chronic hypoxic respiratory failure (3L NC chronically) who presented to the emergency department complaining of a 3-day history of increasing shortness of breath especially with exertion and productive cough.  In the emergency department troponin was normal and proBNP 89444.  Hemoglobin was 8.5.  CXR suggestive of multi-focal pneumonia.  Respiratory panel negative for covid or other virus. Patient started on abx, lasix, and admitted.     3/7/2022; still complaining of shortness of breath with minimal exertion, T-max of 36.6, vital signs are stable currently on 3 L of nasal cannula.  Spoke with RN.  Serum creatinine bumped up to 1.27 prerenal NOE consult nephrology, proBNP 60175, will consult cardiology.  3/8/2022; patient walked about 200 feet without getting hypoxia on supplemental oxygen but still increased work of breathing, otherwise hemodynamically stable.  3/9/2022; past patient still endorses shortness of breath with minimal exertion, has been  endorsing diarrhea for the last 2 or 3 days, will check stool for C. difficile, T-max of 100.7, vital stable currently on 1 L of nasal cannula.  3/10/2022; C. difficile came back negative, started on Imodium as needed, hypoalbuminemia, check prealbumin, had some overnight paroxysmal atrial tachycardia beta-blocker dose increased, spironolactone has been added by nephrology.  3/11/2022; diarrhea has resolved apparently patient did not get spironolactone yesterday, as order was not placed, chest x-ray PA and lateral still with features of multifocal pneumonia in the lungs with improved aeration compared to 3/6/2022 pulmonology cardiology and nephrology following.  3/12  hgb 7.4 wo bleeding   Slept well  folic and b12 replacement  Sore tongue.. empiriic nystatinS/S  3/13  She has been feeling short of breath more than usual today  Her hemoglobin actually better  3/14/2022: Patient reports continuing to feel poorly with shortness of air and cough productive of clear and sometimes yellow phlegm.  3/15/2022: The patient continues to report a cough and shortness of air but her oxygen saturations are adequate on her usual home O2 of 3 to 4 L/min.  She was counseled on her chest x-ray results.  3/16/2022: Patient reports feeling better today.  Unfortunately her renal function worsened so Dr. Yi wants her to stay.  Dr. Liu pulmonary said the patient was doing better and could discharge today from his standpoint.  3/17/2022: The patient reports continuing to improve with decrease in her productive cough and shortness of breath.  She reports diarrhea that is not improved with the cholestyramine so Imodium was encouraged (it had been available as needed and she did not realize it).  The patient was anxious to go home but her creatinine worsened to 2.24 so Dr. Yi wanted her to stay.  3/18/2022: The patient reports continued improvement in her shortness of breath and cough.  She says the diarrhea has improved with Imodium.   She denies any melena or bright red blood per rectum.  I spoke with Dr. Yi and the patient's creatinine leveled off and she is now felt to be stable for discharge.        DISCHARGE Follow Up Recommendations for labs and diagnostics: BMP in 1 week and follow-up with Dr. Yi in 1 to 2 weeks.      Reasons For Change In Medications and Indications for New Medications:  Lasix 40 mg a day and Aldactone 25 mg a day for congestive heart failure.    Day of Discharge     Vital Signs:  Temp:  [97.6 °F (36.4 °C)-98.2 °F (36.8 °C)] 97.8 °F (36.6 °C)  Heart Rate:  [73-85] 75  Resp:  [16-20] 18  BP: (123-136)/(54-66) 130/66  Flow (L/min):  [3] 3    Physical Exam:  Physical Exam   Vital signs and nurses notes reviewed.  Thin elderly female sitting up in bed comfortable on nasal cannul in no acute distress; sclera anicteric, mucous membranes moist;  lungs with decreased air entry but no adventitial noises bilaterally; CV regular rate and rhythm; abdomen soft nontender nondistended; extremities with no edema or calf tenderness; no cyanosis; no Hull catheter.  The patient's skin is loose with protuberant bony prominences of the skull trunk and extremities suggestive of subcutaneous muscle and fat loss.  Exam unchanged from 3/17/2022.      Pertinent  and/or Most Recent Results     LAB RESULTS:      Lab 03/18/22  0150 03/17/22  0203 03/16/22  0337 03/14/22  2243 03/14/22  0100   WBC 12.10* 13.40* 13.10* 12.50* 13.30*   HEMOGLOBIN 9.7* 9.9* 10.3* 9.3* 8.3*   HEMATOCRIT 29.1* 30.0* 29.7* 27.6* 25.2*   PLATELETS 301 340 343 329 290   NEUTROS ABS 8.60* 9.40* 8.80* 9.90* 11.50*   LYMPHS ABS 2.30 2.70 2.90 1.60 1.10   MONOS ABS 0.90 1.00* 1.20* 0.80 0.60   EOS ABS 0.10 0.10 0.10 0.00 0.00   MCV 97.6* 98.0* 98.5* 98.4* 98.4*         Lab 03/18/22  0908 03/17/22  0730 03/16/22  0337 03/15/22  0858 03/14/22  0940 03/14/22  0100 03/13/22  0159 03/12/22  0159   SODIUM 135* 136 135* 135* 137 137   < > 138   POTASSIUM 4.2 4.0 4.9 4.4 5.1 5.8*   < >  3.7   CHLORIDE 97* 96* 94* 96* 100 101   < > 102   CO2 23.0 24.0 28.0 25.0 26.0 23.0   < > 25.0   ANION GAP 15.0 16.0* 13.0 14.0 11.0 13.0   < > 11.0   BUN 53* 54* 43* 36* 31* 28*   < > 20   CREATININE 2.28* 2.24* 2.11* 1.67* 1.35* 1.26*   < > 1.35*   EGFR 21.8* 22.2* 23.9* 31.6* 40.8* 44.3*   < > 40.8*   GLUCOSE 124* 129* 109* 212* 137* 154*   < > 112*   CALCIUM 9.5 9.5 9.8 9.4 9.4 9.2   < > 8.4*   MAGNESIUM  --   --   --   --   --  1.7  --  1.9   PHOSPHORUS 4.3 4.7* 5.7* 4.7*  --  3.6   < > 2.9   TSH  --  0.901  --   --   --   --   --   --     < > = values in this interval not displayed.         Lab 03/18/22  0908 03/17/22  0730 03/16/22  0337 03/15/22  0858 03/14/22  0100   ALBUMIN 3.60 3.50 3.50 3.30* 2.80*                     Brief Urine Lab Results     None        Microbiology Results (last 10 days)     Procedure Component Value - Date/Time    Clostridium Difficile Toxin - Stool, Per Rectum [[id number]]  (Normal) Collected: 03/09/22 1730    Lab Status: Final result Specimen: Stool from Per Rectum Updated: 03/10/22 0708    Narrative:      The following orders were created for panel order Clostridium Difficile Toxin - Stool, Per Rectum.  Procedure                               Abnormality         Status                     ---------                               -----------         ------                     Clostridium Difficile EI...[[id number]]  Normal              Final result                 Please view results for these tests on the individual orders.    Clostridium Difficile EIA - Stool, Per Rectum [[id number]]  (Normal) Collected: 03/09/22 1730    Lab Status: Final result Specimen: Stool from Per Rectum Updated: 03/10/22 0708     C Diff GDH / Toxin Negative          CT Chest Without Contrast Diagnostic    Result Date: 3/6/2022  Impression: 1. Multifocal groundglass opacities throughout the lungs most pronounced in the lower lobes which may relate to atypical infection or COVID-19 pneumonia. 2. Focal nodular  airspace disease in right upper lobe measuring 18 x 14 mm. There was previously a cluster of nodules in this region and this could relate to chronic inflammation secondary to prior infection or inflammation, however recommend short-term follow-up in 2-3 months or PET/CT to evaluate this nodule. 3. Additional chronic findings above.  Electronically Signed By-Nahid Cooley MD On:3/6/2022 6:29 PM This report was finalized on [id number] by  Nahid Cooley MD.    XR Chest 1 View    Result Date: 3/16/2022  Impression:  Stable chest over the past 24 hours, unchanged bilateral lower lobe pneumonia.  Pulmonary nodule left upper lobe redemonstrated, unchanged measuring 1.4 cm. Follow-up to ensure resolution is recommended.  Electronically Signed By-Joaquín Hernandes On:3/16/2022 7:51 AM This report was finalized on [id number] by  Joaquín Hernandes, .    XR Chest 1 View    Result Date: 3/15/2022  Impression:  Stable chest or the past 2 days with bibasilar pneumonia  Electronically Signed By-Joaquín Hernandes On:3/15/2022 8:13 AM This report was finalized on [id number] by  Joaquín Hernandes, .    XR Chest 1 View    Result Date: 3/13/2022  Impression: IMPRESSION : Bibasilar airspace disease or pronounced on the left than the right. Findings compatible with pneumonia.[  Electronically Signed By-Zander Yang On:3/13/2022 12:53 PM This report was finalized on [id number] by  Zander Yang, .    XR Chest 1 View    Result Date: 3/6/2022  Impression: Patchy multifocal airspace disease involving the lung bases concerning for pneumonia.  Electronically Signed By-Nahid Cooley MD On:3/6/2022 12:03 PM This report was finalized on [id number] by  Nahid Cooley MD.    XR Chest PA & Lateral    Result Date: 3/11/2022  Impression: Features of multifocal pneumonia in the lungs are again noted, with improved aeration compared to 3/6/2022.  Electronically Signed By-Miriam Templeton MD On:3/11/2022 9:56 AM This report was finalized on [id number] by   Miriam Templeton MD.              Results for orders placed during the hospital encounter of 04/16/21    Adult Transthoracic Echo Complete W/ Cont if Necessary Per Protocol    Interpretation Summary  · Estimated left ventricular EF was in agreement with the calculated left ventricular EF. Left ventricular ejection fraction appears to be 46 - 50%. Left ventricular systolic function is mildly decreased.  · Left ventricular wall thickness is consistent with mild to moderate septal asymmetric hypertrophy.  · Estimated right ventricular systolic pressure from tricuspid regurgitation is normal (<35 mmHg).  · Left ventricular diastolic function is consistent with (grade I) impaired relaxation.      Labs Pending at Discharge:      Procedures Performed           Consults:   Consults     Date and Time Order Name Status Description    3/7/2022  1:22 PM Inpatient Pulmonology Consult Completed     3/7/2022  7:40 AM Inpatient Nephrology Consult Completed     3/7/2022  7:39 AM Inpatient Cardiology Consult Completed     3/6/2022 12:42 PM Hospitalist (on-call MD unless specified)              Discharge Details        Discharge Medications      New Medications      Instructions Start Date   albuterol sulfate  (90 Base) MCG/ACT inhaler  Commonly known as: Proventil HFA   2 puffs, Inhalation, Every 4 Hours PRN      atorvastatin 80 MG tablet  Commonly known as: LIPITOR   80 mg, Oral, Nightly      cyanocobalamin 1000 MCG tablet  Commonly known as: VITAMIN B-12   1,000 mcg, Oral, Daily, Available over-the-counter   Start Date: March 19, 2022     folic acid 1 MG tablet  Commonly known as: FOLVITE   1 mg, Oral, Daily   Start Date: March 19, 2022     guaiFENesin 600 MG 12 hr tablet  Commonly known as: MUCINEX   600 mg, Oral, Every 12 Hours Scheduled      ipratropium-albuterol 0.5-2.5 mg/3 ml nebulizer  Commonly known as: DUO-NEB   3 mL, Nebulization, Every 4 Hours PRN      loperamide 2 MG capsule  Commonly known as: IMODIUM   2 mg,  Oral, 4 Times Daily PRN, Available over-the-counter.      nystatin 100,000 unit/mL suspension  Commonly known as: MYCOSTATIN   500,000 Units, Swish & Swallow, 4 Times Daily      phenylephrine-mineral oil-petrolatum 0.25-14-74.9 % ointment hemorrhoidal ointment  Commonly known as: PREPARATION H   1 application, Rectal, 3 Times Daily PRN, Available over-the-counter      predniSONE 5 MG tablet  Commonly known as: DELTASONE   Take 15 mg daily for 4 days, then 10 mg daily for 4 days, then 5 mg daily for 4 days, then D/C      spironolactone 25 MG tablet  Commonly known as: Aldactone   25 mg, Oral, Daily         Changes to Medications      Instructions Start Date   furosemide 20 MG tablet  Commonly known as: Lasix  What changed: how much to take   40 mg, Oral, Daily         Continue These Medications      Instructions Start Date   aspirin 81 MG EC tablet   81 mg, Oral, Daily      BREZTRI AEROSPHERE IN   1 puff, Inhalation, 2 Times Daily, Getting Samples      cholecalciferol 10 MCG (400 UNIT) tablet  Commonly known as: VITAMIN D3   400 Units, Oral, Daily      clopidogrel 75 MG tablet  Commonly known as: PLAVIX   75 mg, Oral, Daily      diphenhydrAMINE 25 mg capsule  Commonly known as: BENADRYL   50 mg, Oral, Every Night at Bedtime      estradiol 1 MG tablet  Commonly known as: ESTRACE   3 mg, Oral, Nightly      ferrous sulfate 324 (65 Fe) MG tablet delayed-release EC tablet   324 mg, Oral, Daily With Breakfast      gabapentin 400 MG capsule  Commonly known as: NEURONTIN   400 mg, Oral, 3 Times Daily      HYDROcodone-acetaminophen  MG per tablet  Commonly known as: NORCO   1 tablet, Oral, Every 6 Hours PRN      isosorbide mononitrate 30 MG 24 hr tablet  Commonly known as: IMDUR   30 mg, Oral, Every 24 Hours      levothyroxine 88 MCG tablet  Commonly known as: SYNTHROID, LEVOTHROID   88 mcg, Oral, Daily      LORazepam 1 MG tablet  Commonly known as: ATIVAN   1 mg, Oral, 4 Times Daily PRN      methocarbamol 500 MG  tablet  Commonly known as: ROBAXIN   500 mg, Oral, 3 Times Daily PRN      metoprolol succinate XL 25 MG 24 hr tablet  Commonly known as: TOPROL-XL   12.5 mg, Oral, Daily      nitroglycerin 0.4 MG SL tablet  Commonly known as: Nitrostat   0.4 mg, Sublingual, Every 5 Minutes PRN      pantoprazole 40 MG EC tablet  Commonly known as: Protonix   40 mg, Oral, Daily      ranolazine 1000 MG 12 hr tablet  Commonly known as: RANEXA   1,000 mg, Oral, Every 12 Hours      sertraline 100 MG tablet  Commonly known as: ZOLOFT   100 mg, Oral, Daily      vitamin C 250 MG tablet  Commonly known as: ASCORBIC ACID   250 mg, Oral, Daily         Stop These Medications    lisinopril 2.5 MG tablet  Commonly known as: PRINIVIL,ZESTRIL     Potassium 99 MG tablet     rosuvastatin 10 MG tablet  Commonly known as: CRESTOR            Allergies   Allergen Reactions   • Naprosyn  [Naproxen] Rash   • Unasyn [Ampicillin-Sulbactam Sodium] Arrhythmia     Chest pain, flushed face   • Bactrim [Sulfamethoxazole-Trimethoprim] Rash         [place] [facility] [place]:  Hospital:  Diet Order   Procedures   • Diet Cardiac; Healthy Heart; Fluid Restriction; 2000cc/day         Discharge Activity:   Activity Instructions     Activity as Tolerated              CODE STATUS:  Code Status and Medical Interventions:   Ordered at: 03/06/22 1359     Code Status (Patient has no pulse and is not breathing):    CPR (Attempt to Resuscitate)     Medical Interventions (Patient has pulse or is breathing):    Full Support         Future Appointments   Date Time Provider Department Center   4/29/2022 11:15 AM Saroj Quarles MD [facility]   8/18/2022  1:15 PM Roc Butler DO [facility]       Additional Instructions for the Follow-ups that You Need to Schedule     Ambulatory Referral to Home Health ([facility])   As directed      Face to Face Visit Date: 3/10/2022    Follow-up provider for Plan of Care?: I treated the  patient in an acute care facility and will not continue treatment after discharge.    Follow-up provider: CONCETTA DAI [[id number]]    Reason/Clinical Findings: Tires easily    Describe mobility limitations that make leaving home difficult: Home health to evaluate    Nursing/Therapeutic Services Requested: Other (Home health to evaluate)    Frequency: 1 Week 1         Call MD With Problems / Concerns   As directed      Instructions: Call [phone] or email [email or web address] for problems or concerns.    Order Comments: Instructions: Call [phone] or email [email or web address] for problems or concerns.          Discharge Follow-up with PCP   As directed       Currently Documented PCP:    Concetta Dai MD    PCP Phone Number:    [phone]     Follow Up Details: Follow-up with PCP in 2-3 business days via telehealth if possible               Time spent on Discharge including face to face service: 50 minutes    This patient has been examined wearing appropriate Personal Protective Equipment and discussed with hospital infection control department. 03/18/22      Signature: Electronically signed by Zeina Lehman MD, 03/18/22, 3:40 PM EDT.

## 2022-03-18 NOTE — PLAN OF CARE
Problem: Adult Inpatient Plan of Care  Goal: Plan of Care Review  Outcome: Ongoing, Progressing  Goal: Patient-Specific Goal (Individualized)  Outcome: Ongoing, Progressing  Goal: Absence of Hospital-Acquired Illness or Injury  Outcome: Ongoing, Progressing  Intervention: Identify and Manage Fall Risk  Recent Flowsheet Documentation  Taken 3/18/2022 0500 by Dorene Ferguson RN  Safety Promotion/Fall Prevention:   safety round/check completed   toileting scheduled   room organization consistent   activity supervised   clutter free environment maintained   fall prevention program maintained  Taken 3/18/2022 0300 by Dorene Ferguson RN  Safety Promotion/Fall Prevention:   safety round/check completed   room organization consistent   activity supervised   clutter free environment maintained   fall prevention program maintained   lighting adjusted  Taken 3/18/2022 0100 by Dorene Ferguson RN  Safety Promotion/Fall Prevention:   safety round/check completed   room organization consistent   activity supervised   clutter free environment maintained   fall prevention program maintained  Taken 3/17/2022 2300 by Dorene Ferguson RN  Safety Promotion/Fall Prevention:   safety round/check completed   room organization consistent   activity supervised   clutter free environment maintained   fall prevention program maintained   lighting adjusted  Taken 3/17/2022 2100 by Dorene Ferguson RN  Safety Promotion/Fall Prevention:   safety round/check completed   room organization consistent   activity supervised   clutter free environment maintained   lighting adjusted  Taken 3/17/2022 1940 by Dorene Ferguson RN  Safety Promotion/Fall Prevention:   safety round/check completed   room organization consistent   activity supervised   clutter free environment maintained   fall prevention program maintained   lighting adjusted  Intervention: Prevent and Manage VTE (Venous Thromboembolism) Risk  Recent Flowsheet Documentation  Taken 3/18/2022 0100 by Dorene Ferguson  RN  Activity Management: activity adjusted per tolerance  Taken 3/17/2022 1940 by Dorene Ferguson RN  Activity Management: activity adjusted per tolerance  Intervention: Prevent Infection  Recent Flowsheet Documentation  Taken 3/18/2022 0500 by Dorene Ferguson RN  Infection Prevention:   visitors restricted/screened   single patient room provided   rest/sleep promoted  Taken 3/18/2022 0300 by Dorene Ferguson RN  Infection Prevention:   visitors restricted/screened   single patient room provided   rest/sleep promoted  Taken 3/18/2022 0100 by Dorene Ferguson RN  Infection Prevention:   visitors restricted/screened   single patient room provided   rest/sleep promoted  Taken 3/17/2022 2300 by Dorene Ferguson RN  Infection Prevention:   visitors restricted/screened   single patient room provided   rest/sleep promoted  Taken 3/17/2022 1940 by Dorene Ferguson RN  Infection Prevention:   single patient room provided   visitors restricted/screened   rest/sleep promoted  Goal: Optimal Comfort and Wellbeing  Outcome: Ongoing, Progressing  Goal: Readiness for Transition of Care  Outcome: Ongoing, Progressing     Problem: Behavioral Health Comorbidity  Goal: Maintenance of Behavioral Health Symptom Control  Outcome: Ongoing, Progressing  Intervention: Maintain Behavioral Health Symptom Control  Recent Flowsheet Documentation  Taken 3/18/2022 0500 by Dorene Ferguson RN  Medication Review/Management: medications reviewed  Taken 3/18/2022 0300 by Dorene Ferguson RN  Medication Review/Management: medications reviewed  Taken 3/18/2022 0100 by Dorene Ferguson RN  Medication Review/Management: medications reviewed  Taken 3/17/2022 2300 by Dorene Ferguson RN  Medication Review/Management: medications reviewed  Taken 3/17/2022 2100 by Dorene Ferguson RN  Medication Review/Management: medications reviewed  Taken 3/17/2022 1940 by Dorene Ferguson RN  Medication Review/Management: medications reviewed     Problem: COPD (Chronic Obstructive Pulmonary Disease) Comorbidity  Goal:  Maintenance of COPD Symptom Control  Outcome: Ongoing, Progressing  Intervention: Maintain COPD-Symptom Control  Recent Flowsheet Documentation  Taken 3/18/2022 0500 by Dorene Ferguson RN  Medication Review/Management: medications reviewed  Taken 3/18/2022 0300 by Dorene Ferguson RN  Medication Review/Management: medications reviewed  Taken 3/18/2022 0100 by Dorene Ferguson RN  Medication Review/Management: medications reviewed  Taken 3/17/2022 2300 by Dorene Ferguson RN  Medication Review/Management: medications reviewed  Taken 3/17/2022 2100 by Dorene Ferguson RN  Medication Review/Management: medications reviewed  Taken 3/17/2022 1940 by Dorene Ferguson RN  Medication Review/Management: medications reviewed     Problem: Diabetes Comorbidity  Goal: Blood Glucose Level Within Targeted Range  Outcome: Ongoing, Progressing     Problem: Heart Failure Comorbidity  Goal: Maintenance of Heart Failure Symptom Control  Outcome: Ongoing, Progressing  Intervention: Maintain Heart Failure-Management  Recent Flowsheet Documentation  Taken 3/18/2022 0500 by Dorene Ferguson RN  Medication Review/Management: medications reviewed  Taken 3/18/2022 0300 by Dorene Ferguson RN  Medication Review/Management: medications reviewed  Taken 3/18/2022 0100 by Dorene Ferguson RN  Medication Review/Management: medications reviewed  Taken 3/17/2022 2300 by Dorene Ferguson RN  Medication Review/Management: medications reviewed  Taken 3/17/2022 2100 by Dorene Ferguson RN  Medication Review/Management: medications reviewed  Taken 3/17/2022 1940 by Dorene Ferguson RN  Medication Review/Management: medications reviewed     Problem: Hypertension Comorbidity  Goal: Blood Pressure in Desired Range  Outcome: Ongoing, Progressing  Intervention: Maintain Blood Pressure Management  Recent Flowsheet Documentation  Taken 3/18/2022 0500 by Dorene Ferguson RN  Medication Review/Management: medications reviewed  Taken 3/18/2022 0300 by Dorene Ferguson RN  Medication Review/Management: medications reviewed  Taken  3/18/2022 0100 by Dorene Ferguson RN  Medication Review/Management: medications reviewed  Taken 3/17/2022 2300 by Dorene Ferguson RN  Medication Review/Management: medications reviewed  Taken 3/17/2022 2100 by Dorene Ferguson RN  Medication Review/Management: medications reviewed  Taken 3/17/2022 1940 by Dorene Ferguson RN  Medication Review/Management: medications reviewed     Problem: Fall Injury Risk  Goal: Absence of Fall and Fall-Related Injury  Outcome: Ongoing, Progressing  Intervention: Identify and Manage Contributors  Recent Flowsheet Documentation  Taken 3/18/2022 0500 by Dorene Ferguson RN  Medication Review/Management: medications reviewed  Taken 3/18/2022 0300 by Dorene Ferguson RN  Medication Review/Management: medications reviewed  Taken 3/18/2022 0100 by Dorene Ferguson RN  Medication Review/Management: medications reviewed  Taken 3/17/2022 2300 by Dorene Ferguson RN  Medication Review/Management: medications reviewed  Taken 3/17/2022 2100 by Dorene Ferguson RN  Medication Review/Management: medications reviewed  Taken 3/17/2022 1940 by Dorene Ferguson RN  Medication Review/Management: medications reviewed  Intervention: Promote Injury-Free Environment  Recent Flowsheet Documentation  Taken 3/18/2022 0500 by Dorene Ferguson RN  Safety Promotion/Fall Prevention:   safety round/check completed   toileting scheduled   room organization consistent   activity supervised   clutter free environment maintained   fall prevention program maintained  Taken 3/18/2022 0300 by Dorene Ferguson RN  Safety Promotion/Fall Prevention:   safety round/check completed   room organization consistent   activity supervised   clutter free environment maintained   fall prevention program maintained   lighting adjusted  Taken 3/18/2022 0100 by Dorene Ferguson RN  Safety Promotion/Fall Prevention:   safety round/check completed   room organization consistent   activity supervised   clutter free environment maintained   fall prevention program maintained  Taken 3/17/2022 2300 by  Phyllis, Dorene, RN  Safety Promotion/Fall Prevention:   safety round/check completed   room organization consistent   activity supervised   clutter free environment maintained   fall prevention program maintained   lighting adjusted  Taken 3/17/2022 2100 by Dorene Ferguson RN  Safety Promotion/Fall Prevention:   safety round/check completed   room organization consistent   activity supervised   clutter free environment maintained   lighting adjusted  Taken 3/17/2022 1940 by Dorene Ferguson RN  Safety Promotion/Fall Prevention:   safety round/check completed   room organization consistent   activity supervised   clutter free environment maintained   fall prevention program maintained   lighting adjusted     Problem: Skin Injury Risk Increased  Goal: Skin Health and Integrity  Outcome: Ongoing, Progressing     Problem: Malnutrition  Goal: Improved Nutritional Intake  Outcome: Ongoing, Progressing   Goal Outcome Evaluation:

## 2022-03-18 NOTE — PROGRESS NOTES
"[facility] PROGRESS NOTE     LOS: 12 days    Patient Care Team:  Deonte Hector MD as PCP - General      Subjective     Patient feeling better.  Diarrhea improving  Patient wants to go home desperately    Objective     Vital Sign Min/Max for last 24 hours  Temp:  [97.6 °F (36.4 °C)-98.2 °F (36.8 °C)] 97.6 °F (36.4 °C)  Heart Rate:  [73-85] 75  Resp:  [16-20] 18  BP: (105-136)/(54-74) 132/54                       Flowsheet Rows    Flowsheet Row First Filed Value   Admission Height 165.1 cm (65\") Documented at 03/06/2022 1049   Admission Weight 59.9 kg (132 lb) Documented at 03/06/2022 1049          No intake/output data recorded.  I/O last 3 completed shifts:  In: 120 [P.O.:120]  Out: -     Physical Exam:  Physical Exam    General Appearance: Chronically ill-appearing, NAD  Skin: warm and dry  HEENT: oral mucosa normal, nonicteric sclera  Neck: supple, no JVD  Lungs: Decreased breath sounds at bases.    Heart: RRR, normal S1 and S2  Abdomen: soft, nontender, nondistended  : no palpable bladder  Extremities: no edema, cyanosis or clubbing  Neuro: normal speech and mental status       LABS:  Lab Results   Component Value Date    CALCIUM 9.5 03/18/2022    PHOS 4.3 03/18/2022     Results from last 7 days   Lab Units 03/18/22  0908 03/18/22  0150 03/17/22  0730 03/17/22  0203 03/16/22  0337 03/14/22  0940 03/14/22  0100 03/12/22  1301 03/12/22  0159   MAGNESIUM mg/dL  --   --   --   --   --   --  1.7  --  1.9   SODIUM mmol/L 135*  --  136  --  135*   < > 137   < > 138   POTASSIUM mmol/L 4.2  --  4.0  --  4.9   < > 5.8*   < > 3.7   CHLORIDE mmol/L 97*  --  96*  --  94*   < > 101   < > 102   CO2 mmol/L 23.0  --  24.0  --  28.0   < > 23.0   < > 25.0   BUN mg/dL 53*  --  54*  --  43*   < > 28*   < > 20   CREATININE mg/dL 2.28*  --  2.24*  --  2.11*   < > 1.26*   < > 1.35*   GLUCOSE mg/dL 124*  --  129*  --  109*   < > 154*   < > 112*   CALCIUM mg/dL 9.5  --  9.5  --  9.8   < > 9.2   < > 8.4*   WBC 10*3/mm3  --  " 12.10*  --  13.40* 13.10*   < > 13.30*   < > 12.30*   HEMOGLOBIN g/dL  --  9.7*  --  9.9* 10.3*   < > 8.3*   < > 7.4*   PLATELETS 10*3/mm3  --  301  --  340 343   < > 290   < > 204    < > = values in this interval not displayed.     Lab Results   Component Value Date    TROPONINI 8.820 (C) 01/07/2021    TROPONINT <0.010 03/06/2022     Estimated Creatinine Clearance: 18.5 mL/min (A) (by C-G formula based on SCr of 2.28 mg/dL (H)).      Brief Urine Lab Results     None        WEIGHTS:     Wt Readings from Last 1 Encounters:   03/18/22 0408 55.7 kg (122 lb 13.4 oz)   03/16/22 1916 54.6 kg (120 lb 6.4 oz)   03/15/22 0600 56.3 kg (124 lb 3.2 oz)   03/14/22 0457 57.2 kg (126 lb 1.7 oz)   03/13/22 0600 59.1 kg (130 lb 2.9 oz)   03/13/22 0324 59.1 kg (130 lb 3.2 oz)   03/10/22 0556 57.6 kg (126 lb 14.4 oz)   03/09/22 0430 57.5 kg (126 lb 12.2 oz)   03/06/22 1049 59.9 kg (132 lb)       albuterol sulfate HFA, 2 puff, Inhalation, BID - RT  vitamin C, 250 mg, Oral, Daily  aspirin, 81 mg, Oral, Daily  atorvastatin, 80 mg, Oral, Nightly  cholecalciferol, 500 Units, Oral, Daily  clopidogrel, 75 mg, Oral, Daily  estradiol, 3 mg, Oral, Nightly  ferrous sulfate, 324 mg, Oral, Daily With Breakfast  folic acid, 1 mg, Oral, Daily  gabapentin, 400 mg, Oral, Q8H  guaiFENesin, 600 mg, Oral, Q12H  isosorbide mononitrate, 30 mg, Oral, Q24H  levothyroxine, 88 mcg, Oral, Q AM  metoprolol succinate XL, 25 mg, Oral, Daily  nystatin, 5 mL, Swish & Swallow, 4x Daily  pantoprazole, 40 mg, Oral, Q AM  predniSONE, 20 mg, Oral, Daily  ranolazine, 1,000 mg, Oral, Q12H  sertraline, 100 mg, Oral, Daily  sodium chloride, 10 mL, Intravenous, Q12H  vitamin B-12, 1,000 mcg, Oral, Daily           Assessment/Plan       1.Acute kidney injury.  Mild  Patient's renal function has worsened due to diuresis and diarrhea but now creatinine seems to be plateauing  Electrolytes okay.  Volume status improved  2.  Hypertension  Blood Pressure well controlled  3.    Acute  on chronic respiratory failure.  Pneumonia with underlying COPD  4.  Congestive heart failure.  Systolic.  Chronic  5.  Hyperkalemia. Improved     Recs:  Patient's creatinine seems to be plateauing diarrhea has resolved  Okay to start diuretics in form of Lasix 40 mg daily and spironolactone 25 mg daily  Follow-up with me in 1 to 2 weeks on discharge      Irving Yi MD  03/18/22  14:07 EDT

## 2022-03-18 NOTE — PROGRESS NOTES
"PULMONARY CRITICAL CARE [facility]  NOTE      PATIENT IDENTIFICATION:  Name: Gayathri Reddy  MRN: [id number]  :  1945     Age: 76 y.o.  Sex: female    DATE OF Note:  3/18/2022   Referring Physician: Zeina Lehman MD                  Subjective:   Feeling better, decreased O2 to 2 L,  no chest or abd  pain, no bowel or bladder issues reported    Objective:  tMax 24 hrs: Temp (24hrs), Av.9 °F (36.6 °C), Min:97.5 °F (36.4 °C), Max:98.2 °F (36.8 °C)      Vitals Ranges:   Temp:  [97.5 °F (36.4 °C)-98.2 °F (36.8 °C)] 97.6 °F (36.4 °C)  Heart Rate:  [73-85] 84  Resp:  [16-20] 18  BP: (105-136)/(56-74) 136/63    Intake and Output Last 3 Shifts:   I/O last 3 completed shifts:  In: 120 [P.O.:120]  Out: -     Exam:  /63 (BP Location: Left arm, Patient Position: Lying)   Pulse 84   Temp 97.6 °F (36.4 °C) (Oral)   Resp 18   Ht 165.1 cm (65\")   Wt 55.7 kg (122 lb 13.4 oz)   SpO2 98%   BMI 20.44 kg/m²     General Appearance: Alert  HEENT:  Normocephalic, without obvious abnormality, Conjunctiva/corneas clear,.  Normal external ear canals, Nares normal, no drainage     Neck:  Supple, symmetrical, trachea midline. No JVD.  Lungs /Chest wall: Bilateral basal rhonchi, respirations unlabored symmetrical wall movement.     Heart:  Regular rate and rhythm, systolic murmur PMI left sternal border  Abdomen: Soft, non-tender, no masses, no organomegaly.    Extremities: Trace edema no clubbing or Cyanosis        Medications:    Current Facility-Administered Medications:   •  acetaminophen (TYLENOL) tablet 650 mg, 650 mg, Oral, Q6H PRN, Laure Mei, APRN, 650 mg at 22 0050  •  albuterol sulfate HFA (PROVENTIL HFA;VENTOLIN HFA;PROAIR HFA) inhaler 2 puff, 2 puff, Inhalation, BID - RT, Zeina Lehman MD, 2 puff at 22 0804  •  ascorbic acid (VITAMIN C) tablet 250 mg, 250 mg, Oral, Daily, Laure Mei APRN, 250 mg at 22 0840  •  aspirin EC tablet 81 mg, 81 mg, Oral, Daily, Sigifredo, " RADHA Carter, 81 mg at 03/18/22 0840  •  atorvastatin (LIPITOR) tablet 80 mg, 80 mg, Oral, Nightly, Laure Mei APRN, 80 mg at 03/17/22 1947  •  cholecalciferol (VITAMIN D3) tablet 500 Units, 500 Units, Oral, Daily, Laure Mei APRN, 500 Units at 03/18/22 0840  •  clopidogrel (PLAVIX) tablet 75 mg, 75 mg, Oral, Daily, Laure Mei APRN, 75 mg at 03/18/22 0841  •  diphenhydrAMINE (BENADRYL) capsule 50 mg, 50 mg, Oral, Nightly PRN, Rosibel Pisano APRN, 50 mg at 03/17/22 2139  •  estradiol (ESTRACE) tablet 3 mg, 3 mg, Oral, Nightly, Laure Mei APRN, 3 mg at 03/17/22 1947  •  ferrous sulfate EC tablet 324 mg, 324 mg, Oral, Daily With Breakfast, Laure Mei APRN, 324 mg at 03/18/22 0841  •  folic acid (FOLVITE) tablet 1 mg, 1 mg, Oral, Daily, Deonte Hartley MD, 1 mg at 03/18/22 0840  •  gabapentin (NEURONTIN) capsule 400 mg, 400 mg, Oral, Q8H, Laure Mei APRN, 400 mg at 03/18/22 0614  •  guaiFENesin (MUCINEX) 12 hr tablet 600 mg, 600 mg, Oral, Q12H, Lilliana Del Rosario APRN, 600 mg at 03/18/22 0840  •  HYDROcodone-acetaminophen (NORCO)  MG per tablet 1 tablet, 1 tablet, Oral, Q6H PRN, Laure Mei APRN, 1 tablet at 03/18/22 0848  •  ipratropium-albuterol (DUO-NEB) nebulizer solution 3 mL, 3 mL, Nebulization, Q4H PRN, Zeina Lehman MD  •  isosorbide mononitrate (IMDUR) 24 hr tablet 30 mg, 30 mg, Oral, Q24H, Laure Mei APRN, 30 mg at 03/17/22 1225  •  levothyroxine (SYNTHROID, LEVOTHROID) tablet 88 mcg, 88 mcg, Oral, Q AM, Laure Mei APRN, 88 mcg at 03/18/22 0614  •  loperamide (IMODIUM) capsule 2 mg, 2 mg, Oral, 4x Daily PRN, Sky Reeder MD, 2 mg at 03/18/22 0338  •  LORazepam (ATIVAN) tablet 1 mg, 1 mg, Oral, Q6H PRN, Luare Mei APRN, 1 mg at 03/18/22 0340  •  Magnesium Sulfate 2 gram Bolus, followed by 8 gram infusion (total Mg dose 10 grams)- Mg less than or  equal to 1mg/dL, 2 g, Intravenous, PRN **OR** Magnesium Sulfate 2 gram / 50mL Infusion (GIVE X 3 BAGS TO EQUAL 6GM TOTAL DOSE) - Mg 1.1 - 1.5 mg/dl, 2 g, Intravenous, PRN, Last Rate: 25 mL/hr at 03/10/22 1815, 2 g at 03/10/22 1815 **OR** Magnesium Sulfate 4 gram infusion- Mg 1.6-1.9 mg/dL, 4 g, Intravenous, PRN, Alix Bernardo APRN  •  methocarbamol (ROBAXIN) tablet 500 mg, 500 mg, Oral, TID PRN, Laure Mei APRN, 500 mg at 03/16/22 2109  •  metoprolol succinate XL (TOPROL-XL) 24 hr tablet 25 mg, 25 mg, Oral, Daily, Alix Bernardo APRN, 25 mg at 03/18/22 0840  •  nitroglycerin (NITROSTAT) SL tablet 0.4 mg, 0.4 mg, Sublingual, Q5 Min PRN, Laure Mei APRN  •  nystatin (MYCOSTATIN) 100,000 unit/mL suspension 500,000 Units, 5 mL, Swish & Swallow, 4x Daily, Andres Wheat MD, 500,000 Units at 03/15/22 1347  •  ondansetron (ZOFRAN) tablet 4 mg, 4 mg, Oral, Q6H PRN, 4 mg at 03/11/22 0259 **OR** ondansetron (ZOFRAN) injection 4 mg, 4 mg, Intravenous, Q6H PRN, Laure Mei APRN, 4 mg at 03/17/22 0610  •  pantoprazole (PROTONIX) EC tablet 40 mg, 40 mg, Oral, Q AM, Laure Mei APRN, 40 mg at 03/18/22 0614  •  phenylephrine-mineral oil-petrolatum (PREPARATION H) 0.25-14-74.9 % hemorhoidal ointment, , Rectal, TID PRN, AlsopRosibel APRN, Given at 03/09/22 0408  •  predniSONE (DELTASONE) tablet 20 mg, 20 mg, Oral, Daily, Andres Wheat MD, 20 mg at 03/18/22 0840  •  ranolazine (RANEXA) 12 hr tablet 1,000 mg, 1,000 mg, Oral, Q12H, Laure Mei APRN, 1,000 mg at 03/18/22 0842  •  sertraline (ZOLOFT) tablet 100 mg, 100 mg, Oral, Daily, Laure Mei APRN, 100 mg at 03/18/22 0841  •  sodium chloride 0.9 % flush 10 mL, 10 mL, Intravenous, PRN, Laure Mei APRN  •  sodium chloride 0.9 % flush 10 mL, 10 mL, Intravenous, Q12H, Laure Mei APRN, 10 mL at 03/18/22 0842  •  sodium chloride 0.9 % flush 10 mL, 10 mL, Intravenous,  Sigifredo DEMPSEY Karyn Michele, APRN  •  vitamin B-12 (CYANOCOBALAMIN) tablet 1,000 mcg, 1,000 mcg, Oral, Daily, Deonte Hartley MD, 1,000 mcg at 03/18/22 0841    Data Review:  All labs (24hrs):   Recent Results (from the past 24 hour(s))   CBC Auto Differential    Collection Time: 03/18/22  1:50 AM    Specimen: Blood   Result Value Ref Range    WBC 12.10 (H) 3.40 - 10.80 10*3/mm3    RBC 2.99 (L) 3.77 - 5.28 10*6/mm3    Hemoglobin 9.7 (L) 12.0 - 15.9 g/dL    Hematocrit 29.1 (L) 34.0 - 46.6 %    MCV 97.6 (H) 79.0 - 97.0 fL    MCH 32.4 26.6 - 33.0 pg    MCHC 33.2 31.5 - 35.7 g/dL    RDW 12.9 12.3 - 15.4 %    RDW-SD 44.2 37.0 - 54.0 fl    MPV 8.1 6.0 - 12.0 fL    Platelets 301 140 - 450 10*3/mm3    Neutrophil % 71.4 42.7 - 76.0 %    Lymphocyte % 18.6 (L) 19.6 - 45.3 %    Monocyte % 7.6 5.0 - 12.0 %    Eosinophil % 1.2 0.3 - 6.2 %    Basophil % 1.2 0.0 - 1.5 %    Neutrophils, Absolute 8.60 (H) 1.70 - 7.00 10*3/mm3    Lymphocytes, Absolute 2.30 0.70 - 3.10 10*3/mm3    Monocytes, Absolute 0.90 0.10 - 0.90 10*3/mm3    Eosinophils, Absolute 0.10 0.00 - 0.40 10*3/mm3    Basophils, Absolute 0.10 0.00 - 0.20 10*3/mm3    nRBC 0.1 0.0 - 0.2 /100 WBC        Imaging:  XR Chest 1 View  Narrative: EXAM: XR CHEST 1 VW-     DATE OF EXAM: 3/16/2022 1:52 AM     INDICATION: Shortness of breath; J18.9-Pneumonia, unspecified organism;  R06.02-Shortness of breath; I50.9-Heart failure, unspecified;  D63.8-Anemia in other chronic diseases classified elsewhere;  I42.0-Dilated cardiomyopathy.       COMPARISONS: 03/15/2022      FINDINGS:     Ill-defined airspace opacities in the lower lobes are unchanged.     Overall stable appearance of the chest. No pneumothorax or pleural  effusion. Mediastinal shadows are unchanged.     Impression:    Stable chest over the past 24 hours, unchanged bilateral lower lobe  pneumonia.     Pulmonary nodule left upper lobe redemonstrated, unchanged measuring 1.4  cm. Follow-up to ensure resolution is  recommended.     Electronically Signed By-Joaquín Hernandes On:3/16/2022 7:51 AM  This report was finalized on [id number] by  Joaquín Hernandes, .       ASSESSMENT:    Pneumonia of both lungs due to infectious organism, unspecified part of lung    Abdominal aortic aneurysm (AAA) (HCC)    Chronic obstructive pulmonary disease (HCC)    Congestive heart failure (HCC)    Coronary artery disease    Dyslipidemia    Essential hypertension    Anemia of chronic disease    Diarrhea    Mixed hyperlipidemia    Chronic stable angina (HCC)    Bilateral carotid artery stenosis    Gastroesophageal reflux disease    Iron deficiency anemia    Cardiomyopathy, dilated (HCC)    Ischemic cardiomyopathy    Moderate malnutrition (CMS/HCC)    Hypothyroidism    Vitamin B12 deficiency    Folic acid deficiency     PLAN:  PT/OT  Continue supplements, intake up to 75% of meals   Wean O2 off  Continue to wean down prednisone slowly  Bronchodilator  Inhaled corticosteroids  Electrolytes/ glycemic control  DVT and GI prophylaxis.    Discussed with Dr Noe Marmolejo, APRN   3/18/2022  09:58 EDT     I personally have examined  and interviewed the patient. I have reviewed the history, data, problems, assessment and plan with our NP.  Critical care time in direct medical management (   ) minutes  Electronically signed by Joanie Liu MD. D, ABSM.

## 2022-03-19 ENCOUNTER — READMISSION MANAGEMENT (OUTPATIENT)
Dept: CALL CENTER | Facility: HOSPITAL | Age: 77
End: 2022-03-19

## 2022-03-19 NOTE — OUTREACH NOTE
Prep Survey    Flowsheet Row Responses   [facility] patient discharged from? [facility]   Is LACE score < 7 ? No   Emergency Room discharge w/ pulse ox? No   Eligibility Readm Mgmt   Discharge diagnosis Pneumonia of both lungs due to infectious organism, unspecified part of lung    Does the patient have one of the following disease processes/diagnoses(primary or secondary)? COPD/Pneumonia   Does the patient have Home health ordered? Yes   What is the Home health agency?  [facility]    Is there a DME ordered? No   Prep survey completed? Yes          MARTINE CAI - Registered Nurse

## 2022-03-20 ENCOUNTER — HOME CARE VISIT (OUTPATIENT)
Dept: HOME HEALTH SERVICES | Facility: HOME HEALTHCARE | Age: 77
End: 2022-03-20

## 2022-03-20 PROCEDURE — G0299 HHS/HOSPICE OF RN EA 15 MIN: HCPCS

## 2022-03-21 ENCOUNTER — HOME CARE VISIT (OUTPATIENT)
Dept: HOME HEALTH SERVICES | Facility: HOME HEALTHCARE | Age: 77
End: 2022-03-21

## 2022-03-21 VITALS
RESPIRATION RATE: 20 BRPM | DIASTOLIC BLOOD PRESSURE: 52 MMHG | TEMPERATURE: 97.9 F | HEART RATE: 76 BPM | BODY MASS INDEX: 20.53 KG/M2 | SYSTOLIC BLOOD PRESSURE: 100 MMHG | OXYGEN SATURATION: 99 % | WEIGHT: 120.25 LBS | HEIGHT: 64 IN

## 2022-03-21 NOTE — CASE COMMUNICATION
MORRO Hector MD   Pt having issues with orthostatic hypotension fell x 3 on Saturday pt and son reported. On admission to Home Health Care Blood pressure lying 120/50 sitting 100/52 stood pt to get became dizzy unable to hear so sat pt back down. Will fax current medication sheet to you to assess meds is on several cardiac meds Please advise of any med changes that pt may need to make. Thank You

## 2022-03-21 NOTE — CASE MANAGEMENT/SOCIAL WORK
Case Management Discharge Note      Final Note: [facility]         Selected Continued Care - Discharged on 3/18/2022 Admission date: 3/6/2022 - [place] [facility] [place] Disposition Code: 06 - home with home health care

## 2022-03-21 NOTE — HOME HEALTH
76 year old female lives alone in home with 3 steps to enter railing present. Admitted to hospital due to abd. pain found to have pneumonia. Oxygen dependent for several years. Lives alone but son with her today reports that she had 3 falls yesterday and he came spent night to help her. Blood pressure dropping when sits and stands both. Discussed will fax MD med sheets to see if any meds can be changed on several cardiac meds that can affect blood pressure pt in agreement.Denies pain with deep breathing no more short of breath than normal pt reported. Rev. low salt diet and fluid restriction to 2000cc per 24 hours. Inst. how to measure 1 cup equals 240cc so can have 8 cups per day to make close to 2000cc voiced understanding. Reports has had vaccines flu pneumonia and covid x 3 Pfizer. No edema pulses intact. BSC sitting by bed for pt to use. stood pt to weigh after short time started to become dizzy assisted back to bed. Pain in back and left leg sharp in nature helped with pain medication. Agreeable to PT eval and HHA to assist with personal hygiene until issues with dizziness improve. Inst. that blood work to be done toward end of week will be faxed to MDs. in agreement. B/P lying 120/50 sitting dropped to 100/52 standing became to dizzy to obtain.   Hx CAD CABG x 4 COPD Pneumonia HF Anemia Hypoxia oxygen dependent Hx smoking Main focus of care cardiopulmonary status related to pneumonia     assess blood pressure lying sitting standing    assess for falls    Assess cardiopulmonary status     assess pain

## 2022-03-22 ENCOUNTER — HOME CARE VISIT (OUTPATIENT)
Dept: HOME HEALTH SERVICES | Facility: HOME HEALTHCARE | Age: 77
End: 2022-03-22

## 2022-03-22 VITALS
HEART RATE: 68 BPM | BODY MASS INDEX: 20.49 KG/M2 | TEMPERATURE: 98.3 F | RESPIRATION RATE: 17 BRPM | WEIGHT: 120 LBS | DIASTOLIC BLOOD PRESSURE: 54 MMHG | HEIGHT: 64 IN | OXYGEN SATURATION: 93 % | SYSTOLIC BLOOD PRESSURE: 78 MMHG

## 2022-03-22 PROCEDURE — G0151 HHCP-SERV OF PT,EA 15 MIN: HCPCS

## 2022-03-22 PROCEDURE — G0299 HHS/HOSPICE OF RN EA 15 MIN: HCPCS

## 2022-03-22 RX ORDER — MIDODRINE HYDROCHLORIDE 10 MG/1
10 TABLET ORAL
Qty: 270 TABLET | Refills: 3 | Status: SHIPPED | OUTPATIENT
Start: 2022-03-22

## 2022-03-23 ENCOUNTER — HOME CARE VISIT (OUTPATIENT)
Dept: HOME HEALTH SERVICES | Facility: HOME HEALTHCARE | Age: 77
End: 2022-03-23

## 2022-03-23 ENCOUNTER — READMISSION MANAGEMENT (OUTPATIENT)
Dept: CALL CENTER | Facility: HOSPITAL | Age: 77
End: 2022-03-23

## 2022-03-23 VITALS
SYSTOLIC BLOOD PRESSURE: 132 MMHG | DIASTOLIC BLOOD PRESSURE: 58 MMHG | TEMPERATURE: 98.3 F | OXYGEN SATURATION: 100 % | RESPIRATION RATE: 18 BRPM | HEART RATE: 77 BPM

## 2022-03-23 PROCEDURE — G0299 HHS/HOSPICE OF RN EA 15 MIN: HCPCS

## 2022-03-23 NOTE — OUTREACH NOTE
COPD/PN Week 1 Survey    Flowsheet Row Responses   [facility] [place] patient discharged from? [facility]   Does the patient have one of the following disease processes/diagnoses(primary or secondary)? COPD/Pneumonia   Was the primary reason for admission: Pneumonia   Week 1 attempt successful? Yes   Call end time 1542   Discharge diagnosis Pneumonia of both lungs due to infectious organism, unspecified part of lung    Meds reviewed with patient/caregiver? Yes   Is the patient having any side effects they believe may be caused by any medication additions or changes? No   Does the patient have all medications ordered at discharge? Yes   Is the patient taking all medications as directed (includes completed medication regime)? Yes   Does the patient have a primary care provider?  Yes   Does the patient have an appointment with their PCP or specialist within 7 days of discharge? No   What is preventing the patient from scheduling follow up appointments within 7 days of discharge? Haven't had time   Nursing Interventions Advised patient to make appointment   Has the patient kept scheduled appointments due by today? N/A   What is the Home health agency?  [facility]    Has home health visited the patient within 72 hours of discharge? Yes   Pulse Ox monitoring Intermittent   Pulse Ox device source Other   O2 Sat comments Home health checked today and it was 100%   O2 Sat: education provided Sat levels, When to seek care   Psychosocial issues? No   Did the patient receive a copy of their discharge instructions? Yes   Nursing interventions Reviewed instructions with patient   What is the patient's perception of their health status since discharge? New symptoms unrelated to diagnosis   Nursing Interventions Nurse provided patient education   If the patient is a current smoker, are they able to teach back resources for cessation? Not a smoker   Is the patient/caregiver able to teach back the hierarchy of who to call/visit for  "symptoms/problems? PCP, Specialist, Home health nurse, Urgent Care, ED, 911 Yes   Additional teach back comments States she is doing \"ok\".  Her bp has been running low 84/45 and 91/54.  Today it was 117/58 and her home health nurse was contacting her PCP regarding this.  She is drinking fluids.  States she has fallen x3 Sat and her son is now staying with her.  She states she hit her head and has headache.  No nausea, vomitting or vision changes.  Advised to have checked.  She is using 3L of oxygen.   Is the patient able to teach back COPD zones? Yes   Nursing interventions Education provided on various zones   Patient reports what zone on this call? Green Zone   Green Zone Reports doing well, Breathing without shortness of breath, Appetite is good, Sleeping well   Green Zone interventions: Take daily medications, Use oxygen as prescribed   Is the patient/caregiver able to teach back signs and symptoms of worsening condition: Fever/chills, Chest pain, Shortness of breath   Is the patient/caregiver able to teach back importance of completing antibiotic course of treatment? Yes   Week 1 call completed? Yes   Wrap up additional comments Home health is contacting PCP regarding symptoms per pt.          GERHARD CHEN - Licensed Nurse  "

## 2022-03-24 ENCOUNTER — HOME CARE VISIT (OUTPATIENT)
Dept: HOME HEALTH SERVICES | Facility: HOME HEALTHCARE | Age: 77
End: 2022-03-24

## 2022-03-24 VITALS
TEMPERATURE: 97.6 F | OXYGEN SATURATION: 99 % | HEART RATE: 66 BPM | DIASTOLIC BLOOD PRESSURE: 50 MMHG | SYSTOLIC BLOOD PRESSURE: 80 MMHG | RESPIRATION RATE: 18 BRPM

## 2022-03-24 NOTE — HOME HEALTH
Ms. Reddy is a pleasant 76-year old patient who was hospitalized from 3/6/22 through 3/18/22 with diagnosis of penumonia both lungs. Patient lives alone but has been having low blood pressure with fall incidents since discharge. Son is staying with her and RN is working with PCP to normalize BP. Patient was independent in indoor ambulation without assistive device but needed assistance of a family member to leave home.    Patient presents with generalized weakenss with 4/5 gross strength. Her BP in sitting is 80/50 but denied dizziness. Sit to stand was not attempt due to recent history of falls related to hypotension. Son is staying with her at all times and able to tolerate lateral transfer from transport chair to/from bed, recliner chair and toilet seat. Patient would benefit from further PT servics when her BP is normalized. Patient and son verbalized good understanding.

## 2022-03-29 ENCOUNTER — HOME CARE VISIT (OUTPATIENT)
Dept: HOME HEALTH SERVICES | Facility: HOME HEALTHCARE | Age: 77
End: 2022-03-29

## 2022-03-30 ENCOUNTER — HOME CARE VISIT (OUTPATIENT)
Dept: HOME HEALTH SERVICES | Facility: HOME HEALTHCARE | Age: 77
End: 2022-03-30

## 2022-03-30 VITALS
BODY MASS INDEX: 21.46 KG/M2 | WEIGHT: 125 LBS | OXYGEN SATURATION: 99 % | TEMPERATURE: 96.6 F | SYSTOLIC BLOOD PRESSURE: 102 MMHG | DIASTOLIC BLOOD PRESSURE: 58 MMHG | RESPIRATION RATE: 18 BRPM | HEART RATE: 70 BPM

## 2022-03-30 PROCEDURE — G0299 HHS/HOSPICE OF RN EA 15 MIN: HCPCS

## 2022-03-31 ENCOUNTER — READMISSION MANAGEMENT (OUTPATIENT)
Dept: CALL CENTER | Facility: HOSPITAL | Age: 77
End: 2022-03-31

## 2022-03-31 ENCOUNTER — HOME CARE VISIT (OUTPATIENT)
Dept: HOME HEALTH SERVICES | Facility: HOME HEALTHCARE | Age: 77
End: 2022-03-31

## 2022-03-31 PROCEDURE — G0157 HHC PT ASSISTANT EA 15: HCPCS

## 2022-03-31 NOTE — OUTREACH NOTE
COPD/PN Week 2 Survey    Flowsheet Row Responses   [facility] patient discharged from? [facility]   Does the patient have one of the following disease processes/diagnoses(primary or secondary)? COPD/Pneumonia   Was the primary reason for admission: Pneumonia   Week 2 attempt successful? Yes   Call start time 1609   Call end time 1612   Discharge diagnosis Pneumonia of both lungs due to infectious organism, unspecified part of lung    Meds reviewed with patient/caregiver? Yes   Is the patient having any side effects they believe may be caused by any medication additions or changes? No   Does the patient have all medications ordered at discharge? Yes   Is the patient taking all medications as directed (includes completed medication regime)? Yes   Does the patient have a primary care provider?  Yes   Does the patient have an appointment with their PCP or specialist within 7 days of discharge? No   What is preventing the patient from scheduling follow up appointments within 7 days of discharge? Haven't had time   Nursing Interventions Advised patient to make appointment   Has the patient kept scheduled appointments due by today? N/A   What is the Home health agency?  [facility]    Has home health visited the patient within 72 hours of discharge? Yes   Home health comments Patient had therapy today.    Psychosocial issues? No   Did the patient receive a copy of their discharge instructions? Yes   Nursing interventions Reviewed instructions with patient   What is the patient's perception of their health status since discharge? Improving   Nursing Interventions Nurse provided patient education   If the patient is a current smoker, are they able to teach back resources for cessation? Not a smoker   Is the patient/caregiver able to teach back the hierarchy of who to call/visit for symptoms/problems? PCP, Specialist, Home health nurse, Urgent Care, ED, 911 Yes   Is the patient/caregiver able to teach back signs and symptoms of  worsening condition: Fever/chills, Shortness of breath, Chest pain   Is the patient/caregiver able to teach back importance of completing antibiotic course of treatment? Yes   Week 2 call completed? Yes   Wrap up additional comments Patient reports she is doing better. Had therapy today. Blood pressure is much improved. No questions or concerns.           DOV CAI - Registered Nurse

## 2022-04-01 VITALS
HEART RATE: 78 BPM | SYSTOLIC BLOOD PRESSURE: 122 MMHG | TEMPERATURE: 96.9 F | DIASTOLIC BLOOD PRESSURE: 80 MMHG | OXYGEN SATURATION: 96 %

## 2022-04-06 ENCOUNTER — HOME CARE VISIT (OUTPATIENT)
Dept: HOME HEALTH SERVICES | Facility: HOME HEALTHCARE | Age: 77
End: 2022-04-06

## 2022-04-06 PROCEDURE — G0299 HHS/HOSPICE OF RN EA 15 MIN: HCPCS

## 2022-04-07 ENCOUNTER — HOME CARE VISIT (OUTPATIENT)
Dept: HOME HEALTH SERVICES | Facility: HOME HEALTHCARE | Age: 77
End: 2022-04-07

## 2022-04-07 VITALS
SYSTOLIC BLOOD PRESSURE: 138 MMHG | TEMPERATURE: 98.8 F | HEART RATE: 73 BPM | RESPIRATION RATE: 17 BRPM | DIASTOLIC BLOOD PRESSURE: 60 MMHG | OXYGEN SATURATION: 98 %

## 2022-04-07 PROCEDURE — G0151 HHCP-SERV OF PT,EA 15 MIN: HCPCS

## 2022-04-07 NOTE — HOME HEALTH
medication education  cardiopulmonary assessment  fall prevention education  discharge if no further needs

## 2022-04-08 ENCOUNTER — READMISSION MANAGEMENT (OUTPATIENT)
Dept: CALL CENTER | Facility: HOSPITAL | Age: 77
End: 2022-04-08

## 2022-04-08 NOTE — OUTREACH NOTE
COPD/PN Week 3 Survey    Flowsheet Row Responses   [facility] patient discharged from? [facility]   Does the patient have one of the following disease processes/diagnoses(primary or secondary)? COPD/Pneumonia   Was the primary reason for admission: Pneumonia   Week 3 attempt successful? Yes   Call start time 1510   Call end time 1512   Discharge diagnosis Pneumonia of both lungs due to infectious organism, unspecified part of lung    Meds reviewed with patient/caregiver? Yes   Is the patient having any side effects they believe may be caused by any medication additions or changes? No   Does the patient have all medications ordered at discharge? Yes   Is the patient taking all medications as directed (includes completed medication regime)? Yes   Does the patient have a primary care provider?  Yes   Comments regarding PCP PATIENT HAS FOLLOW UP APPOINTMENT WITH DR. DAI NEXT WEEK.    Has the patient kept scheduled appointments due by today? Yes   What is the Home health agency?  [facility]    Has home health visited the patient within 72 hours of discharge? Yes   DME comments had home O2 prior to this admit   Pulse Ox monitoring Intermittent   Pulse Ox device source Other   O2 Sat comments O2 sat 98% on 3L   O2 Sat: education provided Sat levels, Monitoring frequency, When to seek care   Psychosocial issues? No   Did the patient receive a copy of their discharge instructions? Yes   Nursing interventions Reviewed instructions with patient   What is the patient's perception of their health status since discharge? Improving   Nursing Interventions Nurse provided patient education   Are the patient's immunizations up to date?  Yes   If the patient is a current smoker, are they able to teach back resources for cessation? Not a smoker   Is the patient/caregiver able to teach back signs and symptoms of worsening condition: Chest pain, Shortness of breath, Fever/chills   Is the patient/caregiver able to teach back importance of  completing antibiotic course of treatment? Yes   Week 3 call completed? Yes          KAHLIL TANNER - Registered Nurse

## 2022-04-10 VITALS
RESPIRATION RATE: 18 BRPM | OXYGEN SATURATION: 99 % | DIASTOLIC BLOOD PRESSURE: 56 MMHG | SYSTOLIC BLOOD PRESSURE: 120 MMHG | TEMPERATURE: 98.6 F | HEART RATE: 66 BPM

## 2022-04-10 NOTE — HOME HEALTH
Patient denied dizziness and showing good progress in transfer and household ambulation without assistive device

## 2022-04-12 ENCOUNTER — HOME CARE VISIT (OUTPATIENT)
Dept: HOME HEALTH SERVICES | Facility: HOME HEALTHCARE | Age: 77
End: 2022-04-12

## 2022-04-12 VITALS
OXYGEN SATURATION: 97 % | SYSTOLIC BLOOD PRESSURE: 100 MMHG | TEMPERATURE: 98.4 F | RESPIRATION RATE: 19 BRPM | DIASTOLIC BLOOD PRESSURE: 58 MMHG | HEART RATE: 86 BPM

## 2022-04-12 PROCEDURE — G0299 HHS/HOSPICE OF RN EA 15 MIN: HCPCS

## 2022-04-14 ENCOUNTER — HOME CARE VISIT (OUTPATIENT)
Dept: HOME HEALTH SERVICES | Facility: HOME HEALTHCARE | Age: 77
End: 2022-04-14

## 2022-04-15 ENCOUNTER — HOME CARE VISIT (OUTPATIENT)
Dept: HOME HEALTH SERVICES | Facility: HOME HEALTHCARE | Age: 77
End: 2022-04-15

## 2022-04-18 ENCOUNTER — HOME CARE VISIT (OUTPATIENT)
Dept: HOME HEALTH SERVICES | Facility: HOME HEALTHCARE | Age: 77
End: 2022-04-18

## 2025-04-09 NOTE — PROGRESS NOTES
I would recommend starting a nasal spray.  I sent for Dymista to your pharmacy, which is a combination of fluticasone and azelastine nasal sprays.  If this is not covered, fluticasone and azelastine are available as separate prescriptions and these are also available over-the-counter    Prior to applying the nasal spray, you can do daily rinses with nasal saline.  If your symptoms are still not improved, you can add on an oral antihistamine in addition to the nasal spray such as Zyrtec, Claritin, Allegra, or Xyzal   Nutrition Services    Patient Name: Gayathri Reddy  YOB: 1945  MRN: [id number]  Admission date: 3/6/2022    PPE Documentation        PPE Worn By Provider Did not enter room for this encounter   PPE Worn By Patient  N/A     PROGRESS NOTE      Encounter Information: 3/14: Progress note to monitor po intake. Variable intakes are documented. Continue supplement.       PO Diet: Diet Cardiac, Diabetic/Consistent Carbs; Healthy Heart; Diabetic - Consistent Carb; Fluid Restriction; 2000cc/day   PO Supplements: Magic Cup daily   PO Intake:  64% average       Nutrition support orders:    Nutrition support review:        Labs (reviewed below): BUN/Creat elev        GI Function:  BM 3/13       Nutrition Intervention: Continue current diet with Magic Cup supplement daily       Results from last 7 days   Lab Units 03/14/22  0940 03/14/22  0100 03/13/22  0815   SODIUM mmol/L 137 137 140   POTASSIUM mmol/L 5.1 5.8* 4.8   CHLORIDE mmol/L 100 101 104   CO2 mmol/L 26.0 23.0 24.0   BUN mg/dL 31* 28* 22   CREATININE mg/dL 1.35* 1.26* 1.27*   CALCIUM mg/dL 9.4 9.2 9.2   GLUCOSE mg/dL 137* 154* 98     Results from last 7 days   Lab Units 03/14/22  0100 03/12/22  1301 03/12/22  0159 03/10/22  2338   MAGNESIUM mg/dL 1.7  --  1.9 2.9*   PHOSPHORUS mg/dL 3.6   < > 2.9 3.1   HEMOGLOBIN g/dL 8.3*   < > 7.4* 8.2*   HEMATOCRIT % 25.2*   < > 21.9* 24.2*    < > = values in this interval not displayed.     COVID19   Date Value Ref Range Status   03/06/2022 Not Detected Not Detected - Ref. Range Final     Lab Results   Component Value Date    HGBA1C 5.4 01/07/2021         RD to follow up per protocol.    Electronically signed by:  Radha Jimenez RD  03/14/22 11:21 EDT     Pain Refusal Text: I offered to prescribe pain medication but the patient refused to take this medication.